# Patient Record
Sex: FEMALE | Race: WHITE | Employment: OTHER | URBAN - METROPOLITAN AREA
[De-identification: names, ages, dates, MRNs, and addresses within clinical notes are randomized per-mention and may not be internally consistent; named-entity substitution may affect disease eponyms.]

---

## 2017-11-21 ENCOUNTER — HOSPITAL ENCOUNTER (OUTPATIENT)
Dept: OTHER | Age: 62
Discharge: HOME | End: 2017-11-21
Attending: INTERNAL MEDICINE | Admitting: INTERNAL MEDICINE

## 2017-11-21 DIAGNOSIS — R07.89 OTHER CHEST PAIN: ICD-10-CM

## 2017-11-21 LAB — TSH SERPL DL<=0.05 MIU/L-ACNC: 2.21 UIU/ML (ref 0.27–4.2)

## 2018-02-22 ENCOUNTER — OFFICE VISIT (OUTPATIENT)
Dept: SURGERY | Age: 63
End: 2018-02-22

## 2018-02-22 VITALS — WEIGHT: 144 LBS | BODY MASS INDEX: 24.72 KG/M2 | SYSTOLIC BLOOD PRESSURE: 140 MMHG | DIASTOLIC BLOOD PRESSURE: 68 MMHG

## 2018-02-22 DIAGNOSIS — K27.5 PERFORATED ULCER (HCC): Primary | ICD-10-CM

## 2018-02-22 PROCEDURE — 99024 POSTOP FOLLOW-UP VISIT: CPT | Performed by: SURGERY

## 2018-02-22 RX ORDER — HYDROCODONE BITARTRATE AND ACETAMINOPHEN 5; 325 MG/1; MG/1
1 TABLET ORAL EVERY 6 HOURS PRN
Qty: 12 TABLET | Refills: 0 | Status: SHIPPED | OUTPATIENT
Start: 2018-02-22 | End: 2018-02-25

## 2018-02-27 ENCOUNTER — OFFICE VISIT (OUTPATIENT)
Dept: SURGERY | Age: 63
End: 2018-02-27

## 2018-02-27 VITALS — BODY MASS INDEX: 23.69 KG/M2 | DIASTOLIC BLOOD PRESSURE: 60 MMHG | SYSTOLIC BLOOD PRESSURE: 100 MMHG | WEIGHT: 138 LBS

## 2018-02-27 DIAGNOSIS — K27.5 PERFORATED ULCER (HCC): Primary | ICD-10-CM

## 2018-02-27 PROCEDURE — 99024 POSTOP FOLLOW-UP VISIT: CPT | Performed by: SURGERY

## 2018-02-27 NOTE — PROGRESS NOTES
[facility] GENERAL AND LAPAROSCOPIC SURGERY          PATIENT NAME: Mendy Mcdonald     TODAY'S DATE: 2/27/2018    SUBJECTIVE:    Pt making steady progress.      OBJECTIVE:  VITALS:  /60   Wt 138 lb (62.6 kg)   BMI 23.69 kg/m²     CONSTITUTIONAL:  awake and alert  LUNGS:  clear to auscultation  ABDOMEN:  normal bowel sounds, soft, non-distended, tenderness noted around incision     Data:    Radiology Review:  None      ASSESSMENT AND PLAN:  S/P repair of perfed DU  Doing well   Staples removed  Cont daily PPI  Off 6 wks post op, then RTW half days X 2 weeks  See back in 6 weeks    Jonel Lemons

## 2018-04-10 ENCOUNTER — OFFICE VISIT (OUTPATIENT)
Dept: SURGERY | Age: 63
End: 2018-04-10

## 2018-04-10 VITALS — DIASTOLIC BLOOD PRESSURE: 62 MMHG | BODY MASS INDEX: 24.55 KG/M2 | SYSTOLIC BLOOD PRESSURE: 128 MMHG | WEIGHT: 143 LBS

## 2018-04-10 DIAGNOSIS — K27.5 PERFORATED ULCER (HCC): Primary | ICD-10-CM

## 2018-04-10 PROCEDURE — 99024 POSTOP FOLLOW-UP VISIT: CPT | Performed by: SURGERY

## 2020-11-02 NOTE — PROGRESS NOTES
Yasmin Mccall MD  [facility]) Physicians - Rheumatology    [x] [facility] HOSPITAL:  [address] [facility]  [place] [] [facility] Office:  [address], [address]   Office: [phone]  Fax: [phone]     RHEUMATOLOGY CONSULT NOTE    HISTORY OF PRESENT ILLNESS:    The pt is a 75 y.o. female referred by Carol Maddox MD for evaluation of OA and RA. Current rheum meds:  Acetaminophen PRN  Voltaren gel PRN  OTC vitamin D3 unknown dose    Prior rheum meds:  NSAIDs: pt states she was told to avoid d/t a \"perforated ulcer\" 3 yrs ago    Pt reports chronic arthralgias in her hands. Joint pain has worsened over the past 6-8 months. Arthralgias are exacerbated by physical activity. L thumb joint has been hurting over the past 3 months. Pt reports constant pain and swelling in her L thumb and R 2nd DIP joints, otherwise denies any other joint swelling. She reports painful, bony growths and deformity in some of her fingers. Hands feel tight and stiff until she takes her Tylenol in the morning. Pt states Ibuprofen \"works the best, it takes me to the point where I can be functional again\" but she was told to avoid NSAIDs d/t a perforated gastric ulcer 3 yrs ago. Acetaminophen and Voltaren gel provide some pain relief. Pt reports chronic mid to low back pain which she attributes to multiple \"herniated discs and DJD\". Pt states she's had nerve ablation to her neck in the past.  She reports LBP after prolonged standing in the kitchen or walking her dogs. R hip hurts constantly. She denies any Hx of fragility fx. Mother had OA. She has a maternal cousin who was recently diagnosed w/ RA and lupus. She is a never smoker. 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       REVIEW OF SYSTEMS:    Constitutional: denies chronic fatigue, fever/chills, night sweats, unintentional weight loss  Integumentary: denies photosensitivity, rash, patchy alopecia, or Sx of Raynaud's phenomenon  Eyes: Tobacco Use    Smoking status: Never Smoker    Smokeless tobacco: Never Used   Substance and Sexual Activity    Alcohol use: Yes     Alcohol/week: 0.0 standard drinks    Drug use: No    Sexual activity: Not on file   Lifestyle    Physical activity     Days per week: Not on file     Minutes per session: Not on file    Stress: Not on file   Relationships    Social connections     Talks on phone: Not on file     Gets together: Not on file     Attends Alevism service: Not on file     Active member of club or organization: Not on file     Attends meetings of clubs or organizations: Not on file     Relationship status: Not on file    Intimate partner violence     Fear of current or ex partner: Not on file     Emotionally abused: Not on file     Physically abused: Not on file     Forced sexual activity: Not on file   Other Topics Concern    Not on file   Social History Narrative    Not on file        No family history on file. MEDICATIONS:    Current Outpatient Medications:     diclofenac sodium (VOLTAREN) 1 % GEL, APPLY 2 GRAMS QID TO BILATERAL HANDS, Disp: , Rfl:     gabapentin (NEURONTIN) 300 MG capsule, Take 1 capsule by mouth 3 times daily for 30 days. , Disp: 90 capsule, Rfl: 0    pantoprazole (PROTONIX) 40 MG tablet, Take 1 tablet by mouth daily, Disp: 30 tablet, Rfl: 3    tiZANidine (ZANAFLEX) 4 MG tablet, Take 4 mg by mouth 2 times daily, Disp: , Rfl:     lisinopril (PRINIVIL;ZESTRIL) 10 MG tablet, Take 10 mg by mouth daily, Disp: , Rfl:     ARMOUR THYROID PO, Take 60 mcg by mouth Daily , Disp: , Rfl:     ALPRAZolam (XANAX) 0.25 MG tablet, Take 0.25 mg by mouth nightly as needed for Sleep., Disp: , Rfl:     escitalopram (LEXAPRO) 10 MG tablet, Take 10 mg by mouth daily, Disp: , Rfl:     traZODone (DESYREL) 50 MG tablet, Take 50 mg by mouth nightly., Disp: , Rfl:     ALLERGIES:  Nsaids    PHYSICAL EXAM:    Vitals:    /72   Temp 96.7 °F (35.9 °C)   Ht 5' 2.5\" (1.588 m)   Wt 166 lb 05/29/2020    MCHC 34.4 05/29/2020    RDW 14.3 05/29/2020     Lab Results   Component Value Date     05/29/2020    K 4.4 05/29/2020     05/29/2020    CO2 21 05/29/2020    BUN 14 05/29/2020    CREATININE 0.6 05/29/2020    GLUCOSE 94 05/29/2020    CALCIUM 9.1 05/29/2020    PROT 7.0 05/29/2020    LABALBU 4.7 05/29/2020    BILITOT 0.8 05/29/2020    ALKPHOS 78 05/29/2020    AST 28 05/29/2020    ALT 24 05/29/2020    LABGLOM >60 05/29/2020    GFRAA >60 05/29/2020    AGRATIO 2.0 05/29/2020    GLOB 2.3 05/29/2020       Negative MARCIA, RF (5/23/17)  Vitamin D 25 OH 60.7 (5/29/20)  TSH wnl (5/29/20)    Radiology:  I personally reviewed prior imaging including:    DEXA study (4/24/15):  T-score -1.7 in L-spine, T-score of -1.5 in L femoral neck. Above results were discussed w/ the pt in detail during today's visit. 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 PMHx pertinent for HTN, hypothyroidism, anxiety, depression and insomnia. Clinical Hx and physical exam findings of her peripheral joints suggestive of erosive OA of the hands. I do not think she has RA given her joint distribution and physical exam findings. Discussed the disease course of erosive OA, pending Rheum w/u. Consider trial of HCQ and IAC injections to the [facility] joints at her 2 wk f/u visit. Clinical Hx of her chronic LBP does not sound inflammatory in nature. Suspect she likely has degenerative arthritis in her R hip and L-spine. Unfortunately she cannot tolerate NSAIDs d/t prior Hx of gastric ulcer and pt reports sub-optimal pain control from OTC Acetaminophen and Voltaren gel. As such she will try a trial of Gabapentin titrated up to 300 mg TID and compound pain cream.  Checked OARRS report. Discussed her prior DEXA study from 2015 showing osteopenia. Discussed the disease course of osteopenia and ordered repeat DEXA study today. Obtain w/u to evaluate for secondary causes of low BMD.  Vitamin D level at goal.     Orders Placed This Encounter   Procedures    XR HAND LEFT (MIN 3 VIEWS)     Standing Status:   Future     Standing Expiration Date:   5/4/2021     Order Specific Question:   Reason for exam:     Answer:   evaluate for degenerative vs inflammatory arthritis    XR HAND RIGHT (MIN 3 VIEWS)     Standing Status:   Future     Standing Expiration Date:   5/4/2021     Order Specific Question:   Reason for exam:     Answer:   evaluate for degenerative vs. inflammatory arthritis    XR HIP BILATERAL W AP PELVIS (2 VIEWS)     Standing Status:   Future     Standing Expiration Date:   5/4/2021     Order Specific Question:   Reason for exam:     Answer:   evaluate hip joints and sacroilliac joints for inflammatory vs degenerative changes    XR LUMBAR SPINE (2-3 VIEWS)     Standing Status:   Future     Standing Expiration Date:   5/4/2021     Order Specific Question:   Reason for exam:     Answer:   evaluate for inflammatory changes of spondyloarthropathies vs degenerative arthritis    CBC Auto Differential     Standing Status:   Future     Standing Expiration Date:   5/4/2021    AST     Standing Status:   Future     Standing Expiration Date:   5/4/2021    ALT     Standing Status:   Future     Standing Expiration Date:   5/4/2021    Creatinine, Serum     Standing Status:   Future     Standing Expiration Date:   5/4/2021    C-Reactive Protein     Standing Status:   Future     Standing Expiration Date:   6/3/2646    Cyclic Citrul Peptide Antibody, IgG     Standing Status:   Future     Standing Expiration Date:   5/4/2021    Rheumatoid Factor     Standing Status:   Future     Standing Expiration Date:   5/4/2021    MARCIA     Standing Status:   Future     Standing Expiration Date:   12/4/2020    Anti SSA     Standing Status:   Future     Standing Expiration Date:   12/4/2020    Anti SSB     Standing Status:   Future     Standing Expiration Date:   12/4/2020    Uric Acid     Standing Status:   Future     Standing Expiration Date:   5/4/2021    PTH, Intact     Standing Status:   Future     Standing Expiration Date:   5/4/2021    Electrophoresis Protein, Serum without Reflex to Immunofixation     Standing Status:   Future     Standing Expiration Date:   5/4/2021    Protein Electrophoresis, Urine     Standing Status:   Future     Standing Expiration Date:   5/4/2021     Outpatient Encounter Medications as of 11/4/2020   Medication Sig Dispense Refill    diclofenac sodium (VOLTAREN) 1 % GEL APPLY 2 GRAMS QID TO BILATERAL HANDS      gabapentin (NEURONTIN) 300 MG capsule Take 1 capsule by mouth 3 times daily for 30 days. 90 capsule 0    pantoprazole (PROTONIX) 40 MG tablet Take 1 tablet by mouth daily 30 tablet 3    tiZANidine (ZANAFLEX) 4 MG tablet Take 4 mg by mouth 2 times daily      lisinopril (PRINIVIL;ZESTRIL) 10 MG tablet Take 10 mg by mouth daily      ARMOUR THYROID PO Take 60 mcg by mouth Daily       ALPRAZolam (XANAX) 0.25 MG tablet Take 0.25 mg by mouth nightly as needed for Sleep.  escitalopram (LEXAPRO) 10 MG tablet Take 10 mg by mouth daily      traZODone (DESYREL) 50 MG tablet Take 50 mg by mouth nightly.  [DISCONTINUED] HYDROcodone-acetaminophen (NORCO) 5-325 MG per tablet Take 1 tablet by mouth daily.  [DISCONTINUED] cyclobenzaprine (FLEXERIL) 10 MG tablet Take 10 mg by mouth nightly as needed for Muscle spasms.  [DISCONTINUED] levothyroxine (SYNTHROID) 88 MCG tablet Take 88 mcg by mouth daily. No facility-administered encounter medications on file as of 11/4/2020. Return in about 2 weeks (around 11/18/2020) for lab result discussion and treatment plan, medication monitoring. 11/4/2020 10:13 AM      Sending consult note to referring provider Iesha Lopez MD.    Thank you very much for allowing me to participate in this pt's care.   Please do not hesitate to contact me if I can be of further assistance. NOTE: This report is transcribed by using voice recognition software dragon. Every effort is made to ensure accuracy; however, inadvertent computerized transcription errors may be present.

## 2020-11-04 ENCOUNTER — TELEPHONE (OUTPATIENT)
Dept: RHEUMATOLOGY | Age: 65
End: 2020-11-04

## 2020-11-04 ENCOUNTER — HOSPITAL ENCOUNTER (OUTPATIENT)
Dept: GENERAL RADIOLOGY | Age: 65
Discharge: HOME | End: 2020-11-04
Payer: MEDICARE

## 2020-11-04 ENCOUNTER — OFFICE VISIT (OUTPATIENT)
Dept: RHEUMATOLOGY | Age: 65
End: 2020-11-04
Payer: MEDICARE

## 2020-11-04 ENCOUNTER — HOSPITAL ENCOUNTER (OUTPATIENT)
Age: 65
Discharge: HOME | End: 2020-11-04
Payer: MEDICARE

## 2020-11-04 VITALS
WEIGHT: 166 LBS | TEMPERATURE: 96.7 F | DIASTOLIC BLOOD PRESSURE: 72 MMHG | SYSTOLIC BLOOD PRESSURE: 136 MMHG | HEIGHT: 63 IN | BODY MASS INDEX: 29.41 KG/M2

## 2020-11-04 DIAGNOSIS — M25.50 CHRONIC PAIN OF MULTIPLE JOINTS: ICD-10-CM

## 2020-11-04 DIAGNOSIS — Z51.81 ENCOUNTER FOR THERAPEUTIC DRUG MONITORING: ICD-10-CM

## 2020-11-04 DIAGNOSIS — G89.29 CHRONIC PAIN OF MULTIPLE JOINTS: ICD-10-CM

## 2020-11-04 DIAGNOSIS — Z78.0 OSTEOPENIA AFTER MENOPAUSE: ICD-10-CM

## 2020-11-04 DIAGNOSIS — M85.80 OSTEOPENIA AFTER MENOPAUSE: ICD-10-CM

## 2020-11-04 LAB
ALT SERPL-CCNC: 33 U/L (ref 10–40)
AST SERPL-CCNC: 23 U/L (ref 15–37)
BASOPHILS ABSOLUTE: 0 K/UL (ref 0–0.2)
BASOPHILS RELATIVE PERCENT: 0.7 %
C-REACTIVE PROTEIN: 4.1 MG/L (ref 0–5.1)
CREAT SERPL-MCNC: 0.8 MG/DL (ref 0.6–1.2)
EOSINOPHILS ABSOLUTE: 0.2 K/UL (ref 0–0.6)
EOSINOPHILS RELATIVE PERCENT: 3 %
GFR AFRICAN AMERICAN: >60
GFR NON-AFRICAN AMERICAN: >60
HCT VFR BLD CALC: 38.2 % (ref 36–48)
HEMOGLOBIN: 13.1 G/DL (ref 12–16)
LYMPHOCYTES ABSOLUTE: 1.2 K/UL (ref 1–5.1)
LYMPHOCYTES RELATIVE PERCENT: 22.7 %
MCH RBC QN AUTO: 34.6 PG (ref 26–34)
MCHC RBC AUTO-ENTMCNC: 34.4 G/DL (ref 31–36)
MCV RBC AUTO: 100.7 FL (ref 80–100)
MONOCYTES ABSOLUTE: 0.5 K/UL (ref 0–1.3)
MONOCYTES RELATIVE PERCENT: 9.4 %
NEUTROPHILS ABSOLUTE: 3.5 K/UL (ref 1.7–7.7)
NEUTROPHILS RELATIVE PERCENT: 64.2 %
PARATHYROID HORMONE INTACT: 35.8 PG/ML (ref 14–72)
PDW BLD-RTO: 13.3 % (ref 12.4–15.4)
PLATELET # BLD: 260 K/UL (ref 135–450)
PMV BLD AUTO: 8.8 FL (ref 5–10.5)
PROTEIN PROTEIN: <0.004 G/DL
PROTEIN PROTEIN: <4 MG/DL
RBC # BLD: 3.79 M/UL (ref 4–5.2)
RHEUMATOID FACTOR: <10 IU/ML
URIC ACID, SERUM: 5.3 MG/DL (ref 2.6–6)
WBC # BLD: 5.4 K/UL (ref 4–11)

## 2020-11-04 PROCEDURE — G8399 PT W/DXA RESULTS DOCUMENT: HCPCS | Performed by: INTERNAL MEDICINE

## 2020-11-04 PROCEDURE — 73130 X-RAY EXAM OF HAND: CPT

## 2020-11-04 PROCEDURE — G8484 FLU IMMUNIZE NO ADMIN: HCPCS | Performed by: INTERNAL MEDICINE

## 2020-11-04 PROCEDURE — 1123F ACP DISCUSS/DSCN MKR DOCD: CPT | Performed by: INTERNAL MEDICINE

## 2020-11-04 PROCEDURE — 1090F PRES/ABSN URINE INCON ASSESS: CPT | Performed by: INTERNAL MEDICINE

## 2020-11-04 PROCEDURE — 73521 X-RAY EXAM HIPS BI 2 VIEWS: CPT

## 2020-11-04 PROCEDURE — 1036F TOBACCO NON-USER: CPT | Performed by: INTERNAL MEDICINE

## 2020-11-04 PROCEDURE — G8417 CALC BMI ABV UP PARAM F/U: HCPCS | Performed by: INTERNAL MEDICINE

## 2020-11-04 PROCEDURE — 72100 X-RAY EXAM L-S SPINE 2/3 VWS: CPT

## 2020-11-04 PROCEDURE — 99204 OFFICE O/P NEW MOD 45 MIN: CPT | Performed by: INTERNAL MEDICINE

## 2020-11-04 PROCEDURE — 3017F COLORECTAL CA SCREEN DOC REV: CPT | Performed by: INTERNAL MEDICINE

## 2020-11-04 PROCEDURE — G8427 DOCREV CUR MEDS BY ELIG CLIN: HCPCS | Performed by: INTERNAL MEDICINE

## 2020-11-04 PROCEDURE — 4040F PNEUMOC VAC/ADMIN/RCVD: CPT | Performed by: INTERNAL MEDICINE

## 2020-11-04 RX ORDER — GABAPENTIN 300 MG/1
300 CAPSULE ORAL 3 TIMES DAILY
Qty: 90 CAPSULE | Refills: 0 | Status: SHIPPED | OUTPATIENT
Start: 2020-11-04 | End: 2020-11-18 | Stop reason: SDUPTHER

## 2020-11-04 NOTE — PATIENT INSTRUCTIONS
Patient Education        Learning About Low Bone Density  What is low bone density? Low bone density (sometimes called osteopenia) is a decrease in thickness, or density, in bones. That means the bones become thinner and weaker. It is much more common in women than in men. It's important to know that low bone density is not a disease. It can happen normally with aging. Having low bone density means that there is a greater risk that you may get osteoporosis. It also means that you are more likely to break a bone than someone who does not have low bone density. But not everyone with low bone density gets osteoporosis or breaks a bone. Low bone density doesn't cause any symptoms. It's usually found with a type of X-ray called a bone density test. Low bone density means that your bone density result (T-score) is between -1.0 and -2.5. What increases your risk for low bone density? Things that increase your risk include:  · Getting older. · Having a family history of osteoporosis. · Being thin. · Being white or . · Getting too little physical activity. · Smoking. · Drinking too much alcohol often. · Using certain medicines such as steroids. How can you prevent osteoporosis? There are things you can do to help prevent osteoporosis. Certain lifestyle changes will help slow the loss of bone density. · Eat food that has plenty of calcium and vitamin D. Yogurt, cheese, milk, and dark green vegetables are high in calcium. Eggs, fatty fish, cereal, and fortified milk are high in vitamin D.  · Ask your doctor if you need to take a calcium plus vitamin D supplement. You may be able to get enough calcium and vitamin D through your diet. · Get regular exercise. ? Do 30 minutes of weight-bearing exercise on most days of the week. Walking, jogging, stair climbing, and dancing are good choices. ? Do resistance exercises with weights or elastic bands 2 or 3 days a week.   · Limit alcohol to 2 drinks a day for men and 1 drink a day for women. Too much alcohol can cause health problems. · Do not smoke. Smoking can make bones thin faster. If you need help quitting, talk to your doctor about stop-smoking programs and medicines. These can increase your chances of quitting for good. Prescription medicines are available for treating low bone density. But these are more often used to treat osteoporosis. Follow-up care is a key part of your treatment and safety. Be sure to make and go to all appointments, and call your doctor if you are having problems. It's also a good idea to know your test results and keep a list of the medicines you take. Where can you learn more? Go to https://Here@ [employer] org and sign in to your [employer] account. Enter O011 in the MyWealth box to learn more about \"Learning About Low Bone Density. \"     If you do not have an account, please click on the \"Sign Up Now\" link. Current as of: April 15, 2020               Content Version: 12.6  © 2006-2020 [employer]. Care instructions adapted under license by [facility] ([facility]). If you have questions about a medical condition or this instruction, always ask your healthcare professional. [place] [address] any warranty or liability for your use of this information.

## 2020-11-04 NOTE — TELEPHONE ENCOUNTER
Pt called asking to schedule her 2 week follow up. There was nothing open in [place] for 2 weeks and they advised her to call the office to see if  office has anything open? There is nothing in the  location in 4 weeks. Wondering if you can fit her in or can she go out a couple more weeks. She is closer to the  location.

## 2020-11-05 LAB
ANTI-NUCLEAR ANTIBODY (ANA): NEGATIVE
ANTI-SS-A IGG: <0.2 AI (ref 0–0.9)
ANTI-SS-B IGG: <0.2 AI (ref 0–0.9)

## 2020-11-06 LAB
ALBUMIN SERPL-MCNC: 4.1 G/DL (ref 3.1–4.9)
ALPHA-1-GLOBULIN: 0.2 G/DL (ref 0.2–0.4)
ALPHA-2-GLOBULIN: 1 G/DL (ref 0.4–1.1)
BETA GLOBULIN: 1.2 G/DL (ref 0.9–1.6)
CYCLIC CITRULLINATED PEPTIDE ANTIBODY IGG: <0.5 U/ML (ref 0–2.9)
GAMMA GLOBULIN: 1.3 G/DL (ref 0.6–1.8)
SPE/IFE INTERPRETATION: NORMAL
TOTAL PROTEIN: 7.7 G/DL (ref 6.4–8.2)
URINE ELECTROPHORESIS INTERP: NORMAL

## 2020-11-16 NOTE — PROGRESS NOTES
Yasmin Mccall MD  [facility]) Physicians - Rheumatology    [x] [facility] HOSPITAL:  [address] [facility]  Sri PatelLoco hairston [] [facility] Office:  [address], [address]   Office: [phone]  Fax: [phone] PROGRESS NOTE    SUBJECTIVE:    Background:   Salli Brittle is a 75 y.o. female w/ erosive OA of the hands, generalized OA, severe DDD of L-spine and osteopenia. PMHx pertinent for HTN, hypothyroidism, anxiety, depression and insomnia. Current rheum meds:  Acetaminophen PRN  Gabapentin 300 mg TID   Voltaren gel PRN  Compound pain cream PRN  OTC vitamin D3 unknown dose     Prior rheum meds:  NSAIDs: pt states she was told to avoid d/t a \"perforated ulcer\" 3 yrs ago    Past medical/surgical history, medications and allergies are reviewed and updated as appropriate. Interval Hx:   Pt reports good pain relief from Gabapentin which she's been taking 2-3x daily. She denies any s/e on this. She feels this has improved her joint pain in her hands and low back. She also reports good pain relief from compound pain cream.  She reports chronic pain mainly in her L 1st CMC joint and R DIP joints (worst in R 2nd DIP joints). These joints hurt significantly when she accidentally hits them against something. Hands feel stiff for at least an hr in the morning.     Rheumatologic ROS:  Constitutional: denies chronic fatigue, fever/chills, night sweats, unintentional weight loss  Integumentary: denies photosensitivity, rash, patchy alopecia, or Sx of Raynaud's phenomenon  Eyes: denies dry eyes, redness or pain, visual disturbance, or floaters  Nose: denies nasal ulcers or recurrent sinusitis  Oral cavity: +extreme dry mouth which she attributes to her thyroid medicine, +canker sores  Cardiovascular: denies CP, palpitations, Hx of pericardial effusion or pericarditis  Respiratory: denies SOB, cough, hemoptysis, or pleurisy  Gastrointestinal: denies heart burn, dysphagia or esophageal dysmotility, denies change in bowel habits or Sx of IBD  Musculoskeletal:  refer to above HPI     Allergies   Allergen Reactions    Nsaids      Note: PERFORATED PEPTIC ULCER       Past Medical History:        Diagnosis Date    Anxiety     Depression     Hypothyroidism        Past Surgical History:        Procedure Laterality Date    ABDOMINAL EXPLORATION SURGERY  02/12/2018    LAPAROTOMY EXPLORATORY, REPAIR PERFORATED ULCER    BLADDER REPAIR      BREAST ENHANCEMENT SURGERY      HYSTERECTOMY      partial       Medications:    Current Outpatient Medications   Medication Sig Dispense Refill    hydroxychloroquine (PLAQUENIL) 200 MG tablet Take 1 tablet by mouth daily for 7 days, THEN 1.5 tablets daily. 132 tablet 0    [START ON 12/1/2020] gabapentin (NEURONTIN) 300 MG capsule Take 1 capsule by mouth 3 times daily for 90 days. 270 capsule 0    diclofenac sodium (VOLTAREN) 1 % GEL APPLY 2 GRAMS QID TO BILATERAL HANDS      pantoprazole (PROTONIX) 40 MG tablet Take 1 tablet by mouth daily 30 tablet 3    tiZANidine (ZANAFLEX) 4 MG tablet Take 4 mg by mouth 2 times daily      lisinopril (PRINIVIL;ZESTRIL) 10 MG tablet Take 10 mg by mouth daily      ARMOUR THYROID PO Take 60 mcg by mouth Daily       ALPRAZolam (XANAX) 0.25 MG tablet Take 0.25 mg by mouth nightly as needed for Sleep.  escitalopram (LEXAPRO) 10 MG tablet Take 10 mg by mouth daily      traZODone (DESYREL) 50 MG tablet Take 50 mg by mouth nightly. No current facility-administered medications for this visit.          OBJECTIVE:  Physical Exam:  Temp 96.9 °F (36.1 °C)   Ht 5' 2.5\" (1.588 m)   Wt 166 lb (75.3 kg)   BMI 29.88 kg/m²     GEN: AAOx3, in NAD, well-appearing  HEAD: normocephalic, atraumatic  EYES: no injection or icterus  CVS: RRR  LUNGS: in no acute respiratory distress, CTAB  MSK:  Spine: no kyphosis or lordosis, no point tenderness over the spine, SI joints NTTP  Upper extremities:              Hands: MCP joints w/o swelling NTTP, there is bony enlargement w/ synovial proliferation of the b/l PIP joints TTP, bogginess of the b/l DIP joints TTP, +Smiley and Heberden nodes TTP, there is squaring of the b/l 1st CMC joints, L CMC joint w/ synovitis very TTP w/ thenar atrophy, full fist formation w/ good  strength              Wrist: no synovitis in the wrist joints b/l, FROM              Elbow: no synovitis or bursitis, FROM  Lower extremities:              Knees: no warmth or effusion present, FROM              Ankles: no synovitis, FROM, Achilles tendons w/o swelling or warmth NTTP              Feet: no toe swelling or pain or warmth on palpation w/ FROM, negative MTP squeeze test  INTEGUMENTARY: no rash or psoriatic lesions, no petechiae, bruises, or palpable purpura, no patchy alopecia, no nail or periungual changes, no clubbing or digital ulcers  PSYCH: normal mood    DATA:  Labs:   I personally reviewed interval labs and discussed w/ the pt in detail which showed:    Lab Results   Component Value Date    WBC 5.4 11/04/2020    HGB 13.1 11/04/2020    HCT 38.2 11/04/2020    .7 (H) 11/04/2020     11/04/2020    LYMPHOPCT 22.7 11/04/2020    RBC 3.79 (L) 11/04/2020    MCH 34.6 (H) 11/04/2020    MCHC 34.4 11/04/2020    RDW 13.3 11/04/2020     Lab Results   Component Value Date     05/29/2020    K 4.4 05/29/2020     05/29/2020    CO2 21 05/29/2020    BUN 14 05/29/2020    CREATININE 0.8 11/04/2020    GLUCOSE 94 05/29/2020    CALCIUM 9.1 05/29/2020    PROT 7.7 11/04/2020    LABALBU 4.1 11/04/2020    BILITOT 0.8 05/29/2020    ALKPHOS 78 05/29/2020    AST 23 11/04/2020    ALT 33 11/04/2020    LABGLOM >60 11/04/2020    GFRAA >60 11/04/2020    AGRATIO 2.0 05/29/2020    GLOB 2.3 05/29/2020       CRP wnl (11/4/20)  Negative RF x 2 (5/23/17, 11/4/20)  Negative CCP (11/4/20)  Uric acid 5.3 (11/4/20)  Negative MARCIA x 2 (5/23/17, 11/4/20)  Negative SSA, SSB (11/4/20)  Vitamin D 25 OH 60.7 (5/29/20)  TSH wnl (5/29/20)  PTH intact wnl (11/4/20)  SPEP and UPEP: no monoclonal band (11/4/20)    Imaging:  I personally reviewed interval imaging and discussed w/ the pt in detail which included:    X-rays (11/4/20):  R hand: There is no acute fracture. Polyarticular osteoarthritis is present worse in the interphalangeal and trapeziometacarpal joints. L hand:  Left hand: There is no acute fracture. Polyarticular osteoarthritis is present worse in the interphalangeal and trapeziometacarpal joints. There is no destructive bone lesion seen. L-spine:  The vertebral body heights are maintained. There is grade 1 anterolisthesis of L4 on L5. Severe degenerative disc disease at L4-L5 and L5-S1. Facet osteoarthritis in the lower lumbar spine. Pelvis/hips:  No acute fractures seen. The hip joints are congruent. There is no destructive osseous lesion. I independently reviewed above x-rays, there are degenerative changes in the b/l PIP joints and significant joint space narrowing w/ gull wing deformities in the b/l DIP joints c/w erosive OA. DEXA study (4/24/15):  T-score -1.7 in L-spine, T-score of -1.5 in L femoral neck. Above results were discussed w/ the pt in detail during today's visit. ASSESSMENT/PLAN:   Discussed her rheum w/u c/w erosive OA of the hands, generalize OA and severe DDD of her L-spine. Discussed the disease course of erosive OA, she will start a 6 month trial of HCQ titrated up to 300 mg daily. Risk, benefits and side effects were fully explained to my best knowledge, including but not limited to skin rash, GI side effects, visual blurring, and the risk of retinal toxicity and need to have yearly eye/retinal exam. I also gave hand out on drug information. Pt verbalized understanding. Injected L 1st CMC joint today, refer to below procedure note for details of the injection. Discussed referral to Ortho Hand Surgery if she does not respond to steroid injection.   Cont compound pain cream PRN. Cont Gabapentin 300 mg TID for her generalized OA and severe DDD of the L-spine. Made referral to Dr. Maura Zuñiga for pain intervention for her chronic LBP. Discussed her prior DEXA study from 2015 showing osteopenia. Discussed the disease course of osteopenia and ordered repeat DEXA study today. Vitamin D level at goal.     Indication: OA of L 1st [facility] joint    Procedure details: The risks and benefits of the procedure were discussed with the pt who then gave verbal consent. The skin was first prepped w/ Chloroprep and alcohol swipe. The area of injection was anaesthetized with topical Ethylene Chloride spray. Using the sterile technique, joint was entered with a 27 G needle and Kenalog 10 mg with 0.25 mL of Lidocaine 1% was injected into the joint and the needle was withdrawn. The pt tolerated the procedure well and there were no immediate post-procedure complications. Post injection care was discussed with patient. Pt was instructed to call or return to clinic PRN if such symptoms occur or there is failure to improve as anticipated. Loan Aguilar was seen today for follow-up. Diagnoses and all orders for this visit:    Erosive osteoarthritis of both hands  -     hydroxychloroquine (PLAQUENIL) 200 MG tablet; Take 1 tablet by mouth daily for 7 days, THEN 1.5 tablets daily.  -     gabapentin (NEURONTIN) 300 MG capsule; Take 1 capsule by mouth 3 times daily for 90 days. DDD (degenerative disc disease), lumbar  -     gabapentin (NEURONTIN) 300 MG capsule; Take 1 capsule by mouth 3 times daily for 90 days.  -     External Referral To Pain Clinic    Primary osteoarthritis involving multiple joints  -     gabapentin (NEURONTIN) 300 MG capsule;  Take 1 capsule by mouth 3 times daily for 90 days.  -     [place] ARTHROCENTESIS ASPIR&/INJ SMALL JT/BURSA W/O US  -     triamcinolone acetonide (KENALOG-40) injection 40 mg  -     lidocaine 1 % injection 1 mL    Osteopenia after menopause  -     DEXA BONE DENSITY 2 SITES; Future    Abnormal findings on diagnostic imaging of limbs   -     DEXA BONE DENSITY 2 SITES; Future          Orders Placed This Encounter   Procedures    DEXA BONE DENSITY 2 SITES     Standing Status:   Future     Standing Expiration Date:   2021     Order Specific Question:   Reason for exam:     Answer:   osteopenia    External Referral To Pain Clinic     Referral Priority:   Routine     Referral Type:   Eval and Treat     Referral Reason:   Specialty Services Required     Requested Specialty:   Pain Management     Number of Visits Requested:   1    [place] ARTHROCENTESIS ASPIR&/INJ SMALL JT/BURSA W/O US     Outpatient Encounter Medications as of 2020   Medication Sig Dispense Refill    hydroxychloroquine (PLAQUENIL) 200 MG tablet Take 1 tablet by mouth daily for 7 days, THEN 1.5 tablets daily. 132 tablet 0    [START ON 2020] gabapentin (NEURONTIN) 300 MG capsule Take 1 capsule by mouth 3 times daily for 90 days. 270 capsule 0    diclofenac sodium (VOLTAREN) 1 % GEL APPLY 2 GRAMS QID TO BILATERAL HANDS      [DISCONTINUED] gabapentin (NEURONTIN) 300 MG capsule Take 1 capsule by mouth 3 times daily for 30 days. 90 capsule 0    pantoprazole (PROTONIX) 40 MG tablet Take 1 tablet by mouth daily 30 tablet 3    tiZANidine (ZANAFLEX) 4 MG tablet Take 4 mg by mouth 2 times daily      lisinopril (PRINIVIL;ZESTRIL) 10 MG tablet Take 10 mg by mouth daily      ARMOUR THYROID PO Take 60 mcg by mouth Daily       ALPRAZolam (XANAX) 0.25 MG tablet Take 0.25 mg by mouth nightly as needed for Sleep.  escitalopram (LEXAPRO) 10 MG tablet Take 10 mg by mouth daily      traZODone (DESYREL) 50 MG tablet Take 50 mg by mouth nightly.  [] triamcinolone acetonide (KENALOG-40) injection 40 mg       [] lidocaine 1 % injection 1 mL        No facility-administered encounter medications on file as of 2020.         Return in about 6 weeks (around 2020) for lab result discussion and treatment plan, medication monitoring. The risks and benefits of my recommendations, as well as other treatment options, benefits and side effects were discussed with the patient today. Questions were answered. NOTE: This report is transcribed by using voice recognition software dragon. Every effort is made to ensure accuracy; however, inadvertent computerized  transcription errors may be present.

## 2020-11-18 ENCOUNTER — OFFICE VISIT (OUTPATIENT)
Dept: RHEUMATOLOGY | Age: 65
End: 2020-11-18
Payer: MEDICARE

## 2020-11-18 VITALS — WEIGHT: 166 LBS | BODY MASS INDEX: 29.41 KG/M2 | TEMPERATURE: 96.9 F | HEIGHT: 63 IN

## 2020-11-18 PROCEDURE — 4040F PNEUMOC VAC/ADMIN/RCVD: CPT | Performed by: INTERNAL MEDICINE

## 2020-11-18 PROCEDURE — 1036F TOBACCO NON-USER: CPT | Performed by: INTERNAL MEDICINE

## 2020-11-18 PROCEDURE — G8417 CALC BMI ABV UP PARAM F/U: HCPCS | Performed by: INTERNAL MEDICINE

## 2020-11-18 PROCEDURE — 3017F COLORECTAL CA SCREEN DOC REV: CPT | Performed by: INTERNAL MEDICINE

## 2020-11-18 PROCEDURE — G8427 DOCREV CUR MEDS BY ELIG CLIN: HCPCS | Performed by: INTERNAL MEDICINE

## 2020-11-18 PROCEDURE — 1123F ACP DISCUSS/DSCN MKR DOCD: CPT | Performed by: INTERNAL MEDICINE

## 2020-11-18 PROCEDURE — G8484 FLU IMMUNIZE NO ADMIN: HCPCS | Performed by: INTERNAL MEDICINE

## 2020-11-18 PROCEDURE — G8399 PT W/DXA RESULTS DOCUMENT: HCPCS | Performed by: INTERNAL MEDICINE

## 2020-11-18 PROCEDURE — 1090F PRES/ABSN URINE INCON ASSESS: CPT | Performed by: INTERNAL MEDICINE

## 2020-11-18 PROCEDURE — 20600 DRAIN/INJ JOINT/BURSA W/O US: CPT | Performed by: INTERNAL MEDICINE

## 2020-11-18 PROCEDURE — 99214 OFFICE O/P EST MOD 30 MIN: CPT | Performed by: INTERNAL MEDICINE

## 2020-11-18 RX ORDER — HYDROXYCHLOROQUINE SULFATE 200 MG/1
TABLET, FILM COATED ORAL
Qty: 132 TABLET | Refills: 0 | Status: SHIPPED | OUTPATIENT
Start: 2020-11-18 | End: 2020-12-15 | Stop reason: SDUPTHER

## 2020-11-18 RX ORDER — LIDOCAINE HYDROCHLORIDE 10 MG/ML
1 INJECTION, SOLUTION INFILTRATION; PERINEURAL ONCE
Status: COMPLETED | OUTPATIENT
Start: 2020-11-18 | End: 2020-11-18

## 2020-11-18 RX ORDER — GABAPENTIN 300 MG/1
300 CAPSULE ORAL 3 TIMES DAILY
Qty: 270 CAPSULE | Refills: 0 | Status: SHIPPED | OUTPATIENT
Start: 2020-12-01 | End: 2021-02-22 | Stop reason: SDUPTHER

## 2020-11-18 RX ORDER — TRIAMCINOLONE ACETONIDE 40 MG/ML
40 INJECTION, SUSPENSION INTRA-ARTICULAR; INTRAMUSCULAR ONCE
Status: COMPLETED | OUTPATIENT
Start: 2020-11-18 | End: 2020-11-18

## 2020-11-18 RX ADMIN — LIDOCAINE HYDROCHLORIDE 1 ML: 10 INJECTION, SOLUTION INFILTRATION; PERINEURAL at 09:59

## 2020-11-18 RX ADMIN — TRIAMCINOLONE ACETONIDE 40 MG: 40 INJECTION, SUSPENSION INTRA-ARTICULAR; INTRAMUSCULAR at 10:00

## 2020-11-18 NOTE — PATIENT INSTRUCTIONS
Plaquenil instructions: Take one tablet daily for the first week. If well tolerated, then take one and a half tablets daily. Hydroxychloroquine     Pronunciation: iesha lyons   Brand: Plaquenil Sulfate     What is hydroxychloroquine? Hydroxychloroquine is used to treat or prevent malaria, a disease caused by parasites that enter the body through the bite of a mosquito. Malaria is common in areas such as [place], [place], and [place]. Hydroxychloroquine is also used to treat symptoms of rheumatoid arthritis and discoid or systemic lupus erythematosus. Hydroxychloroquine may also be used for purposes not listed in this medication guide. What should I discuss with my health care provider before taking hydroxychloroquine? You should not use this medication if you are allergic to hydroxychloroquine, or if you have a history of vision changes or damage to your retina caused by an anti-malaria medication. Hydroxychloroquine should not be used for long-term treatment in children. To make sure hydroxychloroquine is safe for you, tell your doctor if you have any of these conditions:    psoriasis;    porphyria;    liver disease;    alcoholism; or    glucose-6-phosphate dehydrogenase (G-6-PD) deficiency. It is not known whether hydroxychloroquine will harm an unborn baby. Tell your doctor if you are pregnant or plan to become pregnant while using this medication. Malaria is more likely to cause death in a pregnant woman. If you are pregnant, talk with your doctor about the risks of traveling to areas where malaria is common. It is not known whether hydroxychloroquine passes into breast milk or if it could harm a nursing baby. Do not use this medication without telling your doctor if you are breast-feeding a baby. How should I take hydroxychloroquine? Take exactly as prescribed by your doctor. Do not take in larger or smaller amounts or for longer than recommended. Follow the directions on your prescription label. Take hydroxychloroquine with a meal or a glass of milk, unless your doctor tells you otherwise. Hydroxychloroquine is sometimes given only once per week. Choose the same day each week to take this medication if you are on a weekly dosing schedule. To prevent malaria: Start taking the medicine 2 weeks before entering an area where malaria is common. Continue taking the medicine regularly during your stay and for at least 8 weeks after you leave the area. To treat malaria: Your doctor may recommend a single dose, or a high starting dose followed by a smaller dose during the last 2 days of treatment. Follow your doctor's instructions. Take this medicine for the full prescribed length of time for malaria. Your symptoms may improve before the infection is completely cleared. In addition to taking hydroxychloroquine, use protective clothing, insect repellents, and mosquito netting around your bed to further prevent mosquito bites that could cause malaria. Contact your doctor as soon as possible if you have been exposed to malaria, or if you have fever or other symptoms of illness during or after a stay in an area where malaria is common. When treating lupus or arthritis, hydroxychloroquine is usually given daily for several weeks or months. For best results, keep using the medication as directed. Talk with your doctor if your symptoms do not improve after 6 months of treatment. While using hydroxychloroquine, you may need frequent blood tests at your doctor's office. No medication is 100% effective in treating or preventing all types of malaria. For best results, keep using the medication as directed. Talk with your doctor if you have fever, vomiting, or diarrhea during your treatment. Store at room temperature away from moisture, heat, and light. What happens if I miss a dose? Take the missed dose as soon as you remember.  Skip the missed dose if it is almost time for your next scheduled dose. Do not take extra medicine to make up the missed dose. What happens if I overdose? Seek emergency medical attention or call the Poison Help line at 1-890.384.5622. An overdose of hydroxychloroquine can be fatal, especially in children. Treatment of a hydroxychloroquine overdose must be started quickly. You may be told to induce vomiting right away (at home, before transport to an emergency room). Ask the poison control center how to induce vomiting in the case of a hydroxychloroquine overdose. Overdose symptoms may include headache, drowsiness, vision changes, slow heart rate, chest pain or heavy feeling, pain spreading to the arm or shoulder, nausea, sweating, seizure (convulsions), shallow breathing, or breathing that stops. What should I avoid while taking hydroxychloroquine? Avoid taking an antacid or Kaopectate (kaolin-pectin) within 4 hours before or after you take hydroxychloroquine. Some antacids can make it harder for your body to absorb hydroxychloroquine. What are the possible side effects of hydroxychloroquine? Some people taking this medication over long periods of time or at high doses have developed irreversible damage to the retina of the eye. Stop taking hydroxychloroquine and call your doctor at once if you have trouble focusing, if you see light streaks or flashes in your vision, or if you notice any swelling or color changes in your eyes. Get emergency medical help if you have any of these signs of an allergic reaction: hives; difficulty breathing; swelling of your face, lips, tongue, or throat.    Call your doctor at once if you have a serious side effect such as:    muscle weakness, twitching, or uncontrolled movement;    loss of balance or coordination;    blurred vision, light sensitivity, seeing halos around lights;    pale skin, easy bruising or bleeding;    confusion, unusual thoughts or behavior; or    seizure (convulsions). Less serious side effects may include:    headache, ringing in your ears, spinning sensation;    nausea, vomiting, stomach pain;    loss of appetite, weight loss;    mood changes, feeling nervous or irritable;    skin rash or itching; or    hair loss.    This is not a complete list of side effects and others may occur. Call your doctor for medical advice about side effects. You may report side effects to FDA at 5-024-MTT-7484. What other drugs will affect hydroxychloroquine? Hydroxychloroquine can harm your liver. This effect is increased when you also use other medicines harmful to the liver. You may need dose adjustments or special tests if you have recently used:    acetaminophen (Tylenol);    an antibiotic, antifungal medicine, sulfa drug, or tuberculosis medicine;    birth control pills or hormone replacement therapy;    blood pressure medication;    cancer medications;    cholesterol-lowering medications such as Crestor, Lipitor, Pravachol, Simcor, Vytorin, Zocor;    gout or arthritis medications (including gold injections);    HIV/AIDS medications;    medicines to treat psychiatric disorders;    an NSAID such as Advil, Aleve, Arthrotec, Cataflam, Celebrex, Indocin, Motrin, Naprosyn, Treximet, Voltaren; or    seizure medications.    This list is not complete and other drugs may interact with hydroxychloroquine. Tell your doctor about all medications you use. This includes prescription, over-the-counter, vitamin, and herbal products. Do not start a new medication without telling your doctor. Where can I get more information? Your pharmacist can provide more information about hydroxychloroquine. Remember, keep this and all other medicines out of the reach of children, never share your medicines with others, and use this medication only for the indication prescribed.    Every effort has been made to ensure that the information provided by [address] [place] [address] Dr JUAREZ ('[employer]') is accurate, up-to-date, and complete, but no guarantee is made to that effect. Drug information contained herein may be time sensitive. [employer] information has been compiled for use by healthcare practitioners and consumers in the [place] and therefore [employer] does not warrant that uses outside of the [place] are appropriate, unless specifically indicated otherwise. [employer]'s drug information does not endorse drugs, diagnose patients or recommend therapy. [employer]'s drug information is an informational resource designed to assist licensed healthcare practitioners in caring for their patients and/or to serve consumers viewing this service as a supplement to, and not a substitute for, the expertise, skill, knowledge and judgment of healthcare practitioners. The absence of a warning for a given drug or drug combination in no way should be construed to indicate that the drug or drug combination is safe, effective or appropriate for any given patient. [facility] does not assume any responsibility for any aspect of healthcare administered with the aid of information [employer] provides. The information contained herein is not intended to cover all possible uses, directions, precautions, warnings, drug interactions, allergic reactions, or adverse effects. If you have questions about the drugs you are taking, check with your doctor, nurse or pharmacist.   Copyright 3435-1487 [address]: 7.01. Revision date: 11/13/2012. This information does not replace the advice of a doctor. [employer] disclaims any warranty or liability for your use of this information.    Content Version: 30.8.690102

## 2020-12-08 ENCOUNTER — HOSPITAL ENCOUNTER (OUTPATIENT)
Dept: GENERAL RADIOLOGY | Age: 65
Discharge: HOME | End: 2020-12-08
Payer: MEDICARE

## 2020-12-08 PROCEDURE — 77080 DXA BONE DENSITY AXIAL: CPT

## 2020-12-15 ENCOUNTER — HOSPITAL ENCOUNTER (INPATIENT)
Age: 65
LOS: 3 days | Discharge: HOME | DRG: 287 | End: 2020-12-18
Attending: EMERGENCY MEDICINE | Admitting: INTERNAL MEDICINE
Payer: MEDICARE

## 2020-12-15 ENCOUNTER — APPOINTMENT (OUTPATIENT)
Dept: CT IMAGING | Age: 65
DRG: 287 | End: 2020-12-15
Payer: MEDICARE

## 2020-12-15 PROBLEM — I48.92 ATRIAL FLUTTER WITH RAPID VENTRICULAR RESPONSE (HCC): Status: ACTIVE | Noted: 2020-12-15

## 2020-12-15 PROBLEM — I48.3 TYPICAL ATRIAL FLUTTER (HCC): Status: ACTIVE | Noted: 2020-12-15

## 2020-12-15 PROBLEM — R07.89 CHEST DISCOMFORT: Status: ACTIVE | Noted: 2020-12-15

## 2020-12-15 LAB
ANION GAP SERPL CALCULATED.3IONS-SCNC: 15 MMOL/L (ref 3–16)
APTT: 31.1 SEC (ref 24.2–36.2)
BASOPHILS ABSOLUTE: 0 K/UL (ref 0–0.2)
BASOPHILS RELATIVE PERCENT: 0.6 %
BUN BLDV-MCNC: 13 MG/DL (ref 7–20)
CALCIUM SERPL-MCNC: 9.3 MG/DL (ref 8.3–10.6)
CHLORIDE BLD-SCNC: 99 MMOL/L (ref 99–110)
CO2: 17 MMOL/L (ref 21–32)
CREAT SERPL-MCNC: 0.6 MG/DL (ref 0.6–1.2)
EOSINOPHILS ABSOLUTE: 0.1 K/UL (ref 0–0.6)
EOSINOPHILS RELATIVE PERCENT: 1.5 %
GFR AFRICAN AMERICAN: >60
GFR NON-AFRICAN AMERICAN: >60
GLUCOSE BLD-MCNC: 113 MG/DL (ref 70–99)
HCT VFR BLD CALC: 36.4 % (ref 36–48)
HEMOGLOBIN: 12.5 G/DL (ref 12–16)
LYMPHOCYTES ABSOLUTE: 0.9 K/UL (ref 1–5.1)
LYMPHOCYTES RELATIVE PERCENT: 11.9 %
MCH RBC QN AUTO: 34.4 PG (ref 26–34)
MCHC RBC AUTO-ENTMCNC: 34.4 G/DL (ref 31–36)
MCV RBC AUTO: 100.1 FL (ref 80–100)
MONOCYTES ABSOLUTE: 0.5 K/UL (ref 0–1.3)
MONOCYTES RELATIVE PERCENT: 7 %
NEUTROPHILS ABSOLUTE: 5.9 K/UL (ref 1.7–7.7)
NEUTROPHILS RELATIVE PERCENT: 79 %
PDW BLD-RTO: 14.1 % (ref 12.4–15.4)
PLATELET # BLD: 296 K/UL (ref 135–450)
PMV BLD AUTO: 8.6 FL (ref 5–10.5)
POTASSIUM REFLEX MAGNESIUM: 3.7 MMOL/L (ref 3.5–5.1)
PRO-BNP: 594 PG/ML (ref 0–124)
RBC # BLD: 3.63 M/UL (ref 4–5.2)
SODIUM BLD-SCNC: 131 MMOL/L (ref 136–145)
TROPONIN: <0.01 NG/ML
WBC # BLD: 7.5 K/UL (ref 4–11)

## 2020-12-15 PROCEDURE — 2500000003 HC RX 250 WO HCPCS: Performed by: EMERGENCY MEDICINE

## 2020-12-15 PROCEDURE — 85025 COMPLETE CBC W/AUTO DIFF WBC: CPT

## 2020-12-15 PROCEDURE — 99284 EMERGENCY DEPT VISIT MOD MDM: CPT

## 2020-12-15 PROCEDURE — 2500000003 HC RX 250 WO HCPCS

## 2020-12-15 PROCEDURE — 1200000000 HC SEMI PRIVATE

## 2020-12-15 PROCEDURE — 83880 ASSAY OF NATRIURETIC PEPTIDE: CPT

## 2020-12-15 PROCEDURE — 36415 COLL VENOUS BLD VENIPUNCTURE: CPT

## 2020-12-15 PROCEDURE — 84484 ASSAY OF TROPONIN QUANT: CPT

## 2020-12-15 PROCEDURE — 71260 CT THORAX DX C+: CPT

## 2020-12-15 PROCEDURE — 2580000003 HC RX 258: Performed by: EMERGENCY MEDICINE

## 2020-12-15 PROCEDURE — 80048 BASIC METABOLIC PNL TOTAL CA: CPT

## 2020-12-15 PROCEDURE — 96365 THER/PROPH/DIAG IV INF INIT: CPT

## 2020-12-15 PROCEDURE — 96375 TX/PRO/DX INJ NEW DRUG ADDON: CPT

## 2020-12-15 PROCEDURE — 85730 THROMBOPLASTIN TIME PARTIAL: CPT

## 2020-12-15 PROCEDURE — 6360000002 HC RX W HCPCS: Performed by: EMERGENCY MEDICINE

## 2020-12-15 PROCEDURE — 99222 1ST HOSP IP/OBS MODERATE 55: CPT | Performed by: INTERNAL MEDICINE

## 2020-12-15 PROCEDURE — 93005 ELECTROCARDIOGRAM TRACING: CPT | Performed by: EMERGENCY MEDICINE

## 2020-12-15 PROCEDURE — C9113 INJ PANTOPRAZOLE SODIUM, VIA: HCPCS | Performed by: EMERGENCY MEDICINE

## 2020-12-15 PROCEDURE — 6370000000 HC RX 637 (ALT 250 FOR IP): Performed by: EMERGENCY MEDICINE

## 2020-12-15 PROCEDURE — 6360000004 HC RX CONTRAST MEDICATION: Performed by: EMERGENCY MEDICINE

## 2020-12-15 RX ORDER — HEPARIN SODIUM 1000 [USP'U]/ML
4000 INJECTION, SOLUTION INTRAVENOUS; SUBCUTANEOUS ONCE
Status: COMPLETED | OUTPATIENT
Start: 2020-12-15 | End: 2020-12-15

## 2020-12-15 RX ORDER — DILTIAZEM HYDROCHLORIDE 5 MG/ML
INJECTION INTRAVENOUS
Status: COMPLETED
Start: 2020-12-15 | End: 2020-12-15

## 2020-12-15 RX ORDER — METOPROLOL TARTRATE 5 MG/5ML
INJECTION INTRAVENOUS
Status: COMPLETED
Start: 2020-12-15 | End: 2020-12-15

## 2020-12-15 RX ORDER — DULOXETIN HYDROCHLORIDE 30 MG/1
30 CAPSULE, DELAYED RELEASE ORAL DAILY
COMMUNITY
End: 2021-05-26 | Stop reason: SDUPTHER

## 2020-12-15 RX ORDER — ASPIRIN 325 MG
325 TABLET ORAL ONCE
Status: DISCONTINUED | OUTPATIENT
Start: 2020-12-15 | End: 2020-12-16 | Stop reason: HOSPADM

## 2020-12-15 RX ORDER — HYDROXYCHLOROQUINE SULFATE 200 MG/1
300 TABLET, FILM COATED ORAL DAILY
COMMUNITY
End: 2021-05-26 | Stop reason: SDUPTHER

## 2020-12-15 RX ORDER — PANTOPRAZOLE SODIUM 40 MG/10ML
40 INJECTION, POWDER, LYOPHILIZED, FOR SOLUTION INTRAVENOUS ONCE
Status: COMPLETED | OUTPATIENT
Start: 2020-12-15 | End: 2020-12-15

## 2020-12-15 RX ORDER — HEPARIN SODIUM 1000 [USP'U]/ML
4000 INJECTION, SOLUTION INTRAVENOUS; SUBCUTANEOUS PRN
Status: DISCONTINUED | OUTPATIENT
Start: 2020-12-15 | End: 2020-12-17

## 2020-12-15 RX ORDER — HEPARIN SODIUM 1000 [USP'U]/ML
2000 INJECTION, SOLUTION INTRAVENOUS; SUBCUTANEOUS PRN
Status: DISCONTINUED | OUTPATIENT
Start: 2020-12-15 | End: 2020-12-17 | Stop reason: ALTCHOICE

## 2020-12-15 RX ORDER — HEPARIN SODIUM 10000 [USP'U]/100ML
12 INJECTION, SOLUTION INTRAVENOUS CONTINUOUS
Status: DISCONTINUED | OUTPATIENT
Start: 2020-12-15 | End: 2020-12-17 | Stop reason: ALTCHOICE

## 2020-12-15 RX ORDER — ASPIRIN 325 MG
325 TABLET ORAL ONCE
Status: COMPLETED | OUTPATIENT
Start: 2020-12-15 | End: 2020-12-15

## 2020-12-15 RX ADMIN — DILTIAZEM HYDROCHLORIDE 25 MG: 5 INJECTION INTRAVENOUS at 19:03

## 2020-12-15 RX ADMIN — METOPROLOL TARTRATE 5 MG: 1 INJECTION, SOLUTION INTRAVENOUS at 19:03

## 2020-12-15 RX ADMIN — DILTIAZEM HYDROCHLORIDE 5 MG/HR: 5 INJECTION INTRAVENOUS at 19:03

## 2020-12-15 RX ADMIN — HEPARIN SODIUM 12 UNITS/KG/HR: 10000 INJECTION, SOLUTION INTRAVENOUS at 20:43

## 2020-12-15 RX ADMIN — HEPARIN SODIUM 4000 UNITS: 1000 INJECTION INTRAVENOUS; SUBCUTANEOUS at 20:43

## 2020-12-15 RX ADMIN — ASPIRIN 325 MG ORAL TABLET 325 MG: 325 PILL ORAL at 19:23

## 2020-12-15 RX ADMIN — IOPAMIDOL 75 ML: 755 INJECTION, SOLUTION INTRAVENOUS at 20:02

## 2020-12-15 RX ADMIN — PANTOPRAZOLE SODIUM 40 MG: 40 INJECTION, POWDER, FOR SOLUTION INTRAVENOUS at 20:43

## 2020-12-15 NOTE — CONSULTS
[address] [facility]  [phone]        Reason for Consultation/Chief Complaint: \" Cardiac arrhythmia, chest pain. \"    History of Present Illness:  Nory Rich is a 75 y.o. patient who presented to the hospital with complaints of palpitations, lightheadeeness and dizziness. She had sudden onset of palpitations which lasted a total of about 60 minutes. She stated she had 45 to 50 minutes of symptoms and then called EMS which took about 10 minutes to get there. In the ER she was noted to have atrial flutter at a rate of 250 bpm which was approaching one-to-one conduction. She had received adenosine which helped briefly and then received IV Lopressor and IV Cardizem which have slowed her rate. She did have a feeling of some midsternal chest tightness which was nonradiating. That is now resolved. She states that she has had flip-flopping type heart arrhythmia intermittently over the past 3 years but not frequent. She is recently recovered from COVID-19. Past history does include severe osteoarthritis, perforated peptic ulcer secondary meloxicam in 2018, secondhand smoke exposure and history of premature CAD involving her mom with MI at the age of 59. History of CAD in her dad in his [de-identified]. Past Medical History:   has a past medical history of Anxiety, Depression, and Hypothyroidism. Surgical History:   has a past surgical history that includes Hysterectomy; bladder repair; Breast enhancement surgery; and Abdominal exploration surgery (02/12/2018). Social History:   reports that she has never smoked. She has never used smokeless tobacco. She reports current alcohol use. She reports that she does not use drugs. Family History:  family history includes Heart Attack in her mother. Home Medications:  Were reviewed and are listed in nursing record. and/or listed below  Prior to Admission medications    Medication Sig Start Date End Date Taking?  Authorizing Provider DULoxetine (CYMBALTA) 30 MG extended release capsule Take 30 mg by mouth daily   Yes Historical Provider, MD   hydroxychloroquine (PLAQUENIL) 200 MG tablet Take 300 mg by mouth daily    Yes Historical Provider, MD   gabapentin (NEURONTIN) 300 MG capsule Take 1 capsule by mouth 3 times daily for 90 days. 12/1/20 3/1/21 Yes Dianah Moritz, MD   pantoprazole (PROTONIX) 40 MG tablet Take 1 tablet by mouth daily 2/18/18  Yes Denise Joy MD   tiZANidine (ZANAFLEX) 4 MG tablet Take 4 mg by mouth 2 times daily as needed    Yes Historical Provider, MD   lisinopril (PRINIVIL;ZESTRIL) 10 MG tablet Take 10 mg by mouth daily   Yes Historical Provider, MD   ARMOUR THYROID PO Take 60 mg by mouth Daily    Yes Historical Provider, MD   ALPRAZolam (XANAX) 0.25 MG tablet Take 0.25 mg by mouth nightly as needed for Sleep. Yes Historical Provider, MD   escitalopram (LEXAPRO) 10 MG tablet Take 5 mg by mouth nightly    Yes Historical Provider, MD   traZODone (DESYREL) 50 MG tablet Take 50 mg by mouth nightly. Yes Historical Provider, MD        Allergies:  Nsaids     Review of Systems:   A complete review of systems has been reviewed and updated today and is negative except as noted in the history of present illness.       Physical Examination:    Vitals:    12/15/20 1838   BP: (!) 123/55   Pulse: 85              General Appearance:  Alert, cooperative, no distress, appears stated age   Head:  Normocephalic, without obvious abnormality, atraumatic   Eyes:  EOMI, conjunctiva/corneas clear       Nose: Nares normal   Throat: Lips normal   Neck: Supple, symmetrical, trachea midline,  no carotid bruit or JVD       Lungs:   Clear to auscultation bilaterally, respirations unlabored   Chest Wall:  No tenderness or deformity   Heart:  Regular rate and rhythm, S1, S2 normal, no murmur, rub or gallop   Abdomen:   Soft, non-tender, bowel sounds active all four quadrants,  no masses, no organomegaly           Extremities: Extremities normal, atraumatic, no cyanosis or edema   Pulses: 2+ and symmetric   Skin: Skin color, texture, turgor normal, no rashes or lesions   Pysch: Normal mood and affect   Neurologic: Normal gross motor and sensory exam.         Labs  CBC:   Lab Results   Component Value Date    WBC 5.4 11/04/2020    RBC 3.79 11/04/2020    HGB 13.1 11/04/2020    HCT 38.2 11/04/2020    .7 11/04/2020    RDW 13.3 11/04/2020     11/04/2020     CMP:    Lab Results   Component Value Date     12/03/2020    K 4.3 12/03/2020    K 3.8 02/13/2018     12/03/2020    CO2 21 12/03/2020    BUN 10 12/03/2020    CREATININE 0.6 12/03/2020    GFRAA >60 12/03/2020    GFRAA >60 01/25/2013    AGRATIO 1.6 12/03/2020    LABGLOM >60 12/03/2020    GLUCOSE 107 12/03/2020    PROT 7.2 12/03/2020    PROT 6.9 01/25/2013    CALCIUM 9.3 12/03/2020    BILITOT 0.5 12/03/2020    ALKPHOS 216 12/03/2020     12/03/2020     12/03/2020     LIPIDS: No components found for: TOTAL CHOLESTEROL,  HDL,  LDL,  TRIGLYCERIDES  PT/INR:  No results found for: PTINR  Lab Results   Component Value Date    TROPONINI 0.01 02/11/2018       EKG:  I have reviewed EKG with the following interpretation:  Impression:    15-DEC-2020 18:27:29 [facility] ROUTINE RECORD  Atrial flutter  Indeterminate axis  Right bundle branch block  Abnormal ECG    Imaging/Procedures:       Assessment/Plan:  Principal Problem:    Typical atrial flutter ([address]  Plan: Possibly related to recent COVID-19 illness. Currently in atrial flutter with controlled rate. Continue Cardizem infusion. Start IV heparin. EP consultation. 2D echo Doppler and Myoview stress test.    Active Problems:    Chest discomfort  Plan: Likely related to atrial flutter. Trend cardiac enzymes. Plan 2D echo with Doppler and Myoview stress test.      DDD (degenerative disc disease), cervical  Plan: Chronic        Thank you for allowing us to participate in the care of Lisa Harvey.  Further evaluation will be based upon the patient's clinical course and testing results. All questions and concerns were addressed to the patient/family. Alternatives to my treatment were discussed. The note was completed using EMR. Every effort was made to ensure accuracy; however, inadvertent computerized transcription errors may be present.     Matheus Davis M.D.

## 2020-12-15 NOTE — ED NOTES
Bed: 01  Expected date:   Expected time:   Means of arrival:   Comments:  Critical only     Kathrynn Males  12/15/20 1817

## 2020-12-15 NOTE — ED NOTES
Pharmacy Medication History Note      List of current medications patient is taking is complete. Source of information: Self    Changes made to medication list:  Medications flagged for removal (include reason, ex. noncompliance):  none    Medications removed (include reason, ex. therapy complete or physician discontinued):  Voltaren gel- therapy completed    Medications added/doses adjusted:  Hydroxychloroquine 200 mg tablets; take 1.5 tablets (300 mg) daily  Duloxetine 30 mg daily    Other notes (ex. Recent course of antibiotics, Coumadin dosing):  Patient is currently weaning off Lexapro (down to 5 mg daily, supposed to end after tomorrow's dose); replacing with duloxetine (first doses started). Denies use of other OTC or herbal medications. Last dose times updated. Myriam Woodruff, PharmD  ED Pharmacist [id number]  12/15/2020      No current facility-administered medications on file prior to encounter. Current Outpatient Medications on File Prior to Encounter   Medication Sig Dispense Refill    DULoxetine (CYMBALTA) 30 MG extended release capsule Take 30 mg by mouth daily      hydroxychloroquine (PLAQUENIL) 200 MG tablet Take 300 mg by mouth daily       gabapentin (NEURONTIN) 300 MG capsule Take 1 capsule by mouth 3 times daily for 90 days. 270 capsule 0    pantoprazole (PROTONIX) 40 MG tablet Take 1 tablet by mouth daily 30 tablet 3    tiZANidine (ZANAFLEX) 4 MG tablet Take 4 mg by mouth 2 times daily as needed       lisinopril (PRINIVIL;ZESTRIL) 10 MG tablet Take 10 mg by mouth daily      ARMOUR THYROID PO Take 60 mg by mouth Daily       ALPRAZolam (XANAX) 0.25 MG tablet Take 0.25 mg by mouth nightly as needed for Sleep.      escitalopram (LEXAPRO) 10 MG tablet Take 5 mg by mouth nightly       traZODone (DESYREL) 50 MG tablet Take 50 mg by mouth nightly. [DISCONTINUED] hydroxychloroquine (PLAQUENIL) 200 MG tablet Take 1 tablet by mouth daily for 7 days, THEN 1.5 tablets daily.  132 tablet 0 [DISCONTINUED] diclofenac sodium (VOLTAREN) 1 % GEL APPLY 2 GRAMS QID TO BILATERAL HANDS

## 2020-12-16 LAB
ANION GAP SERPL CALCULATED.3IONS-SCNC: 13 MMOL/L (ref 3–16)
APTT: 49.2 SEC (ref 24.2–36.2)
APTT: 63.9 SEC (ref 24.2–36.2)
BUN BLDV-MCNC: 11 MG/DL (ref 7–20)
CALCIUM SERPL-MCNC: 9.3 MG/DL (ref 8.3–10.6)
CHLORIDE BLD-SCNC: 100 MMOL/L (ref 99–110)
CO2: 22 MMOL/L (ref 21–32)
CREAT SERPL-MCNC: 0.6 MG/DL (ref 0.6–1.2)
EKG ATRIAL RATE: 232 BPM
EKG ATRIAL RATE: 234 BPM
EKG ATRIAL RATE: 375 BPM
EKG DIAGNOSIS: NORMAL
EKG P AXIS: 70 DEGREES
EKG P AXIS: 89 DEGREES
EKG Q-T INTERVAL: 186 MS
EKG Q-T INTERVAL: 388 MS
EKG Q-T INTERVAL: 398 MS
EKG QRS DURATION: 190 MS
EKG QRS DURATION: 92 MS
EKG QRS DURATION: 94 MS
EKG QTC CALCULATION (BAZETT): 365 MS
EKG QTC CALCULATION (BAZETT): 453 MS
EKG QTC CALCULATION (BAZETT): 455 MS
EKG R AXIS: 46 DEGREES
EKG R AXIS: 50 DEGREES
EKG R AXIS: 56 DEGREES
EKG T AXIS: 0 DEGREES
EKG T AXIS: 261 DEGREES
EKG T AXIS: 42 DEGREES
EKG VENTRICULAR RATE: 232 BPM
EKG VENTRICULAR RATE: 78 BPM
EKG VENTRICULAR RATE: 83 BPM
GFR AFRICAN AMERICAN: >60
GFR NON-AFRICAN AMERICAN: >60
GLUCOSE BLD-MCNC: 139 MG/DL (ref 70–99)
HCT VFR BLD CALC: 38.1 % (ref 36–48)
HEMOGLOBIN: 13 G/DL (ref 12–16)
LV EF: 45 %
LV EF: 53 %
LVEF MODALITY: NORMAL
LVEF MODALITY: NORMAL
MCH RBC QN AUTO: 34.4 PG (ref 26–34)
MCHC RBC AUTO-ENTMCNC: 34.2 G/DL (ref 31–36)
MCV RBC AUTO: 100.7 FL (ref 80–100)
PDW BLD-RTO: 14.5 % (ref 12.4–15.4)
PLATELET # BLD: 274 K/UL (ref 135–450)
PMV BLD AUTO: 8.7 FL (ref 5–10.5)
POTASSIUM REFLEX MAGNESIUM: 3.9 MMOL/L (ref 3.5–5.1)
RBC # BLD: 3.78 M/UL (ref 4–5.2)
SODIUM BLD-SCNC: 135 MMOL/L (ref 136–145)
TROPONIN: 0.02 NG/ML
TROPONIN: 0.02 NG/ML
TROPONIN: 0.03 NG/ML
WBC # BLD: 5.6 K/UL (ref 4–11)

## 2020-12-16 PROCEDURE — 99152 MOD SED SAME PHYS/QHP 5/>YRS: CPT | Performed by: INTERNAL MEDICINE

## 2020-12-16 PROCEDURE — 93010 ELECTROCARDIOGRAM REPORT: CPT | Performed by: INTERNAL MEDICINE

## 2020-12-16 PROCEDURE — A9502 TC99M TETROFOSMIN: HCPCS | Performed by: INTERNAL MEDICINE

## 2020-12-16 PROCEDURE — 1200000000 HC SEMI PRIVATE

## 2020-12-16 PROCEDURE — 99223 1ST HOSP IP/OBS HIGH 75: CPT | Performed by: INTERNAL MEDICINE

## 2020-12-16 PROCEDURE — 36415 COLL VENOUS BLD VENIPUNCTURE: CPT

## 2020-12-16 PROCEDURE — 3430000000 HC RX DIAGNOSTIC RADIOPHARMACEUTICAL: Performed by: INTERNAL MEDICINE

## 2020-12-16 PROCEDURE — 93306 TTE W/DOPPLER COMPLETE: CPT

## 2020-12-16 PROCEDURE — 6370000000 HC RX 637 (ALT 250 FOR IP): Performed by: INTERNAL MEDICINE

## 2020-12-16 PROCEDURE — 84484 ASSAY OF TROPONIN QUANT: CPT

## 2020-12-16 PROCEDURE — 93005 ELECTROCARDIOGRAM TRACING: CPT | Performed by: INTERNAL MEDICINE

## 2020-12-16 PROCEDURE — 78452 HT MUSCLE IMAGE SPECT MULT: CPT

## 2020-12-16 PROCEDURE — 85730 THROMBOPLASTIN TIME PARTIAL: CPT

## 2020-12-16 PROCEDURE — 2500000003 HC RX 250 WO HCPCS: Performed by: EMERGENCY MEDICINE

## 2020-12-16 PROCEDURE — 6360000002 HC RX W HCPCS: Performed by: EMERGENCY MEDICINE

## 2020-12-16 PROCEDURE — 2580000003 HC RX 258: Performed by: EMERGENCY MEDICINE

## 2020-12-16 PROCEDURE — 93017 CV STRESS TEST TRACING ONLY: CPT | Performed by: INTERNAL MEDICINE

## 2020-12-16 PROCEDURE — 6360000002 HC RX W HCPCS: Performed by: INTERNAL MEDICINE

## 2020-12-16 PROCEDURE — 85027 COMPLETE CBC AUTOMATED: CPT

## 2020-12-16 PROCEDURE — 80048 BASIC METABOLIC PNL TOTAL CA: CPT

## 2020-12-16 RX ORDER — ATORVASTATIN CALCIUM 40 MG/1
40 TABLET, FILM COATED ORAL NIGHTLY
Status: DISCONTINUED | OUTPATIENT
Start: 2020-12-16 | End: 2020-12-18 | Stop reason: HOSPADM

## 2020-12-16 RX ORDER — PROMETHAZINE HYDROCHLORIDE 25 MG/1
12.5 TABLET ORAL EVERY 6 HOURS PRN
Status: DISCONTINUED | OUTPATIENT
Start: 2020-12-16 | End: 2020-12-18 | Stop reason: HOSPADM

## 2020-12-16 RX ORDER — ACETAMINOPHEN 325 MG/1
650 TABLET ORAL EVERY 6 HOURS PRN
Status: DISCONTINUED | OUTPATIENT
Start: 2020-12-16 | End: 2020-12-17 | Stop reason: DRUGHIGH

## 2020-12-16 RX ORDER — POLYETHYLENE GLYCOL 3350 17 G/17G
17 POWDER, FOR SOLUTION ORAL DAILY PRN
Status: DISCONTINUED | OUTPATIENT
Start: 2020-12-16 | End: 2020-12-18 | Stop reason: HOSPADM

## 2020-12-16 RX ORDER — ESCITALOPRAM OXALATE 10 MG/1
5 TABLET ORAL NIGHTLY
Status: DISCONTINUED | OUTPATIENT
Start: 2020-12-16 | End: 2020-12-16

## 2020-12-16 RX ORDER — DILTIAZEM HYDROCHLORIDE 120 MG/1
120 CAPSULE, COATED, EXTENDED RELEASE ORAL DAILY
Status: DISCONTINUED | OUTPATIENT
Start: 2020-12-16 | End: 2020-12-18 | Stop reason: HOSPADM

## 2020-12-16 RX ORDER — POTASSIUM CHLORIDE 7.45 MG/ML
10 INJECTION INTRAVENOUS PRN
Status: DISCONTINUED | OUTPATIENT
Start: 2020-12-16 | End: 2020-12-18 | Stop reason: HOSPADM

## 2020-12-16 RX ORDER — SODIUM CHLORIDE 0.9 % (FLUSH) 0.9 %
10 SYRINGE (ML) INJECTION EVERY 12 HOURS SCHEDULED
Status: DISCONTINUED | OUTPATIENT
Start: 2020-12-16 | End: 2020-12-18 | Stop reason: HOSPADM

## 2020-12-16 RX ORDER — TRAZODONE HYDROCHLORIDE 50 MG/1
50 TABLET ORAL NIGHTLY
Status: DISCONTINUED | OUTPATIENT
Start: 2020-12-16 | End: 2020-12-16

## 2020-12-16 RX ORDER — DULOXETIN HYDROCHLORIDE 30 MG/1
30 CAPSULE, DELAYED RELEASE ORAL DAILY
Status: DISCONTINUED | OUTPATIENT
Start: 2020-12-16 | End: 2020-12-18 | Stop reason: HOSPADM

## 2020-12-16 RX ORDER — ONDANSETRON 2 MG/ML
4 INJECTION INTRAMUSCULAR; INTRAVENOUS EVERY 6 HOURS PRN
Status: DISCONTINUED | OUTPATIENT
Start: 2020-12-16 | End: 2020-12-18 | Stop reason: HOSPADM

## 2020-12-16 RX ORDER — PANTOPRAZOLE SODIUM 40 MG/1
40 TABLET, DELAYED RELEASE ORAL DAILY
Status: DISCONTINUED | OUTPATIENT
Start: 2020-12-16 | End: 2020-12-18 | Stop reason: HOSPADM

## 2020-12-16 RX ORDER — SODIUM CHLORIDE 0.9 % (FLUSH) 0.9 %
10 SYRINGE (ML) INJECTION PRN
Status: DISCONTINUED | OUTPATIENT
Start: 2020-12-16 | End: 2020-12-18 | Stop reason: HOSPADM

## 2020-12-16 RX ORDER — AMINOPHYLLINE DIHYDRATE 25 MG/ML
100 INJECTION, SOLUTION INTRAVENOUS ONCE
Status: COMPLETED | OUTPATIENT
Start: 2020-12-16 | End: 2020-12-16

## 2020-12-16 RX ORDER — GABAPENTIN 300 MG/1
300 CAPSULE ORAL 3 TIMES DAILY
Status: DISCONTINUED | OUTPATIENT
Start: 2020-12-16 | End: 2020-12-16

## 2020-12-16 RX ORDER — GABAPENTIN 300 MG/1
300 CAPSULE ORAL 3 TIMES DAILY
Status: DISCONTINUED | OUTPATIENT
Start: 2020-12-16 | End: 2020-12-18 | Stop reason: HOSPADM

## 2020-12-16 RX ORDER — LEVOTHYROXINE AND LIOTHYRONINE 19; 4.5 UG/1; UG/1
60 TABLET ORAL DAILY
Status: DISCONTINUED | OUTPATIENT
Start: 2020-12-16 | End: 2020-12-18 | Stop reason: HOSPADM

## 2020-12-16 RX ORDER — TRAZODONE HYDROCHLORIDE 50 MG/1
50 TABLET ORAL NIGHTLY
Status: DISCONTINUED | OUTPATIENT
Start: 2020-12-16 | End: 2020-12-18 | Stop reason: HOSPADM

## 2020-12-16 RX ORDER — ACETAMINOPHEN 650 MG/1
650 SUPPOSITORY RECTAL EVERY 6 HOURS PRN
Status: DISCONTINUED | OUTPATIENT
Start: 2020-12-16 | End: 2020-12-18 | Stop reason: HOSPADM

## 2020-12-16 RX ORDER — TIZANIDINE 4 MG/1
4 TABLET ORAL 2 TIMES DAILY PRN
Status: DISCONTINUED | OUTPATIENT
Start: 2020-12-16 | End: 2020-12-18 | Stop reason: HOSPADM

## 2020-12-16 RX ORDER — ALPRAZOLAM 0.25 MG/1
0.25 TABLET ORAL NIGHTLY PRN
Status: DISCONTINUED | OUTPATIENT
Start: 2020-12-16 | End: 2020-12-18 | Stop reason: HOSPADM

## 2020-12-16 RX ORDER — ALPRAZOLAM 0.25 MG/1
0.25 TABLET ORAL ONCE
Status: COMPLETED | OUTPATIENT
Start: 2020-12-16 | End: 2020-12-16

## 2020-12-16 RX ORDER — HYDROXYCHLOROQUINE SULFATE 200 MG/1
300 TABLET, FILM COATED ORAL DAILY
Status: DISCONTINUED | OUTPATIENT
Start: 2020-12-16 | End: 2020-12-18 | Stop reason: HOSPADM

## 2020-12-16 RX ADMIN — AMINOPHYLLINE 100 MG: 25 INJECTION, SOLUTION INTRAVENOUS at 14:27

## 2020-12-16 RX ADMIN — LEVOTHYROXINE, LIOTHYRONINE 60 MG: 19; 4.5 TABLET ORAL at 06:50

## 2020-12-16 RX ADMIN — ATORVASTATIN CALCIUM 40 MG: 40 TABLET, FILM COATED ORAL at 21:13

## 2020-12-16 RX ADMIN — TETROFOSMIN 30 MILLICURIE: 1.38 INJECTION, POWDER, LYOPHILIZED, FOR SOLUTION INTRAVENOUS at 15:42

## 2020-12-16 RX ADMIN — TRAZODONE HYDROCHLORIDE 50 MG: 50 TABLET ORAL at 04:03

## 2020-12-16 RX ADMIN — ALPRAZOLAM 0.25 MG: 0.25 TABLET ORAL at 02:20

## 2020-12-16 RX ADMIN — GABAPENTIN 300 MG: 300 CAPSULE ORAL at 08:26

## 2020-12-16 RX ADMIN — TRAZODONE HYDROCHLORIDE 50 MG: 50 TABLET ORAL at 21:44

## 2020-12-16 RX ADMIN — REGADENOSON 0.4 MG: 0.08 INJECTION, SOLUTION INTRAVENOUS at 14:05

## 2020-12-16 RX ADMIN — HEPARIN SODIUM 12 UNITS/KG/HR: 10000 INJECTION, SOLUTION INTRAVENOUS at 23:44

## 2020-12-16 RX ADMIN — DULOXETINE HYDROCHLORIDE 30 MG: 30 CAPSULE, DELAYED RELEASE ORAL at 08:26

## 2020-12-16 RX ADMIN — TETROFOSMIN 10 MILLICURIE: 1.38 INJECTION, POWDER, LYOPHILIZED, FOR SOLUTION INTRAVENOUS at 12:46

## 2020-12-16 RX ADMIN — PANTOPRAZOLE SODIUM 40 MG: 40 TABLET, DELAYED RELEASE ORAL at 08:26

## 2020-12-16 RX ADMIN — ALPRAZOLAM 0.25 MG: 0.25 TABLET ORAL at 21:12

## 2020-12-16 RX ADMIN — HYDROXYCHLOROQUINE SULFATE 300 MG: 200 TABLET ORAL at 08:27

## 2020-12-16 RX ADMIN — DILTIAZEM HYDROCHLORIDE 10 MG/HR: 5 INJECTION INTRAVENOUS at 21:08

## 2020-12-16 RX ADMIN — ALPRAZOLAM 0.25 MG: 0.25 TABLET ORAL at 15:55

## 2020-12-16 RX ADMIN — GABAPENTIN 300 MG: 300 CAPSULE ORAL at 04:03

## 2020-12-16 RX ADMIN — DILTIAZEM HYDROCHLORIDE 120 MG: 120 CAPSULE, COATED, EXTENDED RELEASE ORAL at 09:50

## 2020-12-16 RX ADMIN — TIZANIDINE 4 MG: 4 TABLET ORAL at 02:20

## 2020-12-16 RX ADMIN — TIZANIDINE 4 MG: 4 TABLET ORAL at 21:41

## 2020-12-16 RX ADMIN — GABAPENTIN 300 MG: 300 CAPSULE ORAL at 21:12

## 2020-12-16 RX ADMIN — GABAPENTIN 300 MG: 300 CAPSULE ORAL at 15:55

## 2020-12-16 ASSESSMENT — PAIN SCALES - GENERAL
PAINLEVEL_OUTOF10: 0
PAINLEVEL_OUTOF10: 0
PAINLEVEL_OUTOF10: 4
PAINLEVEL_OUTOF10: 0

## 2020-12-16 ASSESSMENT — PAIN DESCRIPTION - FREQUENCY: FREQUENCY: INTERMITTENT

## 2020-12-16 ASSESSMENT — PAIN DESCRIPTION - ONSET: ONSET: OTHER (COMMENT)

## 2020-12-16 ASSESSMENT — PAIN - FUNCTIONAL ASSESSMENT: PAIN_FUNCTIONAL_ASSESSMENT: PREVENTS OR INTERFERES SOME ACTIVE ACTIVITIES AND ADLS

## 2020-12-16 ASSESSMENT — PAIN DESCRIPTION - PAIN TYPE: TYPE: CHRONIC PAIN

## 2020-12-16 ASSESSMENT — PAIN DESCRIPTION - DESCRIPTORS: DESCRIPTORS: ACHING

## 2020-12-16 ASSESSMENT — PAIN DESCRIPTION - ORIENTATION: ORIENTATION: RIGHT;LEFT

## 2020-12-16 ASSESSMENT — PAIN DESCRIPTION - PROGRESSION: CLINICAL_PROGRESSION: RAPIDLY IMPROVING

## 2020-12-16 NOTE — CONSULTS
negative for - bleeding, blood clots, bruising or jaundice  · Genito-Urinary:  negative for - Dysuria or incontinence  · Musculoskeletal: negative for - Joint swelling, muscle pain  · Neurological: negative for - confusion, dizziness, headaches   · Psychiatric: No anxiety, no depression. · Dermatological: negative for - rash    Physical Examination:  Vitals:    20 0800   BP: 106/84   Pulse: 85   Resp: 18   Temp: 97.1 °F (36.2 °C)   SpO2: 94%      In: 101 [I.V.:101]  Out: -    Wt Readings from Last 3 Encounters:   20 152 lb 9.6 oz (69.2 kg)   20 166 lb (75.3 kg)   20 166 lb (75.3 kg)     Temp  Av.8 °F (36.6 °C)  Min: 97.1 °F (36.2 °C)  Max: 98.6 °F (37 °C)  Pulse  Av.8  Min: 74  Max: 101  BP  Min: 92/52  Max: 137/81  SpO2  Av.2 %  Min: 93 %  Max: 98 %    Intake/Output Summary (Last 24 hours) at 2020 0908  Last data filed at 2020 0330  Gross per 24 hour   Intake 101 ml   Output --   Net 101 ml       · Telemetry: Atrial fibrillation   · Constitutional: Oriented. No distress. · Head: Normocephalic and atraumatic. · Mouth/Throat: Oropharynx is clear and moist.   · Eyes: Conjunctivae normal. EOM are normal.   · Neck: Neck supple. No rigidity. No JVD present. · Cardiovascular: Normal rate, irregular rhythm, S1&S2. · Pulmonary/Chest: Bilateral respiratory sounds. No wheezes, No rhonchi. · Abdominal: Soft. Bowel sounds present. No distension, No tenderness. · Musculoskeletal: No tenderness. No edema    · Lymphadenopathy: Has no cervical adenopathy. · Neurological: Alert and oriented. Cranial nerve appears intact, No Gross deficit   · Skin: Skin is warm and dry. No rash noted. · Psychiatric: Has a normal behavior     Labs, diagnostic and imaging results reviewed. Reviewed.    Recent Labs     12/15/20  1900 20  0717   * 135*   K 3.7 3.9   CL 99 100   CO2 17* 22   BUN 13 11   CREATININE 0.6 0.6     Recent Labs     12/15/20  1900 20  0717 disease), cervical [M50.30] 09/30/2014       Assessment:       Plan:    - New onset Atrial fibrillation and flutter     The ECG at admission suggests atrial flutter with 1:1 conduction but later it is Atrial fibrillation. Less than 24 hours. On heparin now. [id number] score 2, female and HTN   Will need anticoagulation , change to oral after result of stress test    Will do cardioversion if she does not convert today. Afib risk factors including age, HTN, obesity, inactivity and EILEEN were discussed with patient. Risk factor modification recommended. All questions were answered. - Treatment options including cardioversion, rate control strategy, anticoagulation were discussed with patient. Risks, benefits and alternative of each treatment options were explained. All questions answered. - chest discomfort, abnl ECG, FHx of CAD     Will do stress test    - HTN   cardizem   BP is well controlled. Continue current meds. - Hyponatremia   Adequate intake             Thank you for allowing me to participate in the care of [address] [place] [address]     NOTE: This report was transcribed using voice recognition software. Every effort was made to ensure accuracy, however, inadvertent computerized transcription errors may be present.

## 2020-12-16 NOTE — PROGRESS NOTES
Pt in stress lab in [facility] with rate of 120s. During lexiscan stress test HR went up to 140-160s. Dr. Ezra Theodore is aware. Aminophyliline was given for side affects.

## 2020-12-16 NOTE — ED PROVIDER NOTES
EXPLORATORY, REPAIR PERFORATED ULCER    BLADDER REPAIR      BREAST ENHANCEMENT SURGERY      HYSTERECTOMY      partial    PARTIAL HYSTERECTOMY      SHOULDER SURGERY      ligament moved up left shoulder     Family History   Problem Relation Age of Onset    Heart Attack Mother      Social History     Socioeconomic History    Marital status:      Spouse name: Not on file    Number of children: Not on file    Years of education: Not on file    Highest education level: Not on file   Occupational History    Not on file   Social Needs    Financial resource strain: Not on file    Food insecurity     Worry: Not on file     Inability: Not on file    Transportation needs     Medical: Not on file     Non-medical: Not on file   Tobacco Use    Smoking status: Never Smoker    Smokeless tobacco: Never Used   Substance and Sexual Activity    Alcohol use:  Yes     Alcohol/week: 0.0 standard drinks    Drug use: No    Sexual activity: Not on file   Lifestyle    Physical activity     Days per week: Not on file     Minutes per session: Not on file    Stress: Not on file   Relationships    Social connections     Talks on phone: Not on file     Gets together: Not on file     Attends [employer] service: Not on file     Active member of club or organization: Not on file     Attends meetings of clubs or organizations: Not on file     Relationship status: Not on file    Intimate partner violence     Fear of current or ex partner: Not on file     Emotionally abused: Not on file     Physically abused: Not on file     Forced sexual activity: Not on file   Other Topics Concern    Not on file   Social History Narrative    Not on file     Current Facility-Administered Medications   Medication Dose Route Frequency Provider Last Rate Last Admin    sodium chloride flush 0.9 % injection 10 mL  10 mL Intravenous 2 times per day Jack Silva, DO   10 mL at 12/17/20 1058    sodium chloride flush 0.9 % injection 10 mL 10 mL Intravenous PRN Comfort Sky DO        acetaminophen (TYLENOL) tablet 650 mg  650 mg Oral Q4H PRN Comfort Sky DO        rivaroxaban (XARELTO) tablet 20 mg  20 mg Oral Daily Lupe Nassar MD   20 mg at 12/17/20 1056    ALPRAZolam (XANAX) tablet 0.25 mg  0.25 mg Oral Nightly PRN Iesha Lopez MD   0.25 mg at 12/16/20 1555    thyroid (ARMOUR) tablet 60 mg  60 mg Oral Daily Iesha Lopez MD   60 mg at 12/17/20 1056    DULoxetine (CYMBALTA) extended release capsule 30 mg  30 mg Oral Daily Iesha Lopez MD   30 mg at 12/17/20 1057    hydroxychloroquine (PLAQUENIL) tablet 300 mg  300 mg Oral Daily Iesha Lopez MD   300 mg at 12/17/20 1056    pantoprazole (PROTONIX) tablet 40 mg  40 mg Oral Daily Iesha Lopez MD   40 mg at 12/17/20 1057    tiZANidine (ZANAFLEX) tablet 4 mg  4 mg Oral BID PRN Iesha Lopez MD   4 mg at 12/16/20 2141    sodium chloride flush 0.9 % injection 10 mL  10 mL Intravenous 2 times per day Iesha Lopez MD   10 mL at 12/17/20 1059    sodium chloride flush 0.9 % injection 10 mL  10 mL Intravenous PRN Iesha Lopez MD        promethazine (PHENERGAN) tablet 12.5 mg  12.5 mg Oral Q6H PRN Iesha Lopez MD        Or    [facility]ron [facility]) injection 4 mg  4 mg Intravenous Q6H PRN Iesha Lopez MD        acetaminophen (TYLENOL) suppository 650 mg  650 mg Rectal Q6H PRN Iesha Lopez MD        polyethylene glycol [facility]) packet 17 g  17 g Oral Daily PRN Iesha Lopez MD        potassium chloride 10 mEq/100 mL IVPB (Peripheral Line)  10 mEq Intravenous PRN Iesha Lopez MD        atorvastatin (LIPITOR) tablet 40 mg  40 mg Oral Nightly Iesha Lopez MD   40 mg at 12/16/20 2113    gabapentin (NEURONTIN) capsule 300 mg  300 mg Oral TID Iesha Lopez MD   300 mg at 12/17/20 1522    dilTIAZem (CARDIZEM CD) extended release capsule 120 mg  120 mg Oral Daily Alison Courtney MD   120 mg at 12/17/20 1056    regadenoson (LEXISCAN) injection 0.4 mg  0.4 mg Intravenous ONCE PRN Karime Carcamo MD        traZODone (DESYREL) tablet 50 mg  50 mg Oral Nightly Rufino Griffiths MD   50 mg at 12/16/20 2144     Allergies   Allergen Reactions    Nsaids      Note: PERFORATED PEPTIC ULCER       Nursing Notes Reviewed    Physical Exam:  Triage VS:    ED Triage Vitals   Enc Vitals Group      BP 12/15/20 1830 (!) 123/55      Pulse 12/15/20 1830 82      Resp 12/15/20 1845 16      Temp 12/15/20 1902 98.6 °F (37 °C)      Temp src --       SpO2 12/15/20 1845 97 %      Weight 12/15/20 1930 166 lb (75.3 kg)      Height --       Head Circumference --       Peak Flow --       Pain Score --       Pain Loc --       Pain Edu? --       Excl. in [address]? --        My pulse ox interpretation is - normal    General appearance:  severe acute distress. Skin:  Pale , diaphoresis   Eye:  Extraocular movements intact. Ears, nose, mouth and throat:  Oral mucosa moist   Neck:  Trachea midline. Extremity:  No swelling. Normal ROM     Heart:  Tachycardia, irregular    Perfusion:  intact  Respiratory:  Lungs clear to auscultation bilaterally. Respirations nonlabored. Abdominal:  Normal bowel sounds. Soft. Nontender. Non distended. Neurological:  Alert and oriented times 3.              Psychiatric:  Appropriate    I have reviewed and interpreted all of the currently available lab results from this visit (if applicable):  Results for orders placed or performed during the hospital encounter of 12/15/20   CBC Auto Differential   Result Value Ref Range    WBC 7.5 4.0 - 11.0 K/uL    RBC 3.63 (L) 4.00 - 5.20 M/uL    Hemoglobin 12.5 12.0 - 16.0 g/dL    Hematocrit 36.4 36.0 - 48.0 %    .1 (H) 80.0 - 100.0 fL    MCH 34.4 (H) 26.0 - 34.0 pg    MCHC 34.4 31.0 - 36.0 g/dL    RDW 14.1 12.4 - 15.4 %    Platelets 804 425 - 674 K/uL    MPV 8.6 5.0 - 10.5 fL    Neutrophils % 79.0 %    Lymphocytes % 11.9 %    Monocytes % 7.0 %    Eosinophils % 1.5 %    Basophils % 0.6 %    Neutrophils Absolute 5.9 1.7 - 7.7 K/uL    Lymphocytes Absolute 0.9 (L) 1.0 - 5.1 K/uL    Monocytes Absolute 0.5 0.0 - 1.3 K/uL    Eosinophils Absolute 0.1 0.0 - 0.6 K/uL    Basophils Absolute 0.0 0.0 - 0.2 K/uL   Basic Metabolic Panel w/ Reflex to MG   Result Value Ref Range    Sodium 131 (L) 136 - 145 mmol/L    Potassium reflex Magnesium 3.7 3.5 - 5.1 mmol/L    Chloride 99 99 - 110 mmol/L    CO2 17 (L) 21 - 32 mmol/L    Anion Gap 15 3 - 16    Glucose 113 (H) 70 - 99 mg/dL    BUN 13 7 - 20 mg/dL    CREATININE 0.6 0.6 - 1.2 mg/dL    GFR Non-African American >60 >60    GFR African American >60 >60    Calcium 9.3 8.3 - 10.6 mg/dL   Troponin   Result Value Ref Range    Troponin <0.01 <0.01 ng/mL   Brain Natriuretic Peptide   Result Value Ref Range    Pro- (H) 0 - 124 pg/mL   APTT   Result Value Ref Range    aPTT 31.1 24.2 - 36.2 sec   APTT   Result Value Ref Range    aPTT 63.9 (H) 24.2 - 36.2 sec   APTT   Result Value Ref Range    aPTT 49.2 (H) 24.2 - 36.2 sec   Troponin   Result Value Ref Range    Troponin 0.03 (H) <0.01 ng/mL   Troponin   Result Value Ref Range    Troponin 0.02 (H) <0.01 ng/mL   CBC   Result Value Ref Range    WBC 5.6 4.0 - 11.0 K/uL    RBC 3.78 (L) 4.00 - 5.20 M/uL    Hemoglobin 13.0 12.0 - 16.0 g/dL    Hematocrit 38.1 36.0 - 48.0 %    .7 (H) 80.0 - 100.0 fL    MCH 34.4 (H) 26.0 - 34.0 pg    MCHC 34.2 31.0 - 36.0 g/dL    RDW 14.5 12.4 - 15.4 %    Platelets 883 603 - 701 K/uL    MPV 8.7 5.0 - 10.5 fL   Basic Metabolic Panel w/ Reflex to MG   Result Value Ref Range    Sodium 135 (L) 136 - 145 mmol/L    Potassium reflex Magnesium 3.9 3.5 - 5.1 mmol/L    Chloride 100 99 - 110 mmol/L    CO2 22 21 - 32 mmol/L    Anion Gap 13 3 - 16    Glucose 139 (H) 70 - 99 mg/dL    BUN 11 7 - 20 mg/dL    CREATININE 0.6 0.6 - 1.2 mg/dL    GFR Non-African American >60 >60    GFR African American >60 >60    Calcium 9.3 8.3 - 10.6 mg/dL   Troponin   Result Value Ref Range    Troponin 0.02 (H) <0.01 ng/mL   CBC   Result Value Ref Range    WBC 4.3 4.0 - 11.0 K/uL    RBC 3.52 (L) 4.00 - 5.20 M/uL    Hemoglobin 12.4 12.0 - 16.0 g/dL    Hematocrit 35.6 (L) 36.0 - 48.0 %    .1 (H) 80.0 - 100.0 fL    MCH 35.3 (H) 26.0 - 34.0 pg    MCHC 34.9 31.0 - 36.0 g/dL    RDW 14.6 12.4 - 15.4 %    Platelets 779 703 - 310 K/uL    MPV 8.5 5.0 - 10.5 fL   APTT   Result Value Ref Range    aPTT 41.0 (H) 24.2 - 36.2 sec   Basic metabolic panel   Result Value Ref Range    Sodium 136 136 - 145 mmol/L    Potassium 4.0 3.5 - 5.1 mmol/L    Chloride 105 99 - 110 mmol/L    CO2 21 21 - 32 mmol/L    Anion Gap 10 3 - 16    Glucose 120 (H) 70 - 99 mg/dL    BUN 10 7 - 20 mg/dL    CREATININE 0.6 0.6 - 1.2 mg/dL    GFR Non-African American >60 >60    GFR African American >60 >60    Calcium 9.0 8.3 - 10.6 mg/dL   Sedimentation Rate   Result Value Ref Range    Sed Rate 13 0 - 30 mm/Hr   C-reactive protein   Result Value Ref Range    CRP 5.9 (H) 0.0 - 5.1 mg/L   Ejection Fraction Percentage   Result Value Ref Range    Left Ventricular Ejection Fraction 50 %    LEFT VENTRICULAR EJECTION FRACTION MODE Cardiac Cath     Left Ventricular Ejection Fraction High Value 55 %   POC ACT-Low Range   Result Value Ref Range    POC ACT  Not Establised sec   EKG 12 Lead   Result Value Ref Range    Ventricular Rate 232 BPM    Atrial Rate 232 BPM    QRS Duration 190 ms    Q-T Interval 186 ms    QTc Calculation (Bazett) 365 ms    P Axis 70 degrees    R Axis 50 degrees    T Axis 0 degrees    Diagnosis       Atrial flutterRight bundle branch blockConfirmed by Gregor Ruff ([id number]) on 12/16/2020 5:33:28 PM   EKG 12 Lead   Result Value Ref Range    Ventricular Rate 78 BPM    Atrial Rate 234 BPM    QRS Duration 94 ms    Q-T Interval 398 ms    QTc Calculation (Bazett) 453 ms    P Axis 89 degrees    R Axis 46 degrees    T Axis 42 degrees    Diagnosis       Atrial flutter with variable A-V blockAnterolateral infarct , age undeterminedConfirmed by Caroline Kwon ([id number]) on 12/16/2020 5:33:36 PM   EKG 12 lead   Result Value Ref Range    Ventricular Rate 83 BPM    Atrial Rate 375 BPM    QRS Duration 92 ms    Q-T Interval 388 ms    QTc Calculation (Bazett) 455 ms    R Axis 56 degrees    T Axis 261 degrees    Diagnosis       Atrial fibrillationMinimal voltage criteria for LVH, may be normal variantST & T wave abnormality, consider inferior ischemiaST & T wave abnormality, consider anterolateral ischemiaConfirmed by Caroline Kwon ([id number]) on 12/16/2020 5:33:47 PM   EKG 12 Lead   Result Value Ref Range    Ventricular Rate 65 BPM    Atrial Rate 65 BPM    P-R Interval 132 ms    QRS Duration 90 ms    Q-T Interval 456 ms    QTc Calculation (Bazett) 474 ms    P Axis 90 degrees    R Axis 54 degrees    T Axis 269 degrees    Diagnosis       Normal sinus rhythmT wave abnormality, consider anterolateral ischemiaT wave abnormality, consider inferolateral ischemiaConfirmed by Caroline Kwon ([id number]) on 12/17/2020 12:56:40 PM      Radiographs (if obtained):  Radiologist's Report Reviewed:  No results found. EKG (if obtained): (All EKG's are interpreted by myself in the absence of a cardiologist)  Right bundle branch block irregular tachycardia nonspecific ST segment changes    MDM:  [facility] arrived with severe distress, the patient's heart rate was greater than 230, patient received 15 of diltiazem and 5 of Lopressor and actually became rate controlled this point time she was about to be synchronized cardioverted when she had improvement in blood pressure and mental status and rate control. Dr. Kenneth Rendon from cardiology exam the patient in the ER. She will be placed on weight-based heparin, she remains on a diltiazem infusion her heart rate is currently controlled further management will be dictated per her primary care physician Dr. Racheal Manjarrez with whom I spoke as well as consulting cardiology.   His repeat EKG shows no significant ST segment elevation and there was no initial troponin leak. Total critical care time provided today was 35 minutes. This excludes seperately billable procedures and family discussion time. Critical care time provided for obtaining history, conducting a physical exam, performing and monitoring interventions, ordering, collecting and interpreting tests, and establishing medical decision-making. There was a potential for life/limb threatening pathology requiring close evaluation and intervention with concern for patient decompensation. Clinical Impression:  1. Paroxysmal supraventricular tachycardia (HCC)    2. Chest pain, precordial    3. Atrial flutter, unspecified type [facility])      Disposition referral (if applicable):  No follow-up provider specified. Disposition medications (if applicable):  Current Discharge Medication List          Comment: Please note this report has been produced using speech recognition software and may contain errors related to that system including errors in grammar, punctuation, and spelling, as well as words and phrases that may be inappropriate. Efforts were made to edit the dictations.      Melinda Tellez MD  [id number]

## 2020-12-16 NOTE — PROGRESS NOTES
Upon arrival to 3a Heart rate 91 atrial fibrillation. Patient states mild dizziness in [facility] ED when walked to bathroom. Patient has BSC with fall safety plan in place. Positive for few fine crackles in posterior bases which clear with deep breathing. Prior history covid 3 weeks ago. Cardizem drip infusing 5mg per hour. Heparin drip infusing at 12 units per kg per hour. 2nd troponin 0.03 next due 0445.  Next PTT due 0745

## 2020-12-16 NOTE — PRE SEDATION
Brief Pre-Op Note/Sedation Assessment      Ilan Ashford  1955  [id number]      [id number]  4:22 PM    Planned Procedure: Cardiac Catheterization Procedure    Post Procedure Plan: Return to same level of care    Consent: I have discussed with the patient and/or the patient representative the indication, alternatives, and the possible risks and/or complications of the planned procedure and the anesthesia methods. The patient and/or patient representative appear to understand and agree to proceed. Chief Complaint: Chest Pain/Pressure      Indications for Cath Procedure:  Suspected CAD  Anginal Classification within 2 weeks:  CCS IV - Inability to perform any activity without angina or angina at rest, i.e., severe limitation  NYHA Heart Failure Class within 2 weeks: No symptoms  Is Cath Lab Visit Valve-related?: Mitral Regurgitation; Regurgitation Severity: Moderate (2+)  Surgical Risk: Intermediate  Functional Type: >= 4 METS with symptoms    Anti- Anginal Meds within 2 weeks:   Yes: Ca Channel Blockers, Aspirin and Statin (Any)    Stress or Imaging Studies Performed:  Stress Test with SPECT Result: Indeterminate Risk/Extent of Ischemia:  Intermediate     Vital Signs:  /83   Pulse 118   Temp 97.5 °F (36.4 °C) (Oral)   Resp 18   Ht 5' 2.5\" (1.588 m)   Wt 152 lb 9.6 oz (69.2 kg)   SpO2 96%   BMI 27.47 kg/m²     Allergies:   Allergies   Allergen Reactions    Nsaids      Note: PERFORATED PEPTIC ULCER       Past Medical History:  Past Medical History:   Diagnosis Date    Anxiety     Arthritis     Depression     Hypertension     Hypothyroidism          Surgical History:  Past Surgical History:   Procedure Laterality Date    ABDOMINAL EXPLORATION SURGERY  02/12/2018    LAPAROTOMY EXPLORATORY, REPAIR PERFORATED ULCER    BLADDER REPAIR      BREAST ENHANCEMENT SURGERY      HYSTERECTOMY      partial    PARTIAL HYSTERECTOMY      SHOULDER SURGERY      ligament moved up left shoulder         Medications:  Current Facility-Administered Medications   Medication Dose Route Frequency Provider Last Rate Last Admin    ALPRAZolam Julissa Carolin) tablet 0.25 mg  0.25 mg Oral Nightly PRN Liz Aquino MD   0.25 mg at 12/16/20 1555    thyroid (ARMOUR) tablet 60 mg  60 mg Oral Daily Liz Aquino MD   60 mg at 12/16/20 0650    DULoxetine (CYMBALTA) extended release capsule 30 mg  30 mg Oral Daily Liz Aquino MD   30 mg at 12/16/20 7747    hydroxychloroquine (PLAQUENIL) tablet 300 mg  300 mg Oral Daily Liz Aquino MD   300 mg at 12/16/20 0827    pantoprazole (PROTONIX) tablet 40 mg  40 mg Oral Daily Liz Aquino MD   40 mg at 12/16/20 0826    tiZANidine (ZANAFLEX) tablet 4 mg  4 mg Oral BID PRN Liz Aquino MD   4 mg at 12/16/20 0220    sodium chloride flush 0.9 % injection 10 mL  10 mL Intravenous 2 times per day Liz Aquino MD        sodium chloride flush 0.9 % injection 10 mL  10 mL Intravenous PRN Liz Aquino MD        promethazine (PHENERGAN) tablet 12.5 mg  12.5 mg Oral Q6H PRN Liz Aquino MD        Or    ondansetron [facility]) injection 4 mg  4 mg Intravenous Q6H PRN Liz Aquino MD        acetaminophen (TYLENOL) tablet 650 mg  650 mg Oral Q6H PRN Liz Aquino MD        Or    acetaminophen (TYLENOL) suppository 650 mg  650 mg Rectal Q6H PRN Liz Aquino MD        polyethylene glycol [facility]) packet 17 g  17 g Oral Daily PRN Liz Aquino MD        potassium chloride 10 mEq/100 mL IVPB (Peripheral Line)  10 mEq Intravenous PRN Liz Aquino MD        atorvastatin (LIPITOR) tablet 40 mg  40 mg Oral Nightly Liz Aquino MD        gabapentin (NEURONTIN) capsule 300 mg  300 mg Oral TID Liz Aquino MD   300 mg at 12/16/20 1555    traZODone (DESYREL) tablet 50 mg  50 mg Oral Nightly Liz Aquino MD   50 mg at 12/16/20 0403    dilTIAZem patient. Informed consent was obtained and further recommendations will be made following the procedure. Carissa Lux DO, [facility]  Interventional Cardiology     o: [phone]  [address] [place] [address]., [address] [facility], [address] [place] [address]      NOTE:  This report was transcribed using voice recognition software. Every effort was made to ensure accuracy; however, inadvertent computerized transcription errors may be present.

## 2020-12-16 NOTE — PROGRESS NOTES
Instructed on Lexiscan Stress Test Procedure including possible side effects/ adverse reactions. Patient verbalizes  understanding and denies having any questions . See [address] [place] [address] Cardiology

## 2020-12-16 NOTE — CARE COORDINATION
Discharge Planning Assessment  RN  discharge planner met with patient to discuss reason for admission, current living situation, and potential needs at the time of discharge    Demographics/Insurance verified Yes    Current type of dwelling:  [address] 1 [address] to get in    Patient from ECF/SW confirmed with:  N/A    Living arrangements:live alone    Level of function/Support: Independent    PCP:  Dr Manuel Vergara    Last Visit to PCP:  1 week ago    DME:  None    Active with any community resources/agencies/skilled home care: No    Medication compliance issues: Denies    Financial issues that could impact healthcare: No        Tentative discharge plan: Home    Discussed and provided facilities of choice if transition to a skilled nursing facility is required at the time of discharge- No      Discussed with patient and/or family that on the day of discharge home tentative time of discharge will be between 10 AM and noon.     Transportation at the time of discharge: Family will transport

## 2020-12-16 NOTE — CONSULTS
Cardiovascular Consultation     Attending Physician: Dmitriy Elias MD    PATIENT: Jamarcus Decker  : 1955  MRN: [id number]    Reason for Consultation:   Chief Complaint   Patient presents with    Tachycardia     Pt from home via [facility], states she felt like her heart was racing, pulse 230's, upon squad arrival also 230, 6,12,12 adenosine no conversion. History of present illness:   Ms. Jamarcus Decker is a 75 y.o. female patient seen in interventional cardiology consultation for elevated troponins. Annie Noramn was admitted yesterday after presenting from home by EMS with abrupt onset tachycardia palpitations and chest pain. She was found to be in atrial flutter, presumably new. She had been recovering from COVID-19 that she had managed at home last month. She denies palpitations chest pain during her infectious course. 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 She states she had remained active and only on one medication for history of hypertension. She denies shortness of breath at rest cough fevers chills nausea vomiting. She states that she had noticed dyspnea on exertion and fatigue over the past month. She was ordered a stress test after being placed on diltiazem and heparin drip. She had been in atrial fibrillation and had rapid ventricular response in stress lab. She states that it did cause recurrent chest pain and palpitations. In seeing her back on telemetry floor she is currently asymptomatic but feeling anxious.       Medical History:      Diagnosis Date    Anxiety     Arthritis     Depression     Hypertension     Hypothyroidism        Surgical History:      Procedure Laterality Date    ABDOMINAL EXPLORATION SURGERY  2018    LAPAROTOMY EXPLORATORY, REPAIR PERFORATED ULCER    BLADDER REPAIR      BREAST ENHANCEMENT SURGERY      HYSTERECTOMY      partial    PARTIAL HYSTERECTOMY      flush 0.9 % injection 10 mL, 2 times per day      sodium chloride flush 0.9 % injection 10 mL, PRN      promethazine (PHENERGAN) tablet 12.5 mg, Q6H PRN    Or      ondansetron (ZOFRAN) injection 4 mg, Q6H PRN      acetaminophen (TYLENOL) tablet 650 mg, Q6H PRN    Or      acetaminophen (TYLENOL) suppository 650 mg, Q6H PRN      polyethylene glycol (GLYCOLAX) packet 17 g, Daily PRN      potassium chloride 10 mEq/100 mL IVPB (Peripheral Line), PRN      atorvastatin (LIPITOR) tablet 40 mg, Nightly      gabapentin (NEURONTIN) capsule 300 mg, TID      traZODone (DESYREL) tablet 50 mg, Nightly      dilTIAZem (CARDIZEM CD) extended release capsule 120 mg, Daily      regadenoson (LEXISCAN) injection 0.4 mg, ONCE PRN      dilTIAZem 125 mg in dextrose 5 % 125 mL infusion, Continuous      heparin (porcine) injection 4,000 Units, PRN      heparin (porcine) injection 2,000 Units, PRN      heparin 25,000 units in dextrose 5% 250 mL infusion, Continuous        Allergies:  Nsaids     Review of Systems:   [x]Full ROS obtained and negative except as mentioned in HPI    Physical Examination:    /83   Pulse 118   Temp 97.5 °F (36.4 °C) (Oral)   Resp 18   Ht 5' 2.5\" (1.588 m)   Wt 152 lb 9.6 oz (69.2 kg)   SpO2 96%   BMI 27.47 kg/m²   Wt Readings from Last 3 Encounters:   12/16/20 152 lb 9.6 oz (69.2 kg)   11/18/20 166 lb (75.3 kg)   11/04/20 166 lb (75.3 kg)       GENERAL: Well developed, well nourished, no acute distress  NEUROLOGICAL: Alert and oriented x3  PSYCH: Normal mood and affect   SKIN: Warm and dry, without lesions  HEENT: Normocephalic, atraumatic, Sclera non-icteric, mucous membranes moist  NECK: supple, JVP normal, thyroid not enlarged   CAROTID: Normal upstroke, no bruits  CARDIAC: Normal PMI, irregular rate and rhythm, normal S1S2, no murmur, rub  RESPIRATORY: Normal respiratory effort, clear to auscultation bilaterally  EXTREMITIES: No cyanosis, clubbing or edema, palpable pulses bilaterally MUSCULOSKELETAL: No joint swelling or tenderness, no chest wall tenderness  GASTROINTESTINAL:  soft, non-tender, no bruit    Labs:  Lab Review   Lab Results   Component Value Date     12/16/2020    K 3.9 12/16/2020     12/16/2020    CO2 22 12/16/2020    BUN 11 12/16/2020    CREATININE 0.6 12/16/2020    GLUCOSE 139 12/16/2020    CALCIUM 9.3 12/16/2020     Lab Results   Component Value Date    TROPONINI 0.02 12/16/2020    TROPONINI 0.02 12/16/2020     Lab Results   Component Value Date    WBC 5.6 12/16/2020    HGB 13.0 12/16/2020    HCT 38.1 12/16/2020    .7 12/16/2020     12/16/2020     Lab Results   Component Value Date    CHOL 161 05/29/2020    TRIG 76 05/29/2020    HDL 77 05/29/2020    HDL 79 06/13/2011       Imaging:  I have reviewed the below testing personally:    EKG:    Atrial fibrillation  Minimal voltage criteria for LVH, may be normal variant  ST & T wave abnormality, consider inferior ischemia   ST & T wave abnormality, consider anterolateral ischemia       ECHO: 12/16/20   -Overall left ventricular systolic function is low normal .   -Ejection fraction is visually estimated to be 50-55 %. E/e'= 8.1   -No definitive wall motion abnormality.   -Indeterminate diastolic function.   -The tip of the anterior leaflet appears to be slightly redundant, and is   suggestive of some myxomatous change with minimal prolapse. -Moderate to severe mitral regurgitation. Two regurgitant jets are noted. -Mild tricuspid regurgitation.   -Mild Aortic regurgitation. -IVC size is normal (<2.1cm) and collapses > 50% with respiration consistent   with normal RA pressure (3mmHg). 12/16/20 SPECT MPI  Summary    Small sized anteroapical fixed defect of intensity consistent with    infarction in the territory of the LAD .    Left ventricular ejection fraction of 45 %.    Overall findings represent a intermediate risk scan.         Impression/Recommendations    Ms. Radha Hartley is a 75 y.o. female patient    Chest pain  Elevated troponins  New onset atrial fibrillation atrial flutter  Mitral regurgitation  Hypertension  Former tobacco  History of COVID-19 November 2020  History of perforated gastric ulcer 2017  Pulmonary nodules    The 10-year ASCVD risk score (Jose Reyes., et al., 2013) is: 4.6%    Values used to calculate the score:      Age: 75 years      Sex: Female      Is Non- : No      Diabetic: No      Tobacco smoker: No      Systolic Blood Pressure: 515 mmHg      Is BP treated: No      HDL Cholesterol: 77 mg/dL      Total Cholesterol: 161 mg/dL    Atrial flutter on admission with atrial fib and rapid ventricular response during stress test today. Abnormal SPECT MPI. I am seeing her following this and she is asymptomatic with improved rates. She is agreeable to cardiac catheterization to determine whether ischemic culprit to new atrial arrhythmias and/or mitral regurg. Risks, benefits, goals, and alternatives of left heart catheterization with the potential for percutaneous coronary intervention discussed with patient; including stroke, heart attack, kidney damage, death, paralysis, disability, damage to nerves/arteries/veins. All questions answered and informed consent obtained. Further recommendations pending coronary angiography and clinical course. Thank you for allowing me to participate in the care of your patient. Please do not hesitate to call. Himanshu Marie DO, [facility]  Interventional Cardiology     o: [phone]  [address] [place] [address]., [address] [facility], [address] [facility]      NOTE:  This report was transcribed using voice recognition software. Every effort was made to ensure accuracy; however, inadvertent computerized transcription errors may be present.

## 2020-12-16 NOTE — H&P
Department of Internal Medicine  History & Physical      SUBJECTIVE:  The patient is 74 yo  female with pmh significant for htn, hypothyroidism, cervical disk degeneration,peptic ulcer disease, gerd, anxiety,depression, and insomnia. She states she started with tachycardia last evening around 17:05. She states it started when she got up to walk across the room. She had some substernal chest tightness, dizziness, and associated shortness of breath. Denies any associated nausea, vomiting, diarrhea or constipation. States she had covid 11/21. She states she was given adenosine in life squad several times with only short term relief of tachycardia. She was found to have aflutter in er. Was admitted for further evaluation and management. Pt admitted to tele. Cardiology consulted. EP Consulted. Pt will undergo serial troponins, echo and nuclear myoview. ALLERGIES:   Allergies   Allergen Reactions    Nsaids      Note: PERFORATED PEPTIC ULCER      MEDICATIONS:   Prior to Admission medications    Medication Sig Start Date End Date Taking? Authorizing Provider   DULoxetine (CYMBALTA) 30 MG extended release capsule Take 30 mg by mouth daily   Yes Historical Provider, MD   hydroxychloroquine (PLAQUENIL) 200 MG tablet Take 300 mg by mouth daily    Yes Historical Provider, MD   gabapentin (NEURONTIN) 300 MG capsule Take 1 capsule by mouth 3 times daily for 90 days. 12/1/20 3/1/21 Yes Sabrina Angel MD   pantoprazole (PROTONIX) 40 MG tablet Take 1 tablet by mouth daily 2/18/18  Yes Florinda Alatorre MD   tiZANidine (ZANAFLEX) 4 MG tablet Take 4 mg by mouth 2 times daily as needed    Yes Historical Provider, MD   lisinopril (PRINIVIL;ZESTRIL) 10 MG tablet Take 10 mg by mouth daily   Yes Historical Provider, MD LOZANO THYROID PO Take 60 mg by mouth Daily    Yes Historical Provider, MD   ALPRAZolam (XANAX) 0.25 MG tablet Take 0.25 mg by mouth nightly as needed for Sleep.    Yes Historical Provider, MD escitalopram (LEXAPRO) 10 MG tablet Take 5 mg by mouth nightly  12/15/20 12/16/20 Yes Historical Provider, MD   traZODone (DESYREL) 50 MG tablet Take 50 mg by mouth nightly. Yes Historical Provider, MD NELSON   Past Medical History:   Diagnosis Date    Anxiety     Arthritis     Depression     Hypertension     Hypothyroidism       PSH:       Procedure Laterality Date    ABDOMINAL EXPLORATION SURGERY  02/12/2018    LAPAROTOMY EXPLORATORY, REPAIR PERFORATED ULCER    BLADDER REPAIR      BREAST ENHANCEMENT SURGERY      HYSTERECTOMY      partial    PARTIAL HYSTERECTOMY      SHOULDER SURGERY      ligament moved up left shoulder      FAMILY HISTORY:       Problem Relation Age of Onset    Heart Attack Mother       SOCIAL HISTORY:   Social History     Tobacco Use    Smoking status: Never Smoker    Smokeless tobacco: Never Used   Substance Use Topics    Alcohol use:  Yes     Alcohol/week: 0.0 standard drinks        REVIEW OF SYSTEMS: -   General ROS:denies any headache or weakness  Ophthalmic ROS: denies any blurred vision, double vision or vision loss  ENT ROS: denies any epistaxis, nasal discharge  Hematological and Lymphatic: denies any fatigue, night sweats, enlarge lymph nodes or bruising ,   Respiratory ROS: denies any shortness of breath, dyspnea on exertion, wheezing, or cough   Cardiovascular ROS: denies any further chest pain,denies any further palpitations, shortness of breath, dizziness syncope or swelling in extremities  Gastrointestinal ROS: denies any abdominal pain, acid reflux, change in bowel habits or blood in stool, dark or tarry stools     Musculoskeletal ROS:denies any back pain or joint pain,  Neurological ROS: denies any mental status changes, seizures, memory loss, speech problems or muscle weakness in extremities   Dermatological ROS: denies any rash or skin lesions     OBJECTIVE:      PHYSICAL:   VITALS:  /66   Pulse 78   Temp 97.5 °F (36.4 °C) (Oral)   Resp 18   Ht 5' 2.5\" (1.588 m)   Wt 152 lb 9.6 oz (69.2 kg)   SpO2 94%   BMI 27.47 kg/m²   PULSE OXIMETRY RANGE: SpO2  Av.3 %  Min: 93 %  Max: 98 %    Intake/Output Summary (Last 24 hours) at 2020 0806  Last data filed at 2020 9884  Gross per 24 hour   Intake 101 ml   Output --   Net 101 ml      CONSTITUTIONAL:  awake, alert, cooperative, no apparent distress, and appears stated age  HEAD:  Normocephalic, atraumatic  HEENT:  ENT exam normal, no neck nodes or sinus tenderness  EYE: conjunctivae/corneas clear. PERRL, EOM's intact. Fundi benign. NECK:  Supple, symmetrical, trachea midline, no adenopathy, thyroid symmetric, not enlarged and no tenderness, skin normal  LUNGS:  No increased work of breathing, good air exchange, clear to auscultation bilaterally, no crackles or wheezing  CARDIOVASCULAR:  Irregularly regular rate and rhythm, normal S1 and S2, and no murmur noted, afib with controlled rate on tele  ABDOMEN:  Normal bowel sounds, soft, non-distended, non-tender, no masses palpated, no hepatosplenomegally  MUSCULOSKELETAL:  There is no redness, warmth, or swelling of the joints. Full range of motion noted. Motor strength is 5 out of 5 all extremities bilaterally. Tone is normal.  NEUROLOGIC:  Awake, alert, oriented to name, place and time. Cranial nerves II-XII are grossly intact. Motor is 5 out of 5 bilaterally. Cerebellar finger to nose, heel to shin intact. Sensory is intact.   Babinski down going, Romberg negative, and gait is normal.  SKIN:  normal skin color, texture, turgor  EDEMA: Normal    Data    Labs Reviewed   CBC WITH AUTO DIFFERENTIAL - Abnormal; Notable for the following components:       Result Value    RBC 3.63 (*)     .1 (*)     MCH 34.4 (*)     Lymphocytes Absolute 0.9 (*)     All other components within normal limits    Narrative:     Performed at:  [facility] [address], [facility], [address]   Phone [phone] [address] PANEL W/ REFLEX TO MG FOR LOW K - Abnormal; Notable for the following components:    Sodium 131 (*)     CO2 17 (*)     Glucose 113 (*)     All other components within normal limits    Narrative:     Performed at:  [facility] [address] [place]   Phone [phone]   BRAIN NATRIURETIC PEPTIDE - Abnormal; Notable for the following components:    Pro- (*)     All other components within normal limits    Narrative:     Performed at:  [facility] [address]   Phone [phone]   APTT - Abnormal; Notable for the following components:    aPTT 63.9 (*)     All other components within normal limits    Narrative:     Performed at:  [facility] [address]   Phone [phone]   TROPONIN - Abnormal; Notable for the following components:    Troponin 0.03 (*)     All other components within normal limits    Narrative:     Performed at:  [facility] [address] [place]   Phone [phone]   TROPONIN - Abnormal; Notable for the following components:    Troponin 0.02 (*)     All other components within normal limits    Narrative:     Performed at:  [facility] [address]   Phone [phone]   TROPONIN - Abnormal; Notable for the following components:    Troponin 0.02 (*)     All other components within normal limits    Narrative:     Performed at:  [facility] [address]   Phone [phone]   TROPONIN    Narrative:     Performed at:  [facility] [address], [place]   Phone [phone]   APTT    Narrative:     Performed at:  [facility] [address], [place]   Phone [phone]   APTT   CBC   BASIC METABOLIC PANEL W/ REFLEX TO MG FOR LOW K   APTT   APTT     CBC with Differential:    Lab Results   Component Value Date    WBC 5.6 12/16/2020    RBC 3.78 12/16/2020    HGB 13.0 12/16/2020    HCT 38.1 12/16/2020     12/16/2020    .7 12/16/2020    MCH 34.4 12/16/2020    MCHC 34.2 12/16/2020    RDW 14.5 12/16/2020    SEGSPCT 74.1 01/18/2013    LYMPHOPCT 11.9 12/15/2020    MONOPCT 7.0 12/15/2020    EOSPCT 3.1 08/15/2011    BASOPCT 0.6 12/15/2020    MONOSABS 0.5 12/15/2020    LYMPHSABS 0.9 12/15/2020    EOSABS 0.1 12/15/2020    BASOSABS 0.0 12/15/2020    DIFFTYPE Auto-K 01/18/2013     CMP:    Lab Results   Component Value Date     12/16/2020    K 3.9 12/16/2020     12/16/2020    CO2 22 12/16/2020    BUN 11 12/16/2020    CREATININE 0.6 12/16/2020    GFRAA >60 12/16/2020    GFRAA >60 01/25/2013    AGRATIO 1.6 12/03/2020    LABGLOM >60 12/16/2020    GLUCOSE 139 12/16/2020    PROT 7.2 12/03/2020    PROT 6.9 01/25/2013    LABALBU 4.4 12/03/2020    CALCIUM 9.3 12/16/2020    BILITOT 0.5 12/03/2020    ALKPHOS 216 12/03/2020     12/03/2020     12/03/2020       Imaging  Ct Chest Pulmonary Embolism W Contrast    Result Date: 12/15/2020  EXAMINATION: CTA OF THE CHEST 12/15/2020 7:58 pm TECHNIQUE: CTA of the chest was performed after the administration of intravenous contrast.  Multiplanar reformatted images are provided for review. MIP images are provided for review. Dose modulation, iterative reconstruction, and/or weight based adjustment of the mA/kV was utilized to reduce the radiation dose to as low as reasonably achievable. COMPARISON: CT abdomen and pelvis, 02/13/2018 HISTORY: ORDERING SYSTEM PROVIDED HISTORY: cp TECHNOLOGIST PROVIDED HISTORY: Reason for exam:->cp Reason for Exam: Chest pain. Acuity: Acute Type of Exam: Initial FINDINGS: Pulmonary Arteries: The central pulmonary arteries are normal in caliber.  Pulmonary arteries are well opacified to the subsegmental levels. No emboli are identified. Mediastinum: There is no mediastinal or hilar adenopathy. Lungs/pleura: There are clusters of nodules in the right upper lobe and lingula, measuring up to 5.5  mm. Dependent densities are noted in the lower lobes. No pleural effusion is appreciated. Upper Abdomen: The adrenal glands and upper abdomen are unremarkable. Soft Tissues/Bones: No acute osseous abnormality is detected. Bilateral breast implants are present. No pulmonary embolus is appreciated. Clusters nodules measuring up 5.5  Mm in the right upper lobe lingula, typical granulomatous disease. If there are no risk factors, no follow-up may be necessary. Otherwise, 12 month follow-up is suggested. Dependent densities in the lungs, characteristic of atelectasis. Pneumonia is considered less likely. --- Fleischner Society guidelines for follow-up and management of incidentally detected pulmonary nodules: Multiple Solid Nodules: Nodule size less than 6 mm In a low-risk patient, no routine follow-up. In a high-risk patient, optional CT at 12 months. - Low risk patients include individuals with minimal or absent history of smoking and other known risk factors. - High risk patients include individuals with a history or smoking or known risk factors. Radiology 2017 http://pubs. rsna.org/doi/full/10.1148/radiol. 1101843938   RISRSLT    ASSESSMENT      Patient Active Problem List   Diagnosis    DDD (degenerative disc disease), cervical    Myofascial pain syndrome    Chronic migraine without aura    Epigastric pain    Perforated ulcer ([address]    Peritonitis (HCC)    Typical atrial flutter (HCC)    Chest discomfort    Atrial flutter with rapid ventricular response ([address]         PLAN  1. Admit to telemetry for atrial fib/flutter with rvr  2. Consult cardiology. 3. Consult EP. 4. Pt is on heparin gtt. 5. Resume home medications as ordered. 6. Echo and nuclear myoview today per cardiology.    7. Plan of care discussed with patient and her nurse. 8. Case discussed with Dr. Ángela Lee who is in agreement with the plan of care.    9.

## 2020-12-17 PROBLEM — I48.91 ATRIAL FIBRILLATION AND FLUTTER (HCC): Status: ACTIVE | Noted: 2020-12-15

## 2020-12-17 LAB
ANION GAP SERPL CALCULATED.3IONS-SCNC: 10 MMOL/L (ref 3–16)
APTT: 41 SEC (ref 24.2–36.2)
BUN BLDV-MCNC: 10 MG/DL (ref 7–20)
C-REACTIVE PROTEIN: 5.9 MG/L (ref 0–5.1)
CALCIUM SERPL-MCNC: 9 MG/DL (ref 8.3–10.6)
CHLORIDE BLD-SCNC: 105 MMOL/L (ref 99–110)
CO2: 21 MMOL/L (ref 21–32)
CREAT SERPL-MCNC: 0.6 MG/DL (ref 0.6–1.2)
EKG ATRIAL RATE: 65 BPM
EKG DIAGNOSIS: NORMAL
EKG P AXIS: 90 DEGREES
EKG P-R INTERVAL: 132 MS
EKG Q-T INTERVAL: 456 MS
EKG QRS DURATION: 90 MS
EKG QTC CALCULATION (BAZETT): 474 MS
EKG R AXIS: 54 DEGREES
EKG T AXIS: 269 DEGREES
EKG VENTRICULAR RATE: 65 BPM
GFR AFRICAN AMERICAN: >60
GFR NON-AFRICAN AMERICAN: >60
GLUCOSE BLD-MCNC: 120 MG/DL (ref 70–99)
HCT VFR BLD CALC: 35.6 % (ref 36–48)
HEMOGLOBIN: 12.4 G/DL (ref 12–16)
LEFT VENTRICULAR EJECTION FRACTION HIGH VALUE: 55 %
LEFT VENTRICULAR EJECTION FRACTION MODE: NORMAL
LV EF: 50 %
MCH RBC QN AUTO: 35.3 PG (ref 26–34)
MCHC RBC AUTO-ENTMCNC: 34.9 G/DL (ref 31–36)
MCV RBC AUTO: 101.1 FL (ref 80–100)
PDW BLD-RTO: 14.6 % (ref 12.4–15.4)
PLATELET # BLD: 273 K/UL (ref 135–450)
PMV BLD AUTO: 8.5 FL (ref 5–10.5)
POC ACT LR: 145 SEC
POTASSIUM SERPL-SCNC: 4 MMOL/L (ref 3.5–5.1)
RBC # BLD: 3.52 M/UL (ref 4–5.2)
SEDIMENTATION RATE, ERYTHROCYTE: 13 MM/HR (ref 0–30)
SODIUM BLD-SCNC: 136 MMOL/L (ref 136–145)
WBC # BLD: 4.3 K/UL (ref 4–11)

## 2020-12-17 PROCEDURE — 2500000003 HC RX 250 WO HCPCS

## 2020-12-17 PROCEDURE — 93325 DOPPLER ECHO COLOR FLOW MAPG: CPT

## 2020-12-17 PROCEDURE — 85027 COMPLETE CBC AUTOMATED: CPT

## 2020-12-17 PROCEDURE — 2580000003 HC RX 258: Performed by: INTERNAL MEDICINE

## 2020-12-17 PROCEDURE — 99233 SBSQ HOSP IP/OBS HIGH 50: CPT | Performed by: INTERNAL MEDICINE

## 2020-12-17 PROCEDURE — 6370000000 HC RX 637 (ALT 250 FOR IP): Performed by: INTERNAL MEDICINE

## 2020-12-17 PROCEDURE — 93458 L HRT ARTERY/VENTRICLE ANGIO: CPT | Performed by: INTERNAL MEDICINE

## 2020-12-17 PROCEDURE — 93320 DOPPLER ECHO COMPLETE: CPT

## 2020-12-17 PROCEDURE — 1200000000 HC SEMI PRIVATE

## 2020-12-17 PROCEDURE — B2111ZZ FLUOROSCOPY OF MULTIPLE CORONARY ARTERIES USING LOW OSMOLAR CONTRAST: ICD-10-PCS | Performed by: INTERNAL MEDICINE

## 2020-12-17 PROCEDURE — C1769 GUIDE WIRE: HCPCS

## 2020-12-17 PROCEDURE — 4A023N7 MEASUREMENT OF CARDIAC SAMPLING AND PRESSURE, LEFT HEART, PERCUTANEOUS APPROACH: ICD-10-PCS | Performed by: INTERNAL MEDICINE

## 2020-12-17 PROCEDURE — 99152 MOD SED SAME PHYS/QHP 5/>YRS: CPT | Performed by: INTERNAL MEDICINE

## 2020-12-17 PROCEDURE — 94760 N-INVAS EAR/PLS OXIMETRY 1: CPT

## 2020-12-17 PROCEDURE — 92960 CARDIOVERSION ELECTRIC EXT: CPT

## 2020-12-17 PROCEDURE — 85347 COAGULATION TIME ACTIVATED: CPT

## 2020-12-17 PROCEDURE — 93005 ELECTROCARDIOGRAM TRACING: CPT | Performed by: INTERNAL MEDICINE

## 2020-12-17 PROCEDURE — B2151ZZ FLUOROSCOPY OF LEFT HEART USING LOW OSMOLAR CONTRAST: ICD-10-PCS | Performed by: INTERNAL MEDICINE

## 2020-12-17 PROCEDURE — 6360000004 HC RX CONTRAST MEDICATION: Performed by: INTERNAL MEDICINE

## 2020-12-17 PROCEDURE — 2709999900 HC NON-CHARGEABLE SUPPLY

## 2020-12-17 PROCEDURE — C1894 INTRO/SHEATH, NON-LASER: HCPCS

## 2020-12-17 PROCEDURE — 92960 CARDIOVERSION ELECTRIC EXT: CPT | Performed by: INTERNAL MEDICINE

## 2020-12-17 PROCEDURE — 86140 C-REACTIVE PROTEIN: CPT

## 2020-12-17 PROCEDURE — 2580000003 HC RX 258

## 2020-12-17 PROCEDURE — 36415 COLL VENOUS BLD VENIPUNCTURE: CPT

## 2020-12-17 PROCEDURE — 93312 ECHO TRANSESOPHAGEAL: CPT

## 2020-12-17 PROCEDURE — 5A2204Z RESTORATION OF CARDIAC RHYTHM, SINGLE: ICD-10-PCS | Performed by: INTERNAL MEDICINE

## 2020-12-17 PROCEDURE — 93458 L HRT ARTERY/VENTRICLE ANGIO: CPT

## 2020-12-17 PROCEDURE — 93010 ELECTROCARDIOGRAM REPORT: CPT | Performed by: INTERNAL MEDICINE

## 2020-12-17 PROCEDURE — 6360000002 HC RX W HCPCS

## 2020-12-17 PROCEDURE — 99152 MOD SED SAME PHYS/QHP 5/>YRS: CPT

## 2020-12-17 PROCEDURE — 85652 RBC SED RATE AUTOMATED: CPT

## 2020-12-17 PROCEDURE — 80048 BASIC METABOLIC PNL TOTAL CA: CPT

## 2020-12-17 PROCEDURE — B24BZZ4 ULTRASONOGRAPHY OF HEART WITH AORTA, TRANSESOPHAGEAL: ICD-10-PCS | Performed by: INTERNAL MEDICINE

## 2020-12-17 PROCEDURE — 85730 THROMBOPLASTIN TIME PARTIAL: CPT

## 2020-12-17 RX ORDER — HEPARIN SODIUM 1000 [USP'U]/ML
4000 INJECTION, SOLUTION INTRAVENOUS; SUBCUTANEOUS PRN
Status: DISCONTINUED | OUTPATIENT
Start: 2020-12-17 | End: 2020-12-17 | Stop reason: ALTCHOICE

## 2020-12-17 RX ORDER — SODIUM CHLORIDE 0.9 % (FLUSH) 0.9 %
10 SYRINGE (ML) INJECTION EVERY 12 HOURS SCHEDULED
Status: DISCONTINUED | OUTPATIENT
Start: 2020-12-17 | End: 2020-12-18 | Stop reason: HOSPADM

## 2020-12-17 RX ORDER — SODIUM CHLORIDE 0.9 % (FLUSH) 0.9 %
10 SYRINGE (ML) INJECTION PRN
Status: DISCONTINUED | OUTPATIENT
Start: 2020-12-17 | End: 2020-12-18 | Stop reason: HOSPADM

## 2020-12-17 RX ORDER — ACETAMINOPHEN 325 MG/1
650 TABLET ORAL EVERY 4 HOURS PRN
Status: DISCONTINUED | OUTPATIENT
Start: 2020-12-17 | End: 2020-12-18 | Stop reason: HOSPADM

## 2020-12-17 RX ADMIN — Medication 10 ML: at 21:55

## 2020-12-17 RX ADMIN — IOPAMIDOL 89 ML: 755 INJECTION, SOLUTION INTRAVENOUS at 07:48

## 2020-12-17 RX ADMIN — HYDROXYCHLOROQUINE SULFATE 300 MG: 200 TABLET ORAL at 10:56

## 2020-12-17 RX ADMIN — GABAPENTIN 300 MG: 300 CAPSULE ORAL at 15:22

## 2020-12-17 RX ADMIN — GABAPENTIN 300 MG: 300 CAPSULE ORAL at 10:57

## 2020-12-17 RX ADMIN — RIVAROXABAN 20 MG: 20 TABLET, FILM COATED ORAL at 10:56

## 2020-12-17 RX ADMIN — PANTOPRAZOLE SODIUM 40 MG: 40 TABLET, DELAYED RELEASE ORAL at 10:57

## 2020-12-17 RX ADMIN — ALPRAZOLAM 0.25 MG: 0.25 TABLET ORAL at 21:54

## 2020-12-17 RX ADMIN — LEVOTHYROXINE, LIOTHYRONINE 60 MG: 19; 4.5 TABLET ORAL at 10:56

## 2020-12-17 RX ADMIN — DULOXETINE HYDROCHLORIDE 30 MG: 30 CAPSULE, DELAYED RELEASE ORAL at 10:57

## 2020-12-17 RX ADMIN — GABAPENTIN 300 MG: 300 CAPSULE ORAL at 21:54

## 2020-12-17 RX ADMIN — TIZANIDINE 4 MG: 4 TABLET ORAL at 21:54

## 2020-12-17 RX ADMIN — DILTIAZEM HYDROCHLORIDE 120 MG: 120 CAPSULE, COATED, EXTENDED RELEASE ORAL at 10:56

## 2020-12-17 RX ADMIN — ATORVASTATIN CALCIUM 40 MG: 40 TABLET, FILM COATED ORAL at 21:54

## 2020-12-17 RX ADMIN — Medication 10 ML: at 10:58

## 2020-12-17 RX ADMIN — TRAZODONE HYDROCHLORIDE 50 MG: 50 TABLET ORAL at 21:54

## 2020-12-17 RX ADMIN — Medication 10 ML: at 10:59

## 2020-12-17 ASSESSMENT — PAIN SCALES - GENERAL
PAINLEVEL_OUTOF10: 0

## 2020-12-17 NOTE — PROGRESS NOTES
12/17/2020    .1 12/17/2020    MCH 35.3 12/17/2020    MCHC 34.9 12/17/2020    RDW 14.6 12/17/2020     12/17/2020    MPV 8.5 12/17/2020     CMP:    Lab Results   Component Value Date     12/17/2020    K 4.0 12/17/2020    K 3.9 12/16/2020     12/17/2020    CO2 21 12/17/2020    BUN 10 12/17/2020    CREATININE 0.6 12/17/2020    GFRAA >60 12/17/2020    GFRAA >60 01/25/2013    AGRATIO 1.6 12/03/2020    LABGLOM >60 12/17/2020    GLUCOSE 120 12/17/2020    PROT 7.2 12/03/2020    PROT 6.9 01/25/2013    LABALBU 4.4 12/03/2020    CALCIUM 9.0 12/17/2020    BILITOT 0.5 12/03/2020    ALKPHOS 216 12/03/2020     12/03/2020     12/03/2020       ASSESSMENT      Patient Active Problem List   Diagnosis    DDD (degenerative disc disease), cervical    Myofascial pain syndrome    Chronic migraine without aura    Epigastric pain    Perforated ulcer ([address]    Peritonitis ([address]    Typical atrial flutter (HCC)    Chest discomfort    Atrial fibrillation and flutter (HCC)    PAF (paroxysmal atrial fibrillation) (HCC)    Hyponatremia    Benign essential HTN         PLAN  1. Continue xarelto on Cardizem  2.  Discharge home tomorrow

## 2020-12-17 NOTE — PLAN OF CARE
Problem: Falls - Risk of:  Goal: Will remain free from falls  Note: Patient is a high fall risk. Patient free of falls this shift. Bed low, locked and alarmed at all times. Call light and bedside table is within reach. Yellow blanket on bed, arm band on wrist and fall sign posted in the room. Notified patient to ask for assistance when needed. Patient verbalized understanding. Problem: Pain:  Goal: Pain level will decrease  Note: Pt denies any pain upon assessment. Pt agreeable to call out to report pain between assessments.        Problem: Pain:  Goal: Control of acute pain  Outcome: Ongoing

## 2020-12-17 NOTE — PROGRESS NOTES
Pharmacy to check patient copays for Eliquis/Xarelto:    Copay for patient will be: $47/month      Pharmacy will continue to follow the decision for anticoagulation and  the patient if appropriate.        Antonietta Gallego, PharmD, BCPS  Clinical Pharmacist  [id number]

## 2020-12-17 NOTE — PROGRESS NOTES
[address]   Electrophysiology Progress Note     Admit Date: 12/15/2020     Reason for follow up: Atrial fibrillation     HPI and Interval History:   Patient seen and examined. Clinical notes reviewed. Telemetry reviewed. No new complaint today. No major events overnight. Denies having chest pain, shortness of breath, dyspnea on exertion, Orthopnea, PND at the time of this visit. Review of System:  All other systems reviewed and are negative except for that noted above. Pertinent negatives are:     · General: negative for fever, chills   · Ophthalmic ROS: negative for - eye pain or loss of vision  · ENT ROS: negative for - headaches, sore throat   · Respiratory: negative for - cough, sputum  · Cardiovascular: Reviewed in HPI  · Gastrointestinal: negative for - abdominal pain, diarrhea, N/V  · Hematology: negative for - bleeding, blood clots, bruising or jaundice  · Genito-Urinary:  negative for - Dysuria or incontinence  · Musculoskeletal: negative for - Joint swelling, muscle pain  · Neurological: negative for - confusion, dizziness, headaches   · Psychiatric: No anxiety, no depression. · Dermatological: negative for - rash      Physical Examination:  Vitals:    20 0825   BP: 104/73   Pulse:    Resp:    Temp:    SpO2:       In: 1233.1 [P.O.:1020; I.V.:213.1]  Out: 1350    Wt Readings from Last 3 Encounters:   20 152 lb 9.6 oz (69.2 kg)   20 166 lb (75.3 kg)   20 166 lb (75.3 kg)     Temp  Av.6 °F (36.4 °C)  Min: 97.3 °F (36.3 °C)  Max: 98 °F (36.7 °C)  Pulse  Av.7  Min: 77  Max: 130  BP  Min: 91/57  Max: 124/83  SpO2  Av.7 %  Min: 87 %  Max: 96 %    Intake/Output Summary (Last 24 hours) at 2020 0840  Last data filed at 2020 0830  Gross per 24 hour   Intake 1233.13 ml   Output 1950 ml   Net -716.87 ml       · Telemetry: A. fib  · Constitutional: Oriented. No distress. · Head: Normocephalic and atraumatic.    · Mouth/Throat: Oropharynx is clear and moist.   · Eyes: Conjunctivae normal. EOM are normal.   · Neck: Neck supple. No rigidity. No JVD present. · Cardiovascular: Normal rate, irregular rhythm, S1&S2. · Pulmonary/Chest: Bilateral respiratory sounds. No wheezes, No rhonchi. · Abdominal: Soft. Bowel sounds present. No distension, No tenderness. · Musculoskeletal: No tenderness. No edema    · Lymphadenopathy: Has no cervical adenopathy. · Neurological: Alert and oriented. Cranial nerve appears intact, No Gross deficit   · Skin: Skin is warm and dry. No rash noted. · Psychiatric: Has a normal behavior     Labs, diagnostic and imaging results reviewed. Reviewed. Recent Labs     12/15/20  1900 12/16/20  0717   * 135*   K 3.7 3.9   CL 99 100   CO2 17* 22   BUN 13 11   CREATININE 0.6 0.6     Recent Labs     12/15/20  1900 12/16/20  0717   WBC 7.5 5.6   HGB 12.5 13.0   HCT 36.4 38.1   .1* 100.7*    274     Lab Results   Component Value Date    TROPONINI 0.02 12/16/2020    TROPONINI 0.02 12/16/2020     Estimated Creatinine Clearance: 85 mL/min (based on SCr of 0.6 mg/dL).    No results found for: BNP  No results found for: PROTIME, INR  Lab Results   Component Value Date    CHOL 161 05/29/2020    HDL 77 05/29/2020    HDL 79 06/13/2011    TRIG 76 05/29/2020       Scheduled Meds:   thyroid  60 mg Oral Daily    DULoxetine  30 mg Oral Daily    hydroxychloroquine  300 mg Oral Daily    pantoprazole  40 mg Oral Daily    sodium chloride flush  10 mL Intravenous 2 times per day    atorvastatin  40 mg Oral Nightly    gabapentin  300 mg Oral TID    dilTIAZem  120 mg Oral Daily    traZODone  50 mg Oral Nightly     Continuous Infusions:   dilTIAZem (CARDIZEM) 125 mg in dextrose 5% 125 mL infusion 5 mg/hr (12/16/20 6468)    heparin (PORCINE) Infusion 12 Units/kg/hr (12/16/20 8716)     PRN Meds:ALPRAZolam, tiZANidine, sodium chloride flush, promethazine **OR** ondansetron, acetaminophen **OR** acetaminophen, polyethylene glycol, potassium chloride, regadenoson, heparin (porcine), heparin (porcine)     Patient Active Problem List    Diagnosis Date Noted    Typical atrial flutter ([address] 12/15/2020     Priority: High    Chest discomfort 12/15/2020     Priority: High    PAF (paroxysmal atrial fibrillation) (HCC)     Hyponatremia     Benign essential HTN     Atrial flutter with rapid ventricular response ([address] 12/15/2020    Perforated ulcer ([address]     Peritonitis ([address]     Epigastric pain 09/17/2015    Chronic migraine without aura 11/13/2014    DDD (degenerative disc disease), cervical 09/30/2014    Myofascial pain syndrome 09/30/2014      Active Hospital Problems    Diagnosis Date Noted    Typical atrial flutter ([address] [I48.3] 12/15/2020     Priority: High    Chest discomfort [R07.89] 12/15/2020     Priority: High    PAF (paroxysmal atrial fibrillation) ([address] [I48.0]     Hyponatremia [E87.1]     Benign essential HTN [I10]     Atrial flutter with rapid ventricular response ([address] [I48.92] 12/15/2020    DDD (degenerative disc disease), cervical [M50.30] 09/30/2014       Assessment:       Plan:      - New onset Atrial fibrillation and flutter                 The ECG at admission suggests atrial flutter with 1:1 conduction but later it is Atrial fibrillation.                 Less than 24 hours. On heparin now. [id number] score 2, female and HTN           Start Xarelto     Will do cardioversion if she does not convert today. Afib risk factors including age, HTN, obesity, inactivity and EILEEN were discussed with patient. Risk factor modification recommended. All questions were answered. - Treatment options including cardioversion, rate control strategy, anticoagulation were discussed with patient. Risks, benefits and alternative of each treatment options were explained.  All questions answered.      - chest discomfort, abnl ECG, FHx of CAD                 Will do stress test     - HTN              cardizem BP is well controlled. Continue current meds.     - Hyponatremia              Adequate intake       -Abnormal i stress test  Heart catheterization today showed normal coronaries and normal ejection fraction.          NOTE: This report was transcribed using voice recognition software. Every effort was made to ensure accuracy, however, inadvertent computerized transcription errors may be present.

## 2020-12-17 NOTE — PROGRESS NOTES
Pt requesting 2nd dose of xanax for sleep tonight d/t she took PRN dose for anxiety earlier today.  Perfectserve sent to admitting MD.

## 2020-12-18 VITALS
OXYGEN SATURATION: 96 % | RESPIRATION RATE: 16 BRPM | DIASTOLIC BLOOD PRESSURE: 85 MMHG | HEIGHT: 63 IN | SYSTOLIC BLOOD PRESSURE: 151 MMHG | BODY MASS INDEX: 28.86 KG/M2 | HEART RATE: 81 BPM | WEIGHT: 162.9 LBS | TEMPERATURE: 97.9 F

## 2020-12-18 LAB
ANION GAP SERPL CALCULATED.3IONS-SCNC: 9 MMOL/L (ref 3–16)
APTT: 31.7 SEC (ref 24.2–36.2)
BUN BLDV-MCNC: 12 MG/DL (ref 7–20)
CALCIUM SERPL-MCNC: 9.3 MG/DL (ref 8.3–10.6)
CHLORIDE BLD-SCNC: 106 MMOL/L (ref 99–110)
CO2: 22 MMOL/L (ref 21–32)
CREAT SERPL-MCNC: 0.7 MG/DL (ref 0.6–1.2)
GFR AFRICAN AMERICAN: >60
GFR NON-AFRICAN AMERICAN: >60
GLUCOSE BLD-MCNC: 108 MG/DL (ref 70–99)
HCT VFR BLD CALC: 34.7 % (ref 36–48)
HEMOGLOBIN: 11.9 G/DL (ref 12–16)
MCH RBC QN AUTO: 34.6 PG (ref 26–34)
MCHC RBC AUTO-ENTMCNC: 34.4 G/DL (ref 31–36)
MCV RBC AUTO: 100.5 FL (ref 80–100)
PDW BLD-RTO: 14.2 % (ref 12.4–15.4)
PLATELET # BLD: 237 K/UL (ref 135–450)
PMV BLD AUTO: 8.1 FL (ref 5–10.5)
POTASSIUM SERPL-SCNC: 4.2 MMOL/L (ref 3.5–5.1)
RBC # BLD: 3.45 M/UL (ref 4–5.2)
SODIUM BLD-SCNC: 137 MMOL/L (ref 136–145)
WBC # BLD: 5.7 K/UL (ref 4–11)

## 2020-12-18 PROCEDURE — 80048 BASIC METABOLIC PNL TOTAL CA: CPT

## 2020-12-18 PROCEDURE — 2580000003 HC RX 258: Performed by: INTERNAL MEDICINE

## 2020-12-18 PROCEDURE — 36415 COLL VENOUS BLD VENIPUNCTURE: CPT

## 2020-12-18 PROCEDURE — 85027 COMPLETE CBC AUTOMATED: CPT

## 2020-12-18 PROCEDURE — 6370000000 HC RX 637 (ALT 250 FOR IP): Performed by: INTERNAL MEDICINE

## 2020-12-18 PROCEDURE — 99233 SBSQ HOSP IP/OBS HIGH 50: CPT | Performed by: NURSE PRACTITIONER

## 2020-12-18 PROCEDURE — 85730 THROMBOPLASTIN TIME PARTIAL: CPT

## 2020-12-18 RX ORDER — DILTIAZEM HYDROCHLORIDE 120 MG/1
120 CAPSULE, COATED, EXTENDED RELEASE ORAL DAILY
Qty: 30 CAPSULE | Refills: 0 | Status: SHIPPED | OUTPATIENT
Start: 2020-12-19 | End: 2021-11-01 | Stop reason: SDUPTHER

## 2020-12-18 RX ADMIN — GABAPENTIN 300 MG: 300 CAPSULE ORAL at 08:26

## 2020-12-18 RX ADMIN — DILTIAZEM HYDROCHLORIDE 120 MG: 120 CAPSULE, COATED, EXTENDED RELEASE ORAL at 08:26

## 2020-12-18 RX ADMIN — HYDROXYCHLOROQUINE SULFATE 300 MG: 200 TABLET ORAL at 08:26

## 2020-12-18 RX ADMIN — GABAPENTIN 300 MG: 300 CAPSULE ORAL at 13:13

## 2020-12-18 RX ADMIN — Medication 10 ML: at 08:31

## 2020-12-18 RX ADMIN — DULOXETINE HYDROCHLORIDE 30 MG: 30 CAPSULE, DELAYED RELEASE ORAL at 08:26

## 2020-12-18 RX ADMIN — LEVOTHYROXINE, LIOTHYRONINE 60 MG: 19; 4.5 TABLET ORAL at 06:03

## 2020-12-18 RX ADMIN — PANTOPRAZOLE SODIUM 40 MG: 40 TABLET, DELAYED RELEASE ORAL at 08:26

## 2020-12-18 NOTE — PROGRESS NOTES
Data- discharge order received, pt verbalized agreement to discharge, disposition to previous residence, no needs for HHC/DME. Action- discharge instructions prepared and given to patient, pt verbalized understanding. Medication information packet given r/t NEW and/or CHANGED prescriptions emphasizing name/purpose/side effects, pt verbalized understanding. Discharge instruction summary: Diet- cardiac, Activity- as tolerated, Primary Care Physician as follows: Yosef King -[phone] f/u appointment within 1 week, immunizations reviewed and updated, prescription medications filled by patient. Inpatient surgical procedure precautions reviewed: left heart cath. Response- Pt belongings gathered, IV removed. Disposition is home (no HHC/DME needs), transported with son, taken to lobby via w/c w/ discharge lounge, no complications.

## 2020-12-18 NOTE — DISCHARGE SUMMARY
Physician Discharge Summary     Patient ID:  Radha Hartley  [id number]  1 y.o.  1955    Admit date: 12/15/2020    Discharge date and time: 12/18/202     Admitting Physician: Liz Aquino MD     Discharge Physician: Liz Aquino MD    Admission Diagnoses: Atrial flutter with rapid ventricular response [facility]) [I48.92]    Discharge Diagnoses: Atrial flutter/atrial fibrillation with rapid ventricular rate    Full Hospital Problem List:  [facility] Problems    Diagnosis Date Noted    Typical atrial flutter ([facility] [I48.3] 12/15/2020     Priority: High    Chest discomfort [R07.89] 12/15/2020     Priority: High    PAF (paroxysmal atrial fibrillation) ([facility] [I48.0]     Hyponatremia [E87.1]     Benign essential HTN [I10]     Atrial fibrillation and flutter ([facility] [I48.91, I48.92] 12/15/2020    DDD (degenerative disc disease), cervical [M50.30] 09/30/2014       Discharged Condition: good    Hospital Course: The patient is a 75-year-old white female with custody case of hypertension, she presented to the ER with atrial flutter/fibrillation with a heart rate in the 220, she was given adenosine and then Cardizem drip, and a slow down, she underwent an echocardiogram, angiogram and then she was cardioverted, she denies any new complaints, she will be discharged home on Cardizem and xarelto. Disposition: home    Consults made during Hospitalization:  None    Treatment team at time of Discharge: Treatment Team: Attending Provider: Liz Aquino MD; Consulting Physician: King Prabhu MD; : Nish Valadez RN; Respiratory Therapist (Day): Ramya Bowling RCP;  Registered Nurse: Janak Barraza, RN; Utilization Reviewer: Luz Marina Cherry RN    Imaging Results:  Echo Complete    Result Date: 12/16/2020  Transthoracic Echocardiography Report (TTE)  Demographics   Patient Name       Beata JONES   Date of Study      12/16/2020         Gender Female   Patient Number     [id number]         Date of Birth       1955   Visit Number       [id number]          Age                 75 year(s)   Accession Number   [id number]         Room Number         8534   Corporate ID       [id number]           Ashley Aminah, MS   Ordering Physician Prem Alcantar MD                 Physician           MD, [facility]  Procedure Type of Study   TTE procedure:ECHOCARDIOGRAM COMPLETE 2D W DOPPLER W COLOR. Procedure Date Date: 12/16/2020 Start: 01:15 PM Study Location: [facility] - Echo Lab Technical Quality: Adequate visualization Indications:Atrial flutter. Patient Status: Routine Height: 62 inches Weight: 152 pounds BSA: 1.7 m2 BMI: 27.8 kg/m2 HR: 114 bpm BP: 104/66 mmHg  Conclusions   Summary  -Overall left ventricular systolic function is low normal .  -Ejection fraction is visually estimated to be 50-55 %. E/e'= 8.1  -No definitive wall motion abnormality.  -Indeterminate diastolic function.  -The tip of the anterior leaflet appears to be slightly redundant, and is  suggestive of some myxomatous change with minimal prolapse. -Moderate to severe mitral regurgitation. Two regurgitant jets are noted. -Mild tricuspid regurgitation.  -Mild Aortic regurgitation. -IVC size is normal (<2.1cm) and collapses > 50% with respiration consistent  with normal RA pressure (3mmHg). Signature   ------------------------------------------------------------------  Electronically signed by Anne Marie Pearce MD, [facility] (Interpreting  physician) on 12/16/2020 at 03:14 PM  ------------------------------------------------------------------   Findings   Left Ventricle  Overall left ventricular systolic function is low normal .  Ejection fraction is visually estimated to be 50-55 %. E/e'= 8.1  No definitive wall motion abnormality. Indeterminate diastolic function. Mitral Valve  The tip of the anterior leaflet appears to be slightly redundant, and is  suggestive of some myxomatous change with minimal prolapse. Moderate to severe mitral regurgitation. Two regurgitant jets are noted. Left Atrium  Dilated left atrium with a volume of 118 ml. Aortic Valve  Aortic valve appears sclerotic but opens adequately. Mild aortic regurgitation. Aorta  The aortic root is normal in size. Right Ventricle  The right ventricle is normal in size and function. TAPSE= 2.24   Tricuspid Valve  Mild tricuspid regurgitation. Estimated pulmonary artery systolic pressure is mildly elevated at 30mmHg  assuming a right atrial pressure of 3 mmHg. Right Atrium  The right atrial size is normal.   Pulmonic Valve  Mild pulmonic regurgitation present. Pericardial Effusion  No pericardial effusion noted. Miscellaneous  IVC size is normal (<2.1cm) and collapses > 50% with respiration consistent  with normal RA pressure (3mmHg).   M-Mode/2D Measurements (cm)   LV Diastolic Dimension: 0.45 cm LV Systolic Dimension: 8.96 cm  LV Septum Diastolic: 5.63 cm  LV PW Diastolic: 5.94 cm        AO Root Dimension: 3.2 cm                                  LA Dimension: 5.2 cm                                  LA Area: 30 cm2  LVOT: 1.8 cm                    LA volume/Index: 118 ml /69 ml/m2  Doppler Measurements   AV Peak Velocity: 108 cm/s     MV Peak E-Wave: 94.9 cm/s  AV Peak Gradient: 4.67 mmHg    MV Peak A-Wave: 59.4 cm/s  AV Mean Gradient: 3 mmHg       MV E/A Ratio: 1.6  LVOT Peak Velocity: 71.1 cm/s  MV P1/2t: 48 msec  AV Area (Continuity):1.69 cm2  MV Max P mmHg                                 MV Vmax:491 cm/s  TR Velocity:200 cm/s  TR Gradient:16 mmHg  Estimated RAP:3 mmHg           MV Deceleration Time: 165 msec  Estimated RVSP: 30 mmHg        MV Area (PHT): 4.58 cm2  E' Septal Velocity: 9.03 cm/s  E' Lateral Velocity: 13.6 cm/s  E/E' ratio: 8.1   Aortic Valve Peak Velocity: 108 cm/s     Mean Velocity: 72.7 cm/s  Peak Gradient: 4.67 mmHg    Mean Gradient: 3 mmHg  Area (continuity): 1.69 cm2  AV VTI: 16 cm  AI P1/2t: 260 msec  Aorta   Aortic Root: 3.2 cm  Ascending Aorta: 3.5 cm  LVOT Diameter: 1.8 cm      Dexa Bone Density Axial Skeleton    Result Date: 12/8/2020  EXAMINATION: BONE DENSITOMETRY 12/8/2020 10:55 am TECHNIQUE: A bone density DEXA scan was performed of the lumbar spine and bilateral hips on a [employer] system. COMPARISON: None. HISTORY: ORDERING SYSTEM PROVIDED HISTORY: Abnormal findings on diagnostic imaging of limbs TECHNOLOGIST PROVIDED HISTORY: Reason for exam:->osteopenia FINDINGS: LUMBAR SPINE: The bone mineral density in the lumbar spine including the L1 through L4 levels is measured at 0.842 g/cm2, which corresponds to a T-score of -1.9 and a Z-score of 0.0. This is within the osteopenia range by WHO criteria. LEFT HIP: The bone mineral density in the left total hip is measured at 0.795 g/cm2 corresponding to a T-score of -1.2 and a Z-score of 0.1. This is within the osteopenia range by WHO criteria. The bone mineral density of the left femoral neck is measured at 0.667 g/cm2 corresponding to a T-score of -1.6 and a Z-score of 0.0. This is within the osteopenia range by WHO criteria. RIGHT HIP: The bone mineral density in the right total hip is measured at 0.750 g/cm2 corresponding to a T-score of -1.6 and a Z-score of -0.2. This is within the osteopenia range by WHO criteria. The bone mineral density of the right femoral neck is measured at 0.640 g/cm2 corresponding to a T-score of -1.9 and a Z-score of -0.3. This is within the osteopenia range by WHO criteria. Based upon the lowest T-score above, this represents osteopenia by WHO criteria.      Ct Chest Pulmonary Embolism W Contrast    Result Date: 12/15/2020  EXAMINATION: CTA OF THE CHEST 12/15/2020 7:58 pm TECHNIQUE: CTA of the chest was performed after the administration of intravenous contrast.  Multiplanar reformatted images are provided for review. MIP images are provided for review. Dose modulation, iterative reconstruction, and/or weight based adjustment of the mA/kV was utilized to reduce the radiation dose to as low as reasonably achievable. COMPARISON: CT abdomen and pelvis, 02/13/2018 HISTORY: ORDERING SYSTEM PROVIDED HISTORY: cp TECHNOLOGIST PROVIDED HISTORY: Reason for exam:->cp Reason for Exam: Chest pain. Acuity: Acute Type of Exam: Initial FINDINGS: Pulmonary Arteries: The central pulmonary arteries are normal in caliber. Pulmonary arteries are well opacified to the subsegmental levels. No emboli are identified. Mediastinum: There is no mediastinal or hilar adenopathy. Lungs/pleura: There are clusters of nodules in the right upper lobe and lingula, measuring up to 5.5  mm. Dependent densities are noted in the lower lobes. No pleural effusion is appreciated. Upper Abdomen: The adrenal glands and upper abdomen are unremarkable. Soft Tissues/Bones: No acute osseous abnormality is detected. Bilateral breast implants are present. No pulmonary embolus is appreciated. Clusters nodules measuring up 5.5  Mm in the right upper lobe lingula, typical granulomatous disease. If there are no risk factors, no follow-up may be necessary. Otherwise, 12 month follow-up is suggested. Dependent densities in the lungs, characteristic of atelectasis. Pneumonia is considered less likely. --- Fleischner Society guidelines for follow-up and management of incidentally detected pulmonary nodules: Multiple Solid Nodules: Nodule size less than 6 mm In a low-risk patient, no routine follow-up. In a high-risk patient, optional CT at 12 months. - Low risk patients include individuals with minimal or absent history of smoking and other known risk factors. - High risk patients include individuals with a history or smoking or known risk factors.  Radiology 2017 http://pubs. rsna.org/doi/full/10.1148/radiol. 3821059668     Nm Myocardial Spect Rest Exercise Or Rx    Result Date: 12/16/2020  Cardiac Perfusion Imaging  Demographics   Patient Name       Marycruz JONES   Date of Study      12/16/2020         Gender              Female   Patient Number     [id number]         Date of Birth       1955   Visit Number       [id number]          Age                 75 year(s)   Accession Number   [id number]         Room Number         0285   Corporate ID       [id number]           NM Technician       Carrillo Srivastava   Nurse              Joss Patino,  Interpreting        Roverto Cano RN                 Physician           MD, [facility]   Ordering Physician Teri Camargo MD   The procedure was explained in detail to the patient. Risks,  complications and alternative treatments were reviewed. Written consent  was obtained. Procedure Procedure Type:   Nuclear Stress Test:Pharmacological, NM MYOCARDIAL SPECT REST EXERCISE OR  RX   Study location: [facility] - Nuclear Medicine   Indications: Abnormal rest ECG and Atrial Fibrillation. Hospital Status: Inpatient. Height: 62 inches Weight: 152 pounds  Risk Factors   The patient risk factors include:treated and controlled hypertension and  family history of premature CAD. Conclusions   Summary  Small sized anteroapical fixed defect of intensity consistent with  infarction in the territory of the LAD . Left ventricular ejection fraction of 45 %. Overall findings represent a intermediate risk scan. Stress Protocols   Resting ECG  Atrial fibrillation. LVH and strain. Resting HR:123 bpm   Resting BP:104/76 mmHg   Pre-stress physical exam: afib rate 120s.   Stress Protocol:Pharmacologic - Lexiscan's  Peak HR:155 bpm                  HR/BP product:19530  Peak BP:126/80 mmHg  Predicted HR: 154 bpm  % of predicted HR: 101  Test duration: 4 min  Reason for termination:Completed   ECG Findings  Atrial fibrillation. Non-diagnostic due to baseline abnormalities . Arrhythmias  Occasional PVC's. Symptoms  Developed symptoms likely related to [address] [place] [address]. There was stress induced shortness of breath and nausea. Symptoms resolved with aminophylline. Chest tightness rates 4/10. Pt states having chest tightness 4/10. Complications  Procedure complication was none. Procedure Medications   - Aminophylline I.V. 100 mg.   - Lexiscan I.V. 0.4 mg.10 sec  Imaging Protocols   - One Day   Rest                          Stress   Isotope:Myoview/Tetrofosmin   Isotope: Myoview/Tetrofosmin  Isotope dose:10.8 mCi         Isotope dose:32.3 mCi  Administration Route:I.V.      Administration Route:I.V.  Date:12/16/2020 12:40         Date:12/16/2020 14:45                                 Technique:      Gated  Imaging Results    Stress ejection    Ejection fraction:45 %    EDV :100 ml    ESV :55 ml    Stroke volume :45 ml    LV mass :124 gr  Medical History  Signatures   ------------------------------------------------------------------  Electronically signed by Fortunato Tamez MD, [facility] (Interpreting  physician) on 12/16/2020 at 16:10  ------------------------------------------------------------------        Discharge Exam:  BP (!) 151/85   Pulse 81   Temp 97.2 °F (36.2 °C) (Oral)   Resp 16   Ht 5' 2.5\" (1.588 m)   Wt 162 lb 14.4 oz (73.9 kg)   SpO2 96%   BMI 29.32 kg/m²     General Appearance:    Alert, cooperative, no distress, appears stated age   Head:    Normocephalic, without obvious abnormality, atraumatic   Eyes:    PERRL, conjunctiva/corneas clear, EOM's intact, fundi     benign, both eyes   Ears:    Normal TM's and external ear canals, both ears   Nose:   Nares normal, septum midline, mucosa normal, no drainage    or sinus tenderness   Throat:   Lips, mucosa, and tongue normal; teeth and gums normal   Neck:   Supple, symmetrical, trachea midline, no adenopathy;     thyroid:  no enlargement/tenderness/nodules; no carotid    bruit or JVD   Back:     Symmetric, no curvature, ROM normal, no CVA tenderness   Lungs:     Clear to auscultation bilaterally, respirations unlabored   Chest Wall:    No tenderness or deformity    Heart:    Regular rate and rhythm, S1 and S2 normal, no murmur, rub   or gallop   Breast Exam:    No tenderness, masses, or nipple abnormality   Abdomen:     Soft, non-tender, bowel sounds active all four quadrants,     no masses, no organomegaly   Genitalia:    Normal female without lesion, discharge or tenderness   Rectal:    Normal tone ;guaiac negative stool   Extremities:   Extremities normal, atraumatic, no cyanosis or edema   Pulses:   2+ and symmetric all extremities   Skin:   Skin color, texture, turgor normal, no rashes or lesions   Lymph nodes:   Cervical, supraclavicular, and axillary nodes normal   Neurologic:   CNII-XII intact, normal strength, sensation and reflexes     throughout       Disposition: home    Condition: stable    Discharge Medications:    [facility]   Home Medication Instructions EVT:[id number]    Printed on:12/18/20 5673   Medication Information                      ALPRAZolam (XANAX) 0.25 MG tablet  Take 0.25 mg by mouth nightly as needed for Sleep. ARMOUR THYROID PO  Take 60 mg by mouth Daily              dilTIAZem (CARDIZEM CD) 120 MG extended release capsule  Take 1 capsule by mouth daily             DULoxetine (CYMBALTA) 30 MG extended release capsule  Take 30 mg by mouth daily             gabapentin (NEURONTIN) 300 MG capsule  Take 1 capsule by mouth 3 times daily for 90 days.              hydroxychloroquine (PLAQUENIL) 200 MG tablet  Take 300 mg by mouth daily              pantoprazole (PROTONIX) 40 MG tablet  Take 1 tablet by mouth daily             rivaroxaban (XARELTO) 20 MG TABS tablet  Take 1 tablet by mouth daily             tiZANidine (ZANAFLEX) 4 MG tablet  Take 4 mg by mouth 2 times daily as needed traZODone (DESYREL) 50 MG tablet  Take 50 mg by mouth nightly. Allergies: Allergies   Allergen Reactions    Nsaids      Note: PERFORATED PEPTIC ULCER         Patient Instructions: Activity: activity as tolerated  Diet: regular diet  Wound Care: none needed    Follow up Instructions: Follow-up with PCP: Cecelia Closs, MD in  2 weeks.     Total time spent on day of discharge including face-to-face visit, examination, documentation, counseling, preparation of discharge plans and followup, and discharge medicine reconciliation and presciptions is 36 minutes    Signed:  Cecelia Closs  12/18/2020  1:31 PM

## 2020-12-18 NOTE — PROGRESS NOTES
[address]   Electrophysiology Progress Note     Date: 12/18/2020  Admit Date: 12/15/2020     Reason for consultation: Atrial fibrillation/flutter    Chief Complaint:   Chief Complaint   Patient presents with    Tachycardia     Pt from home via [address] [facility], states she felt like her heart was racing, pulse 230's, upon squad arrival also 230, 6,12,12 adenosine no conversion. History of Present Illness: History obtained from patient and medical record. Earl Zimmerman is a 75 y.o. female with a past medical history of HTN, anxiety, depression, and hypothyroidism. Pt presented to hospital with malaise and tachycardia with reported HR >200. She was recently diagnosed with COVID on November 21st. She was found to be in atrial fibrillation/flutter. Interval Hx: Today, she is being seen for follow up. She feels well and would like to go home. S/p CLAUDINE/DCCV yesterday with Dr. Yaneth Jim. She remains in sinus rhythm. We discussed the need for sleep study as outpatient due to the risks of untreated EILEEN. Patient seen and examined. Clinical notes reviewed. Telemetry reviewed. No new complaints today. No major events overnight. Denies having chest pain, palpitations, shortness of breath, orthopnea/PND, cough, or dizziness at the time of this visit. Allergies: Allergies   Allergen Reactions    Nsaids      Note: PERFORATED PEPTIC ULCER     Home Meds:  Prior to Visit Medications    Medication Sig Taking? Authorizing Provider   DULoxetine (CYMBALTA) 30 MG extended release capsule Take 30 mg by mouth daily Yes Historical Provider, MD   hydroxychloroquine (PLAQUENIL) 200 MG tablet Take 300 mg by mouth daily  Yes Historical Provider, MD   gabapentin (NEURONTIN) 300 MG capsule Take 1 capsule by mouth 3 times daily for 90 days.  Yes Kenroy Graff MD   pantoprazole (PROTONIX) 40 MG tablet Take 1 tablet by mouth daily Yes Elena Arenas MD   tiZANidine (ZANAFLEX) 4 MG tablet Take 4 mg by mouth 2 times daily as needed  Yes Historical Provider, MD   lisinopril (PRINIVIL;ZESTRIL) 10 MG tablet Take 10 mg by mouth daily Yes Historical Provider, MD LOZANO THYROID PO Take 60 mg by mouth Daily  Yes Historical Provider, MD   ALPRAZolam (XANAX) 0.25 MG tablet Take 0.25 mg by mouth nightly as needed for Sleep. Yes Historical Provider, MD   escitalopram (LEXAPRO) 10 MG tablet Take 5 mg by mouth nightly  Yes Historical Provider, MD   traZODone (DESYREL) 50 MG tablet Take 50 mg by mouth nightly. Yes Historical Provider, MD      Scheduled Meds:   sodium chloride flush  10 mL Intravenous 2 times per day    rivaroxaban  20 mg Oral Daily    thyroid  60 mg Oral Daily    DULoxetine  30 mg Oral Daily    hydroxychloroquine  300 mg Oral Daily    pantoprazole  40 mg Oral Daily    sodium chloride flush  10 mL Intravenous 2 times per day    atorvastatin  40 mg Oral Nightly    gabapentin  300 mg Oral TID    dilTIAZem  120 mg Oral Daily    traZODone  50 mg Oral Nightly     Continuous Infusions:  PRN Meds:sodium chloride flush, acetaminophen, ALPRAZolam, tiZANidine, sodium chloride flush, promethazine **OR** ondansetron, [DISCONTINUED] acetaminophen **OR** acetaminophen, polyethylene glycol, potassium chloride, regadenoson     Past Medical History:  Past Medical History:   Diagnosis Date    Anxiety     Arthritis     Depression     Hypertension     Hypothyroidism       Past Surgical History:    has a past surgical history that includes Hysterectomy; bladder repair; Abdominal exploration surgery (02/12/2018); partial hysterectomy (cervix not removed); Breast enhancement surgery; and shoulder surgery. Social History:  Reviewed. reports that she has never smoked. She has never used smokeless tobacco. She reports current alcohol use. She reports that she does not use drugs. Family History:  Reviewed. family history includes Heart Attack in her mother.      Review of Systems:  · Constitutional: Negative for tenderness or masses. · Musculoskeletal: Bilateral upper and lower extremity strength 5/5 with full ROM  · Neurologic/Psych: Awake and orientated to person, place and time. Calm affect, appropriate mood    Pertinent labs, diagnostic, device, and imaging results reviewed as a part of this visit    Labs:    BMP:   Recent Labs     20  0717 20  0818 20  0555   * 136 137   K 3.9 4.0 4.2    105 106   CO2 22 21 22   BUN 11 10 12   CREATININE 0.6 0.6 0.7     Estimated Creatinine Clearance: 75 mL/min (based on SCr of 0.7 mg/dL). CBC:   Recent Labs     20  0717 20  0818 20  0555   WBC 5.6 4.3 5.7   HGB 13.0 12.4 11.9*   HCT 38.1 35.6* 34.7*   .7* 101.1* 100.5*    273 237     Thyroid:   Lab Results   Component Value Date    TSH 2.08 2020     Lipids:   Lab Results   Component Value Date    CHOL 161 2020    HDL 77 2020    HDL 79 2011    TRIG 76 2020     LFTS:   Lab Results   Component Value Date     2020     2020    ALKPHOS 216 2020    PROT 7.2 2020    PROT 6.9 2013    AGRATIO 1.6 2020    BILITOT 0.5 2020     Cardiac Enzymes:   Lab Results   Component Value Date    TROPONINI 0.02 2020    TROPONINI 0.02 2020    TROPONINI 0.03 2020     Coags: No results found for: PROTIME, INR    EC20  NSR with T wave abnormality    ECHO: 20   -Overall left ventricular systolic function is low normal .   -Ejection fraction is visually estimated to be 50-55 %. E/e'= 8.1   -No definitive wall motion abnormality.   -Indeterminate diastolic function.   -The tip of the anterior leaflet appears to be slightly redundant, and is suggestive of some myxomatous change with minimal prolapse. -Moderate to severe mitral regurgitation. Two regurgitant jets are noted. -Mild tricuspid regurgitation.   -Mild Aortic regurgitation.    -IVC size is normal (<2.1cm) and collapses > 50% with respiration consistent   with normal RA pressure (3mmHg). Cath: 12/17/20  Dominance: Right       LM: no angiographic stenosis  LAD: no angiographic stenosis  LCx: no angiographic stenosis  RCA: no angiographic stenosis     LVEDP: 2 mmHg   LVEF: 50-55%     Impression/Recommendations:  Normal coronary study. Problem List:   Patient Active Problem List    Diagnosis Date Noted    Typical atrial flutter ([address] 12/15/2020     Priority: High    Chest discomfort 12/15/2020     Priority: High    PAF (paroxysmal atrial fibrillation) (HCC)     Hyponatremia     Benign essential HTN     Atrial fibrillation and flutter ([address] 12/15/2020    Perforated ulcer ([address]     Peritonitis ([address]     Epigastric pain 09/17/2015    Chronic migraine without aura 11/13/2014    DDD (degenerative disc disease), cervical 09/30/2014    Myofascial pain syndrome 09/30/2014        Assessment and Plan:     1. Paroxysmal Atrial Fibrillation  - Currently in NSR  - Continue cardizem 120 mg QD  - [id number] score: 3 (Age, Gender, HTN) ; [id number] Vasc score and anticoagulation discussed. High risk for stroke and thromboembolism. Anticoagulation is recommended. Risk of bleeding was discussed.  ~ On Xarelto 20 mg QD (CrCl ~ 75 ml/min based on creatinine of 0.7)    - Afib risk factors including age, HTN, obesity, inactivity and EILEEN were discussed with patient. Risk factor modification recommended   ~ TSH 2.08 (12/20)       - Treatment options including cardioversion, rate control strategy, antiarrhythmics, anticoagulation and possible ablation were discussed with patient. Risks, benefits and alternative of each treatment options were explained. All questions answered    2. HTN  - Controlled: Goal <130/80  - Continue current medications  - Encouraged to monitor and log BP readings at home, then bring log to next visit  - Discussed importance of low sodium diet, weight control and exercise    3.  Chest Discomfort   - Resolved with resumption of sinus   - Cath with normal coronaries    4. Mitral Regurgitation   - Mild to moderate MR per CLAUDINE    5. At Risk For Sleep Apnea  - Multiple risk factors for EILEEN  - Discussed long term effects of untreated EILEEN  - Referral to sleep study center    6. Hypothyroidism   - Stable   - On thyroid replacement    Ok for discharge from EP standpoint. Will follow up in office in 2-3 months. EP will sign off. Please call if there are any questions. Thank you for consult. All pertinent information and plan of care discussed with the EP physician. All questions and concerns were addressed to the patient. Alternatives to my treatment were discussed. I have discussed the above stated plan with patient and the nurse. The patient verbalized understanding and agreed with the plan. Thank you for allowing to us to participate in the care of [address] [place] [address].     Eric Ramirez, EDDA-CNP  [facility]   Office: [phone]

## 2020-12-18 NOTE — PLAN OF CARE
Problem: Falls - Risk of:  Goal: Will remain free from falls  Description: Will remain free from falls  Outcome: Ongoing  Note: Medium fall risk. Patient stead on feet, no need for bed or chair alarm. Belongings within reach, non-skid footwear on patient.       Problem: Pain:  Goal: Control of acute pain  Description: Control of acute pain  Outcome: Ongoing

## 2021-01-14 ENCOUNTER — VIRTUAL VISIT (OUTPATIENT)
Dept: PULMONOLOGY | Age: 66
End: 2021-01-14
Payer: MEDICARE

## 2021-01-14 DIAGNOSIS — G47.33 OBSTRUCTIVE SLEEP APNEA: Primary | ICD-10-CM

## 2021-01-14 DIAGNOSIS — E66.3 OVERWEIGHT (BMI 25.0-29.9): ICD-10-CM

## 2021-01-14 DIAGNOSIS — I48.0 PAF (PAROXYSMAL ATRIAL FIBRILLATION) (HCC): ICD-10-CM

## 2021-01-14 PROCEDURE — 1111F DSCHRG MED/CURRENT MED MERGE: CPT | Performed by: INTERNAL MEDICINE

## 2021-01-14 PROCEDURE — 4040F PNEUMOC VAC/ADMIN/RCVD: CPT | Performed by: INTERNAL MEDICINE

## 2021-01-14 PROCEDURE — 3017F COLORECTAL CA SCREEN DOC REV: CPT | Performed by: INTERNAL MEDICINE

## 2021-01-14 PROCEDURE — 1036F TOBACCO NON-USER: CPT | Performed by: INTERNAL MEDICINE

## 2021-01-14 PROCEDURE — G8484 FLU IMMUNIZE NO ADMIN: HCPCS | Performed by: INTERNAL MEDICINE

## 2021-01-14 PROCEDURE — 99203 OFFICE O/P NEW LOW 30 MIN: CPT | Performed by: INTERNAL MEDICINE

## 2021-01-14 PROCEDURE — G8427 DOCREV CUR MEDS BY ELIG CLIN: HCPCS | Performed by: INTERNAL MEDICINE

## 2021-01-14 PROCEDURE — G8399 PT W/DXA RESULTS DOCUMENT: HCPCS | Performed by: INTERNAL MEDICINE

## 2021-01-14 PROCEDURE — G8417 CALC BMI ABV UP PARAM F/U: HCPCS | Performed by: INTERNAL MEDICINE

## 2021-01-14 PROCEDURE — 1090F PRES/ABSN URINE INCON ASSESS: CPT | Performed by: INTERNAL MEDICINE

## 2021-01-14 PROCEDURE — 1123F ACP DISCUSS/DSCN MKR DOCD: CPT | Performed by: INTERNAL MEDICINE

## 2021-01-14 ASSESSMENT — SLEEP AND FATIGUE QUESTIONNAIRES
HOW LIKELY ARE YOU TO NOD OFF OR FALL ASLEEP WHILE SITTING QUIETLY AFTER LUNCH WITHOUT ALCOHOL: 0
HOW LIKELY ARE YOU TO NOD OFF OR FALL ASLEEP WHILE SITTING INACTIVE IN A PUBLIC PLACE: 0
HOW LIKELY ARE YOU TO NOD OFF OR FALL ASLEEP WHILE WATCHING TV: 1
HOW LIKELY ARE YOU TO NOD OFF OR FALL ASLEEP WHILE LYING DOWN TO REST IN THE AFTERNOON WHEN CIRCUMSTANCES PERMIT: 0
HOW LIKELY ARE YOU TO NOD OFF OR FALL ASLEEP IN A CAR, WHILE STOPPED FOR A FEW MINUTES IN TRAFFIC: 0

## 2021-01-14 NOTE — PROGRESS NOTES
Pulmonology Video Visit    Pursuant to the emergency declaration under the [facility] waiver authority and the Coronavirus Preparedness and [facility] Appropriations Act this Video Visit was insisted, with patient's consent, to reduce the patient's risk of exposure to COVID-19 and provide continuity of care for an established patient. The patient was at home, while the provider was at the clinic. Services were provided through a synchronous discussion through a Video Visit to substitute for in-person clinic visit, and coded as such. CONSULTING PHYSICIAN:  Cristino Alford MD , No ref. provider found     REASON FOR VISIT:   Chief Complaint   Patient presents with    New Patient     ref by Dr Gerrianne Kayser: 1/14/2021    HISTORY OF PRESENT ILLNESS: 77y.o. year old female  who is being seen in the pulmonary/sleep clinic via a video visit. Patient reports that for last few years she has been having sleep issues including snoring, gasping, choking at nighttime. She is a light sleeper and has to take trazodone, anxiolytics and a muscle relaxant to help her go to sleep. Despite this she wakes up tired. Denies any daytime sleepiness. Has gained about 16 pounds of weight in last 1 year. Does have a.m. dry mouth. Denies a.m. headache.     Sleep Medicine 1/14/2021   Sitting and reading 0   Watching TV 1   Sitting, inactive in a public place (e.g. a theatre or a meeting) 0   As a passenger in a car for an hour without a break 0   Lying down to rest in the afternoon when circumstances permit 0   Sitting and talking to someone 0   Sitting quietly after a lunch without alcohol 0   In a car, while stopped for a few minutes in traffic 0   Total score 1       STOP-BANG Questionnaire    Snore Loudly Yes   Often feel Tired/Fatigued/Sleepy Yes   Observed breathing pauses No   Blood pressure problems No BMI >35 Kg/m2  29.32     Age>50 77 y.o. Neck Circumference>16 inches      Gender Male? female     Total 3         REVIEW OF SYSTEMS:   CONSTITUTIONAL SYMPTOMS: The patient denies fever, fatigue, night sweats, weight loss or weight gain. HEENT: No vision changes. No tinnitus, Denies sinus pain. No hoarseness, or dysphagia. NECK: Patient denies swelling in the neck. CARDIOVASCULAR: Denies chest pain, palpitation, syncope. RESPIRATORY: Denies shortness of breath or cough. GASTROINTESTINAL: Denies nausea, abdominal pain or change in bowel function. GENITOURINARY: Denies obstructive symptoms. No history of incontinence. BREASTS: No masses or lumps in the breasts. SKIN: No rashes or itching. MUSCULOSKELETAL: Denies weakness or bone pain. NEUROLOGICAL: No headaches or seizures. PSYCHIATRIC: Denies mood swings or depression. ENDOCRINE: Denies heat or cold intolerance or excessive thirst.  HEMATOLOGIC/LYMPHATIC: Denies easy bruising or lymph node swelling. ALLERGIC/IMMUNOLOGIC: No environmental allergies. PAST MEDICAL HISTORY:   Past Medical History:   Diagnosis Date    Anxiety     Arthritis     Depression     Hypertension     Hypothyroidism        PAST SURGICAL HISTORY:   Past Surgical History:   Procedure Laterality Date    ABDOMINAL EXPLORATION SURGERY  02/12/2018    LAPAROTOMY EXPLORATORY, REPAIR PERFORATED ULCER    BLADDER REPAIR      BREAST ENHANCEMENT SURGERY      HYSTERECTOMY      partial    PARTIAL HYSTERECTOMY      SHOULDER SURGERY      ligament moved up left shoulder       SOCIAL HISTORY:   Social History     Tobacco Use    Smoking status: Never Smoker    Smokeless tobacco: Never Used   Substance Use Topics    Alcohol use:  Yes     Alcohol/week: 0.0 standard drinks    Drug use: No       FAMILY HISTORY:   Family History   Problem Relation Age of Onset    Heart Attack Mother        MEDICATIONS:     Current Outpatient Medications on File Prior to Visit Medication Sig Dispense Refill    rivaroxaban (XARELTO) 20 MG TABS tablet Take 1 tablet by mouth daily 30 tablet 0    dilTIAZem (CARDIZEM CD) 120 MG extended release capsule Take 1 capsule by mouth daily 30 capsule 0    DULoxetine (CYMBALTA) 30 MG extended release capsule Take 30 mg by mouth daily      hydroxychloroquine (PLAQUENIL) 200 MG tablet Take 300 mg by mouth daily       gabapentin (NEURONTIN) 300 MG capsule Take 1 capsule by mouth 3 times daily for 90 days. 270 capsule 0    pantoprazole (PROTONIX) 40 MG tablet Take 1 tablet by mouth daily 30 tablet 3    tiZANidine (ZANAFLEX) 4 MG tablet Take 4 mg by mouth 2 times daily as needed       ARMOUR THYROID PO Take 60 mg by mouth Daily       ALPRAZolam (XANAX) 0.25 MG tablet Take 0.25 mg by mouth nightly as needed for Sleep.  traZODone (DESYREL) 50 MG tablet Take 50 mg by mouth nightly. No current facility-administered medications on file prior to visit. ALLERGIES:   Allergies as of 01/14/2021 - Review Complete 01/14/2021   Allergen Reaction Noted    Nsaids  02/22/2018      OBJECTIVE:   vitals were not taken for this visit. PHYSICAL EXAM:    Physical Exam    Constitutional:       General: She is not in acute distress. Appearance: Normal appearance. She is not ill-appearing. HENT:      Head: Normocephalic and atraumatic. Right Ear: External ear normal.      Left Ear: External ear normal.   Eyes:      General: No scleral icterus. Right eye: No discharge. Left eye: No discharge. Extraocular Movements: Extraocular movements intact. Conjunctiva/sclera: Conjunctivae normal.   Neck:      Musculoskeletal: Neck supple. Pulmonary:      Effort: Pulmonary effort is normal. No respiratory distress. Skin:     Coloration: Skin is not jaundiced. Findings: No lesion or rash. Neurological:      Mental Status: She is alert. Cranial Nerves: No cranial nerve deficit. Coordination: Coordination normal.   Psychiatric:         Mood and Affect: Mood normal.        LABS:    Lab Summary Latest Ref Rng & Units 12/28/2020 12/18/2020 12/17/2020   WBC 4.0 - 11.0 K/uL - 5.7 4.3   Hgb 12.0 - 16.0 g/dL - 11. 9(L) 12.4   Hct 36.0 - 48.0 % - 34. 7(L) 35. 6(L)   Platelets 899 - 689 K/uL - 237 273   Sodium 136 - 145 mmol/L 138 137 136   Potassium 3.5 - 5.1 mmol/L 4.4 4.2 4.0   BUN 7 - 20 mg/dL 13 12 10   Creatinine 0.6 - 1.2 mg/dL 0.6 0.7 0.6   Glucose 70 - 99 mg/dL 95 108(H) 120(H)   Calcium 8.3 - 10.6 mg/dL 9.2 9.3 9.0   Alk Phos 40 - 129 U/L 88 - -   Albumin 3.4 - 5.0 g/dL 4.5 - -   Bilirubin 0.0 - 1.0 mg/dL 0.5 - -   AST 15 - 37 U/L 19 - -   ALT 10 - 40 U/L 25 - -   Some recent data might be hidden           IMAGING:    No new chest imaging for me to review. Pulmonary Functions Testing Results:          IMPRESSION AND RECOMMENDATIONS:     1. Obstructive sleep apnea  -Patient has several features which are suggestive of obstructive sleep apnea. -I will order for an in lab polysomnography to rule this possibility out. Patient may also need a CPAP titration study.  - Baseline Diagnostic Sleep Study; Future    2. PAF (paroxysmal atrial fibrillation) (HCC)  -Currently patient is on Cardizem 120 mg daily, Xarelto 20 mg daily. 3.  Overweight  -I strongly advised the patient to make efforts to lose weight. I discussed various modalities including moderate intensity intermittent exercises, diet control and bariatric surgery. If the patient loses even 10 to 15% of current body weight, it will be beneficial in improving the overall health. Return in about 6 weeks (around 2/25/2021).          Rosie Tay MD  Pulmonary Critical Care and Sleep Medicine  1/14/2021, 2:24 PM This note was completed using dragon medical speech recognition software. Grammatical errors, random word insertions, pronoun errors and incomplete sentences are occasional consequences of this technology due to software limitations. If there are questions or concerns about the content of this note of information contained within the body of this dictation they should be addressed with the provider for clarification.

## 2021-01-18 ENCOUNTER — TELEPHONE (OUTPATIENT)
Dept: SLEEP CENTER | Age: 66
End: 2021-01-18

## 2021-01-19 ENCOUNTER — TELEPHONE (OUTPATIENT)
Dept: SLEEP CENTER | Age: 66
End: 2021-01-19

## 2021-01-19 NOTE — TELEPHONE ENCOUNTER
Left vm with pt that we do need to schedule covid test for her sleep study scheduled on 1/26. Its [facility] policy to have this done for any outpatient procedure and asked for a phone call back to get this scheduled.

## 2021-01-21 ENCOUNTER — OFFICE VISIT (OUTPATIENT)
Dept: PRIMARY CARE CLINIC | Age: 66
End: 2021-01-21
Payer: MEDICARE

## 2021-01-21 DIAGNOSIS — Z20.828 EXPOSURE TO SARS-ASSOCIATED CORONAVIRUS: Primary | ICD-10-CM

## 2021-01-21 PROCEDURE — G8428 CUR MEDS NOT DOCUMENT: HCPCS | Performed by: NURSE PRACTITIONER

## 2021-01-21 PROCEDURE — G8417 CALC BMI ABV UP PARAM F/U: HCPCS | Performed by: NURSE PRACTITIONER

## 2021-01-21 PROCEDURE — 99211 OFF/OP EST MAY X REQ PHY/QHP: CPT | Performed by: NURSE PRACTITIONER

## 2021-01-21 NOTE — PATIENT INSTRUCTIONS
You have received a viral test for COVID-19. Below is education on quarantine per the CDC guidelines. For any symptoms, seek care from your PCP, call [phone] to establish care with a doctor, or go directly to an urgent care or the emergency room. Test results will take 2-7 days and will be sent to you in your [employer] account. If you test positive, you will be contacted via phone. If you test negative, the ONLY communication will be through [address] GO TO [employer] AND SIGN UP FOR [employer]  (LOWER LEFT OF THE HOME PAGE)  No test is 100%. If you have symptoms, you should follow the guidance of quarantine as previously stated. You can still be contagious if you have symptoms. Your [facility] will reach out to you if you have a positive result. They will provide you with a return to work date and note. If you were tested for a pre-op, then you should remain in quarantine until your procedure. How do I know if I need to be in quarantine? If you live in a community where COVID-19 is or might be spreading (currently, that is virtually everywhere in the [place])  Be alert for symptoms. Watch for fever, cough, shortness of breath, or other symptoms of COVID-19.  ? Take your temperature if symptoms develop. ? Practice social distancing. Maintain 6 feet of distance from others and stay out of crowded places. ? Follow CDC guidance if symptoms develop. If you feel healthy but:  ? Recently had close contact with a person with COVID-19 you need to Quarantine:  ? Stay home until 14 days after your last exposure. ? Check your temperature twice a day and watch for symptoms of COVID-19.  ? If possible, stay away from people who are at higher-risk for getting very sick from COVID-19. Stay Home and Monitor Your Health if you:  ? Have been diagnosed with COVID-19, or  ? Are waiting for test results, or  ?  Have cough, fever, or shortness of breath, or symptoms of COVID-19 When You Can be Around Others After You Had or Likely Had COVID-19     If you have or think you might have COVID-19, it is important to stay home and away from other people. Staying away from others helps stop the spread of COVID-19. If you have an emergency warning sign (including trouble breathing), get emergency medical care immediately. When you can be around others (end home isolation) depends on different factors for different situations. Find CDC's recommendations for your situation below. I think or know I had COVID-19, and I had symptoms  You can be with others after  ? 3 days with no fever and  ? Respiratory symptoms have improved (e.g. cough, shortness of breath) and  ? 10 days since symptoms first appeared  Depending on your healthcare provider's advice and availability of testing, you might get tested to see if you still have COVID-19. If you will be tested, you can be around others when you have no fever, respiratory symptoms have improved, and you receive two negative test results in a row, at least 24 hours apart. I tested positive for COVID-19 but had no symptoms  If you continue to have no symptoms, you can be with others after:  ? 10 days have passed since test or 14 days since your exposure test   Depending on your healthcare provider's advice and availability of testing, you might get tested to see if you still have COVID-19. If you will be tested, you can be around others after you receive two negative test results in a row, at least 24 hours apart. If you develop symptoms after testing positive, follow the guidance above for I think or know I had COVID, and I had symptoms.   For Anyone Who Has Been Around a Person with COVID-19  It is important to remember that anyone who has close contact with someone with COVID-19 should stay home for 14 days after exposure based on the time it takes to develop illness. Testing is not necessary.     www.cdc.gov/coronavirus/2019-ncov/index.html

## 2021-01-21 NOTE — PROGRESS NOTES
Rohith Flores received a viral test for COVID-19. They were educated on isolation and quarantine as appropriate. For any symptoms, they were directed to seek care from their PCP, given contact information to establish with a doctor, directed to an urgent care or the emergency room.

## 2021-01-22 LAB — SARS-COV-2: NOT DETECTED

## 2021-01-26 ENCOUNTER — HOSPITAL ENCOUNTER (OUTPATIENT)
Dept: SLEEP CENTER | Age: 66
Discharge: HOME | End: 2021-01-26
Payer: MEDICARE

## 2021-01-26 DIAGNOSIS — G47.33 OBSTRUCTIVE SLEEP APNEA: ICD-10-CM

## 2021-01-26 PROCEDURE — 95810 POLYSOM 6/> YRS 4/> PARAM: CPT | Performed by: INTERNAL MEDICINE

## 2021-01-26 PROCEDURE — 95810 POLYSOM 6/> YRS 4/> PARAM: CPT

## 2021-01-27 DIAGNOSIS — G47.33 OBSTRUCTIVE SLEEP APNEA SYNDROME: Primary | ICD-10-CM

## 2021-02-01 ENCOUNTER — TELEPHONE (OUTPATIENT)
Dept: PULMONOLOGY | Age: 66
End: 2021-02-01

## 2021-02-11 ENCOUNTER — OFFICE VISIT (OUTPATIENT)
Dept: PRIMARY CARE CLINIC | Age: 66
End: 2021-02-11
Payer: MEDICARE

## 2021-02-11 DIAGNOSIS — Z20.828 EXPOSURE TO SARS-ASSOCIATED CORONAVIRUS: Primary | ICD-10-CM

## 2021-02-11 LAB — SARS-COV-2, NAA: NOT DETECTED

## 2021-02-11 PROCEDURE — 99211 OFF/OP EST MAY X REQ PHY/QHP: CPT | Performed by: NURSE PRACTITIONER

## 2021-02-11 PROCEDURE — G8428 CUR MEDS NOT DOCUMENT: HCPCS | Performed by: NURSE PRACTITIONER

## 2021-02-11 PROCEDURE — G8417 CALC BMI ABV UP PARAM F/U: HCPCS | Performed by: NURSE PRACTITIONER

## 2021-02-11 NOTE — PROGRESS NOTES
Patrici Alpers received a viral test for COVID-19. They were educated on isolation and quarantine as appropriate. For any symptoms, they were directed to seek care from their PCP, given contact information to establish with a doctor, directed to an urgent care or the emergency room.

## 2021-02-11 NOTE — PATIENT INSTRUCTIONS
You have received a viral test for COVID-19. Below is education on quarantine per the CDC guidelines. For any symptoms, seek care from your PCP, call [phone] to establish care with a doctor, or go directly to an urgent care or the emergency room. Test results will take 2-7 days and will be sent to you in your [employer] account. If you test positive, you will be contacted via phone. If you test negative, the ONLY communication will be through [address] GO TO [employer] AND SIGN UP FOR [employer]  (LOWER LEFT OF THE HOME PAGE)  No test is 100%. If you have symptoms, you should follow the guidance of quarantine as previously stated. You can still be contagious if you have symptoms. Your [facility] will reach out to you if you have a positive result. They will provide you with a return to work date and note. If you were tested for a pre-op, then you should remain in quarantine until your procedure. How do I know if I need to be in quarantine? If you live in a community where COVID-19 is or might be spreading (currently, that is virtually everywhere in the [place])  Be alert for symptoms. Watch for fever, cough, shortness of breath, or other symptoms of COVID-19.  ? Take your temperature if symptoms develop. ? Practice social distancing. Maintain 6 feet of distance from others and stay out of crowded places. ? Follow CDC guidance if symptoms develop. If you feel healthy but:  ? Recently had close contact with a person with COVID-19 you need to Quarantine:  ? Stay home until 14 days after your last exposure. ? Check your temperature twice a day and watch for symptoms of COVID-19.  ? If possible, stay away from people who are at higher-risk for getting very sick from COVID-19. Stay Home and Monitor Your Health if you:  ? Have been diagnosed with COVID-19, or  ? Are waiting for test results, or  ?  Have cough, fever, or shortness of breath, or symptoms of COVID-19 When You Can be Around Others After You Had or Likely Had COVID-19     If you have or think you might have COVID-19, it is important to stay home and away from other people. Staying away from others helps stop the spread of COVID-19. If you have an emergency warning sign (including trouble breathing), get emergency medical care immediately. When you can be around others (end home isolation) depends on different factors for different situations. Find CDC's recommendations for your situation below. I think or know I had COVID-19, and I had symptoms  You can be with others after  ? 3 days with no fever and  ? Respiratory symptoms have improved (e.g. cough, shortness of breath) and  ? 10 days since symptoms first appeared  Depending on your healthcare provider's advice and availability of testing, you might get tested to see if you still have COVID-19. If you will be tested, you can be around others when you have no fever, respiratory symptoms have improved, and you receive two negative test results in a row, at least 24 hours apart. I tested positive for COVID-19 but had no symptoms  If you continue to have no symptoms, you can be with others after:  ? 10 days have passed since test or 14 days since your exposure test   Depending on your healthcare provider's advice and availability of testing, you might get tested to see if you still have COVID-19. If you will be tested, you can be around others after you receive two negative test results in a row, at least 24 hours apart. If you develop symptoms after testing positive, follow the guidance above for I think or know I had COVID, and I had symptoms.   For Anyone Who Has Been Around a Person with COVID-19  It is important to remember that anyone who has close contact with someone with COVID-19 should stay home for 14 days after exposure based on the time it takes to develop illness. Testing is not necessary.     www.cdc.gov/coronavirus/2019-ncov/index.html

## 2021-02-22 DIAGNOSIS — M15.9 PRIMARY OSTEOARTHRITIS INVOLVING MULTIPLE JOINTS: ICD-10-CM

## 2021-02-22 DIAGNOSIS — M15.4 EROSIVE OSTEOARTHRITIS OF BOTH HANDS: ICD-10-CM

## 2021-02-22 DIAGNOSIS — M51.36 DDD (DEGENERATIVE DISC DISEASE), LUMBAR: ICD-10-CM

## 2021-02-22 RX ORDER — GABAPENTIN 300 MG/1
300 CAPSULE ORAL 3 TIMES DAILY
Qty: 270 CAPSULE | Refills: 0 | Status: SHIPPED | OUTPATIENT
Start: 2021-02-22 | End: 2021-05-26 | Stop reason: SDUPTHER

## 2021-02-25 ENCOUNTER — OFFICE VISIT (OUTPATIENT)
Dept: CARDIOLOGY CLINIC | Age: 66
End: 2021-02-25
Payer: MEDICARE

## 2021-02-25 VITALS
WEIGHT: 163.8 LBS | HEART RATE: 74 BPM | SYSTOLIC BLOOD PRESSURE: 137 MMHG | BODY MASS INDEX: 29.02 KG/M2 | OXYGEN SATURATION: 97 % | DIASTOLIC BLOOD PRESSURE: 88 MMHG | HEIGHT: 63 IN

## 2021-02-25 DIAGNOSIS — R94.31 EKG ABNORMALITIES: ICD-10-CM

## 2021-02-25 DIAGNOSIS — I48.92 ATRIAL FIBRILLATION AND FLUTTER (HCC): Primary | ICD-10-CM

## 2021-02-25 DIAGNOSIS — I10 BENIGN ESSENTIAL HTN: ICD-10-CM

## 2021-02-25 DIAGNOSIS — I49.8 JUNCTIONAL RHYTHM: ICD-10-CM

## 2021-02-25 DIAGNOSIS — I48.91 ATRIAL FIBRILLATION AND FLUTTER (HCC): Primary | ICD-10-CM

## 2021-02-25 DIAGNOSIS — R29.818 SUSPECTED SLEEP APNEA: ICD-10-CM

## 2021-02-25 PROCEDURE — 4040F PNEUMOC VAC/ADMIN/RCVD: CPT | Performed by: INTERNAL MEDICINE

## 2021-02-25 PROCEDURE — 3017F COLORECTAL CA SCREEN DOC REV: CPT | Performed by: INTERNAL MEDICINE

## 2021-02-25 PROCEDURE — 99214 OFFICE O/P EST MOD 30 MIN: CPT | Performed by: INTERNAL MEDICINE

## 2021-02-25 PROCEDURE — G8417 CALC BMI ABV UP PARAM F/U: HCPCS | Performed by: INTERNAL MEDICINE

## 2021-02-25 PROCEDURE — G8484 FLU IMMUNIZE NO ADMIN: HCPCS | Performed by: INTERNAL MEDICINE

## 2021-02-25 PROCEDURE — G8399 PT W/DXA RESULTS DOCUMENT: HCPCS | Performed by: INTERNAL MEDICINE

## 2021-02-25 PROCEDURE — 1036F TOBACCO NON-USER: CPT | Performed by: INTERNAL MEDICINE

## 2021-02-25 PROCEDURE — 1090F PRES/ABSN URINE INCON ASSESS: CPT | Performed by: INTERNAL MEDICINE

## 2021-02-25 PROCEDURE — 93228 REMOTE 30 DAY ECG REV/REPORT: CPT | Performed by: INTERNAL MEDICINE

## 2021-02-25 PROCEDURE — 93000 ELECTROCARDIOGRAM COMPLETE: CPT | Performed by: INTERNAL MEDICINE

## 2021-02-25 PROCEDURE — G8427 DOCREV CUR MEDS BY ELIG CLIN: HCPCS | Performed by: INTERNAL MEDICINE

## 2021-02-25 PROCEDURE — 1123F ACP DISCUSS/DSCN MKR DOCD: CPT | Performed by: INTERNAL MEDICINE

## 2021-02-25 NOTE — PROCEDURES
[address]     Electrophysiology Procedure Note       Date of Procedure: 2/25/2021  Patient's Name: Ashley Fraga  YOB: 1955   Medical Record Number: [id number]  Procedure Performed by: Mandy Gilbert MD    Procedures performed:  IV sedation. Trans-esophageal echocardiography  External Electrical cardioversion   Mallampati3  ASA 3    Indication of the procedure: Persistent atrial flutter   Details of procedure: The patient was brought to the cath lab area in a fasting and non-sedated state. The risks, benefits and alternatives of the procedure were discussed with the patient. The patient opted to proceed with the procedure. Written informed consent was signed and placed in the chart. A timeout protocol was completed to identify the patient and the procedure being performed. IV sedation was provided with IV Versed, Fentanyl initially and CLAUDINE was performed which did not show any MARION/LA clot/thrombus. Full CLAUDINE reports will be dictated. I pushed 40 mg Brevital.An independent trained observer pushed medications  at my direction. We monitored the patient's level of consciousness and vital signs/physiologic status throughout the procedure duration (see start and stop times below). Sedation:  2 mg Versed, 50 mcg Fentanyl   Sedation start: 0919  Sedation stop: 0940     Patient is on chronic anticoagulation therapy. Then we used Brevital for sedation and electrical DC cardioversion was perfomred using 200J, synchronized shock. Patient was converted to sinus rhythm at 61 bpm. The patient tolerated the procedure well and there were no complications. Conclusion:   Successful external DC cardioversion of atrial fib flutter . Plan: Will continue with medical therapy.

## 2021-03-03 ENCOUNTER — OFFICE VISIT (OUTPATIENT)
Dept: PULMONOLOGY | Age: 66
End: 2021-03-03
Payer: MEDICARE

## 2021-03-03 VITALS — OXYGEN SATURATION: 98 % | HEART RATE: 74 BPM

## 2021-03-03 DIAGNOSIS — I48.0 PAF (PAROXYSMAL ATRIAL FIBRILLATION) (HCC): ICD-10-CM

## 2021-03-03 DIAGNOSIS — G47.33 OBSTRUCTIVE SLEEP APNEA SYNDROME: Primary | ICD-10-CM

## 2021-03-03 DIAGNOSIS — E66.3 OVERWEIGHT (BMI 25.0-29.9): ICD-10-CM

## 2021-03-03 PROCEDURE — G8417 CALC BMI ABV UP PARAM F/U: HCPCS | Performed by: INTERNAL MEDICINE

## 2021-03-03 PROCEDURE — G8399 PT W/DXA RESULTS DOCUMENT: HCPCS | Performed by: INTERNAL MEDICINE

## 2021-03-03 PROCEDURE — 1090F PRES/ABSN URINE INCON ASSESS: CPT | Performed by: INTERNAL MEDICINE

## 2021-03-03 PROCEDURE — 4040F PNEUMOC VAC/ADMIN/RCVD: CPT | Performed by: INTERNAL MEDICINE

## 2021-03-03 PROCEDURE — 1036F TOBACCO NON-USER: CPT | Performed by: INTERNAL MEDICINE

## 2021-03-03 PROCEDURE — 1123F ACP DISCUSS/DSCN MKR DOCD: CPT | Performed by: INTERNAL MEDICINE

## 2021-03-03 PROCEDURE — 99214 OFFICE O/P EST MOD 30 MIN: CPT | Performed by: INTERNAL MEDICINE

## 2021-03-03 PROCEDURE — G8427 DOCREV CUR MEDS BY ELIG CLIN: HCPCS | Performed by: INTERNAL MEDICINE

## 2021-03-03 PROCEDURE — 3017F COLORECTAL CA SCREEN DOC REV: CPT | Performed by: INTERNAL MEDICINE

## 2021-03-03 PROCEDURE — G8484 FLU IMMUNIZE NO ADMIN: HCPCS | Performed by: INTERNAL MEDICINE

## 2021-03-03 NOTE — PROGRESS NOTES
Pulmonology Follow Up Visit    CONSULTING PHYSICIAN:  Feroz Vilchis MD , No ref. provider found     REASON FOR VISIT:   Chief Complaint   Patient presents with    Sleep Apnea       DATE OF VISIT: 3/3/2021    HISTORY OF PRESENT ILLNESS: 77y.o. year old female  who is being seen in the pulmonary/sleep for a follow-up. Patient underwent a diagnostic polysomnography which revealed severe obstructive sleep apnea. Patient continues to have sleep-related symptoms. Is willing to give a trial of CPAP therapy. No further episodes of atrial fibrillation or a flutter. Now does have a loop recorder. Previously:   Patient reports that for last few years she has been having sleep issues including snoring, gasping, choking at nighttime. She is a light sleeper and has to take trazodone, anxiolytics and a muscle relaxant to help her go to sleep. Despite this she wakes up tired. Denies any daytime sleepiness. Has gained about 16 pounds of weight in last 1 year. Does have a.m. dry mouth. Denies a.m. headache. Sleep Medicine 1/14/2021   Sitting and reading 0   Watching TV 1   Sitting, inactive in a public place (e.g. a theatre or a meeting) 0   As a passenger in a car for an hour without a break 0   Lying down to rest in the afternoon when circumstances permit 0   Sitting and talking to someone 0   Sitting quietly after a lunch without alcohol 0   In a car, while stopped for a few minutes in traffic 0   Total score 1       STOP-BANG Questionnaire    Snore Loudly Yes   Often feel Tired/Fatigued/Sleepy Yes   Observed breathing pauses No   Blood pressure problems No   BMI >35 Kg/m2  29.32     Age>50 77 y.o. Neck Circumference>16 inches      Gender Male? female     Total 3         REVIEW OF SYSTEMS:   CONSTITUTIONAL SYMPTOMS: The patient denies fever, fatigue, night sweats, weight loss or weight gain. HEENT: No vision changes. No tinnitus, Denies sinus pain. No hoarseness, or dysphagia.    NECK: Patient denies swelling in the neck. CARDIOVASCULAR: Denies chest pain, palpitation, syncope. RESPIRATORY: Denies shortness of breath or cough. GASTROINTESTINAL: Denies nausea, abdominal pain or change in bowel function. GENITOURINARY: Denies obstructive symptoms. No history of incontinence. BREASTS: No masses or lumps in the breasts. SKIN: No rashes or itching. MUSCULOSKELETAL: Denies weakness or bone pain. NEUROLOGICAL: No headaches or seizures. PSYCHIATRIC: Denies mood swings or depression. ENDOCRINE: Denies heat or cold intolerance or excessive thirst.  HEMATOLOGIC/LYMPHATIC: Denies easy bruising or lymph node swelling. ALLERGIC/IMMUNOLOGIC: No environmental allergies. PAST MEDICAL HISTORY:   Past Medical History:   Diagnosis Date    Anxiety     Arthritis     Depression     Hypertension     Hypothyroidism        PAST SURGICAL HISTORY:   Past Surgical History:   Procedure Laterality Date    ABDOMINAL EXPLORATION SURGERY  02/12/2018    LAPAROTOMY EXPLORATORY, REPAIR PERFORATED ULCER    BLADDER REPAIR      BREAST ENHANCEMENT SURGERY      HYSTERECTOMY      partial    PARTIAL HYSTERECTOMY      SHOULDER SURGERY      ligament moved up left shoulder       SOCIAL HISTORY:   Social History     Tobacco Use    Smoking status: Never Smoker    Smokeless tobacco: Never Used   Substance Use Topics    Alcohol use: Yes     Alcohol/week: 0.0 standard drinks    Drug use: No       FAMILY HISTORY:   Family History   Problem Relation Age of Onset    Heart Attack Mother        MEDICATIONS:     Current Outpatient Medications on File Prior to Visit   Medication Sig Dispense Refill    gabapentin (NEURONTIN) 300 MG capsule Take 1 capsule by mouth 3 times daily for 90 days.  (Patient taking differently: Take 400 mg by mouth 3 times daily. ) 270 capsule 0    rivaroxaban (XARELTO) 20 MG TABS tablet Take 1 tablet by mouth daily 30 tablet 0    dilTIAZem (CARDIZEM CD) 120 MG extended release capsule Take 1 capsule by mouth daily 30 capsule 0    DULoxetine (CYMBALTA) 30 MG extended release capsule Take 30 mg by mouth daily      hydroxychloroquine (PLAQUENIL) 200 MG tablet Take 300 mg by mouth daily       pantoprazole (PROTONIX) 40 MG tablet Take 1 tablet by mouth daily 30 tablet 3    tiZANidine (ZANAFLEX) 4 MG tablet Take 4 mg by mouth 2 times daily as needed       ARMOUR THYROID PO Take 60 mg by mouth Daily       ALPRAZolam (XANAX) 0.25 MG tablet Take 0.25 mg by mouth nightly as needed for Sleep.  traZODone (DESYREL) 50 MG tablet Take 50 mg by mouth nightly. No current facility-administered medications on file prior to visit. ALLERGIES:   Allergies as of 03/03/2021 - Review Complete 03/03/2021   Allergen Reaction Noted    Nsaids  02/22/2018      OBJECTIVE:   pulse is 74. Her oxygen saturation is 98%. PHYSICAL EXAM:    Physical Exam    CONSTITUTIONAL: She is a 77y.o.-year-old who appears her stated age. She is alert and oriented x 3 and in no acute distress. HEENT: PERRLA, EOMI. No scleral icterus. No thrush, atraumatic, normocephalic. Mallampati class II airway. NECK: Supple, without cervical or supraclavicular lymphadenopathy:  CARDIOVASCULAR: S1 S2 RRR. Without murmer  RESPIRATORY & CHEST: Lungs are clear to auscultation and percussion. No wheezing, no crackles. Good air movement  GASTROINTESTINAL & ABDOMEN: Soft, nontender, positive bowel sounds in all quadrants, non-distended, without hepatosplenomegaly. GENITOURINARY: Deferred. MUSCULOSKELETAL: No tenderness to palpation of the axial skeleton. There is no clubbing. No cyanosis. No edema of the lower extremities. SKIN OF BODY: No rash or jaundice. PSYCHIATRIC EVALUATION: Normal affect. Patient answers questions appropriately. HEMATOLOGIC/LYMPHATIC/ IMMUNOLOGIC: No palpable lymphadenopathy. NEUROLOGIC: Alert and oriented x 3. Groslly non-focal. Motor strength is 5+/5 in all muscle groups.  The patient has a normal sensorium globally. LABS:    Lab Summary Latest Ref Rng & Units 12/28/2020 12/18/2020 12/17/2020   WBC 4.0 - 11.0 K/uL - 5.7 4.3   Hgb 12.0 - 16.0 g/dL - 11. 9(L) 12.4   Hct 36.0 - 48.0 % - 34. 7(L) 35. 6(L)   Platelets 033 - 484 K/uL - 237 273   Sodium 136 - 145 mmol/L 138 137 136   Potassium 3.5 - 5.1 mmol/L 4.4 4.2 4.0   BUN 7 - 20 mg/dL 13 12 10   Creatinine 0.6 - 1.2 mg/dL 0.6 0.7 0.6   Glucose 70 - 99 mg/dL 95 108(H) 120(H)   Calcium 8.3 - 10.6 mg/dL 9.2 9.3 9.0   Alk Phos 40 - 129 U/L 88 - -   Albumin 3.4 - 5.0 g/dL 4.5 - -   Bilirubin 0.0 - 1.0 mg/dL 0.5 - -   AST 15 - 37 U/L 19 - -   ALT 10 - 40 U/L 25 - -   Some recent data might be hidden           IMAGING:    No new chest imaging for me to review. Pulmonary Functions Testing Results:    Baseline polysomnography done on 1/26/2021  Sleep efficiency: 88.8%  Overall AHI: 36  REM AHI: 29.1  Lowest oxygen saturation was 76%  Time spent below an oxygen saturation of 88%: 131.4 minutes        IMPRESSION AND RECOMMENDATIONS:     1. Obstructive sleep apnea  -Patient has severe obstructive sleep apnea and severe oxygen desaturation.  -She will need an in lab CPAP titration study in order to find out the adequate pressures, adequate hold and need for oxygen supplementation at nighttime. 2. PAF (paroxysmal atrial fibrillation) (HCC)  -Currently patient is on Cardizem 120 mg daily, Xarelto 20 mg daily. 3.  Overweight  -I strongly advised the patient to make efforts to lose weight. I discussed various modalities including moderate intensity intermittent exercises, diet control and bariatric surgery. If the patient loses even 10 to 15% of current body weight, it will be beneficial in improving the overall health. Return in about 3 months (around 6/3/2021). Toby Milian MD  Pulmonary Critical Care and Sleep Medicine  3/3/2021, 11:45 AM    This note was completed using dragon medical speech recognition software.  Grammatical errors, random word insertions, pronoun errors and incomplete sentences are occasional consequences of this technology due to software limitations. If there are questions or concerns about the content of this note of information contained within the body of this dictation they should be addressed with the provider for clarification.

## 2021-03-04 ENCOUNTER — OFFICE VISIT (OUTPATIENT)
Dept: PRIMARY CARE CLINIC | Age: 66
End: 2021-03-04
Payer: MEDICARE

## 2021-03-04 DIAGNOSIS — Z20.828 EXPOSURE TO SARS-ASSOCIATED CORONAVIRUS: Primary | ICD-10-CM

## 2021-03-04 LAB — SARS-COV-2: NOT DETECTED

## 2021-03-04 PROCEDURE — 99211 OFF/OP EST MAY X REQ PHY/QHP: CPT | Performed by: NURSE PRACTITIONER

## 2021-03-04 PROCEDURE — G8417 CALC BMI ABV UP PARAM F/U: HCPCS | Performed by: NURSE PRACTITIONER

## 2021-03-04 PROCEDURE — G8428 CUR MEDS NOT DOCUMENT: HCPCS | Performed by: NURSE PRACTITIONER

## 2021-03-04 NOTE — PATIENT INSTRUCTIONS
You have received a viral test for COVID-19. Below is education on quarantine per the CDC guidelines. For any symptoms, seek care from your PCP, call [phone] to establish care with a doctor, or go directly to an urgent care or the emergency room. Test results will take 2-7 days and will be sent to you in your [employer] account. If you test positive, you will be contacted via phone. If you test negative, the ONLY communication will be through [address] GO TO [employer] AND SIGN UP FOR [employer]  (LOWER LEFT OF THE HOME PAGE)  No test is 100%. If you have symptoms, you should follow the guidance of quarantine as previously stated. You can still be contagious if you have symptoms. Your [facility] Department will reach out to you if you have a positive result. They will provide you with a return to work date and note. If you were tested for a pre-op, then you should remain in quarantine until your procedure. How do I know if I need to be in quarantine? If you live in a community where COVID-19 is or might be spreading (currently, that is virtually everywhere in the [place])  Be alert for symptoms. Watch for fever, cough, shortness of breath, or other symptoms of COVID-19.  ? Take your temperature if symptoms develop. ? Practice social distancing. Maintain 6 feet of distance from others and stay out of crowded places. ? Follow CDC guidance if symptoms develop. If you feel healthy but:  ? Recently had close contact with a person with COVID-19 you need to Quarantine:  ? Stay home until 14 days after your last exposure. ? Check your temperature twice a day and watch for symptoms of COVID-19.  ? If possible, stay away from people who are at higher-risk for getting very sick from COVID-19. Stay Home and Monitor Your Health if you:  ? Have been diagnosed with COVID-19, or  ? Are waiting for test results, or  ?  Have cough, fever, or shortness of breath, or symptoms of COVID-19 When You Can be Around Others After You Had or Likely Had COVID-19     If you have or think you might have COVID-19, it is important to stay home and away from other people. Staying away from others helps stop the spread of COVID-19. If you have an emergency warning sign (including trouble breathing), get emergency medical care immediately. When you can be around others (end home isolation) depends on different factors for different situations. Find CDC's recommendations for your situation below. I think or know I had COVID-19, and I had symptoms  You can be with others after  ? 3 days with no fever and  ? Respiratory symptoms have improved (e.g. cough, shortness of breath) and  ? 10 days since symptoms first appeared  Depending on your healthcare provider's advice and availability of testing, you might get tested to see if you still have COVID-19. If you will be tested, you can be around others when you have no fever, respiratory symptoms have improved, and you receive two negative test results in a row, at least 24 hours apart. I tested positive for COVID-19 but had no symptoms  If you continue to have no symptoms, you can be with others after:  ? 10 days have passed since test or 14 days since your exposure test   Depending on your healthcare provider's advice and availability of testing, you might get tested to see if you still have COVID-19. If you will be tested, you can be around others after you receive two negative test results in a row, at least 24 hours apart. If you develop symptoms after testing positive, follow the guidance above for I think or know I had COVID, and I had symptoms.   For Anyone Who Has Been Around a Person with COVID-19  It is important to remember that anyone who has close contact with someone with COVID-19 should stay home for 14 days after exposure based on the time it takes to develop illness. Testing is not necessary.     www.cdc.gov/coronavirus/2019-ncov/index.html

## 2021-03-04 NOTE — PROGRESS NOTES
Damion Thomas received a viral test for COVID-19. They were educated on isolation and quarantine as appropriate. For any symptoms, they were directed to seek care from their PCP, given contact information to establish with a doctor, directed to an urgent care or the emergency room.

## 2021-03-08 ENCOUNTER — HOSPITAL ENCOUNTER (OUTPATIENT)
Dept: SLEEP CENTER | Age: 66
Discharge: HOME | End: 2021-03-08
Payer: MEDICARE

## 2021-03-08 DIAGNOSIS — I48.92 ATRIAL FIBRILLATION AND FLUTTER (HCC): ICD-10-CM

## 2021-03-08 DIAGNOSIS — G47.33 OBSTRUCTIVE SLEEP APNEA SYNDROME: ICD-10-CM

## 2021-03-08 DIAGNOSIS — I48.91 ATRIAL FIBRILLATION AND FLUTTER (HCC): ICD-10-CM

## 2021-03-08 PROCEDURE — 95811 POLYSOM 6/>YRS CPAP 4/> PARM: CPT

## 2021-03-08 PROCEDURE — 95811 POLYSOM 6/>YRS CPAP 4/> PARM: CPT | Performed by: INTERNAL MEDICINE

## 2021-03-11 ENCOUNTER — TELEPHONE (OUTPATIENT)
Dept: PULMONOLOGY | Age: 66
End: 2021-03-11

## 2021-03-11 ENCOUNTER — TELEPHONE (OUTPATIENT)
Dept: INTERNAL MEDICINE CLINIC | Age: 66
End: 2021-03-11

## 2021-03-11 DIAGNOSIS — M15.4 EROSIVE OSTEOARTHRITIS OF BOTH HANDS: ICD-10-CM

## 2021-03-11 RX ORDER — HYDROXYCHLOROQUINE SULFATE 200 MG/1
TABLET, FILM COATED ORAL
Qty: 30 TABLET | Refills: 2 | Status: CANCELLED | OUTPATIENT
Start: 2021-03-11

## 2021-03-11 NOTE — TELEPHONE ENCOUNTER
Patient is requesting a refill of hydroxychloroquine (PLAQUENIL) 200 MG tablet, 1 tab qd sent to [facility] in [place].

## 2021-03-15 DIAGNOSIS — M15.4 EROSIVE OSTEOARTHRITIS OF BOTH HANDS: ICD-10-CM

## 2021-03-15 RX ORDER — HYDROXYCHLOROQUINE SULFATE 200 MG/1
TABLET, FILM COATED ORAL
Qty: 132 TABLET | Refills: 0 | Status: SHIPPED | OUTPATIENT
Start: 2021-03-15 | End: 2021-03-17

## 2021-03-15 NOTE — TELEPHONE ENCOUNTER
Pt calling to see where her medicine is---says she takes 1 and 1/2 of the 200 mg tables a day---hurting terribly ---can this be called in for her? Thanks.

## 2021-03-17 RX ORDER — HYDROXYCHLOROQUINE SULFATE 200 MG/1
TABLET, FILM COATED ORAL
Qty: 135 TABLET | Refills: 1 | Status: SHIPPED | OUTPATIENT
Start: 2021-03-17 | End: 2021-05-26

## 2021-04-01 ENCOUNTER — TELEPHONE (OUTPATIENT)
Dept: CARDIOLOGY CLINIC | Age: 66
End: 2021-04-01

## 2021-04-01 NOTE — LETTER
[facility] CARDIOLOGY[address]  Dept: [phone]  Dept Fax: [phone]    Johnny Altamirano  1955 4/1/21    To Whom It May Concern:    Johnny Altamirano has been seen in the electrophysiology office for Atrial fibrillation. Worse a 30 day monitor 2/25/2021 which showed no recurrence of atrial fibrillation. Discontinuation of any anticoagulant carries significant risks of morbidity, sometimes with a fatal outcome (e.g. stroke), from thromboembolic complications. Guidelines do not recommend discontinuation of anticoagulation for low risk surgical procedure, such as cataract surgery, dental procedures, and dermatologic surgeries. The risks of hemorrhage or thromboembolism versus the benefit from the operation need to be addressed by attending surgeon. Xarelto: \"If anticoagulation must be discontinued to reduce the risk of bleeding with surgical or other procedures, Leana Stout should be stopped at least 24 hours before the procedure to reduce the risk of bleeding. In deciding whether a procedure should be delayed until 24 hours after the last dose of XARELTO, the increased risk of bleeding should be weighed against the urgency of intervention. Leana Stout should be restarted after the surgical or other procedures as soon as adequate hemostasis has been established, noting that the time to onset of  therapeutic effect is short. If oral medication cannot be taken during or after surgical intervention, consider administering a parenteral anticoagulant. \"    Johnny Altamirano may hold Xarelto 3 days prior to her scheduled procedure. If more days are desired, it is up to the requesting physician to appropriately bridge the patient with an alternative medication. Please limit time off anticoagulation and resume medication as soon as possible. Please contact my office with any further questions or concerns.      Sincerely,        Severo Primer, MD

## 2021-04-01 NOTE — TELEPHONE ENCOUNTER
No atril fibrillation on her monitor it is acceptable to hold her xarelto for 3 days or per her physicians recommendation.  Letter created will fax

## 2021-04-01 NOTE — TELEPHONE ENCOUNTER
Pt calling she is having an epidural injections with Dr Forbes Goodpasture and needs to stop taking her xarelto for 3 days prior ph. [phone] fax [phone] pls call to advise thank you

## 2021-04-06 ENCOUNTER — TELEPHONE (OUTPATIENT)
Dept: PULMONOLOGY | Age: 66
End: 2021-04-06

## 2021-04-06 NOTE — TELEPHONE ENCOUNTER
Pt called and said she wants to return her cpap machine, said it made her face swell up and she couldn't sleep.     Call her to discuss    [phone]

## 2021-04-09 ENCOUNTER — VIRTUAL VISIT (OUTPATIENT)
Dept: PULMONOLOGY | Age: 66
End: 2021-04-09
Payer: MEDICARE

## 2021-04-09 DIAGNOSIS — G47.33 OBSTRUCTIVE SLEEP APNEA SYNDROME: Primary | ICD-10-CM

## 2021-04-09 DIAGNOSIS — E66.3 OVERWEIGHT (BMI 25.0-29.9): ICD-10-CM

## 2021-04-09 DIAGNOSIS — I48.0 PAF (PAROXYSMAL ATRIAL FIBRILLATION) (HCC): ICD-10-CM

## 2021-04-09 PROCEDURE — G8399 PT W/DXA RESULTS DOCUMENT: HCPCS | Performed by: INTERNAL MEDICINE

## 2021-04-09 PROCEDURE — 4040F PNEUMOC VAC/ADMIN/RCVD: CPT | Performed by: INTERNAL MEDICINE

## 2021-04-09 PROCEDURE — 3017F COLORECTAL CA SCREEN DOC REV: CPT | Performed by: INTERNAL MEDICINE

## 2021-04-09 PROCEDURE — G8417 CALC BMI ABV UP PARAM F/U: HCPCS | Performed by: INTERNAL MEDICINE

## 2021-04-09 PROCEDURE — G8427 DOCREV CUR MEDS BY ELIG CLIN: HCPCS | Performed by: INTERNAL MEDICINE

## 2021-04-09 PROCEDURE — 99214 OFFICE O/P EST MOD 30 MIN: CPT | Performed by: INTERNAL MEDICINE

## 2021-04-09 PROCEDURE — 1036F TOBACCO NON-USER: CPT | Performed by: INTERNAL MEDICINE

## 2021-04-09 PROCEDURE — 1090F PRES/ABSN URINE INCON ASSESS: CPT | Performed by: INTERNAL MEDICINE

## 2021-04-09 PROCEDURE — 1123F ACP DISCUSS/DSCN MKR DOCD: CPT | Performed by: INTERNAL MEDICINE

## 2021-04-09 NOTE — PROGRESS NOTES
a car for an hour without a break 0   Lying down to rest in the afternoon when circumstances permit 0   Sitting and talking to someone 0   Sitting quietly after a lunch without alcohol 0   In a car, while stopped for a few minutes in traffic 0   Total score 1       STOP-BANG Questionnaire    Snore Loudly Yes   Often feel Tired/Fatigued/Sleepy Yes   Observed breathing pauses No   Blood pressure problems No   BMI >35 Kg/m2  29.32     Age>50 77 y.o. Neck Circumference>16 inches      Gender Male? female     Total 3         REVIEW OF SYSTEMS:   CONSTITUTIONAL SYMPTOMS: The patient denies fever, fatigue, night sweats, weight loss or weight gain. HEENT: No vision changes. No tinnitus, Denies sinus pain. No hoarseness, or dysphagia. NECK: Patient denies swelling in the neck. CARDIOVASCULAR: Denies chest pain, palpitation, syncope. RESPIRATORY: Denies shortness of breath or cough. GASTROINTESTINAL: Denies nausea, abdominal pain or change in bowel function. GENITOURINARY: Denies obstructive symptoms. No history of incontinence. BREASTS: No masses or lumps in the breasts. SKIN: No rashes or itching. MUSCULOSKELETAL: Denies weakness or bone pain. NEUROLOGICAL: No headaches or seizures. PSYCHIATRIC: Denies mood swings or depression. ENDOCRINE: Denies heat or cold intolerance or excessive thirst.  HEMATOLOGIC/LYMPHATIC: Denies easy bruising or lymph node swelling. ALLERGIC/IMMUNOLOGIC: No environmental allergies.     PAST MEDICAL HISTORY:   Past Medical History:   Diagnosis Date    Anxiety     Arthritis     Depression     Hypertension     Hypothyroidism        PAST SURGICAL HISTORY:   Past Surgical History:   Procedure Laterality Date    ABDOMINAL EXPLORATION SURGERY  02/12/2018    LAPAROTOMY EXPLORATORY, REPAIR PERFORATED ULCER    BLADDER REPAIR      BREAST ENHANCEMENT SURGERY      HYSTERECTOMY      partial    PARTIAL HYSTERECTOMY      SHOULDER SURGERY      ligament moved up left shoulder SOCIAL HISTORY:   Social History     Tobacco Use    Smoking status: Never Smoker    Smokeless tobacco: Never Used   Substance Use Topics    Alcohol use: Yes     Alcohol/week: 0.0 standard drinks    Drug use: No       FAMILY HISTORY:   Family History   Problem Relation Age of Onset    Heart Attack Mother        MEDICATIONS:     Current Outpatient Medications on File Prior to Visit   Medication Sig Dispense Refill    hydroxychloroquine (PLAQUENIL) 200 MG tablet TAKE 1 AND 1/2 TABLETS BY MOUTH EVERY  tablet 1    gabapentin (NEURONTIN) 300 MG capsule Take 1 capsule by mouth 3 times daily for 90 days. (Patient taking differently: Take 400 mg by mouth 3 times daily. ) 270 capsule 0    rivaroxaban (XARELTO) 20 MG TABS tablet Take 1 tablet by mouth daily 30 tablet 0    dilTIAZem (CARDIZEM CD) 120 MG extended release capsule Take 1 capsule by mouth daily 30 capsule 0    DULoxetine (CYMBALTA) 30 MG extended release capsule Take 30 mg by mouth daily      hydroxychloroquine (PLAQUENIL) 200 MG tablet Take 300 mg by mouth daily       pantoprazole (PROTONIX) 40 MG tablet Take 1 tablet by mouth daily 30 tablet 3    tiZANidine (ZANAFLEX) 4 MG tablet Take 4 mg by mouth 2 times daily as needed       ARMOUR THYROID PO Take 60 mg by mouth Daily       ALPRAZolam (XANAX) 0.25 MG tablet Take 0.25 mg by mouth nightly as needed for Sleep.  traZODone (DESYREL) 50 MG tablet Take 50 mg by mouth nightly. No current facility-administered medications on file prior to visit. ALLERGIES:   Allergies as of 04/09/2021 - Review Complete 04/09/2021   Allergen Reaction Noted    Nsaids  02/22/2018      OBJECTIVE:   vitals were not taken for this visit. PHYSICAL EXAM:    Physical Exam    Physical Exam    Constitutional:       General: She is not in acute distress. Appearance: Normal appearance. She is not ill-appearing. HENT:      Head: Normocephalic and atraumatic.       Right Ear: External ear normal.      Left Ear: External ear normal.   Eyes:      General: No scleral icterus. Right eye: No discharge. Left eye: No discharge. Extraocular Movements: Extraocular movements intact. Conjunctiva/sclera: Conjunctivae normal.   Neck:      Musculoskeletal: Neck supple. Pulmonary:      Effort: Pulmonary effort is normal. No respiratory distress. Skin:     Coloration: Skin is not jaundiced. Findings: No lesion or rash. Neurological:      Mental Status: She is alert. Cranial Nerves: No cranial nerve deficit. Coordination: Coordination normal.   Psychiatric:         Mood and Affect: Mood normal.        LABS:    Lab Summary Latest Ref Rng & Units 12/28/2020 12/18/2020 12/17/2020   WBC 4.0 - 11.0 K/uL - 5.7 4.3   Hgb 12.0 - 16.0 g/dL - 11. 9(L) 12.4   Hct 36.0 - 48.0 % - 34. 7(L) 35. 6(L)   Platelets 193 - 753 K/uL - 237 273   Sodium 136 - 145 mmol/L 138 137 136   Potassium 3.5 - 5.1 mmol/L 4.4 4.2 4.0   BUN 7 - 20 mg/dL 13 12 10   Creatinine 0.6 - 1.2 mg/dL 0.6 0.7 0.6   Glucose 70 - 99 mg/dL 95 108(H) 120(H)   Calcium 8.3 - 10.6 mg/dL 9.2 9.3 9.0   Alk Phos 40 - 129 U/L 88 - -   Albumin 3.4 - 5.0 g/dL 4.5 - -   Bilirubin 0.0 - 1.0 mg/dL 0.5 - -   AST 15 - 37 U/L 19 - -   ALT 10 - 40 U/L 25 - -   Some recent data might be hidden           IMAGING:    No new chest imaging for me to review. Pulmonary Functions Testing Results:    Baseline polysomnography done on 1/26/2021  Sleep efficiency: 88.8%  Overall AHI: 36  REM AHI: 29.1  Lowest oxygen saturation was 76%  Time spent below an oxygen saturation of 88%: 131.4 minutes        IMPRESSION AND RECOMMENDATIONS:     1. Obstructive sleep apnea  -Patient has severe obstructive sleep apnea and severe oxygen desaturation.   -CPAP titration study showed that she would benefit from a BiPAP therapy.  -Since she is having pressure intolerance, I will reduce the pressures to EPAP: 10 cmH2O, pressure support range of 4-8 cmH2O, IPAP

## 2021-05-21 NOTE — PROGRESS NOTES
Narinder Wheeler MD  [facility]) Physicians - Rheumatology    [x] [facility] HOSPITAL:  [address] [facility], [facility] [place] [] [place] Office:  [address], [email or web address]   Office: [phone]  Fax: [phone] PROGRESS NOTE    SUBJECTIVE:    Background:   Alma Pritchett is a 77 y.o. female w/ erosive OA of the hands, generalized OA, severe DDD of L-spine (followed by Pain Management) and osteopenia. PMHx pertinent for HTN, hypothyroidism, anxiety, depression and insomnia. Current rheum meds:   mg daily: started in 11/2020  Gabapentin 600 mg TID: prescribed by Pain Management   Duloxetine 30 mg daily  Voltaren gel PRN  Compound pain cream PRN  OTC vitamin D3 daily     Prior rheum meds:  NSAIDs: pt states she was told to avoid d/t a \"perforated ulcer\" 3 yrs ago    Past medical/surgical history, medications and allergies are reviewed and updated as appropriate. Interval Hx:   Pt reports significant improvement in her arthralgias since starting HCQ in November. Specifically, she reports improvement in her joint pain, swelling and stiffness in her hand joints. She is able to use her hands much better now. She also reports good pain relief from her L 1st CMC joint injection, swelling has completely resolved. She tells me she's been following w/ Pain Management (Dr. Radha Merlos) for her severe DDD of L-spine. She tells me her Gabapentin dose has been increased to 600 mg TID by Dr. Radha Merlos and she has been started on low dose Duloxetine 30 mg daily. She reports good pain relief from this.     Rheumatologic ROS:  Constitutional: denies chronic fatigue, fever/chills, night sweats, unintentional weight loss  Integumentary: denies photosensitivity, rash, patchy alopecia, or Sx of Raynaud's phenomenon  Eyes: denies dry eyes, redness or pain, visual disturbance, or floaters  Oral cavity: +extreme dry mouth which she attributes to her thyroid medicine, +canker sores  Cardiovascular: denies CP, palpitations, Hx of pericardial effusion or pericarditis  Respiratory: denies SOB, cough, hemoptysis, or pleurisy  Musculoskeletal:  refer to above HPI     Allergies   Allergen Reactions    Nsaids      Note: PERFORATED PEPTIC ULCER       Past Medical History:        Diagnosis Date    Anxiety     Arthritis     Depression     Hypertension     Hypothyroidism        Past Surgical History:        Procedure Laterality Date    ABDOMINAL EXPLORATION SURGERY  02/12/2018    LAPAROTOMY EXPLORATORY, REPAIR PERFORATED ULCER    BLADDER REPAIR      BREAST ENHANCEMENT SURGERY      HYSTERECTOMY      partial    PARTIAL HYSTERECTOMY      SHOULDER SURGERY      ligament moved up left shoulder       Medications:    Current Outpatient Medications   Medication Sig Dispense Refill    [START ON 6/1/2021] gabapentin (NEURONTIN) 300 MG capsule Take 2 capsules by mouth 3 times daily for 90 days. 540 capsule 0    hydroxychloroquine (PLAQUENIL) 200 MG tablet Take 1.5 tablets by mouth daily 135 tablet 0    DULoxetine (CYMBALTA) 30 MG extended release capsule Take 1 capsule by mouth daily 90 capsule 0    rivaroxaban (XARELTO) 20 MG TABS tablet Take 1 tablet by mouth daily 30 tablet 0    dilTIAZem (CARDIZEM CD) 120 MG extended release capsule Take 1 capsule by mouth daily 30 capsule 0    pantoprazole (PROTONIX) 40 MG tablet Take 1 tablet by mouth daily 30 tablet 3    tiZANidine (ZANAFLEX) 4 MG tablet Take 4 mg by mouth 2 times daily as needed       ARMOUR THYROID PO Take 60 mg by mouth Daily       ALPRAZolam (XANAX) 0.25 MG tablet Take 0.25 mg by mouth nightly as needed for Sleep.  traZODone (DESYREL) 50 MG tablet Take 50 mg by mouth nightly. No current facility-administered medications for this visit.         OBJECTIVE:  Physical Exam:  /88   Ht 5' 2.75\" (1.594 m)   Wt 168 lb (76.2 kg)   BMI 30.00 kg/m²     GEN: AAOx3, in NAD, well-appearing  HEAD: normocephalic, atraumatic  EYES: no injection or icterus  CVS: RRR  LUNGS: in no acute respiratory distress, CTAB  MSK:  Spine: no kyphosis or lordosis, no point tenderness over the spine, SI joints NTTP  Upper extremities:              Hands: MCP joints w/o swelling NTTP, there is bony enlargement w/ synovial proliferation of the b/l PIP joints NTTP, DIP joints w/o synovitis slightly boggy NTTP, +Smiley and Heberden nodes TTP, there is squaring of the b/l 1st CMC joints, L CMC joint w/o synovitis NTTP w/ thenar atrophy, full fist formation w/ good  strength              Wrist: no synovitis in the wrist joints b/l, FROM              Elbow: no synovitis or bursitis, FROM  Lower extremities:              Knees: no warmth or effusion present, FROM              Ankles: no synovitis, FROM, Achilles tendons w/o swelling or warmth NTTP              Feet: no toe swelling or pain or warmth on palpation w/ FROM, negative MTP squeeze test  INTEGUMENTARY: no rash or psoriatic lesions, no petechiae, bruises, or palpable purpura, no patchy alopecia, no nail or periungual changes, no clubbing or digital ulcers  PSYCH: normal mood    DATA:  Labs:   I personally reviewed interval labs and discussed w/ the pt in detail which showed:    Lab Results   Component Value Date    WBC 5.9 05/25/2021    HGB 11.9 (L) 05/25/2021    HCT 34.3 (L) 05/25/2021    .6 (H) 05/25/2021     05/25/2021    LYMPHOPCT 18.7 05/25/2021    RBC 3.31 (L) 05/25/2021    MCH 36.0 (H) 05/25/2021    MCHC 34.8 05/25/2021    RDW 13.0 05/25/2021     Lab Results   Component Value Date     (L) 05/25/2021    K 4.2 05/25/2021    CL 99 05/25/2021    CO2 23 05/25/2021    BUN 11 05/25/2021    CREATININE 0.9 05/25/2021    GLUCOSE 100 (H) 05/25/2021    CALCIUM 9.0 05/25/2021    PROT 6.8 05/25/2021    LABALBU 4.4 05/25/2021    BILITOT 0.4 05/25/2021    ALKPHOS 83 05/25/2021    AST 27 05/25/2021    ALT 27 05/25/2021    LABGLOM >60 05/25/2021    GFRAA >60 05/25/2021 AGRATIO 1.8 05/25/2021    GLOB 2.4 05/25/2021     Lab Results   Component Value Date    VITD25 58.9 05/25/2021     CRP wnl (11/4/20)  Negative RF x 2 (5/23/17, 11/4/20)  Negative CCP (11/4/20)  Uric acid 5.3 (11/4/20)  Negative MARCIA x 2 (5/23/17, 11/4/20)  Negative SSA, SSB (11/4/20)  TSH wnl (5/29/20)  PTH intact wnl (11/4/20)  SPEP and UPEP: no monoclonal band (11/4/20)    Imaging:  I personally reviewed interval imaging and discussed w/ the pt in detail which included:    X-rays (11/4/20):  R hand: There is no acute fracture. Polyarticular osteoarthritis is present worse in the interphalangeal and trapeziometacarpal joints. L hand:  Left hand: There is no acute fracture. Polyarticular osteoarthritis is present worse in the interphalangeal and trapeziometacarpal joints. There is no destructive bone lesion seen. L-spine:  The vertebral body heights are maintained. There is grade 1 anterolisthesis of L4 on L5. Severe degenerative disc disease at L4-L5 and L5-S1. Facet osteoarthritis in the lower lumbar spine. Pelvis/hips:  No acute fractures seen. The hip joints are congruent. There is no destructive osseous lesion. I independently reviewed above x-rays, there are degenerative changes in the b/l PIP joints and significant joint space narrowing w/ gull wing deformities in the b/l DIP joints c/w erosive OA. DEXA study (4/24/15):  T-score -1.7 in L-spine  T-score of -1.5 in L femoral neck. DEXA study (12/8/20):  T-score -1.9 in L1-L4  T-score -1.6 in the L femoral neck  T-score -1.9 in the R femoral neck  I personally calculated her FRAX scores:  Major osteoporosis fx risk 10%  Hip fx risk 1.4%    Above results were discussed w/ the pt in detail during today's visit. ASSESSMENT/PLAN:   Erosive OA well controlled on HCQ.  Made referral to ophthalmology for annual eye exam.    Per pt request, we will take over her Gabapentin and Duloxetine prescriptions as she tells me her pain specialist

## 2021-05-26 ENCOUNTER — OFFICE VISIT (OUTPATIENT)
Dept: RHEUMATOLOGY | Age: 66
End: 2021-05-26
Payer: MEDICARE

## 2021-05-26 VITALS
SYSTOLIC BLOOD PRESSURE: 138 MMHG | DIASTOLIC BLOOD PRESSURE: 88 MMHG | HEIGHT: 63 IN | BODY MASS INDEX: 29.77 KG/M2 | WEIGHT: 168 LBS

## 2021-05-26 DIAGNOSIS — Z78.0 OSTEOPENIA AFTER MENOPAUSE: ICD-10-CM

## 2021-05-26 DIAGNOSIS — M15.4 EROSIVE OSTEOARTHRITIS OF BOTH HANDS: Primary | ICD-10-CM

## 2021-05-26 DIAGNOSIS — Z79.899 HIGH RISK MEDICATION USE: ICD-10-CM

## 2021-05-26 DIAGNOSIS — M85.80 OSTEOPENIA AFTER MENOPAUSE: ICD-10-CM

## 2021-05-26 DIAGNOSIS — M51.36 DDD (DEGENERATIVE DISC DISEASE), LUMBAR: ICD-10-CM

## 2021-05-26 DIAGNOSIS — M15.9 PRIMARY OSTEOARTHRITIS INVOLVING MULTIPLE JOINTS: ICD-10-CM

## 2021-05-26 PROCEDURE — G8427 DOCREV CUR MEDS BY ELIG CLIN: HCPCS | Performed by: INTERNAL MEDICINE

## 2021-05-26 PROCEDURE — G8417 CALC BMI ABV UP PARAM F/U: HCPCS | Performed by: INTERNAL MEDICINE

## 2021-05-26 PROCEDURE — 1123F ACP DISCUSS/DSCN MKR DOCD: CPT | Performed by: INTERNAL MEDICINE

## 2021-05-26 PROCEDURE — 4040F PNEUMOC VAC/ADMIN/RCVD: CPT | Performed by: INTERNAL MEDICINE

## 2021-05-26 PROCEDURE — 1036F TOBACCO NON-USER: CPT | Performed by: INTERNAL MEDICINE

## 2021-05-26 PROCEDURE — 99214 OFFICE O/P EST MOD 30 MIN: CPT | Performed by: INTERNAL MEDICINE

## 2021-05-26 PROCEDURE — G8399 PT W/DXA RESULTS DOCUMENT: HCPCS | Performed by: INTERNAL MEDICINE

## 2021-05-26 PROCEDURE — 3017F COLORECTAL CA SCREEN DOC REV: CPT | Performed by: INTERNAL MEDICINE

## 2021-05-26 PROCEDURE — 1090F PRES/ABSN URINE INCON ASSESS: CPT | Performed by: INTERNAL MEDICINE

## 2021-05-26 RX ORDER — DULOXETIN HYDROCHLORIDE 30 MG/1
30 CAPSULE, DELAYED RELEASE ORAL DAILY
Qty: 90 CAPSULE | Refills: 0 | Status: SHIPPED | OUTPATIENT
Start: 2021-05-26 | End: 2021-08-25 | Stop reason: ALTCHOICE

## 2021-05-26 RX ORDER — HYDROXYCHLOROQUINE SULFATE 200 MG/1
300 TABLET, FILM COATED ORAL DAILY
Qty: 135 TABLET | Refills: 0 | Status: SHIPPED | OUTPATIENT
Start: 2021-05-26 | End: 2021-08-25 | Stop reason: SDUPTHER

## 2021-05-26 RX ORDER — GABAPENTIN 300 MG/1
600 CAPSULE ORAL 3 TIMES DAILY
Qty: 540 CAPSULE | Refills: 0 | Status: SHIPPED | OUTPATIENT
Start: 2021-06-01 | End: 2021-08-25 | Stop reason: SDUPTHER

## 2021-06-01 ENCOUNTER — TELEPHONE (OUTPATIENT)
Dept: CARDIOLOGY CLINIC | Age: 66
End: 2021-06-01

## 2021-06-01 NOTE — TELEPHONE ENCOUNTER
Patient made appt for check up in November . She never got a call about the results of the heart monitor she wore a few months ago .  Please call patient with results

## 2021-06-12 ENCOUNTER — HOSPITAL ENCOUNTER (OUTPATIENT)
Dept: ULTRASOUND IMAGING | Age: 66
Discharge: HOME | End: 2021-06-12
Payer: MEDICARE

## 2021-06-12 DIAGNOSIS — D53.1 MEGALOBLASTIC ANEMIA DUE TO ALCOHOLISM: ICD-10-CM

## 2021-06-12 DIAGNOSIS — F10.10 ALCOHOL ABUSE: ICD-10-CM

## 2021-06-12 PROCEDURE — 76700 US EXAM ABDOM COMPLETE: CPT

## 2021-08-23 NOTE — PROGRESS NOTES
sores  Cardiovascular: denies CP, palpitations, Hx of pericardial effusion or pericarditis  Respiratory: denies SOB, cough, hemoptysis, or pleurisy  Musculoskeletal:  refer to above HPI     Allergies   Allergen Reactions    Nsaids      Note: PERFORATED PEPTIC ULCER       Past Medical History:        Diagnosis Date    Anxiety     Arthritis     Depression     Hypertension     Hypothyroidism        Past Surgical History:        Procedure Laterality Date    ABDOMINAL EXPLORATION SURGERY  02/12/2018    LAPAROTOMY EXPLORATORY, REPAIR PERFORATED ULCER    BLADDER REPAIR      BREAST ENHANCEMENT SURGERY      HYSTERECTOMY      partial    PARTIAL HYSTERECTOMY      SHOULDER SURGERY      ligament moved up left shoulder       Medications:    Current Outpatient Medications   Medication Sig Dispense Refill    predniSONE (DELTASONE) 10 MG tablet Take 2 tablets by mouth daily for 10 days, THEN 1 tablet daily for 10 days, THEN 0.5 tablets daily for 10 days. 35 tablet 0    gabapentin (NEURONTIN) 300 MG capsule Take 2 capsules by mouth 3 times daily for 90 days. 540 capsule 0    hydroxychloroquine (PLAQUENIL) 200 MG tablet Take 1.5 tablets by mouth daily 135 tablet 0    rivaroxaban (XARELTO) 20 MG TABS tablet Take 1 tablet by mouth daily 30 tablet 0    dilTIAZem (CARDIZEM CD) 120 MG extended release capsule Take 1 capsule by mouth daily 30 capsule 0    pantoprazole (PROTONIX) 40 MG tablet Take 1 tablet by mouth daily 30 tablet 3    tiZANidine (ZANAFLEX) 4 MG tablet Take 4 mg by mouth 2 times daily as needed       ARMOUR THYROID PO Take 60 mg by mouth Daily       ALPRAZolam (XANAX) 0.25 MG tablet Take 0.25 mg by mouth nightly as needed for Sleep.  traZODone (DESYREL) 50 MG tablet Take 50 mg by mouth nightly. No current facility-administered medications for this visit.         OBJECTIVE:  Physical Exam:  /78   Ht 5' 3\" (1.6 m)   Wt 168 lb (76.2 kg)   BMI 29.76 kg/m²     GEN: AAOx3, in NAD, well-appearing  HEAD: normocephalic, atraumatic  EYES: no injection or icterus  CVS: RRR  LUNGS: in no acute respiratory distress, CTAB  MSK:  Upper extremities:              Hands: MCP joints w/o swelling NTTP, there is bony enlargement w/ synovial proliferation and synovitis of the b/l PIP and DIP joints TTP, +Smiley and Heberden nodes TTP, there is squaring of the b/l 1st CMC joints, L CMC joint w/ synovitis TTP w/ thenar atrophy, full fist formation w/ good  strength              Wrist: no synovitis in the wrist joints b/l, FROM              Elbow: no synovitis or bursitis, FROM  Lower extremities:              Knees: no warmth or effusion present, FROM              Ankles: no synovitis, FROM, Achilles tendons w/o swelling or warmth NTTP              Feet: no toe swelling or pain or warmth on palpation w/ FROM, negative MTP squeeze test  INTEGUMENT: no rash or psoriatic lesions, no petechiae, bruises, or palpable purpura, no patchy alopecia, no nail or periungual changes, no clubbing or digital ulcers    DATA:  Labs:   I personally reviewed interval labs and discussed w/ the pt in detail which showed:    Lab Results   Component Value Date    WBC 5.9 05/25/2021    HGB 11.9 (L) 05/25/2021    HCT 34.3 (L) 05/25/2021    .6 (H) 05/25/2021     05/25/2021    LYMPHOPCT 18.7 05/25/2021    RBC 3.31 (L) 05/25/2021    MCH 36.0 (H) 05/25/2021    MCHC 34.8 05/25/2021    RDW 13.0 05/25/2021       Chemistry        Component Value Date/Time     (L) 05/25/2021 1024    K 4.2 05/25/2021 1024    K 3.9 12/16/2020 0717    CL 99 05/25/2021 1024    CO2 23 05/25/2021 1024    BUN 11 05/25/2021 1024    CREATININE 0.9 05/25/2021 1024        Component Value Date/Time    CALCIUM 9.0 05/25/2021 1024    ALKPHOS 83 05/25/2021 1024    AST 27 05/25/2021 1024    ALT 27 05/25/2021 1024    BILITOT 0.4 05/25/2021 1024        Lab Results   Component Value Date    VITD25 58.9 05/25/2021     Lab Results   Component Value Date CRP 5.9 (H) 12/17/2020    CRP 4.1 11/04/2020     Lab Results   Component Value Date    SEDRATE 13 12/17/2020    SEDRATE 10 05/23/2017     Lab Results   Component Value Date    LABURIC 5.3 11/04/2020    LABURIC 5.1 05/23/2017     Lab Results   Component Value Date    VITD25 58.9 05/25/2021     Negative RF x 2 (5/23/17, 11/4/20)  Negative CCP (11/4/20)  Negative MARCIA x 2 (5/23/17, 11/4/20)  Negative SSA, SSB (11/4/20)  TSH wnl (5/29/20)  PTH intact wnl (11/4/20)  SPEP and UPEP: no monoclonal band (11/4/20)    Imaging:  I personally reviewed interval imaging and discussed w/ the pt in detail which included:    X-rays (11/4/20):  R hand: There is no acute fracture. Polyarticular osteoarthritis is present worse in the interphalangeal and trapeziometacarpal joints. L hand:  Left hand: There is no acute fracture. Polyarticular osteoarthritis is present worse in the interphalangeal and trapeziometacarpal joints. There is no destructive bone lesion seen. L-spine:  The vertebral body heights are maintained. There is grade 1 anterolisthesis of L4 on L5. Severe degenerative disc disease at L4-L5 and L5-S1. Facet osteoarthritis in the lower lumbar spine. Pelvis/hips:  No acute fractures seen. The hip joints are congruent. There is no destructive osseous lesion. I independently reviewed above x-rays, there are degenerative changes in the b/l PIP joints and significant joint space narrowing w/ gull wing deformities in the b/l DIP joints c/w erosive OA. DEXA study (4/24/15):  T-score -1.7 in L-spine  T-score of -1.5 in L femoral neck. DEXA study (12/8/20):  T-score -1.9 in L1-L4  T-score -1.6 in the L femoral neck  T-score -1.9 in the R femoral neck  I personally calculated her FRAX scores:  Major osteoporosis fx risk 10%  Hip fx risk 1.4%    Above results were discussed w/ the pt in detail during today's visit. ASSESSMENT/PLAN:   1.  Erosive osteoarthritis of both hands  - active currently, she will start a low dos Prednisone taper now, cont HCQ  - predniSONE (DELTASONE) 10 MG tablet; Take 2 tablets by mouth daily for 10 days, THEN 1 tablet daily for 10 days, THEN 0.5 tablets daily for 10 days. Dispense: 35 tablet; Refill: 0  - gabapentin (NEURONTIN) 300 MG capsule; Take 2 capsules by mouth 3 times daily for 90 days. Dispense: 540 capsule; Refill: 0  - hydroxychloroquine (PLAQUENIL) 200 MG tablet; Take 1.5 tablets by mouth daily  Dispense: 135 tablet; Refill: 0    2. Primary osteoarthritis involving multiple joints  - injected L 1st CMC joint today, refer to below procedure note for details of the injection  - gabapentin (NEURONTIN) 300 MG capsule; Take 2 capsules by mouth 3 times daily for 90 days. Dispense: 540 capsule; Refill: 0  - VA ARTHROCENTESIS ASPIR&/INJ SMALL JT/BURSA W/O US  - triamcinolone acetonide (KENALOG-40) injection 10 mg  - lidocaine 1 % injection 0.3 mL    3. High risk medication use  - annual eye exam for HCQ toxicity monitoring, previously made referral to Opthalmology    4. DDD (degenerative disc disease), lumbar  - good response to radiofrequency ablation in the past, pt does not want to return to Dr. Mel Wilder, Dr. Oj Frias closed down his practice, provided general referral to Pain Management - instructed pt to call her insurance for a new pain clinic close to her  - predniSONE (DELTASONE) 10 MG tablet; Take 2 tablets by mouth daily for 10 days, THEN 1 tablet daily for 10 days, THEN 0.5 tablets daily for 10 days. Dispense: 35 tablet; Refill: 0  - gabapentin (NEURONTIN) 300 MG capsule; Take 2 capsules by mouth 3 times daily for 90 days. Dispense: 540 capsule; Refill: 0  - External Referral To Pain Clinic    5. Osteopenia after menopause  - discussed her repeat DEXA study in December. I personally calculated her FRAX scores which are still below the threshold for indication to initiate bisphosphonate therapy. She denies any Hx of fragility fractures.    - vitamin D level at goal, cont daily vitamin D w/ calcium    Indication: OA of L 1st [facility] joint     Procedure details: The risks and benefits of the procedure were discussed with the pt who then gave verbal consent. The skin was first prepped w/ Chloroprep and alcohol swipe. The area of injection was anaesthetized with topical Ethylene Chloride spray. Using the sterile technique, joint was entered with a 27 G needle and Kenalog 10 mg with 0.25 mL of Lidocaine 1% was injected into the joint and the needle was withdrawn. The pt tolerated the procedure well and there were no immediate post-procedure complications. Post injection care was discussed with patient. Pt was instructed to call or return to clinic PRN if such symptoms occur or there is failure to improve as anticipated. Return in about 3 months (around 11/25/2021) for lab result discussion and treatment plan, medication monitoring. The risks and benefits of my recommendations, as well as other treatment options, benefits and side effects were discussed with the patient today. Questions were answered. NOTE: This report is transcribed by using voice recognition software dragon. Every effort is made to ensure accuracy; however, inadvertent computerized  transcription errors may be present.

## 2021-08-25 ENCOUNTER — OFFICE VISIT (OUTPATIENT)
Dept: RHEUMATOLOGY | Age: 66
End: 2021-08-25
Payer: MEDICARE

## 2021-08-25 VITALS
BODY MASS INDEX: 29.77 KG/M2 | DIASTOLIC BLOOD PRESSURE: 78 MMHG | SYSTOLIC BLOOD PRESSURE: 135 MMHG | HEIGHT: 63 IN | WEIGHT: 168 LBS

## 2021-08-25 DIAGNOSIS — M51.36 DDD (DEGENERATIVE DISC DISEASE), LUMBAR: ICD-10-CM

## 2021-08-25 DIAGNOSIS — M15.9 PRIMARY OSTEOARTHRITIS INVOLVING MULTIPLE JOINTS: ICD-10-CM

## 2021-08-25 DIAGNOSIS — M85.80 OSTEOPENIA AFTER MENOPAUSE: ICD-10-CM

## 2021-08-25 DIAGNOSIS — Z79.899 HIGH RISK MEDICATION USE: ICD-10-CM

## 2021-08-25 DIAGNOSIS — Z78.0 OSTEOPENIA AFTER MENOPAUSE: ICD-10-CM

## 2021-08-25 DIAGNOSIS — M15.4 EROSIVE OSTEOARTHRITIS OF BOTH HANDS: Primary | ICD-10-CM

## 2021-08-25 PROBLEM — M05.9 RHEUMATOID ARTHRITIS WITH RHEUMATOID FACTOR, UNSPECIFIED (HCC): Status: ACTIVE | Noted: 2021-08-25

## 2021-08-25 PROBLEM — M06.9 RHEUMATOID ARTHRITIS, UNSPECIFIED (HCC): Status: ACTIVE | Noted: 2021-08-25

## 2021-08-25 PROCEDURE — 99214 OFFICE O/P EST MOD 30 MIN: CPT | Performed by: INTERNAL MEDICINE

## 2021-08-25 PROCEDURE — G8417 CALC BMI ABV UP PARAM F/U: HCPCS | Performed by: INTERNAL MEDICINE

## 2021-08-25 PROCEDURE — G8399 PT W/DXA RESULTS DOCUMENT: HCPCS | Performed by: INTERNAL MEDICINE

## 2021-08-25 PROCEDURE — 1036F TOBACCO NON-USER: CPT | Performed by: INTERNAL MEDICINE

## 2021-08-25 PROCEDURE — 1123F ACP DISCUSS/DSCN MKR DOCD: CPT | Performed by: INTERNAL MEDICINE

## 2021-08-25 PROCEDURE — 1090F PRES/ABSN URINE INCON ASSESS: CPT | Performed by: INTERNAL MEDICINE

## 2021-08-25 PROCEDURE — G8427 DOCREV CUR MEDS BY ELIG CLIN: HCPCS | Performed by: INTERNAL MEDICINE

## 2021-08-25 PROCEDURE — 3017F COLORECTAL CA SCREEN DOC REV: CPT | Performed by: INTERNAL MEDICINE

## 2021-08-25 PROCEDURE — 20600 DRAIN/INJ JOINT/BURSA W/O US: CPT | Performed by: INTERNAL MEDICINE

## 2021-08-25 PROCEDURE — 4040F PNEUMOC VAC/ADMIN/RCVD: CPT | Performed by: INTERNAL MEDICINE

## 2021-08-25 RX ORDER — HYDROXYCHLOROQUINE SULFATE 200 MG/1
300 TABLET, FILM COATED ORAL DAILY
Qty: 135 TABLET | Refills: 0 | Status: SHIPPED | OUTPATIENT
Start: 2021-08-25 | End: 2021-10-12

## 2021-08-25 RX ORDER — TRIAMCINOLONE ACETONIDE 40 MG/ML
10 INJECTION, SUSPENSION INTRA-ARTICULAR; INTRAMUSCULAR ONCE
Status: COMPLETED | OUTPATIENT
Start: 2021-08-25 | End: 2021-08-25

## 2021-08-25 RX ORDER — GABAPENTIN 300 MG/1
600 CAPSULE ORAL 3 TIMES DAILY
Qty: 540 CAPSULE | Refills: 0 | Status: SHIPPED | OUTPATIENT
Start: 2021-08-25 | End: 2021-11-17 | Stop reason: SDUPTHER

## 2021-08-25 RX ORDER — LIDOCAINE HYDROCHLORIDE 10 MG/ML
0.25 INJECTION, SOLUTION INFILTRATION; PERINEURAL ONCE
Status: COMPLETED | OUTPATIENT
Start: 2021-08-25 | End: 2021-08-25

## 2021-08-25 RX ORDER — PREDNISONE 10 MG/1
TABLET ORAL
Qty: 35 TABLET | Refills: 0 | Status: SHIPPED | OUTPATIENT
Start: 2021-08-25 | End: 2021-09-24

## 2021-08-25 RX ADMIN — TRIAMCINOLONE ACETONIDE 10 MG: 40 INJECTION, SUSPENSION INTRA-ARTICULAR; INTRAMUSCULAR at 12:00

## 2021-08-25 RX ADMIN — LIDOCAINE HYDROCHLORIDE 0.3 ML: 10 INJECTION, SOLUTION INFILTRATION; PERINEURAL at 11:59

## 2021-10-12 DIAGNOSIS — M15.4 EROSIVE OSTEOARTHRITIS OF BOTH HANDS: ICD-10-CM

## 2021-10-12 RX ORDER — HYDROXYCHLOROQUINE SULFATE 200 MG/1
TABLET, FILM COATED ORAL
Qty: 135 TABLET | Refills: 0 | Status: SHIPPED | OUTPATIENT
Start: 2021-10-12 | End: 2021-11-17 | Stop reason: SDUPTHER

## 2021-10-29 ENCOUNTER — HOSPITAL ENCOUNTER (INPATIENT)
Age: 66
LOS: 2 days | Discharge: HOME | DRG: 310 | End: 2021-10-31
Attending: EMERGENCY MEDICINE | Admitting: INTERNAL MEDICINE
Payer: MEDICARE

## 2021-10-29 ENCOUNTER — APPOINTMENT (OUTPATIENT)
Dept: CT IMAGING | Age: 66
DRG: 310 | End: 2021-10-29
Payer: MEDICARE

## 2021-10-29 ENCOUNTER — APPOINTMENT (OUTPATIENT)
Dept: GENERAL RADIOLOGY | Age: 66
DRG: 310 | End: 2021-10-29
Payer: MEDICARE

## 2021-10-29 ENCOUNTER — TELEPHONE (OUTPATIENT)
Dept: CARDIOLOGY CLINIC | Age: 66
End: 2021-10-29

## 2021-10-29 DIAGNOSIS — I48.92 ATRIAL FLUTTER WITH RAPID VENTRICULAR RESPONSE (HCC): Primary | ICD-10-CM

## 2021-10-29 PROBLEM — R79.89 ELEVATED TROPONIN: Status: ACTIVE | Noted: 2021-10-29

## 2021-10-29 PROBLEM — I48.91 ATRIAL FIBRILLATION WITH RAPID VENTRICULAR RESPONSE (HCC): Status: ACTIVE | Noted: 2021-10-29

## 2021-10-29 PROBLEM — R77.8 ELEVATED TROPONIN: Status: ACTIVE | Noted: 2021-10-29

## 2021-10-29 LAB
A/G RATIO: 1.5 (ref 1.1–2.2)
ALBUMIN SERPL-MCNC: 4.4 G/DL (ref 3.4–5)
ALP BLD-CCNC: 96 U/L (ref 40–129)
ALT SERPL-CCNC: 35 U/L (ref 10–40)
ANION GAP SERPL CALCULATED.3IONS-SCNC: 12 MMOL/L (ref 3–16)
AST SERPL-CCNC: 30 U/L (ref 15–37)
BASOPHILS ABSOLUTE: 0.1 K/UL (ref 0–0.2)
BASOPHILS RELATIVE PERCENT: 1.2 %
BILIRUB SERPL-MCNC: 0.6 MG/DL (ref 0–1)
BUN BLDV-MCNC: 15 MG/DL (ref 7–20)
CALCIUM SERPL-MCNC: 9.8 MG/DL (ref 8.3–10.6)
CHLORIDE BLD-SCNC: 102 MMOL/L (ref 99–110)
CO2: 22 MMOL/L (ref 21–32)
CREAT SERPL-MCNC: 0.8 MG/DL (ref 0.6–1.2)
EOSINOPHILS ABSOLUTE: 0.1 K/UL (ref 0–0.6)
EOSINOPHILS RELATIVE PERCENT: 2.6 %
GFR AFRICAN AMERICAN: >60
GFR NON-AFRICAN AMERICAN: >60
GLUCOSE BLD-MCNC: 102 MG/DL (ref 70–99)
HCT VFR BLD CALC: 38.3 % (ref 36–48)
HEMOGLOBIN: 13 G/DL (ref 12–16)
LYMPHOCYTES ABSOLUTE: 1.4 K/UL (ref 1–5.1)
LYMPHOCYTES RELATIVE PERCENT: 23.7 %
MCH RBC QN AUTO: 34.3 PG (ref 26–34)
MCHC RBC AUTO-ENTMCNC: 34 G/DL (ref 31–36)
MCV RBC AUTO: 100.8 FL (ref 80–100)
MONOCYTES ABSOLUTE: 0.6 K/UL (ref 0–1.3)
MONOCYTES RELATIVE PERCENT: 10.3 %
NEUTROPHILS ABSOLUTE: 3.6 K/UL (ref 1.7–7.7)
NEUTROPHILS RELATIVE PERCENT: 62.2 %
PDW BLD-RTO: 13.6 % (ref 12.4–15.4)
PLATELET # BLD: 281 K/UL (ref 135–450)
PMV BLD AUTO: 7.9 FL (ref 5–10.5)
POTASSIUM REFLEX MAGNESIUM: 4.6 MMOL/L (ref 3.5–5.1)
PRO-BNP: 3129 PG/ML (ref 0–124)
RBC # BLD: 3.8 M/UL (ref 4–5.2)
SODIUM BLD-SCNC: 136 MMOL/L (ref 136–145)
TOTAL PROTEIN: 7.4 G/DL (ref 6.4–8.2)
TROPONIN: 0.02 NG/ML
TROPONIN: <0.01 NG/ML
WBC # BLD: 5.7 K/UL (ref 4–11)

## 2021-10-29 PROCEDURE — 84484 ASSAY OF TROPONIN QUANT: CPT

## 2021-10-29 PROCEDURE — 36415 COLL VENOUS BLD VENIPUNCTURE: CPT

## 2021-10-29 PROCEDURE — 71260 CT THORAX DX C+: CPT

## 2021-10-29 PROCEDURE — 96374 THER/PROPH/DIAG INJ IV PUSH: CPT

## 2021-10-29 PROCEDURE — 80053 COMPREHEN METABOLIC PANEL: CPT

## 2021-10-29 PROCEDURE — 2060000000 HC ICU INTERMEDIATE R&B

## 2021-10-29 PROCEDURE — 99283 EMERGENCY DEPT VISIT LOW MDM: CPT

## 2021-10-29 PROCEDURE — 71045 X-RAY EXAM CHEST 1 VIEW: CPT

## 2021-10-29 PROCEDURE — 93005 ELECTROCARDIOGRAM TRACING: CPT | Performed by: EMERGENCY MEDICINE

## 2021-10-29 PROCEDURE — 83880 ASSAY OF NATRIURETIC PEPTIDE: CPT

## 2021-10-29 PROCEDURE — 85025 COMPLETE CBC W/AUTO DIFF WBC: CPT

## 2021-10-29 PROCEDURE — 2500000003 HC RX 250 WO HCPCS: Performed by: EMERGENCY MEDICINE

## 2021-10-29 PROCEDURE — 84443 ASSAY THYROID STIM HORMONE: CPT

## 2021-10-29 PROCEDURE — 6360000004 HC RX CONTRAST MEDICATION: Performed by: EMERGENCY MEDICINE

## 2021-10-29 PROCEDURE — 6370000000 HC RX 637 (ALT 250 FOR IP): Performed by: INTERNAL MEDICINE

## 2021-10-29 RX ORDER — PANTOPRAZOLE SODIUM 40 MG/1
40 TABLET, DELAYED RELEASE ORAL DAILY
Status: DISCONTINUED | OUTPATIENT
Start: 2021-10-30 | End: 2021-10-31 | Stop reason: HOSPADM

## 2021-10-29 RX ORDER — TIZANIDINE 4 MG/1
4 TABLET ORAL 2 TIMES DAILY PRN
Status: DISCONTINUED | OUTPATIENT
Start: 2021-10-29 | End: 2021-10-31 | Stop reason: HOSPADM

## 2021-10-29 RX ORDER — LISINOPRIL 10 MG/1
10 TABLET ORAL DAILY
Status: DISCONTINUED | OUTPATIENT
Start: 2021-10-30 | End: 2021-10-31 | Stop reason: HOSPADM

## 2021-10-29 RX ORDER — DILTIAZEM HYDROCHLORIDE 120 MG/1
120 CAPSULE, COATED, EXTENDED RELEASE ORAL DAILY
Status: DISCONTINUED | OUTPATIENT
Start: 2021-10-30 | End: 2021-10-31 | Stop reason: HOSPADM

## 2021-10-29 RX ORDER — ESCITALOPRAM OXALATE 10 MG/1
20 TABLET ORAL DAILY
COMMUNITY

## 2021-10-29 RX ORDER — ONDANSETRON 2 MG/ML
4 INJECTION INTRAMUSCULAR; INTRAVENOUS EVERY 6 HOURS PRN
Status: DISCONTINUED | OUTPATIENT
Start: 2021-10-29 | End: 2021-10-31 | Stop reason: HOSPADM

## 2021-10-29 RX ORDER — GABAPENTIN 300 MG/1
600 CAPSULE ORAL 3 TIMES DAILY
Status: DISCONTINUED | OUTPATIENT
Start: 2021-10-29 | End: 2021-10-31 | Stop reason: HOSPADM

## 2021-10-29 RX ORDER — POLYETHYLENE GLYCOL 3350 17 G/17G
17 POWDER, FOR SOLUTION ORAL DAILY PRN
Status: DISCONTINUED | OUTPATIENT
Start: 2021-10-29 | End: 2021-10-31 | Stop reason: HOSPADM

## 2021-10-29 RX ORDER — HYDROXYCHLOROQUINE SULFATE 200 MG/1
300 TABLET, FILM COATED ORAL DAILY
Status: DISCONTINUED | OUTPATIENT
Start: 2021-10-30 | End: 2021-10-31 | Stop reason: HOSPADM

## 2021-10-29 RX ORDER — POTASSIUM CHLORIDE 7.45 MG/ML
10 INJECTION INTRAVENOUS PRN
Status: DISCONTINUED | OUTPATIENT
Start: 2021-10-29 | End: 2021-10-31 | Stop reason: HOSPADM

## 2021-10-29 RX ORDER — TRAZODONE HYDROCHLORIDE 50 MG/1
50 TABLET ORAL NIGHTLY
Status: DISCONTINUED | OUTPATIENT
Start: 2021-10-29 | End: 2021-10-31 | Stop reason: HOSPADM

## 2021-10-29 RX ORDER — LISINOPRIL 10 MG/1
10 TABLET ORAL DAILY
COMMUNITY

## 2021-10-29 RX ORDER — ACETAMINOPHEN 325 MG/1
650 TABLET ORAL EVERY 6 HOURS PRN
Status: DISCONTINUED | OUTPATIENT
Start: 2021-10-29 | End: 2021-10-31 | Stop reason: HOSPADM

## 2021-10-29 RX ORDER — ESCITALOPRAM OXALATE 10 MG/1
10 TABLET ORAL DAILY
Status: DISCONTINUED | OUTPATIENT
Start: 2021-10-30 | End: 2021-10-31 | Stop reason: HOSPADM

## 2021-10-29 RX ORDER — ALPRAZOLAM 0.25 MG/1
0.25 TABLET ORAL NIGHTLY PRN
Status: DISCONTINUED | OUTPATIENT
Start: 2021-10-29 | End: 2021-10-31 | Stop reason: HOSPADM

## 2021-10-29 RX ORDER — SODIUM CHLORIDE 9 MG/ML
25 INJECTION, SOLUTION INTRAVENOUS PRN
Status: DISCONTINUED | OUTPATIENT
Start: 2021-10-29 | End: 2021-10-31 | Stop reason: HOSPADM

## 2021-10-29 RX ORDER — ONDANSETRON 4 MG/1
4 TABLET, ORALLY DISINTEGRATING ORAL EVERY 8 HOURS PRN
Status: DISCONTINUED | OUTPATIENT
Start: 2021-10-29 | End: 2021-10-31 | Stop reason: HOSPADM

## 2021-10-29 RX ORDER — DILTIAZEM HYDROCHLORIDE 5 MG/ML
10 INJECTION INTRAVENOUS ONCE
Status: COMPLETED | OUTPATIENT
Start: 2021-10-29 | End: 2021-10-29

## 2021-10-29 RX ORDER — NITROGLYCERIN 0.4 MG/1
0.4 TABLET SUBLINGUAL EVERY 5 MIN PRN
Status: DISCONTINUED | OUTPATIENT
Start: 2021-10-29 | End: 2021-10-31 | Stop reason: HOSPADM

## 2021-10-29 RX ORDER — DILTIAZEM HYDROCHLORIDE 5 MG/ML
20 INJECTION INTRAVENOUS ONCE
Status: DISCONTINUED | OUTPATIENT
Start: 2021-10-29 | End: 2021-10-29

## 2021-10-29 RX ORDER — LANOLIN ALCOHOL/MO/W.PET/CERES
1000 CREAM (GRAM) TOPICAL DAILY
COMMUNITY

## 2021-10-29 RX ORDER — LANOLIN ALCOHOL/MO/W.PET/CERES
1000 CREAM (GRAM) TOPICAL DAILY
Status: DISCONTINUED | OUTPATIENT
Start: 2021-10-30 | End: 2021-10-31 | Stop reason: HOSPADM

## 2021-10-29 RX ORDER — DILTIAZEM HCL/D5W 125 MG/125
5-15 PLASTIC BAG, INJECTION (ML) INTRAVENOUS CONTINUOUS
Status: DISCONTINUED | OUTPATIENT
Start: 2021-10-29 | End: 2021-10-29 | Stop reason: ALTCHOICE

## 2021-10-29 RX ORDER — M-VIT,TX,IRON,MINS/CALC/FOLIC 27MG-0.4MG
1 TABLET ORAL DAILY
COMMUNITY

## 2021-10-29 RX ORDER — ACETAMINOPHEN 650 MG/1
650 SUPPOSITORY RECTAL EVERY 6 HOURS PRN
Status: DISCONTINUED | OUTPATIENT
Start: 2021-10-29 | End: 2021-10-31 | Stop reason: HOSPADM

## 2021-10-29 RX ORDER — PSYLLIUM HUSK 0.4 G
1 CAPSULE ORAL DAILY
COMMUNITY

## 2021-10-29 RX ORDER — LEVOTHYROXINE AND LIOTHYRONINE 19; 4.5 UG/1; UG/1
60 TABLET ORAL DAILY
Status: DISCONTINUED | OUTPATIENT
Start: 2021-10-30 | End: 2021-10-31 | Stop reason: HOSPADM

## 2021-10-29 RX ORDER — SODIUM CHLORIDE 0.9 % (FLUSH) 0.9 %
5-40 SYRINGE (ML) INJECTION EVERY 12 HOURS SCHEDULED
Status: DISCONTINUED | OUTPATIENT
Start: 2021-10-29 | End: 2021-10-31 | Stop reason: HOSPADM

## 2021-10-29 RX ORDER — SODIUM CHLORIDE 0.9 % (FLUSH) 0.9 %
5-40 SYRINGE (ML) INJECTION PRN
Status: DISCONTINUED | OUTPATIENT
Start: 2021-10-29 | End: 2021-10-31 | Stop reason: HOSPADM

## 2021-10-29 RX ADMIN — Medication 5 MG/HR: at 15:14

## 2021-10-29 RX ADMIN — IOPAMIDOL 75 ML: 755 INJECTION, SOLUTION INTRAVENOUS at 17:44

## 2021-10-29 RX ADMIN — DILTIAZEM HYDROCHLORIDE 10 MG: 5 INJECTION INTRAVENOUS at 15:03

## 2021-10-29 RX ADMIN — GABAPENTIN 600 MG: 300 CAPSULE ORAL at 21:46

## 2021-10-29 RX ADMIN — TIZANIDINE 4 MG: 4 TABLET ORAL at 21:46

## 2021-10-29 RX ADMIN — TRAZODONE HYDROCHLORIDE 50 MG: 50 TABLET ORAL at 21:46

## 2021-10-29 ASSESSMENT — PAIN DESCRIPTION - PAIN TYPE: TYPE: ACUTE PAIN

## 2021-10-29 ASSESSMENT — PAIN DESCRIPTION - LOCATION: LOCATION: CHEST

## 2021-10-29 ASSESSMENT — PAIN SCALES - GENERAL: PAINLEVEL_OUTOF10: 2

## 2021-10-29 NOTE — ED PROVIDER NOTES
(CALCIUM 1000 + D) 1000-800 MG-UNIT TABS    Take 1 tablet by mouth daily    DILTIAZEM (CARDIZEM CD) 120 MG EXTENDED RELEASE CAPSULE    Take 1 capsule by mouth daily    ESCITALOPRAM (LEXAPRO) 10 MG TABLET    Take 10 mg by mouth daily    GABAPENTIN (NEURONTIN) 300 MG CAPSULE    Take 2 capsules by mouth 3 times daily for 90 days. HYDROXYCHLOROQUINE (PLAQUENIL) 200 MG TABLET    TAKE 1 AND 1/2 TABLETS BY MOUTH DAILY    LISINOPRIL (PRINIVIL;ZESTRIL) 10 MG TABLET    Take 10 mg by mouth daily    MULTIPLE VITAMINS-MINERALS (THERAPEUTIC MULTIVITAMIN-MINERALS) TABLET    Take 1 tablet by mouth daily    PANTOPRAZOLE (PROTONIX) 40 MG TABLET    Take 1 tablet by mouth daily    RIVAROXABAN (XARELTO) 20 MG TABS TABLET    Take 1 tablet by mouth daily    TIZANIDINE (ZANAFLEX) 4 MG TABLET    Take 4 mg by mouth 2 times daily as needed     TRAZODONE (DESYREL) 50 MG TABLET    Take 50 mg by mouth nightly. VITAMIN B-12 (CYANOCOBALAMIN) 1000 MCG TABLET    Take 1,000 mcg by mouth daily       Social history:  reports that she has never smoked. She has never used smokeless tobacco. She reports current alcohol use. She reports that she does not use drugs. Family history:    Family History   Problem Relation Age of Onset    Heart Attack Mother        Exam  ED Triage Vitals [10/29/21 1318]   BP Temp Temp Source Pulse Resp SpO2 Height Weight   136/63 97.5 °F (36.4 °C) Oral 126 20 97 % 5' 3\" (1.6 m) 167 lb (75.8 kg)     Nursing note and vitals reviewed. Constitutional: Appears uncomfortable and in moderate distress. HENT:      Head: Normocephalic and atraumatic. Ears: External ears normal.      Nose: Nose normal.     Mouth: Membrane mucosa moist and pink. Eyes: Anicteric sclera. No discharge. Neck: Supple. Trachea midline. Cardiovascular: Heart is tachycardic with irregularly irregular rhythm. No murmurs  Pulmonary/Chest: Tachypneic with short phrase dyspnea. Lungs are CTAB. No wheezes, rales, or rhonchi. Abdominal: Soft.  No distension. Musculoskeletal: Moves all extremities. No gross deformity. No lower extremity edema. Neurological: Alert and oriented. Face symmetric. Speech is clear. Skin: Warm and dry. No rash. Psychiatric: Normal mood and affect. Behavior is normal.    EKG  The Ekg interpreted by me in the absence of a cardiologist shows. atrial flutter with variable block with a rate of 124  Axis is   Normal  QTc is  prolonged  Intervals and Durations are unremarkable. No specific ST-T wave changes appreciated. No evidence of acute ischemia. Atrial flutter is new in comparison to previous EKG from 2/25/2021      Radiology  CT CHEST PULMONARY EMBOLISM W CONTRAST   Final Result   No evidence of a pulmonary embolus. Mildly dilated and atherosclerotic thoracic aorta with no aneurysm or   dissection. Subsegmental atelectasis vs early infiltrates or scarring along the lung   bases posteriorly which is less prominent. XR CHEST PORTABLE   Final Result   No acute process.              Labs  Results for orders placed or performed during the hospital encounter of 10/29/21   CBC Auto Differential   Result Value Ref Range    WBC 5.7 4.0 - 11.0 K/uL    RBC 3.80 (L) 4.00 - 5.20 M/uL    Hemoglobin 13.0 12.0 - 16.0 g/dL    Hematocrit 38.3 36.0 - 48.0 %    .8 (H) 80.0 - 100.0 fL    MCH 34.3 (H) 26.0 - 34.0 pg    MCHC 34.0 31.0 - 36.0 g/dL    RDW 13.6 12.4 - 15.4 %    Platelets 018 251 - 994 K/uL    MPV 7.9 5.0 - 10.5 fL    Neutrophils % 62.2 %    Lymphocytes % 23.7 %    Monocytes % 10.3 %    Eosinophils % 2.6 %    Basophils % 1.2 %    Neutrophils Absolute 3.6 1.7 - 7.7 K/uL    Lymphocytes Absolute 1.4 1.0 - 5.1 K/uL    Monocytes Absolute 0.6 0.0 - 1.3 K/uL    Eosinophils Absolute 0.1 0.0 - 0.6 K/uL    Basophils Absolute 0.1 0.0 - 0.2 K/uL   Comprehensive Metabolic Panel w/ Reflex to MG   Result Value Ref Range    Sodium 136 136 - 145 mmol/L    Potassium reflex Magnesium 4.6 3.5 - 5.1 mmol/L    Chloride 102 99 - 110 mmol/L    CO2 22 21 - 32 mmol/L    Anion Gap 12 3 - 16    Glucose 102 (H) 70 - 99 mg/dL    BUN 15 7 - 20 mg/dL    CREATININE 0.8 0.6 - 1.2 mg/dL    GFR Non-African American >60 >60    GFR African American >60 >60    Calcium 9.8 8.3 - 10.6 mg/dL    Total Protein 7.4 6.4 - 8.2 g/dL    Albumin 4.4 3.4 - 5.0 g/dL    Albumin/Globulin Ratio 1.5 1.1 - 2.2    Total Bilirubin 0.6 0.0 - 1.0 mg/dL    Alkaline Phosphatase 96 40 - 129 U/L    ALT 35 10 - 40 U/L    AST 30 15 - 37 U/L   Troponin   Result Value Ref Range    Troponin 0.02 (H) <0.01 ng/mL   Brain Natriuretic Peptide   Result Value Ref Range    Pro-BNP 3,129 (H) 0 - 124 pg/mL       MDM and ED Course  Patient was found to be in atrial fibrillation with RVR on arrival.  Her initial heart rate on my evaluation was 126 bpm.  During her visit, it climbed as high as ~140 beats per minute while I was in the room at her bedside. Initially treated with Cardizem bolus at 20 mg IV. Patient's heart rate did not respond to this, so I initiated a Cardizem drip. Since then, her heart rate has improved significantly. Troponin and BNP were both elevated. The troponin was only slightly elevated, likely reflective of heart strain from her dysrhythmia. BNP was significantly elevated just over 3000, but also likely due to strain of the heart. Chest x-ray is normal.  I followed this with a CT of the chest for PE given her sudden onset of symptoms, description of symptoms, and elevated BNP and troponin. I wanted to ensure there was no PE nor occult edema not seen on chest x-ray. Fortunately, CT was negative for pulmonary embolism. She also had no edema. I spoke with Dr. Ana Sanches, her PCP. We thoroughly discussed the history, physical exam, laboratory and imaging studies, as well as, emergency department course.  Based upon that discussion, we've decided to admit Sarah Womack for further observation and evaluation of Azucena Cantor's atrial fibrillation requiring

## 2021-10-29 NOTE — ED TRIAGE NOTES
Pt arrived per family d/t a run of afib.  Currently placed on monitor and HR started at 76 up to 125 bpm.

## 2021-10-29 NOTE — H&P
Department of Internal Medicine  History & Physical      SUBJECTIVE:  The patient is a 77year old white female with PMH significant for A fib,HTN and rheumatoid arthritis who presented to the ER with rapid heart rate, dizziness, chest pain and shortness of breath was found in rapid A fib , the patient was diagnosed with atrial fibrillation last year and has a normal angiogram , she states that she has been taking her medicine regularly, she is currnetly on 10 mg of cardizem drip. ALLERGIES:   Allergies   Allergen Reactions    Nsaids      Note: PERFORATED PEPTIC ULCER      MEDICATIONS:   Prior to Admission medications    Medication Sig Start Date End Date Taking? Authorizing Provider   lisinopril (PRINIVIL;ZESTRIL) 10 MG tablet Take 10 mg by mouth daily   Yes Historical Provider, MD   escitalopram (LEXAPRO) 10 MG tablet Take 10 mg by mouth daily   Yes Historical Provider, MD   Calcium Carb-Cholecalciferol (CALCIUM 1000 + D) 1000-800 MG-UNIT TABS Take 1 tablet by mouth daily   Yes Historical Provider, MD   Multiple Vitamins-Minerals (THERAPEUTIC MULTIVITAMIN-MINERALS) tablet Take 1 tablet by mouth daily   Yes Historical Provider, MD   vitamin B-12 (CYANOCOBALAMIN) 1000 MCG tablet Take 1,000 mcg by mouth daily   Yes Historical Provider, MD   hydroxychloroquine (PLAQUENIL) 200 MG tablet TAKE 1 AND 1/2 TABLETS BY MOUTH DAILY 10/12/21  Yes Danette Mijares MD   gabapentin (NEURONTIN) 300 MG capsule Take 2 capsules by mouth 3 times daily for 90 days.  8/25/21 11/23/21 Yes Danette Mijares MD   rivaroxaban [place]) 20 MG TABS tablet Take 1 tablet by mouth daily 12/18/20  Yes Marilee Santos MD   dilTIAZem [facility] CD) 120 MG extended release capsule Take 1 capsule by mouth daily 12/19/20  Yes Marilee Santos MD   pantoprazole (PROTONIX) 40 MG tablet Take 1 tablet by mouth daily 2/18/18  Yes Branden Keith MD   tiZANidine (ZANAFLEX) 4 MG tablet Take 4 mg by mouth 2 times daily as needed    Yes Historical Provider, MD LOZANO THYROID PO Take 60 mg by mouth Daily    Yes Historical Provider, MD   ALPRAZolam (XANAX) 0.25 MG tablet Take 0.25 mg by mouth nightly as needed for Sleep. Yes Historical Provider, MD   traZODone (DESYREL) 50 MG tablet Take 50 mg by mouth nightly. Yes Historical Provider, MD NELSON   Past Medical History:   Diagnosis Date    Anxiety     Arthritis     Depression     Hypertension     Hypothyroidism       PSH:       Procedure Laterality Date    ABDOMINAL EXPLORATION SURGERY  02/12/2018    LAPAROTOMY EXPLORATORY, REPAIR PERFORATED ULCER    BLADDER REPAIR      BREAST ENHANCEMENT SURGERY      HYSTERECTOMY      partial    PARTIAL HYSTERECTOMY      SHOULDER SURGERY      ligament moved up left shoulder      FAMILY HISTORY:       Problem Relation Age of Onset    Heart Attack Mother       SOCIAL HISTORY:   Social History     Tobacco Use    Smoking status: Never Smoker    Smokeless tobacco: Never Used   Substance Use Topics    Alcohol use:  Yes     Alcohol/week: 0.0 standard drinks        REVIEW OF SYSTEMS: -   General ROS:denies any headache or weakness  Ophthalmic ROS: denies any blurred vision, double vision or vision loss  ENT ROS: denies any epistaxis, nasal discharge  Hematological and Lymphatic: denies any fatigue, night sweats, enlarge lymph nodes or bruising ,   Respiratory ROS: has shortness of breath, dyspnea on exertion, wheezing, or cough   Cardiovascular ROS:had  chest pain, palpitations, shortness of breath, dizziness but denies syncope or swelling in extremities  Gastrointestinal ROS: denies any abdominal pain, acid reflux, change in bowel habits or blood in stool, dark or tarry stools     Musculoskeletal ROS:denies any back pain or joint pain,  Neurological ROS: denies any mental status changes, seizures, memory loss, speech problems or muscle weakness in extremities   Dermatological ROS: denies any rash or skin lesions     OBJECTIVE:      PHYSICAL:   VITALS: /63   Pulse 126   Temp 97.5 °F (36.4 °C) (Oral)   Resp 20   Ht 5' 3\" (1.6 m)   Wt 167 lb (75.8 kg)   SpO2 97%   BMI 29.58 kg/m²   PULSE OXIMETRY RANGE: SpO2  Av %  Min: 97 %  Max: 97 %  No intake or output data in the 24 hours ending 10/29/21 1924   CONSTITUTIONAL:  awake, alert, cooperative, no apparent distress, and appears stated age  HEAD:  Normocephalic, atraumatic  HEENT:  ENT exam normal, no neck nodes or sinus tenderness  EYE: conjunctivae/corneas clear. PERRL, EOM's intact. Fundi benign. NECK:  Supple, symmetrical, trachea midline, no adenopathy, thyroid symmetric, not enlarged and no tenderness, skin normal  LUNGS:  No increased work of breathing, good air exchange, clear to auscultation bilaterally, no crackles or wheezing  CARDIOVASCULAR:  irregularly irregular rhythm, tachycardic  ABDOMEN:  No scars, normal bowel sounds, soft, non-distended, non-tender, no masses palpated, no hepatosplenomegally  MUSCULOSKELETAL:  There is no redness, warmth, or swelling of the joints. Full range of motion noted. Motor strength is 5 out of 5 all extremities bilaterally. Tone is normal.  NEUROLOGIC:  Awake, alert, oriented to name, place and time. Cranial nerves II-XII are grossly intact. Motor is 5 out of 5 bilaterally. Cerebellar finger to nose, heel to shin intact. Sensory is intact.   Babinski down going, Romberg negative, and gait is normal.  SKIN:  no bruising or bleeding  EDEMA: Normal    Data    Labs Reviewed   CBC WITH AUTO DIFFERENTIAL - Abnormal; Notable for the following components:       Result Value    RBC 3.80 (*)     .8 (*)     MCH 34.3 (*)     All other components within normal limits    Narrative:     Performed at:  [facility] [address], [place], [address]   Phone [phone]   COMPREHENSIVE METABOLIC PANEL W/ REFLEX TO MG FOR LOW K - Abnormal; Notable for the following components:    Glucose 102 (*)     All other components within normal limits    Narrative:     Performed at:  [facility]  [address] [facility]  [place], [address]   Phone [phone]   TROPONIN - Abnormal; Notable for the following components:    Troponin 0.02 (*)     All other components within normal limits    Narrative:     Performed at:  [facility]  [address] [facility],  [place], [address]   Phone 21  - Abnormal; Notable for the following components:    Pro-BNP 3,129 (*)     All other components within normal limits    Narrative:     Performed at:  [facility]  [address] [facility],  [place], [address]   Phone [phone]   TROPONIN     CBC:   Lab Results   Component Value Date    WBC 5.7 10/29/2021    RBC 3.80 10/29/2021    HGB 13.0 10/29/2021    HCT 38.3 10/29/2021    .8 10/29/2021    MCH 34.3 10/29/2021    MCHC 34.0 10/29/2021    RDW 13.6 10/29/2021     10/29/2021    MPV 7.9 10/29/2021     Lab Results   Component Value Date    TROPONINI 0.02 (H) 10/29/2021       Imaging  CT CHEST PULMONARY EMBOLISM W CONTRAST [[id number]] Collected: 10/29/21 1848      Order Status: Completed Updated: 10/29/21 1903     Narrative:       EXAMINATION:   CTA OF THE CHEST 10/29/2021 4:12 pm     TECHNIQUE:   CTA of the chest was performed after the administration of intravenous   contrast.  Multiplanar reformatted images are provided for review.  MIP   images are provided for review. Dose modulation, iterative reconstruction,   and/or weight based adjustment of the mA/kV was utilized to reduce the   radiation dose to as low as reasonably achievable.      COMPARISON:   12/15/2020     HISTORY:   ORDERING SYSTEM PROVIDED HISTORY: short of breath, tachycardia, sudden onset   symptoms   TECHNOLOGIST PROVIDED HISTORY:   Reason for exam:->short of breath, tachycardia, sudden onset symptoms   Decision Support Exception - unselect if not a suspected or confirmed   emergency medical condition->Emergency Medical Condition (MA)   Reason for Exam: Atrial Flutter (arrived per family hx of afib; placed pulse   ox on and P ranged from  bpm (in car was holding 140s); SOB)   Acuity: Acute   Type of Exam: Initial     FINDINGS:   Pulmonary Arteries: Pulmonary arteries are adequately opacified for   evaluation.  No evidence of intraluminal filling defect to suggest pulmonary   embolism.  Main pulmonary artery is normal in caliber. Mediastinum: No evidence of mediastinal lymphadenopathy.  The heart and   pericardium demonstrate no acute abnormality.  There is mild calcified plaque   throughout the aorta which is mildly dilated throughout minimally opacified   with no obvious aneurysm or filling defect. Lungs/pleura: There are hazy subsegmental linear and ground-glass opacities          ASSESSMENT      Patient Active Problem List   Diagnosis    DDD (degenerative disc disease), cervical    Myofascial pain syndrome    Chronic migraine without aura    Epigastric pain    [facility]forated ulcer ([facility] [address]    Peritonitis (HCC)    Typical atrial flutter (HCC)    Chest discomfort    Atrial flutter (HCC)    PAF (paroxysmal atrial fibrillation) (HCC)    Hyponatremia    Hypertension    Obstructive sleep apnea    Suspected sleep apnea    Paroxysmal supraventricular tachycardia (HCC)    Atrial fibrillation and flutter (HCC)    Rheumatoid arthritis with rheumatoid factor, unspecified    Rheumatoid arthritis, unspecified    Atrial fibrillation with rapid ventricular response (HCC)    Elevated troponin         PLAN  1. The patient was admitted to telemtry  2. Started on cardizem drip   3. troponin elevated most probably secondary to rate, check troponin  4. Cardiology consult  5. Resume home medicine   6. Check TSH  7.  Recheck labs in AM

## 2021-10-29 NOTE — TELEPHONE ENCOUNTER
Patient states she is atrial fibrillation. Rates 112/88. She is taking her cardizem and xarelto. States she is dizzy and SOB with any activity. She is en route  to the ER. She requested that we call and let them know she is coming. ER notified.  She well be triaged as usual.

## 2021-10-30 LAB
ANION GAP SERPL CALCULATED.3IONS-SCNC: 14 MMOL/L (ref 3–16)
BUN BLDV-MCNC: 22 MG/DL (ref 7–20)
CALCIUM SERPL-MCNC: 9.3 MG/DL (ref 8.3–10.6)
CHLORIDE BLD-SCNC: 102 MMOL/L (ref 99–110)
CO2: 20 MMOL/L (ref 21–32)
CREAT SERPL-MCNC: 0.7 MG/DL (ref 0.6–1.2)
EKG ATRIAL RATE: 300 BPM
EKG ATRIAL RATE: 97 BPM
EKG DIAGNOSIS: NORMAL
EKG DIAGNOSIS: NORMAL
EKG Q-T INTERVAL: 350 MS
EKG Q-T INTERVAL: 408 MS
EKG QRS DURATION: 86 MS
EKG QRS DURATION: 88 MS
EKG QTC CALCULATION (BAZETT): 502 MS
EKG QTC CALCULATION (BAZETT): 523 MS
EKG R AXIS: 60 DEGREES
EKG R AXIS: 69 DEGREES
EKG T AXIS: -54 DEGREES
EKG T AXIS: 259 DEGREES
EKG VENTRICULAR RATE: 124 BPM
EKG VENTRICULAR RATE: 99 BPM
GFR AFRICAN AMERICAN: >60
GFR NON-AFRICAN AMERICAN: >60
GLUCOSE BLD-MCNC: 101 MG/DL (ref 70–99)
HCT VFR BLD CALC: 36 % (ref 36–48)
HEMOGLOBIN: 12.4 G/DL (ref 12–16)
MCH RBC QN AUTO: 34.4 PG (ref 26–34)
MCHC RBC AUTO-ENTMCNC: 34.4 G/DL (ref 31–36)
MCV RBC AUTO: 100 FL (ref 80–100)
PDW BLD-RTO: 13.4 % (ref 12.4–15.4)
PLATELET # BLD: 251 K/UL (ref 135–450)
PMV BLD AUTO: 8.6 FL (ref 5–10.5)
POTASSIUM REFLEX MAGNESIUM: 3.9 MMOL/L (ref 3.5–5.1)
RBC # BLD: 3.6 M/UL (ref 4–5.2)
SODIUM BLD-SCNC: 136 MMOL/L (ref 136–145)
TROPONIN: <0.01 NG/ML
TSH SERPL DL<=0.05 MIU/L-ACNC: 1.99 UIU/ML (ref 0.27–4.2)
WBC # BLD: 5.6 K/UL (ref 4–11)

## 2021-10-30 PROCEDURE — 99223 1ST HOSP IP/OBS HIGH 75: CPT | Performed by: INTERNAL MEDICINE

## 2021-10-30 PROCEDURE — 93010 ELECTROCARDIOGRAM REPORT: CPT | Performed by: INTERNAL MEDICINE

## 2021-10-30 PROCEDURE — 2580000003 HC RX 258: Performed by: INTERNAL MEDICINE

## 2021-10-30 PROCEDURE — 80048 BASIC METABOLIC PNL TOTAL CA: CPT

## 2021-10-30 PROCEDURE — 84484 ASSAY OF TROPONIN QUANT: CPT

## 2021-10-30 PROCEDURE — 2500000003 HC RX 250 WO HCPCS: Performed by: INTERNAL MEDICINE

## 2021-10-30 PROCEDURE — 6370000000 HC RX 637 (ALT 250 FOR IP): Performed by: INTERNAL MEDICINE

## 2021-10-30 PROCEDURE — 36415 COLL VENOUS BLD VENIPUNCTURE: CPT

## 2021-10-30 PROCEDURE — 6360000002 HC RX W HCPCS: Performed by: INTERNAL MEDICINE

## 2021-10-30 PROCEDURE — 93005 ELECTROCARDIOGRAM TRACING: CPT | Performed by: INTERNAL MEDICINE

## 2021-10-30 PROCEDURE — 2060000000 HC ICU INTERMEDIATE R&B

## 2021-10-30 PROCEDURE — 85027 COMPLETE CBC AUTOMATED: CPT

## 2021-10-30 RX ADMIN — ESCITALOPRAM OXALATE 10 MG: 10 TABLET ORAL at 09:10

## 2021-10-30 RX ADMIN — PANTOPRAZOLE SODIUM 40 MG: 40 TABLET, DELAYED RELEASE ORAL at 06:19

## 2021-10-30 RX ADMIN — AMIODARONE HYDROCHLORIDE 300 MG: 50 INJECTION, SOLUTION INTRAVENOUS at 13:48

## 2021-10-30 RX ADMIN — Medication 10 ML: at 21:10

## 2021-10-30 RX ADMIN — LEVOTHYROXINE, LIOTHYRONINE 60 MG: 19; 4.5 TABLET ORAL at 06:19

## 2021-10-30 RX ADMIN — ACETAMINOPHEN 650 MG: 325 TABLET ORAL at 09:18

## 2021-10-30 RX ADMIN — TRAZODONE HYDROCHLORIDE 50 MG: 50 TABLET ORAL at 21:10

## 2021-10-30 RX ADMIN — GABAPENTIN 600 MG: 300 CAPSULE ORAL at 21:10

## 2021-10-30 RX ADMIN — RIVAROXABAN 20 MG: 20 TABLET, FILM COATED ORAL at 09:19

## 2021-10-30 RX ADMIN — GABAPENTIN 600 MG: 300 CAPSULE ORAL at 16:54

## 2021-10-30 RX ADMIN — TIZANIDINE 4 MG: 4 TABLET ORAL at 21:09

## 2021-10-30 RX ADMIN — ACETAMINOPHEN 650 MG: 325 TABLET ORAL at 16:57

## 2021-10-30 RX ADMIN — Medication 10 ML: at 02:14

## 2021-10-30 RX ADMIN — ALPRAZOLAM 0.25 MG: 0.25 TABLET ORAL at 01:27

## 2021-10-30 RX ADMIN — DILTIAZEM HYDROCHLORIDE 5 MG/HR: 5 INJECTION INTRAVENOUS at 07:56

## 2021-10-30 RX ADMIN — GABAPENTIN 600 MG: 300 CAPSULE ORAL at 09:10

## 2021-10-30 RX ADMIN — DILTIAZEM HYDROCHLORIDE 120 MG: 120 CAPSULE, COATED, EXTENDED RELEASE ORAL at 09:10

## 2021-10-30 RX ADMIN — Medication 10 ML: at 09:11

## 2021-10-30 RX ADMIN — LISINOPRIL 10 MG: 10 TABLET ORAL at 09:10

## 2021-10-30 RX ADMIN — Medication 1000 MCG: at 09:10

## 2021-10-30 RX ADMIN — ALPRAZOLAM 0.25 MG: 0.25 TABLET ORAL at 21:14

## 2021-10-30 RX ADMIN — HYDROXYCHLOROQUINE SULFATE 300 MG: 200 TABLET ORAL at 09:10

## 2021-10-30 RX ADMIN — DILTIAZEM HYDROCHLORIDE 10 MG/HR: 5 INJECTION INTRAVENOUS at 02:13

## 2021-10-30 ASSESSMENT — PAIN SCALES - GENERAL
PAINLEVEL_OUTOF10: 5
PAINLEVEL_OUTOF10: 4
PAINLEVEL_OUTOF10: 5

## 2021-10-30 NOTE — PROGRESS NOTES
Department of Internal Medicine  Progress Note      SUBJECTIVE:  Had an episode of chest pain overnight , HR down to 70 with cardizem 5 mg/h ,HR up to 130 going to bathroom    Review of Systems - General ROS:denies any headache or weakness    Respiratory ROS: denies any shortness of breath, dyspnea on exertion, wheezing, or cough   Cardiovascular ROS: denies any chest pain, palpitations, shortness of breath, dizziness syncope or swelling in extremities  Gastrointestinal ROS: denies any abdominal pain, acid reflux, change in bowel habits or blood in stool, dark or tarry stools       OBJECTIVE:      PHYSICAL:   VITALS:  /82   Pulse 82   Temp 97.5 °F (36.4 °C) (Oral)   Resp 18   Ht 5' 3\" (1.6 m)   Wt 166 lb 6.4 oz (75.5 kg)   SpO2 96%   BMI 29.48 kg/m²   PULSE OXIMETRY RANGE: SpO2  Av.8 %  Min: 95 %  Max: 97 %  No intake or output data in the 24 hours ending 10/30/21 0834   CONSTITUTIONAL:  awake, alert, cooperative, no apparent distress, and appears stated age  NECK:  Supple, symmetrical, trachea midline, no adenopathy, thyroid symmetric, not enlarged and no tenderness, skin normal  LUNGS:  No increased work of breathing, good air exchange, clear to auscultation bilaterally, no crackles or wheezing  CARDIOVASCULAR:  irregularly irregular rhythm  ABDOMEN:  No scars, normal bowel sounds, soft, non-distended, non-tender, no masses palpated, no hepatosplenomegally  NEUROLOGIC:  Awake, alert, oriented to name, place and time. Cranial nerves II-XII are grossly intact. Motor is 5 out of 5 bilaterally. Cerebellar finger to nose, heel to shin intact. Sensory is intact.   Babinski down going, Romberg negative, and gait is normal.  EDEMA: Normal    Data      CBC:   Lab Results   Component Value Date    WBC 5.6 10/30/2021    RBC 3.60 10/30/2021    HGB 12.4 10/30/2021    HCT 36.0 10/30/2021    .0 10/30/2021    MCH 34.4 10/30/2021    MCHC 34.4 10/30/2021    RDW 13.4 10/30/2021     10/30/2021 MPV 8.6 10/30/2021     CMP:    Lab Results   Component Value Date     10/30/2021    K 3.9 10/30/2021     10/30/2021    CO2 20 10/30/2021    BUN 22 10/30/2021    CREATININE 0.7 10/30/2021    GFRAA >60 10/30/2021    GFRAA >60 01/25/2013    AGRATIO 1.5 10/29/2021    LABGLOM >60 10/30/2021    GLUCOSE 101 10/30/2021    PROT 7.4 10/29/2021    PROT 6.9 01/25/2013    LABALBU 4.4 10/29/2021    CALCIUM 9.3 10/30/2021    BILITOT 0.6 10/29/2021    ALKPHOS 96 10/29/2021    AST 30 10/29/2021    ALT 35 10/29/2021     Lab Results   Component Value Date    TROPONINI <0.01 10/30/2021       ASSESSMENT      Patient Active Problem List   Diagnosis    DDD (degenerative disc disease), cervical    Myofascial pain syndrome    Chronic migraine without aura    Epigastric pain    Perforated ulcer ([address]    Peritonitis ([address]    Typical atrial flutter (HCC)    Chest discomfort    Atrial flutter (HCC)    PAF (paroxysmal atrial fibrillation) (HCC)    Hyponatremia    Hypertension    Obstructive sleep apnea    Suspected sleep apnea    Paroxysmal supraventricular tachycardia (HCC)    Atrial fibrillation and flutter (HCC)    Rheumatoid arthritis with rheumatoid factor, unspecified    Rheumatoid arthritis, unspecified    Atrial fibrillation with rapid ventricular response (HCC)    Elevated troponin         PLAN  1. Continue cardizem drip   2. Cardiology consulted  3.  On Xarelto

## 2021-10-30 NOTE — CONSULTS
Diana Bernstein MD   300 mg at 10/30/21 0910    lisinopril (PRINIVIL;ZESTRIL) tablet 10 mg  10 mg Oral Daily Haley Reyes MD   10 mg at 10/30/21 0910    pantoprazole (PROTONIX) tablet 40 mg  40 mg Oral Daily Haley Reyes MD   40 mg at 10/30/21 3912    rivaroxaban (XARELTO) tablet 20 mg  20 mg Oral Daily Haley Reyes MD   20 mg at 10/30/21 0919    tiZANidine (ZANAFLEX) tablet 4 mg  4 mg Oral BID PRN Haley Reyes MD   4 mg at 10/29/21 2146    traZODone (DESYREL) tablet 50 mg  50 mg Oral Nightly Haley Reyes MD   50 mg at 10/29/21 2146    vitamin B-12 (CYANOCOBALAMIN) tablet 1,000 mcg  1,000 mcg Oral Daily Haley Reyes MD   1,000 mcg at 10/30/21 0910    sodium chloride flush 0.9 % injection 5-40 mL  5-40 mL IntraVENous 2 times per day Haley Reyes MD   10 mL at 10/30/21 0911    sodium chloride flush 0.9 % injection 5-40 mL  5-40 mL IntraVENous PRN Haley Reyes MD        0.9 % sodium chloride infusion  25 mL IntraVENous PRN Haley Reyes MD        ondansetron (ZOFRAN-ODT) disintegrating tablet 4 mg  4 mg Oral Q8H PRN Haley Reyes MD        Or    ondansetron [facility]) injection 4 mg  4 mg IntraVENous Q6H PRN Haley Reyes MD        acetaminophen (TYLENOL) tablet 650 mg  650 mg Oral Q6H PRN Haley Reyes MD   650 mg at 10/30/21 4667    Or    acetaminophen (TYLENOL) suppository 650 mg  650 mg Rectal Q6H PRN Haley Reyes MD        polyethylene glycol [facility]) packet 17 g  17 g Oral Daily PRN Haley Reyes MD        potassium chloride 10 mEq/100 mL IVPB (Peripheral Line)  10 mEq IntraVENous PRN Haley Reyes MD        nitroGLYCERIN (NITROSTAT) SL tablet 0.4 mg  0.4 mg SubLINGual Q5 Min PRN Haley Reyes MD           Allergies:  Nsaids     Social History:  Social History     Socioeconomic History    Marital status:      Spouse name: Not on file    Number of children: Not on file    Years of education: Not on file    Highest education level: Not on file   Occupational History    Not on file   Tobacco Use    Smoking status: Never Smoker    Smokeless tobacco: Never Used   Vaping Use    Vaping Use: Never assessed   Substance and Sexual Activity    Alcohol use: Yes     Alcohol/week: 0.0 standard drinks    Drug use: No    Sexual activity: Not on file   Other Topics Concern    Not on file   Social History Narrative    Not on file     Social Determinants of Health     Financial Resource Strain:     Difficulty of Paying Living Expenses:    Food Insecurity:     Worried About Running Out of Food in the Last Year:     [address] in the Last Year:    Transportation Needs:     Lack of Transportation (Medical):  Lack of Transportation (Non-Medical):    Physical Activity:     Days of Exercise per Week:     Minutes of Exercise per Session:    Stress:     Feeling of Stress :    Social Connections:     Frequency of Communication with Friends and Family:     Frequency of Social Gatherings with Friends and Family:     Attends [employer] Services:     Active Member of Clubs or Organizations:     Attends Club or Organization Meetings:     Marital Status:    Intimate Partner Violence:     Fear of Current or Ex-Partner:     Emotionally Abused:     Physically Abused:     Sexually Abused:        Family History:   Family History   Problem Relation Age of Onset    Heart Attack Mother      Family history has been reviewed and not pertinent except as noted above. Review of Systems:   · Constitutional: there has been no unanticipated weight loss. No change in energy or activity level   · Eyes: No visual changes   · ENT: No Headaches, hearing loss or vertigo. No mouth sores or sore throat. · Cardiovascular: Reviewed in HPI  · Respiratory: No cough or wheezing, no sputum production. · Gastrointestinal: No abdominal pain, appetite loss, blood in stools.  No change in bowel or bladder habits. · Genitourinary: No nocturia, dysuria, trouble voiding  · Musculoskeletal:  No gait disturbance, weakness or joint complaints. · Integumentary: No rash or pruritis. · Neurological: No headache, change in muscle strength, numbness or tingling. No change in gait, balance, coordination, mood, affect, memory, mentation, behavior. · Psychiatric: No anxiety or depression  · Endocrine: No malaise or fever  · Hematologic/Lymphatic: No abnormal bruising or bleeding, blood clots or swollen lymph nodes. · Allergic/Immunologic: No nasal congestion or hives. Physical Examination:    Vitals:    10/29/21 2030 10/30/21 0126 10/30/21 0604 10/30/21 0715   BP: 131/83 90/61 105/65 124/82   Pulse: 94 87 85 82   Resp: 16 16 18 18   Temp: 98.2 °F (36.8 °C) 97.5 °F (36.4 °C) 97.3 °F (36.3 °C) 97.5 °F (36.4 °C)   TempSrc: Oral Axillary Oral Oral   SpO2: 96% 96% 95% 96%   Weight: 166 lb 6.4 oz (75.5 kg)      Height:         Body mass index is 29.48 kg/m². Wt Readings from Last 3 Encounters:   10/29/21 166 lb 6.4 oz (75.5 kg)   08/25/21 168 lb (76.2 kg)   05/26/21 168 lb (76.2 kg)      BP Readings from Last 3 Encounters:   10/30/21 124/82   08/25/21 135/78   05/26/21 138/88        Physical Examination:    · CONSTITUTIONAL: Well developed, well nourished  · EYES: PERRLA. No xanthelasma, sclera non icteric  · EARS,NOSE,MOUTH,THROAT:  Mucous membranes moist, normal hearing  · NECK: Supple, JVP normal, thyroid not enlarged. Carotids 2+ without bruits  · RESPIRATORY: Normal effort, no rales or rhonchi  · CARDIOVASCULAR: Normal PMI, irregular rate and rhythm, no murmurs, rub or gallop. No edema. Radial pulses present and equal  · CHEST: No scar or masses  · ABDOMEN: Normal bowel sounds. No masses or tenderness. No bruit  · MUSCULOSKELETAL: No clubbing or cyanosis. Moves all extremities well. Normal gait  · SKIN:  Warm and dry. No rashes  · NEUROLOGIC: Cranial nerves intact.  Alert and oriented  · PSYCHIATRIC: Calm affect. Appears to have normal judgement and insight        Assessment/Plan  Recurrent AF - has been refractory to IV cardizem. Hx of junctional rhythm. I have spoken with Dr Christal Preston who suggests IV amio bolus x 1-2 to try to help convert her chemically. If not will need CVon MOnday. Hx of junctional rhythm. Sleep apnea - treated by Dr Peg Lawrence. Thank you for allowing to me to participate in the care of [address] [place] [address].

## 2021-10-30 NOTE — PLAN OF CARE
Patient free from harm. ID bands on, bed in lowest position, call light in reach. Patient instructed to call for help if needed. Patient educated on ambulation and safety. 11-Oct-2017 23:59

## 2021-10-30 NOTE — ED NOTES
Report given to 3T RN, tele confirmed on monitor.      Doris Ervin RN  10/29/21 2021
traZODone (DESYREL) 50 MG tablet Take 50 mg by mouth nightly.

## 2021-10-31 VITALS
OXYGEN SATURATION: 95 % | TEMPERATURE: 98.5 F | HEART RATE: 76 BPM | RESPIRATION RATE: 16 BRPM | BODY MASS INDEX: 29.48 KG/M2 | HEIGHT: 63 IN | SYSTOLIC BLOOD PRESSURE: 119 MMHG | DIASTOLIC BLOOD PRESSURE: 70 MMHG | WEIGHT: 166.4 LBS

## 2021-10-31 LAB
EKG ATRIAL RATE: 81 BPM
EKG DIAGNOSIS: NORMAL
EKG P AXIS: 42 DEGREES
EKG P-R INTERVAL: 294 MS
EKG Q-T INTERVAL: 556 MS
EKG QRS DURATION: 110 MS
EKG QTC CALCULATION (BAZETT): 645 MS
EKG R AXIS: 58 DEGREES
EKG T AXIS: 64 DEGREES
EKG VENTRICULAR RATE: 81 BPM

## 2021-10-31 PROCEDURE — 93005 ELECTROCARDIOGRAM TRACING: CPT | Performed by: INTERNAL MEDICINE

## 2021-10-31 PROCEDURE — 93010 ELECTROCARDIOGRAM REPORT: CPT | Performed by: INTERNAL MEDICINE

## 2021-10-31 PROCEDURE — 99233 SBSQ HOSP IP/OBS HIGH 50: CPT | Performed by: INTERNAL MEDICINE

## 2021-10-31 PROCEDURE — 6370000000 HC RX 637 (ALT 250 FOR IP): Performed by: INTERNAL MEDICINE

## 2021-10-31 PROCEDURE — 2580000003 HC RX 258: Performed by: INTERNAL MEDICINE

## 2021-10-31 RX ADMIN — LISINOPRIL 10 MG: 10 TABLET ORAL at 10:22

## 2021-10-31 RX ADMIN — GABAPENTIN 600 MG: 300 CAPSULE ORAL at 15:51

## 2021-10-31 RX ADMIN — ESCITALOPRAM OXALATE 10 MG: 10 TABLET ORAL at 10:23

## 2021-10-31 RX ADMIN — RIVAROXABAN 20 MG: 20 TABLET, FILM COATED ORAL at 10:21

## 2021-10-31 RX ADMIN — Medication 1000 MCG: at 10:21

## 2021-10-31 RX ADMIN — GABAPENTIN 600 MG: 300 CAPSULE ORAL at 10:22

## 2021-10-31 RX ADMIN — LEVOTHYROXINE, LIOTHYRONINE 60 MG: 19; 4.5 TABLET ORAL at 05:31

## 2021-10-31 RX ADMIN — DILTIAZEM HYDROCHLORIDE 120 MG: 120 CAPSULE, COATED, EXTENDED RELEASE ORAL at 10:21

## 2021-10-31 RX ADMIN — HYDROXYCHLOROQUINE SULFATE 300 MG: 200 TABLET ORAL at 10:21

## 2021-10-31 RX ADMIN — Medication 10 ML: at 10:23

## 2021-10-31 RX ADMIN — ACETAMINOPHEN 650 MG: 325 TABLET ORAL at 07:37

## 2021-10-31 RX ADMIN — PANTOPRAZOLE SODIUM 40 MG: 40 TABLET, DELAYED RELEASE ORAL at 05:31

## 2021-10-31 ASSESSMENT — PAIN SCALES - GENERAL
PAINLEVEL_OUTOF10: 2
PAINLEVEL_OUTOF10: 4
PAINLEVEL_OUTOF10: 4

## 2021-10-31 ASSESSMENT — PAIN DESCRIPTION - PAIN TYPE: TYPE: CHRONIC PAIN

## 2021-10-31 ASSESSMENT — PAIN DESCRIPTION - LOCATION: LOCATION: BACK;NECK

## 2021-10-31 NOTE — DISCHARGE SUMMARY
Physician Discharge Summary     Patient ID:  Pernell Cee  [id number]  49 y.o.  1955    Admit date: 10/29/2021    Discharge date and time: 10/31/2021     Admitting Physician: Dayanara Carmen MD     Discharge Physician: Dayanara Carmen MD    Admission Diagnoses: Atrial fibrillation with rapid ventricular response ([facility] [address] [I48.91]  Atrial flutter with rapid ventricular response [facility]) [I48.92]    Discharge Diagnoses: Paroxysmal Atrial fibrillation    Full Hospital Problem List:  Active Hospital Problems    Diagnosis Date Noted    Atrial fibrillation with rapid ventricular response ([address] [I48.91] 10/29/2021    Elevated troponin [R77.8] 10/29/2021    Hypertension [I10]        Discharged Condition: good    Hospital Course: The patient was admitted with rapid atrial fibrillation was started on cardizem drip , weaned off , was given a bolus of amiodarone , converted before amiodarone but had a run of a fib when went to bathroom , cardiology ok to discharge home and follow up with EP. Disposition: home    Consults made during Hospitalization:  [address] TO CARDIOLOGY    Treatment team at time of Discharge: Treatment Team: Attending Provider: Dayanara Carmen MD; Consulting Physician: Keanu Bautista MD; Respiratory Therapist (Day): Kellie Crowder RCP    Imaging Results:  XR CHEST PORTABLE    Result Date: 10/29/2021  EXAMINATION: ONE XRAY VIEW OF THE CHEST 10/29/2021 1:38 pm COMPARISON: 02/11/2018 HISTORY: ORDERING SYSTEM PROVIDED HISTORY: chest pain, short of breath TECHNOLOGIST PROVIDED HISTORY: Reason for exam:->chest pain, short of breath Reason for Exam: Atrial Flutter (arrived per family hx of afib; placed pulse ox on and P ranged from  bpm (in car was holding 140s); SOB) Acuity: Acute Type of Exam: Initial FINDINGS: The lungs are without acute focal process. There is no effusion or pneumothorax. The cardiomediastinal silhouette is stable.  The osseous structures are stable. No acute process. CT CHEST PULMONARY EMBOLISM W CONTRAST    Result Date: 10/29/2021  EXAMINATION: CTA OF THE CHEST 10/29/2021 4:12 pm TECHNIQUE: CTA of the chest was performed after the administration of intravenous contrast.  Multiplanar reformatted images are provided for review. MIP images are provided for review. Dose modulation, iterative reconstruction, and/or weight based adjustment of the mA/kV was utilized to reduce the radiation dose to as low as reasonably achievable. COMPARISON: 12/15/2020 HISTORY: ORDERING SYSTEM PROVIDED HISTORY: short of breath, tachycardia, sudden onset symptoms TECHNOLOGIST PROVIDED HISTORY: Reason for exam:->short of breath, tachycardia, sudden onset symptoms Decision Support Exception - unselect if not a suspected or confirmed emergency medical condition->Emergency Medical Condition (MA) Reason for Exam: Atrial Flutter (arrived per family hx of afib; placed pulse ox on and P ranged from  bpm (in car was holding 140s); SOB) Acuity: Acute Type of Exam: Initial FINDINGS: Pulmonary Arteries: Pulmonary arteries are adequately opacified for evaluation. No evidence of intraluminal filling defect to suggest pulmonary embolism. Main pulmonary artery is normal in caliber. Mediastinum: No evidence of mediastinal lymphadenopathy. The heart and pericardium demonstrate no acute abnormality. There is mild calcified plaque throughout the aorta which is mildly dilated throughout minimally opacified with no obvious aneurysm or filling defect. Lungs/pleura: There are hazy subsegmental linear and ground-glass opacities along the lung bases posteriorly which are less prominent. No effusion is seen and there is no pulmonary nodule or mass. Upper Abdomen: Limited images of the upper abdomen are unremarkable. Soft Tissues/Bones: No acute bone or soft tissue abnormality.   There are bilateral breast implants in place which are intact and unchanged in there are hazy subsegmental linear and ground-glass opacities posteriorly which are less prominent no effusion is seen and there is no pulmonary nodule or mass. No evidence of a pulmonary embolus. Mildly dilated and atherosclerotic thoracic aorta with no aneurysm or dissection. Subsegmental atelectasis vs early infiltrates or scarring along the lung bases posteriorly which is less prominent.        Discharge Exam:  /74   Pulse 66   Temp 97.6 °F (36.4 °C) (Oral)   Resp 16   Ht 5' 3\" (1.6 m)   Wt 166 lb 6.4 oz (75.5 kg)   SpO2 93%   BMI 29.48 kg/m²     General Appearance:    Alert, cooperative, no distress, appears stated age   Head:    Normocephalic, without obvious abnormality, atraumatic   Eyes:    PERRL, conjunctiva/corneas clear, EOM's intact, fundi     benign, both eyes   Ears:    Normal TM's and external ear canals, both ears   Nose:   Nares normal, septum midline, mucosa normal, no drainage    or sinus tenderness   Throat:   Lips, mucosa, and tongue normal; teeth and gums normal   Neck:   Supple, symmetrical, trachea midline, no adenopathy;     thyroid:  no enlargement/tenderness/nodules; no carotid    bruit or JVD   Back:     Symmetric, no curvature, ROM normal, no CVA tenderness   Lungs:     Clear to auscultation bilaterally, respirations unlabored   Chest Wall:    No tenderness or deformity    Heart:    Regular rate and rhythm, S1 and S2 normal, no murmur, rub   or gallop   Breast Exam:    No tenderness, masses, or nipple abnormality   Abdomen:     Soft, non-tender, bowel sounds active all four quadrants,     no masses, no organomegaly   Genitalia:    Normal female without lesion, discharge or tenderness   Rectal:    Normal tone ;guaiac negative stool   Extremities:   Extremities normal, atraumatic, no cyanosis or edema   Pulses:   2+ and symmetric all extremities   Skin:   Skin color, texture, turgor normal, no rashes or lesions   Lymph nodes:   Cervical, supraclavicular, and axillary nodes normal Neurologic:   CNII-XII intact, normal strength, sensation and reflexes     throughout       Disposition: home    Condition: stable    Discharge Medications:   Magdalena Blount   Home Medication Instructions YMO:[id number]    Printed on:10/31/21 6182   Medication Information                      ALPRAZolam (XANAX) 0.25 MG tablet  Take 0.25 mg by mouth nightly as needed for Sleep. ARMOUR THYROID PO  Take 60 mg by mouth Daily              Calcium Carb-Cholecalciferol (CALCIUM 1000 + D) 1000-800 MG-UNIT TABS  Take 1 tablet by mouth daily             dilTIAZem (CARDIZEM CD) 120 MG extended release capsule  Take 1 capsule by mouth daily             escitalopram (LEXAPRO) 10 MG tablet  Take 10 mg by mouth daily             gabapentin (NEURONTIN) 300 MG capsule  Take 2 capsules by mouth 3 times daily for 90 days. hydroxychloroquine (PLAQUENIL) 200 MG tablet  TAKE 1 AND 1/2 TABLETS BY MOUTH DAILY             lisinopril (PRINIVIL;ZESTRIL) 10 MG tablet  Take 10 mg by mouth daily             Multiple Vitamins-Minerals (THERAPEUTIC MULTIVITAMIN-MINERALS) tablet  Take 1 tablet by mouth daily             pantoprazole (PROTONIX) 40 MG tablet  Take 1 tablet by mouth daily             rivaroxaban (XARELTO) 20 MG TABS tablet  Take 1 tablet by mouth daily             tiZANidine (ZANAFLEX) 4 MG tablet  Take 4 mg by mouth 2 times daily as needed              traZODone (DESYREL) 50 MG tablet  Take 50 mg by mouth nightly. vitamin B-12 (CYANOCOBALAMIN) 1000 MCG tablet  Take 1,000 mcg by mouth daily                 Allergies: Allergies   Allergen Reactions    Nsaids      Note: PERFORATED PEPTIC ULCER         Patient Instructions: Activity: activity as tolerated  Diet: regular diet  Wound Care: none needed    Follow up Instructions: Follow-up with PCP: Zulema Sanches MD in  1 week.     Total time spent on day of discharge including face-to-face visit, examination, documentation, counseling, preparation of discharge plans and followup, and discharge medicine reconciliation and presciptions is 36 minutes    Signed:  Gorge Stallworth MD  10/31/2021  3:02 PM

## 2021-10-31 NOTE — PROGRESS NOTES
[address]   Progress Note  CHF/Pulmonary Hypertension Cardiology    Chief complaint: We are following this patient for atrial fibrillation with RVR  HPI:   76 yo WF with hx of PAF which is usually controlled on po cardizem came to the ER with recurrent Af and fast rates from which she was symptomatic. Placed on a cardizem drip and rates have improved but still in AF and symptomatic on getting up to the BR. Hx of some junctional rhythm. She has been told she may need an ablation in the future.         ROS:  She converted to sinus rhythm yesterday. She did receive the amiodarone bolus. Continue on cardizem. She is frustrated that this has happened, especially over a weekend. She would like to proceed with an ablation. Denies shortness of breath.     Medications/Labs all Reviewed    Lab Results   Component Value Date    WBC 5.6 10/30/2021    HGB 12.4 10/30/2021    HCT 36.0 10/30/2021    .0 10/30/2021     10/30/2021     Lab Results   Component Value Date    CREATININE 0.7 10/30/2021    BUN 22 (H) 10/30/2021     10/30/2021    K 3.9 10/30/2021     10/30/2021    CO2 20 (L) 10/30/2021     No results found for: INR, PROTIME     Physical Examination:    /74   Pulse 66   Temp 97.6 °F (36.4 °C) (Oral)   Resp 16   Ht 5' 3\" (1.6 m)   Wt 166 lb 6.4 oz (75.5 kg)   SpO2 93%   BMI 29.48 kg/m²      WD/WN  HEENT:  NC/AT  Respiratory:  · Resp Assessment: Normal respiratory effort  · Resp Auscultation: Clear to auscultation bilaterally   Cardiovascular:  · Auscultation: regular rate and rhythm, normal S1S2, no murmur, rub or gallop  · Palpation:  Nl PMI  · JVP:  normal  · Extremities: No Edema  Abdomen:  · Soft, non-tender  · Normal bowel sounds  Extremities:  ·  No Cyanosis or Clubbing  Neurological/Psychiatric:  · Oriented to time, place, and person  · Non-anxious  Skin Warm and dry    Lab Results   Component Value Date     10/30/2021     10/29/2021     05/25/2021    K 3.9 10/30/2021    K 4.6 10/29/2021    K 4.2 05/25/2021    K 4.4 12/28/2020    K 4.2 12/18/2020    K 3.9 12/16/2020    BUN 22 10/30/2021    BUN 15 10/29/2021    BUN 11 05/25/2021    CREATININE 0.7 10/30/2021    CREATININE 0.8 10/29/2021    CREATININE 0.9 05/25/2021    GLUCOSE 101 10/30/2021    GLUCOSE 102 10/29/2021     Lab Results   Component Value Date    PROBNP 3,129 (H) 10/29/2021    PROBNP 594 (H) 12/15/2020     Lab Results   Component Value Date    ALT 35 10/29/2021    ALT 27 05/25/2021    AST 30 10/29/2021    AST 27 05/25/2021     Lab Results   Component Value Date    HGB 12.4 10/30/2021    HGB 13.0 10/29/2021    HCT 36.0 10/30/2021    HCT 38.3 10/29/2021     10/30/2021     10/29/2021     Lab Results   Component Value Date    TRIG 82 05/25/2021    TRIG 76 05/29/2020    HDL 75 05/25/2021    HDL 77 05/29/2020    HDL 79 06/13/2011    HDL 68 05/04/2010    LDLCALC 64 05/25/2021    [address] 69 05/29/2020        76 yo WF with hx of PAF which is usually controlled on po cardizem came to the ER with recurrent Af and fast rates from which she was symptomatic. She is now in NSR  She has been told she may need an ablation in the future. she would like to discuss this with EP. I told her that scheduling an ablation for tomorrow would be extremely unlikely.   Okay for her to be discharged home and follow up with Dr. Andrzej Lamar as an outpatient for an ablation.     NYHA Class: 2    Emmanuel Desir MD, 10/31/2021 2:32 PM

## 2021-10-31 NOTE — PROGRESS NOTES
Department of Internal Medicine  Progress Note      SUBJECTIVE:  Converted yesterday before amiodarone bolus but had a run of A fib at 10 pm when went to the bathroom, denies any chest pain    Review of Systems - General ROS:denies any headache or weakness    Respiratory ROS: denies any shortness of breath, dyspnea on exertion, wheezing, or cough   Cardiovascular ROS: denies any chest pain, palpitations, shortness of breath, dizziness syncope or swelling in extremities  Gastrointestinal ROS: denies any abdominal pain, acid reflux, change in bowel habits or blood in stool, dark or tarry stools       OBJECTIVE:      PHYSICAL:   VITALS:  /74   Pulse 66   Temp 97.6 °F (36.4 °C) (Oral)   Resp 16   Ht 5' 3\" (1.6 m)   Wt 166 lb 6.4 oz (75.5 kg)   SpO2 93%   BMI 29.48 kg/m²   PULSE OXIMETRY RANGE: SpO2  Av.5 %  Min: 93 %  Max: 97 %  No intake or output data in the 24 hours ending 10/31/21 0846   CONSTITUTIONAL:  awake, alert, cooperative, no apparent distress, and appears stated age  NECK:  Supple, symmetrical, trachea midline, no adenopathy, thyroid symmetric, not enlarged and no tenderness, skin normal  LUNGS:  No increased work of breathing, good air exchange, clear to auscultation bilaterally, no crackles or wheezing  CARDIOVASCULAR:  regular rate and rhythm  ABDOMEN:  No scars, normal bowel sounds, soft, non-distended, non-tender, no masses palpated, no hepatosplenomegally  NEUROLOGIC:  Awake, alert, oriented to name, place and time. Cranial nerves II-XII are grossly intact. Motor is 5 out of 5 bilaterally. Cerebellar finger to nose, heel to shin intact. Sensory is intact.   Babinski down going, Romberg negative, and gait is normal.  EDEMA: Normal    Data      CBC:   Lab Results   Component Value Date    WBC 5.6 10/30/2021    RBC 3.60 10/30/2021    HGB 12.4 10/30/2021    HCT 36.0 10/30/2021    .0 10/30/2021    MCH 34.4 10/30/2021    MCHC 34.4 10/30/2021    RDW 13.4 10/30/2021     10/30/2021    MPV 8.6 10/30/2021     CMP:    Lab Results   Component Value Date     10/30/2021    K 3.9 10/30/2021     10/30/2021    CO2 20 10/30/2021    BUN 22 10/30/2021    CREATININE 0.7 10/30/2021    GFRAA >60 10/30/2021    GFRAA >60 01/25/2013    AGRATIO 1.5 10/29/2021    LABGLOM >60 10/30/2021    GLUCOSE 101 10/30/2021    PROT 7.4 10/29/2021    PROT 6.9 01/25/2013    LABALBU 4.4 10/29/2021    CALCIUM 9.3 10/30/2021    BILITOT 0.6 10/29/2021    ALKPHOS 96 10/29/2021    AST 30 10/29/2021    ALT 35 10/29/2021       ASSESSMENT      Patient Active Problem List   Diagnosis    DDD (degenerative disc disease), cervical    Myofascial pain syndrome    Chronic migraine without aura    Epigastric pain    Perforated ulcer ([address]    Peritonitis (HCC)    Typical atrial flutter (HCC)    Chest discomfort    Atrial flutter (HCC)    PAF (paroxysmal atrial fibrillation) (HCC)    Hyponatremia    Hypertension    Obstructive sleep apnea    Suspected sleep apnea    Paroxysmal supraventricular tachycardia (HCC)    Atrial fibrillation and flutter (HCC)    Rheumatoid arthritis with rheumatoid factor, unspecified    Rheumatoid arthritis, unspecified    Atrial fibrillation with rapid ventricular response (HCC)    Elevated troponin         PLAN  1.  The patient with paroxysmal A fib , further plan by cardiology , anticipate will spent one more night to monitor rhythm and see EP tomorrow

## 2021-10-31 NOTE — CARE COORDINATION
Discharge Planning:  I have reviewed the patient's medical history in detail and updated the computerized patient record. Patient independent prior to admission. There are no needs identified at this time. If something specific is identified, please notify Discharge Planner.     Electronically signed by Filipe Coon RN on 10/31/2021 at 3:30 PM

## 2021-10-31 NOTE — PROGRESS NOTES
Data- discharge order received, pt verbalized agreement to discharge, disposition to previous residence, no needs for HHC/DME. Action- discharge instructions prepared and given to pt, pt verbalized understanding. Medication information packet given r/t NEW and/or CHANGED prescriptions emphasizing name/purpose/side effects, pt verbalized understanding. Discharge instruction summary: Diet- General, Activity- as tolerated, Primary Care Physician as follows: Dayanara Carmen [phone] f/u appointment as directed, she is to call Cardiology tomorrow morning to follow up for possible ablation    1. WEIGHT: Admit Weight: 167 lb (75.8 kg) (10/29/21 1318)        Today  Weight: 166 lb 6.4 oz (75.5 kg) (10/29/21 2030)       2. O2 SAT.: SpO2: 95 % (10/31/21 1345)    Response- Pt belongings gathered, IV removed. Disposition is home (no HHC/DME needs), transported with papers and belongings, taken to [facility] ambulatory per request, no complications.

## 2021-11-01 ENCOUNTER — TELEPHONE (OUTPATIENT)
Dept: CARDIOLOGY CLINIC | Age: 66
End: 2021-11-01

## 2021-11-01 RX ORDER — DILTIAZEM HYDROCHLORIDE 180 MG/1
180 CAPSULE, COATED, EXTENDED RELEASE ORAL DAILY
Qty: 30 CAPSULE | Refills: 5 | Status: ON HOLD | OUTPATIENT
Start: 2021-11-01 | End: 2022-03-03 | Stop reason: HOSPADM

## 2021-11-01 RX ORDER — DILTIAZEM HYDROCHLORIDE 180 MG/1
180 CAPSULE, COATED, EXTENDED RELEASE ORAL DAILY
Qty: 90 CAPSULE | OUTPATIENT
Start: 2021-11-01

## 2021-11-01 NOTE — TELEPHONE ENCOUNTER
Received refill request for Diltiazem 180 mg from [facility]. Rx was filled today.  Duplicate request.

## 2021-11-01 NOTE — TELEPHONE ENCOUNTER
She was at [facility] ER all weekend for AF . RAJI said she could go home but needed to call [facility] first thing today . Patient thinks she needs ablation .  Please call to advise

## 2021-11-03 PROBLEM — R77.8 ELEVATED TROPONIN: Status: RESOLVED | Noted: 2021-10-29 | Resolved: 2021-11-03

## 2021-11-03 PROBLEM — R79.89 ELEVATED TROPONIN: Status: RESOLVED | Noted: 2021-10-29 | Resolved: 2021-11-03

## 2021-11-03 PROBLEM — R29.818 SUSPECTED SLEEP APNEA: Status: RESOLVED | Noted: 2021-02-25 | Resolved: 2021-11-03

## 2021-11-03 NOTE — PROGRESS NOTES
[address]   Electrophysiology  EDDA Watts-CNP  Attending EP: Dr. Juanita Adam    Date: 11/24/2021  I had the privilege of visiting Evon Vargas in the office. Chief Complaint:   Chief Complaint   Patient presents with    Follow-up     9 month, discuss ablation    Atrial Fibrillation     History of Present Illness: History obtained from patient and medical record. Evon Vargas is 77 y.o. female with a past medical history of HTN, anxiety, depression, and hypothyroidism.     In December of 2020, pt presented to hospital with malaise and tachycardia with reported HR >200. She was recently diagnosed with COVID on November 21st. She was found to be in atrial fibrillation/flutter. S/p DCCV (12/20)    -Interval history: Today, Evon Vargas is being seen for hospital follow up. She was recently admitted for AF with RVR and she converted spontaneously. She wants to discuss ablation. We had a long discussion about ablation, including risks, benefits, and alternatives. Her BP is elevated today, 144/90. She has not taken her cardizem today. She states it runs \"120/70s\" at home. Pt admits to having EILEEN, but she was unable to tolerate the mask and no longer wears it. She works as a therapy assistant. Denies having chest pain, palpitations, shortness of breath, orthopnea/PND, cough, or dizziness at the time of this visit. With regard to medication therapy the patient has been compliant with prescribed regimen. They have tolerated therapy to date. Allergies: Allergies   Allergen Reactions    Nsaids      Note: PERFORATED PEPTIC ULCER     Home Medications:  Prior to Visit Medications    Medication Sig Taking?  Authorizing Provider   methylPREDNISolone (MEDROL DOSEPACK) 4 MG tablet  Yes Historical Provider, MD   traMADol (ULTRAM) 50 MG tablet TAKE 1 TABLET BY MOUTH EVERY 12 HOURS AS NEEDED Yes Historical Provider, MD   gabapentin (NEURONTIN) 300 MG capsule Take 2 capsules by mouth (02/12/2018); partial hysterectomy (cervix not removed); Breast enhancement surgery; and shoulder surgery. Social History:  Reviewed. reports that she has never smoked. She has never used smokeless tobacco. She reports current alcohol use. She reports that she does not use drugs. Family History:  Reviewed. family history includes Heart Attack in her mother. Review of Systems:  · Constitutional: Negative for fever, night sweats, chills, weight changes, or weakness  · Skin: Negative for rash, dry skin, pruritus, bruising, bleeding, blood clots, or changes in skin pigment  · HEENT: Negative for vision changes, ringing in the ears, sore throat, dysphagia, or swollen lymph nodes  · Respiratory: Reviewed in HPI  · Cardiovascular: Reviewed in HPI  · Gastrointestinal: Negative for abdominal pain, N/V/D, constipation, or black/tarry stools  · Genito-Urinary: Negative for dysuria, incontinence, urgency, or hematuria  · Musculoskeletal: Negative for joint swelling, muscle pain, or injuries  · Neurological/Psych: Negative for confusion, seizures, dizziness, headaches, balance issues or TIA-like symptoms. No anxiety, depression, or insomnia    Physical Examination:  Vitals:    11/24/21 1144   BP: (!) 144/91   Pulse: 74   SpO2: 96%      Wt Readings from Last 3 Encounters:   11/24/21 174 lb (78.9 kg)   11/17/21 177 lb (80.3 kg)   10/29/21 166 lb 6.4 oz (75.5 kg)     Constitutional: Cooperative and in no apparent distress, and appears well nourished  Skin: Warm and pink; no pallor, cyanosis, bruising, or clubbing  HEENT: Symmetric and normocephalic. PERRL, EOM intact. Conjunctiva pink with clear sclera. Mucus membranes pink and moist. Teeth intact. Thyroid smooth without nodules or goiter  Respiratory: Respirations symmetric and unlabored. Lungs clear to auscultation bilaterally, no wheezing, rhonchi, or crackles  Cardiovascular:  Regular rate and rhythm. S1/S2 present without murmurs, rubs, or gallops.  Peripheral pulses 2+, capillary refill < 3 seconds. No elevation of JVP. No peripheral edema  Gastrointestinal: Abdomen soft and round. Bowel sounds normoactive in all quadrants without tenderness or masses. Musculoskeletal: Bilateral upper and lower extremity strength 5/5 with full ROM. Neurological/Psych: Awake and orientated to person, place and time. Calm affect, appropriate mood. Pertinent labs, diagnostic, device, and imaging results reviewed as a part of this visit    LABS    CBC:   Lab Results   Component Value Date    WBC 5.6 10/30/2021    HGB 12.4 10/30/2021    HCT 36.0 10/30/2021    .0 10/30/2021     10/30/2021     BMP:   Lab Results   Component Value Date    CREATININE 0.7 10/30/2021    BUN 22 (H) 10/30/2021     10/30/2021    K 3.9 10/30/2021     10/30/2021    CO2 20 (L) 10/30/2021     Estimated Creatinine Clearance: 78 mL/min (based on SCr of 0.7 mg/dL). Thyroid:   Lab Results   Component Value Date    TSH 1.99 10/29/2021     Lipid Panel:   Lab Results   Component Value Date    CHOL 155 2021    HDL 75 2021    HDL 79 2011    TRIG 82 2021     LFTs:  Lab Results   Component Value Date    ALT 35 10/29/2021    AST 30 10/29/2021    ALKPHOS 96 10/29/2021    BILITOT 0.6 10/29/2021     Coags:   Lab Results   Component Value Date    APTT 31.7 2020       EC2021  - NSR with T wave changes. Rate 65, QRS 94, QTc 434    Echo:    Overall left ventricular function is normal.   There is no evidence of mass or thrombus in the left atrium or appendage. The left atrium is dilated. Tricuspid aortic valve. Trace aortic regurgitation is present. Plaque is noted in the thoracic aorta. Cath:   Dominance: Right    LM: no angiographic stenosis  LAD: no angiographic stenosis  LCx: no angiographic stenosis  RCA: no angiographic stenosis  LVEDP: 2 mmHg   LVEF: 50-55%      Impression/Recommendations:  Normal coronary study. Event Monitor:   NSR. Average HR 75, low 50, high 149  5 short atrial runs    Assessment:    1. Paroxysmal Atrial Fibrillation/Flutter   - S/p DCC (12/20)     - Currently in NSR   - Continue cardizem 180 mg QD   - [id number] score: 3 (Age, Gender, HTN) ; [id number] Vasc score and anticoagulation discussed. High risk for stroke and thromboembolism. Anticoagulation is recommended. Risk of bleeding was discussed.    ~ On Xarelto 20 mg QD (CrCl ~ 75 ml/min based on creatinine of 0.7)      - Afib risk factors including age, HTN, obesity, inactivity and EILEEN were discussed with patient. Risk factor modification recommended               ~ TSH 2.08 (12/20)                  - Treatment options including cardioversion, rate control strategy, antiarrhythmics, anticoagulation and possible ablation were discussed with patient. Risks, benefits and alternative of each treatment options were explained. All questions answered     ~ Given the symptomatic nature of her atrial fibrillation, she would be ideal candidate for AF ablation. Atrial fibrillation ablation procedure has been discussed in detail with patient. The risks, benefits and alternatives of the ablation procedure were discussed with the patient. The risks including, but not limited to, the risks of bleeding, infection, radiation exposure, injury to vascular, cardiac and surrounding structures (including pneumothorax), stroke, cardiac perforation, tamponade, need for emergent open heart surgery, need for pacemaker implantation, Injury to the phrenic nerve, injury to the esophagus, myocardial infarction and death were discussed in detail. I explained the success rate considering possible need for a second procedure is about 60-70% and with addition of anti arrhythmics is about 80%. We discussed the need for repeat procedure. On average patients need more than one ablation procedure    -------- > The patient opted to proceed with the ablation. Hold Xarelto one day prior to procedure    2. Junctional Rhythm   - Noted in past; likely secondary to higher vagal tone   - Asymptomatic     3. HTN   - Mildly uncontrolled (hasn't taken meds yet today)    ~ Goal <130/80   - Continue current medications   - Encouraged to monitor and log BP readings at home, then bring log to next visit   - Discussed importance of low sodium diet, weight control and exercise     4. Mitral Regurgitation              - Mild to moderate MR per CLAUDINE     5. EILEEN  - Unable to tolerate CPAP  - Encourage to use CPAP to prevent long term effects of untreated EILEEN     6. Hypothyroidism              - Stable              - On thyroid replacement    Plan:  1. No changes  2. Our  will be contacting you from [phone] to schedule your procedure. Hold Xarelto the day prior to the procedure  3. Call if blood pressure remains elevated at home    F/U: Follow-up with EP following ablation  -Call [address] at [phone] with any questions    Diet & Exercise:   The patient is counseled to follow a low salt diet to assure blood pressure remains controlled for cardiovascular risk factor modification   The patient is counseled to avoid excess caffeine, and energy drinks as this may exacerbated ectopy and arrhythmia   The patient is counseled to lose weight to control cardiovascular risk factors   Exercise program discussed: To improve overall cardiovascular health, the patient is instructed to increase cardiovascular related activities with a goal of 150 min/week of moderate level activity or 10,000 steps per day. Encouraged to perform as much activity as tolerated    I have addressed the patient's cardiac risk factors and adjusted pharmacologic treatment as needed. In addition, I have reinforced the need for patient directed risk factor modification. I independently reviewed the ECG    All questions and concerns were addressed with the patient. Alternatives to treatment were discussed.      Thank you for allowing to us to participate in the care of [address] [place] [address] Road    Time  33 minutes spent preparing to see patient including reviewing patient history/prior tests/prior consults, performing a medical exam, counseling and educating patient/family/caregiver, ordering medications/tests/procedures, referring and communicating with PCPs and other pertinent consultants, documenting information in the EMR, independently interpreting results and communicating to family and coordination of patient care.     Craige Holstein, APRN-CNP  [address]   Office: [phone]

## 2021-11-05 NOTE — PROGRESS NOTES
Physician Progress Note      Saskia Prabhakar  [facility] #:                  [id number]  :                       1955  ADMIT DATE:       10/29/2021 1:15 PM  Maricruz Mendoza [place] DATE:        10/31/2021 4:30 PM  RESPONDING  PROVIDER #:        Jearl Rubinstein MD          QUERY TEXT:    Patient admitted with PAF , noted to have paroxysmal atrial fibrillation and   is maintained on Xarelto. If possible, please document in progress notes and   discharge summary if you are evaluating and/or treating any of the following: The medical record reflects the following:  Risk Factors: Age, PAF/Aflutter on Xarelto, HTN  Clinical Indicators: Pt. was admitted for PAF/A-Flutter in R, converted on   her own prior to Amiodarone gtt started. Pt. has been on Xarelto. [id number] score >1 calculated to be 3. Treatment: Cardiology consult, po Xarelto    [id number] score >1 as this predicts an increased risk of thrombotic and   embolic stroke  Options provided:  -- Secondary hypercoagulable state in a patient with atrial fibrillation  -- Other - I will add my own diagnosis  -- Disagree - Not applicable / Not valid  -- Disagree - Clinically unable to determine / Unknown  -- Refer to Clinical Documentation Reviewer    PROVIDER RESPONSE TEXT:    Provider is clinically unable to determine a response to this query.     Query created by: Noah Shannon on 2021 9:28 AM      Electronically signed by:  Jearl Rubinstein MD 2021 10:51 AM

## 2021-11-12 NOTE — PROGRESS NOTES
Jeses Du MD  [facility]) Physicians - Rheumatology    [x] [facility]:  [address] [facility] [address], [address] [facility]  Mihaela Pickett [place] [] [place] Office:  [address], [address]   Office: [phone]  Fax: [phone] PROGRESS NOTE    SUBJECTIVE:    Background:   Pernell Cee is a 77 y.o. female w/ erosive OA of the hands, generalized OA, severe DDD of L-spine (followed by Pain Management) and osteopenia. PMHx pertinent for HTN, hypothyroidism, anxiety, depression and insomnia. Current rheum meds:   mg daily: started in 11/2020  Gabapentin 600 mg TID  Lexapro: per PCP  Voltaren gel PRN  Compound pain cream PRN  OTC vitamin D3 daily + calcium 500 mg BID     Prior rheum meds:  Prednisone taper starting w/ 20 mg daily: provided good pain relief  Duloxetine 30 mg daily: caused \"my head to swim\"  NSAIDs: pt states she was told to avoid d/t a \"perforated ulcer\" 3 yrs ago    Past medical/surgical history, medications and allergies are reviewed and updated as appropriate. Interval Hx:   Pt states her arthralgias in her hands remain well controlled on HCQ. She denies any joint swelling or prolonged morning stiffness. She reports good pain relief from low dose Prednisone taper prescribed at her last visit. She also reports good pain relief from IAC injection to her L CMC joint at her last visit. She reports worsening mechanical LBP which she attributes to her remodeling her floors and ripping up her carpet. She was referred to Pain Management in the past but she had not made any appt as her back pain had not flared up. She previously saw Dr. Heather Escobar but was unable to schedule w/ Dr. Heather Escobar d/t conflicting work schedule. She remains on Gabapentin. She is requesting for a temporary Tramadol Rx. She recently fractured her pinky when her pit bull jumped on her hand. Of note, pt was hospitalized last month for rapid Afib.  She tells me she is currently on Cardizem and Xarelto. She plans to undergo an ablation in the near future. Rheumatologic ROS:  Constitutional: denies chronic fatigue, fever/chills, night sweats, unintentional weight loss  Integumentary: denies photosensitivity, rash, patchy alopecia, or Sx of Raynaud's phenomenon  Eyes: denies dry eyes, redness or pain, visual disturbance, or floaters  Oral cavity: +extreme dry mouth which she attributes to her thyroid medicine, +canker sores  Cardiovascular: denies CP, palpitations, Hx of pericardial effusion or pericarditis  Respiratory: denies SOB, cough, hemoptysis, or pleurisy  Musculoskeletal:  refer to above HPI     Allergies   Allergen Reactions    Nsaids      Note: PERFORATED PEPTIC ULCER       Past Medical History:        Diagnosis Date    Anxiety     Arthritis     Depression     Hypertension     Hypothyroidism        Past Surgical History:        Procedure Laterality Date    ABDOMINAL EXPLORATION SURGERY  02/12/2018    LAPAROTOMY EXPLORATORY, REPAIR PERFORATED ULCER    BLADDER REPAIR      BREAST ENHANCEMENT SURGERY      HYSTERECTOMY      partial    PARTIAL HYSTERECTOMY      SHOULDER SURGERY      ligament moved up left shoulder       Medications:    Current Outpatient Medications   Medication Sig Dispense Refill    traMADol (ULTRAM) 50 MG tablet TAKE 1 TABLET BY MOUTH EVERY 12 HOURS AS NEEDED      [START ON 11/22/2021] gabapentin (NEURONTIN) 300 MG capsule Take 2 capsules by mouth 3 times daily for 90 days. 540 capsule 0    traMADol (ULTRAM) 50 MG tablet Take 1 tablet by mouth every 8 hours as needed for Pain for up to 30 days.  Take lowest dose possible to manage pain 90 tablet 0    hydroxychloroquine (PLAQUENIL) 200 MG tablet Take 1 tablet by mouth 2 times daily 180 tablet 0    dilTIAZem (CARDIZEM CD) 180 MG extended release capsule Take 1 capsule by mouth daily 30 capsule 5    lisinopril (PRINIVIL;ZESTRIL) 10 MG tablet Take 10 mg by mouth daily      escitalopram (LEXAPRO) 10 MG tablet Take 10 mg by mouth daily      Calcium Carb-Cholecalciferol (CALCIUM 1000 + D) 1000-800 MG-UNIT TABS Take 1 tablet by mouth daily      Multiple Vitamins-Minerals (THERAPEUTIC MULTIVITAMIN-MINERALS) tablet Take 1 tablet by mouth daily      vitamin B-12 (CYANOCOBALAMIN) 1000 MCG tablet Take 1,000 mcg by mouth daily      rivaroxaban (XARELTO) 20 MG TABS tablet Take 1 tablet by mouth daily 30 tablet 0    pantoprazole (PROTONIX) 40 MG tablet Take 1 tablet by mouth daily 30 tablet 3    tiZANidine (ZANAFLEX) 4 MG tablet Take 4 mg by mouth 2 times daily as needed       ARMOUR THYROID PO Take 60 mg by mouth Daily       ALPRAZolam (XANAX) 0.25 MG tablet Take 0.25 mg by mouth nightly as needed for Sleep.  traZODone (DESYREL) 50 MG tablet Take 50 mg by mouth nightly. No current facility-administered medications for this visit.         OBJECTIVE:  Physical Exam:  /60 (Site: Right Upper Arm)   Pulse 71   Temp 96.3 °F (35.7 °C) (Infrared)   Wt 177 lb (80.3 kg)   SpO2 97%   BMI 31.35 kg/m²     GEN: AAOx3, in NAD, well-appearing  HEAD: normocephalic, atraumatic  EYES: no injection or icterus  CVS: RRR  LUNGS: in no acute respiratory distress, CTAB  MSK:  Upper extremities:              Hands: MCP joints w/o swelling NTTP, there is bony enlargement of the b/l PIP and DIP joints no active synovitis NTTP, +Smiley and Heberden nodes TTP, there is squaring of the b/l 1st CMC joints w/ thenar atrophy no active synovitis, full fist formation w/ good  strength              Wrist: no synovitis in the wrist joints b/l, FROM              Elbow: no synovitis or bursitis, FROM  Lower extremities:              Knees: no warmth or effusion present, FROM              Ankles: no synovitis, FROM, Achilles tendons w/o swelling or warmth NTTP              Feet: no toe swelling or pain or warmth on palpation w/ FROM, negative MTP squeeze test  INTEGUMENT: no rash or psoriatic lesions, no petechiae, bruises, or palpable purpura, no patchy alopecia, no nail or periungual changes, no clubbing or digital ulcers    DATA:  Labs: I personally reviewed interval labs and discussed w/ the pt in detail which showed:    Lab Results   Component Value Date    WBC 5.6 10/30/2021    HGB 12.4 10/30/2021    HCT 36.0 10/30/2021    .0 10/30/2021     10/30/2021    LYMPHOPCT 23.7 10/29/2021    RBC 3.60 (L) 10/30/2021    MCH 34.4 (H) 10/30/2021    MCHC 34.4 10/30/2021    RDW 13.4 10/30/2021     Lab Results   Component Value Date     10/30/2021    K 3.9 10/30/2021     10/30/2021    CO2 20 (L) 10/30/2021    BUN 22 (H) 10/30/2021    CREATININE 0.7 10/30/2021    GLUCOSE 101 (H) 10/30/2021    CALCIUM 9.3 10/30/2021    PROT 7.4 10/29/2021    LABALBU 4.4 10/29/2021    BILITOT 0.6 10/29/2021    ALKPHOS 96 10/29/2021    AST 30 10/29/2021    ALT 35 10/29/2021    LABGLOM >60 10/30/2021    GFRAA >60 10/30/2021    AGRATIO 1.5 10/29/2021    GLOB 2.4 05/25/2021     Lab Results   Component Value Date    VITD25 58.9 05/25/2021     Lab Results   Component Value Date    CRP 5.9 (H) 12/17/2020    CRP 4.1 11/04/2020     Lab Results   Component Value Date    SEDRATE 13 12/17/2020    SEDRATE 10 05/23/2017     Lab Results   Component Value Date    LABURIC 5.3 11/04/2020    LABURIC 5.1 05/23/2017     Negative RF x 2 (5/23/17, 11/4/20)  Negative CCP (11/4/20)  Negative MARCIA x 2 (5/23/17, 11/4/20)  Negative SSA, SSB (11/4/20)  TSH wnl (5/29/20)  PTH intact wnl (11/4/20)  SPEP and UPEP: no monoclonal band (11/4/20)    Imaging:  I personally reviewed interval imaging and discussed w/ the pt in detail which included:    X-rays (11/4/20):  R hand: There is no acute fracture. Polyarticular osteoarthritis is present worse in the interphalangeal and trapeziometacarpal joints. L hand:  Left hand: There is no acute fracture. Polyarticular osteoarthritis is present worse in the interphalangeal and trapeziometacarpal joints.  There is no destructive bone lesion seen. L-spine:  The vertebral body heights are maintained. There is grade 1 anterolisthesis of L4 on L5. Severe degenerative disc disease at L4-L5 and L5-S1. Facet osteoarthritis in the lower lumbar spine. Pelvis/hips:  No acute fractures seen. The hip joints are congruent. There is no destructive osseous lesion. I independently reviewed above x-rays, there are degenerative changes in the b/l PIP joints and significant joint space narrowing w/ gull wing deformities in the b/l DIP joints c/w erosive OA. DEXA study (4/24/15):  T-score -1.7 in L-spine  T-score of -1.5 in L femoral neck. DEXA study (12/8/20):  T-score -1.9 in L1-L4  T-score -1.6 in the L femoral neck  T-score -1.9 in the R femoral neck  I personally calculated her FRAX scores:  Major osteoporosis fx risk 10%  Hip fx risk 1.4%    Above results were discussed w/ the pt in detail during today's visit. ASSESSMENT/PLAN:   1. Erosive osteoarthritis of both hands  Assessment & Plan:  - she had good response to low dose Prednisone taper after her last visit. Inflammatory arthritis remains well controlled on HCQ. No active synovitis appreciated on exam today. She will increase her HCQ dose from 300 mg daily to 200 mg BID based on her current weight. Orders:  -     gabapentin (NEURONTIN) 300 MG capsule; Take 2 capsules by mouth 3 times daily for 90 days. , Disp-540 capsule, R-0Normal  -     hydroxychloroquine (PLAQUENIL) 200 MG tablet; Take 1 tablet by mouth 2 times daily, Disp-180 tablet, R-0Normal  2. Primary osteoarthritis involving multiple joints  Assessment & Plan:  - good pain relief from IAC injection to the L CMC joint on 8/25/21.  - cont OTC APAP, avoid NSAIDs d/t pt being on anticoagulation for Afib. Cont Gabapentin and topicals. Orders:  -     gabapentin (NEURONTIN) 300 MG capsule; Take 2 capsules by mouth 3 times daily for 90 days. , Disp-540 capsule, R-0Normal  3.  DDD (degenerative disc disease), lumbar  Assessment & Plan:  - recent flare from remodeling her floors. Pt was previously followed by Dr. Michi Benoit, she stated her schedule did not allow her to cont seeing Dr. Michi Benoit. She has been referred to Pain Management but never made an appt. She requested for a temporary Tramadol Rx today for her acute LBP. Provided 30 day supply of Tramadol, checked OARRS report. D/w pt that we cannot provide long term Tramadol Rx. I strongly encouraged her to establish care w/ Pain Management. Cont same dose Gabapentin. Avoid NSAIDs d/t pt being on anticoagulation. Cont OTC APAP PRN. Orders:  -     gabapentin (NEURONTIN) 300 MG capsule; Take 2 capsules by mouth 3 times daily for 90 days. , Disp-540 capsule, R-0Normal  -     traMADol (ULTRAM) 50 MG tablet; Take 1 tablet by mouth every 8 hours as needed for Pain for up to 30 days. Take lowest dose possible to manage pain, Disp-90 tablet, R-0Print  4. High risk medication use  Assessment & Plan:  - pt stated she had normal baseline eye exam for HCQ this yr.  5. Osteopenia after menopause  Assessment & Plan:  - I personally calculated her FRAX scores based on her most recent DEXA study from 12/2020 which are still below the threshold for indication to initiate bisphosphonate therapy. She denies any Hx of fragility fractures. Recent traumatic fx of L 5th digit from pit bull. Will plan on repeating DEXA study in 12/2022.  - vitamin D level at goal, cont daily vitamin D w/ calcium. Return in about 3 months (around 2/17/2022) for lab result discussion and treatment plan, medication monitoring. The risks and benefits of my recommendations, as well as other treatment options, benefits and side effects were discussed with the patient today. Questions were answered. NOTE: This report is transcribed by using voice recognition software dragon. Every effort is made to ensure accuracy; however, inadvertent computerized  transcription errors may be present.

## 2021-11-15 DIAGNOSIS — M15.9 PRIMARY OSTEOARTHRITIS INVOLVING MULTIPLE JOINTS: ICD-10-CM

## 2021-11-15 DIAGNOSIS — M15.4 EROSIVE OSTEOARTHRITIS OF BOTH HANDS: ICD-10-CM

## 2021-11-15 DIAGNOSIS — M51.36 DDD (DEGENERATIVE DISC DISEASE), LUMBAR: ICD-10-CM

## 2021-11-15 RX ORDER — GABAPENTIN 300 MG/1
CAPSULE ORAL
Qty: 540 CAPSULE | Refills: 0 | OUTPATIENT
Start: 2021-11-15 | End: 2022-02-13

## 2021-11-17 ENCOUNTER — OFFICE VISIT (OUTPATIENT)
Dept: RHEUMATOLOGY | Age: 66
End: 2021-11-17
Payer: MEDICARE

## 2021-11-17 VITALS
HEART RATE: 71 BPM | BODY MASS INDEX: 31.35 KG/M2 | TEMPERATURE: 96.3 F | DIASTOLIC BLOOD PRESSURE: 60 MMHG | WEIGHT: 177 LBS | OXYGEN SATURATION: 97 % | SYSTOLIC BLOOD PRESSURE: 110 MMHG

## 2021-11-17 DIAGNOSIS — M15.4 EROSIVE OSTEOARTHRITIS OF BOTH HANDS: Primary | ICD-10-CM

## 2021-11-17 DIAGNOSIS — M15.9 PRIMARY OSTEOARTHRITIS INVOLVING MULTIPLE JOINTS: ICD-10-CM

## 2021-11-17 DIAGNOSIS — M51.36 DDD (DEGENERATIVE DISC DISEASE), LUMBAR: ICD-10-CM

## 2021-11-17 DIAGNOSIS — Z79.899 HIGH RISK MEDICATION USE: ICD-10-CM

## 2021-11-17 DIAGNOSIS — M85.80 OSTEOPENIA AFTER MENOPAUSE: ICD-10-CM

## 2021-11-17 DIAGNOSIS — Z78.0 OSTEOPENIA AFTER MENOPAUSE: ICD-10-CM

## 2021-11-17 PROBLEM — M51.369 DDD (DEGENERATIVE DISC DISEASE), LUMBAR: Status: ACTIVE | Noted: 2021-11-17

## 2021-11-17 PROBLEM — M15.0 PRIMARY OSTEOARTHRITIS INVOLVING MULTIPLE JOINTS: Status: ACTIVE | Noted: 2021-11-17

## 2021-11-17 PROCEDURE — 1111F DSCHRG MED/CURRENT MED MERGE: CPT | Performed by: INTERNAL MEDICINE

## 2021-11-17 PROCEDURE — 99214 OFFICE O/P EST MOD 30 MIN: CPT | Performed by: INTERNAL MEDICINE

## 2021-11-17 PROCEDURE — G8399 PT W/DXA RESULTS DOCUMENT: HCPCS | Performed by: INTERNAL MEDICINE

## 2021-11-17 PROCEDURE — G8484 FLU IMMUNIZE NO ADMIN: HCPCS | Performed by: INTERNAL MEDICINE

## 2021-11-17 PROCEDURE — 1123F ACP DISCUSS/DSCN MKR DOCD: CPT | Performed by: INTERNAL MEDICINE

## 2021-11-17 PROCEDURE — 1090F PRES/ABSN URINE INCON ASSESS: CPT | Performed by: INTERNAL MEDICINE

## 2021-11-17 PROCEDURE — 3017F COLORECTAL CA SCREEN DOC REV: CPT | Performed by: INTERNAL MEDICINE

## 2021-11-17 PROCEDURE — G8427 DOCREV CUR MEDS BY ELIG CLIN: HCPCS | Performed by: INTERNAL MEDICINE

## 2021-11-17 PROCEDURE — 1036F TOBACCO NON-USER: CPT | Performed by: INTERNAL MEDICINE

## 2021-11-17 PROCEDURE — G8417 CALC BMI ABV UP PARAM F/U: HCPCS | Performed by: INTERNAL MEDICINE

## 2021-11-17 PROCEDURE — 4040F PNEUMOC VAC/ADMIN/RCVD: CPT | Performed by: INTERNAL MEDICINE

## 2021-11-17 RX ORDER — TRAMADOL HYDROCHLORIDE 50 MG/1
TABLET ORAL
COMMUNITY
Start: 2021-09-11

## 2021-11-17 RX ORDER — GABAPENTIN 300 MG/1
600 CAPSULE ORAL 3 TIMES DAILY
Qty: 540 CAPSULE | Refills: 0 | Status: SHIPPED | OUTPATIENT
Start: 2021-11-22 | End: 2022-02-23 | Stop reason: SDUPTHER

## 2021-11-17 RX ORDER — HYDROXYCHLOROQUINE SULFATE 200 MG/1
200 TABLET, FILM COATED ORAL 2 TIMES DAILY
Qty: 180 TABLET | Refills: 0 | Status: SHIPPED | OUTPATIENT
Start: 2021-11-17 | End: 2022-02-23 | Stop reason: SDUPTHER

## 2021-11-17 RX ORDER — TRAMADOL HYDROCHLORIDE 50 MG/1
50 TABLET ORAL EVERY 8 HOURS PRN
Qty: 90 TABLET | Refills: 0 | Status: SHIPPED | OUTPATIENT
Start: 2021-11-17 | End: 2021-12-17

## 2021-11-17 RX ORDER — GABAPENTIN 400 MG/1
CAPSULE ORAL
COMMUNITY
Start: 2021-08-22 | End: 2021-11-17

## 2021-11-17 RX ORDER — HYDROXYCHLOROQUINE SULFATE 200 MG/1
TABLET, FILM COATED ORAL
Qty: 135 TABLET | Refills: 0 | Status: CANCELLED | OUTPATIENT
Start: 2021-11-17

## 2021-11-17 NOTE — ASSESSMENT & PLAN NOTE
- recent flare from remodeling her floors. Pt was previously followed by Dr. Kamran Samson, she stated her schedule did not allow her to cont seeing Dr. Kamran Samson. She has been referred to Pain Management but never made an appt. She requested for a temporary Tramadol Rx today for her acute LBP. Provided 30 day supply of Tramadol, checked OARRS report. D/w pt that we cannot provide long term Tramadol Rx. I strongly encouraged her to establish care w/ Pain Management. Cont same dose Gabapentin. Avoid NSAIDs d/t pt being on anticoagulation. Cont OTC APAP PRN.

## 2021-11-17 NOTE — ASSESSMENT & PLAN NOTE
- I personally calculated her FRAX scores based on her most recent DEXA study from 12/2020 which are still below the threshold for indication to initiate bisphosphonate therapy. She denies any Hx of fragility fractures. Recent traumatic fx of L 5th digit from pit bull. Will plan on repeating DEXA study in 12/2022.  - vitamin D level at goal, cont daily vitamin D w/ calcium.

## 2021-11-17 NOTE — ASSESSMENT & PLAN NOTE
- she had good response to low dose Prednisone taper after her last visit. Inflammatory arthritis remains well controlled on HCQ. No active synovitis appreciated on exam today. She will increase her HCQ dose from 300 mg daily to 200 mg BID based on her current weight.

## 2021-11-17 NOTE — ASSESSMENT & PLAN NOTE
- good pain relief from IAC injection to the L CMC joint on 8/25/21.  - cont OTC APAP, avoid NSAIDs d/t pt being on anticoagulation for Afib. Cont Gabapentin and topicals.

## 2021-11-24 ENCOUNTER — OFFICE VISIT (OUTPATIENT)
Dept: CARDIOLOGY CLINIC | Age: 66
End: 2021-11-24
Payer: MEDICARE

## 2021-11-24 VITALS
BODY MASS INDEX: 30.83 KG/M2 | HEART RATE: 74 BPM | WEIGHT: 174 LBS | SYSTOLIC BLOOD PRESSURE: 144 MMHG | HEIGHT: 63 IN | OXYGEN SATURATION: 96 % | DIASTOLIC BLOOD PRESSURE: 91 MMHG

## 2021-11-24 DIAGNOSIS — I48.92 ATRIAL FIBRILLATION AND FLUTTER (HCC): Primary | ICD-10-CM

## 2021-11-24 DIAGNOSIS — I48.91 ATRIAL FIBRILLATION AND FLUTTER (HCC): Primary | ICD-10-CM

## 2021-11-24 DIAGNOSIS — G47.33 OBSTRUCTIVE SLEEP APNEA: ICD-10-CM

## 2021-11-24 DIAGNOSIS — I10 HYPERTENSION, UNSPECIFIED TYPE: ICD-10-CM

## 2021-11-24 DIAGNOSIS — I47.1 PAROXYSMAL SUPRAVENTRICULAR TACHYCARDIA (HCC): ICD-10-CM

## 2021-11-24 PROBLEM — R07.89 CHEST DISCOMFORT: Status: RESOLVED | Noted: 2020-12-15 | Resolved: 2021-11-24

## 2021-11-24 PROCEDURE — 4040F PNEUMOC VAC/ADMIN/RCVD: CPT | Performed by: NURSE PRACTITIONER

## 2021-11-24 PROCEDURE — 1090F PRES/ABSN URINE INCON ASSESS: CPT | Performed by: NURSE PRACTITIONER

## 2021-11-24 PROCEDURE — 1123F ACP DISCUSS/DSCN MKR DOCD: CPT | Performed by: NURSE PRACTITIONER

## 2021-11-24 PROCEDURE — G8427 DOCREV CUR MEDS BY ELIG CLIN: HCPCS | Performed by: NURSE PRACTITIONER

## 2021-11-24 PROCEDURE — G8417 CALC BMI ABV UP PARAM F/U: HCPCS | Performed by: NURSE PRACTITIONER

## 2021-11-24 PROCEDURE — 1036F TOBACCO NON-USER: CPT | Performed by: NURSE PRACTITIONER

## 2021-11-24 PROCEDURE — G8399 PT W/DXA RESULTS DOCUMENT: HCPCS | Performed by: NURSE PRACTITIONER

## 2021-11-24 PROCEDURE — 3017F COLORECTAL CA SCREEN DOC REV: CPT | Performed by: NURSE PRACTITIONER

## 2021-11-24 PROCEDURE — 93000 ELECTROCARDIOGRAM COMPLETE: CPT | Performed by: NURSE PRACTITIONER

## 2021-11-24 PROCEDURE — 1111F DSCHRG MED/CURRENT MED MERGE: CPT | Performed by: NURSE PRACTITIONER

## 2021-11-24 PROCEDURE — G8484 FLU IMMUNIZE NO ADMIN: HCPCS | Performed by: NURSE PRACTITIONER

## 2021-11-24 PROCEDURE — 99214 OFFICE O/P EST MOD 30 MIN: CPT | Performed by: NURSE PRACTITIONER

## 2021-11-24 RX ORDER — METHYLPREDNISOLONE 4 MG/1
TABLET ORAL
Status: ON HOLD | COMMUNITY
Start: 2021-11-23 | End: 2022-03-02 | Stop reason: ALTCHOICE

## 2021-11-24 NOTE — LETTER
[facility]    11/24/21  London Emms  1955  EP Procedures     Pacemaker implant (single/dual) XXX EP Study    ICD implant (single/dual) XXX Atrial flutter ablation (CLAUDINE Y/N)    Biv implant ICD  Tilt Table    Biv implant PPM XXX Atrial fibrillation ablation (CLAUDINE Yes)    Generator Change (PPM/ICD/BiV)  SVT ablation    Lead revision (RV/LA/RA) (<1 month)  VT ablation      Lead extraction +/- upgrade (BiV/PPM/ICD)  VT Ischemic/ non-ischemic    Loop implant/ removal  VT RVOT    Cardioversion  VT Left sided    CLAUDINE  AVN ablation     Equipment     [employer]   PEDRITO Mapping System    [employer]  Carto Mapping System    [employer]  CryoAblation    [employer]ron[employer]  Laser Lead Extraction     EP Procedures Scheduling Request    # hours Requested   Scheduled  Date:   Specific Day  Completed    Anesthesia Yes F/u Date:   CT surgery backup  COVID     Overnight stay      Location [facility]       Pre-Procedure Labs / Imaging     PT/INR  Type & cross    CBC  Units PRBC    BMP/Mg  Units FFP    Venogram  Cardiac CTA for Pulmonary vein mapping     RN INITIALS:     Patient Instructions  Do not eat or drink after midnight the night prior to procedure  Dx:PAF  Hold Xarelto for 1 day prior

## 2021-11-24 NOTE — PATIENT INSTRUCTIONS
1. No changes  2. Our  will be contacting you from [phone] to schedule your procedure. Hold Xarelto the day prior to the procedure  3.  Call if blood pressure remains elevated at home

## 2021-12-08 ENCOUNTER — HOSPITAL ENCOUNTER (OUTPATIENT)
Dept: MRI IMAGING | Age: 66
Discharge: HOME | End: 2021-12-08
Payer: MEDICARE

## 2021-12-08 DIAGNOSIS — M47.816 LUMBAR SPONDYLOSIS: ICD-10-CM

## 2021-12-08 PROCEDURE — 72148 MRI LUMBAR SPINE W/O DYE: CPT

## 2022-02-07 ENCOUNTER — TELEPHONE (OUTPATIENT)
Dept: CARDIOLOGY CLINIC | Age: 67
End: 2022-02-07

## 2022-02-07 NOTE — TELEPHONE ENCOUNTER
Pt is scheduled for ablation on 3/15 with Dr. Marah Sosa    If she likes, we can increase her cardizem to 240 mg daily to help reduce her episodes of AF until the ablation, otherwise, would not recommend any other changes    JERMAN Melendez      .

## 2022-02-07 NOTE — TELEPHONE ENCOUNTER
Pt called stating the past few weeks pt has been intermittent in AFIB where she gets extremely dizzy, and AFIB is 10-15 points. Pt still feels fluttering when she is resting not doing anything cony she is extremely tired. As soon as she get up and moves around even going to the bathroom her HR jumps  and she is very SOB. Pt stated she is having rough time.     Pls advise thank you

## 2022-02-18 NOTE — PROGRESS NOTES
Mau العراقي MD  [facility]) Physicians - Rheumatology    [x] [facility]:  [facility]  [address] [facility] [] [place]:  [address], [address]   Office: [phone]  Fax: [phone]     RHEUMATOLOGY PROGRESS NOTE    ASSESSMENT/PLAN:  Ish Colunga is a 74 y.o. female w/ erosive OA of the hands, generalized OA, severe DDD of L-spine (followed by Pain Management) and osteopenia.     PMHx pertinent for HTN, hypothyroidism, anxiety, depression and insomnia.     Current rheum meds:   mg daily: started in 11/2020  Gabapentin 600 mg TID  Lexapro: per PCP  Voltaren gel PRN  Compound pain cream PRN  OTC vitamin D3 daily + calcium 500 mg BID     Prior rheum meds:  Prednisone taper starting w/ 20 mg daily: provided good pain relief  Duloxetine 30 mg daily: caused \"my head to swim\"  NSAIDs: pt states she was told to avoid d/t a \"perforated ulcer\" 3 yrs ago    1. Erosive osteoarthritis of both hands  Assessment & Plan:  - well controlled on HCQ, cont 300 mg daily based on her current weight. Orders:  -     hydroxychloroquine (PLAQUENIL) 200 MG tablet; Take 1.5 tablets by mouth daily, Disp-135 tablet, R-1Normal  -     gabapentin (NEURONTIN) 300 MG capsule; Take 2 capsules by mouth 3 times daily for 90 days. , Disp-540 capsule, R-1Normal  2. Primary osteoarthritis involving multiple joints  -     gabapentin (NEURONTIN) 300 MG capsule; Take 2 capsules by mouth 3 times daily for 90 days. , Disp-540 capsule, R-1Normal  3. Multilevel degenerative disc disease  Assessment & Plan:  - following w/ Dr. Marcela Martinez for DDD of the cervical and lumbar spine. - cont Gabapentin. D/w pt that we cannot prescribe long term Tramadol, will defer to Pain Management. Orders:  -     gabapentin (NEURONTIN) 300 MG capsule; Take 2 capsules by mouth 3 times daily for 90 days. , Disp-540 capsule, R-1Normal  4.  Age-related osteoporosis with current pathological fracture, initial encounter  Assessment & Plan:  - discussed her new Dx of osteoporosis based on recent fragility fracture - pt sustained a R sacral insufficiency fracture from a ground level fall in 10/2021. Prior DEXA studies showed osteopenic T-scores. - discussed the disease course of osteoporosis and provided pt handout. - start Fosamax. Discussed s/e and risks of Fosamax including flu like Sx, arthralgias, myalgias and jaw osteonecrosis. - cont daily vitamin D and calcium supplements. Vitamin D level at goal.  Orders:  -     Creatinine; Future  -     PTH, Intact; Future  -     TSH with Reflex to FT4; Future  -     alendronate (FOSAMAX) 70 MG tablet; Take 1 tablet by mouth every 7 days, Disp-12 tablet, R-1Normal  5. High risk medication use  Assessment & Plan:  - annual eye exam for HCQ toxicity monitoring. 6. Encounter for therapeutic drug monitoring  -     Creatinine; Future  7. Other osteoporosis without current pathological fracture   -     TSH with Reflex to FT4; Future     Return in about 4 months (around 6/23/2022) for lab result discussion and treatment plan, medication monitoring. The risks and benefits of my recommendations, as well as other treatment options, benefits and side effects were discussed with the patient today. Questions were answered. NOTE: This report is transcribed by using voice recognition software dragon. Every effort is made to ensure accuracy; however, inadvertent computerized  transcription errors may be present. SUBJECTIVE:  Past medical/surgical history, medications and allergies are reviewed and updated as appropriate. Interval Hx:    Pt reports good pain relief from HCQ. She denies any significant arthralgias or joint swelling in her hand joints today. Pt states she is following w/ Dr. Viktoria Mata. She tells me she recently had a \"nerve block\" done to her cervical spine and she plans to to get \"nerve ablation\" in the future. She reports good pain relief from Gabapentin.  She is wondering if she can get a refill on Tramadol. Pt states she sustained a R sacral insufficiency fracture from a ground level fall at a wedding in 10/2021. She tells me her primary care provider recently started her on calcium supplement. Rheumatologic ROS:  Constitutional: denies chronic fatigue, fever/chills, night sweats, unintentional weight loss  Integumentary: denies photosensitivity, rash, patchy alopecia, or Sx of Raynaud's phenomenon  Eyes: denies dry eyes, redness or pain, visual disturbance, or floaters  Oral cavity: +extreme dry mouth which she attributes to her thyroid medicine, +canker sores  Cardiovascular: denies CP, palpitations, Hx of pericardial effusion or pericarditis  Respiratory: denies SOB, cough, hemoptysis, or pleurisy  Musculoskeletal:  refer to above HPI     Allergies   Allergen Reactions    Nsaids      Note: PERFORATED PEPTIC ULCER       Past Medical History:        Diagnosis Date    Anxiety     Arthritis     Depression     Hypertension     Hypothyroidism        Past Surgical History:        Procedure Laterality Date    ABDOMINAL EXPLORATION SURGERY  02/12/2018    LAPAROTOMY EXPLORATORY, REPAIR PERFORATED ULCER    BLADDER REPAIR      BREAST ENHANCEMENT SURGERY      HYSTERECTOMY      partial    PARTIAL HYSTERECTOMY      SHOULDER SURGERY      ligament moved up left shoulder       Medications:    Current Outpatient Medications   Medication Sig Dispense Refill    hydroxychloroquine (PLAQUENIL) 200 MG tablet Take 1.5 tablets by mouth daily 135 tablet 1    gabapentin (NEURONTIN) 300 MG capsule Take 2 capsules by mouth 3 times daily for 90 days.  540 capsule 1    alendronate (FOSAMAX) 70 MG tablet Take 1 tablet by mouth every 7 days 12 tablet 1    traMADol (ULTRAM) 50 MG tablet TAKE 1 TABLET BY MOUTH EVERY 12 HOURS AS NEEDED      dilTIAZem (CARDIZEM CD) 180 MG extended release capsule Take 1 capsule by mouth daily (Patient taking differently: Take 240 mg by mouth daily ) warmth NTTP              Feet: no toe swelling or pain or warmth on palpation w/ FROM, negative MTP squeeze test  INTEGUMENT: no rash or psoriatic lesions, no petechiae, bruises, or palpable purpura, no patchy alopecia, no nail or periungual changes, no clubbing or digital ulcers    DATA:  Labs: I personally reviewed interval labs and discussed w/ the pt in detail which showed:    Lab Results   Component Value Date    WBC 5.6 10/30/2021    HGB 12.4 10/30/2021    HCT 36.0 10/30/2021    .0 10/30/2021     10/30/2021    LYMPHOPCT 23.7 10/29/2021    RBC 3.60 (L) 10/30/2021    MCH 34.4 (H) 10/30/2021    MCHC 34.4 10/30/2021    RDW 13.4 10/30/2021     Lab Results   Component Value Date     10/30/2021    K 3.9 10/30/2021     10/30/2021    CO2 20 (L) 10/30/2021    BUN 22 (H) 10/30/2021    CREATININE 0.7 10/30/2021    GLUCOSE 101 (H) 10/30/2021    CALCIUM 9.3 10/30/2021    PROT 7.4 10/29/2021    LABALBU 4.4 10/29/2021    BILITOT 0.6 10/29/2021    ALKPHOS 96 10/29/2021    AST 30 10/29/2021    ALT 35 10/29/2021    LABGLOM >60 10/30/2021    GFRAA >60 10/30/2021    AGRATIO 1.5 10/29/2021    GLOB 2.4 05/25/2021     Lab Results   Component Value Date    VITD25 58.9 05/25/2021     Lab Results   Component Value Date    LABURIC 5.3 11/04/2020    LABURIC 5.1 05/23/2017     Lab Results   Component Value Date    CRP 5.9 (H) 12/17/2020    CRP 4.1 11/04/2020     Lab Results   Component Value Date    SEDRATE 13 12/17/2020    SEDRATE 10 05/23/2017     No results found for: CKTOTAL    Negative RF x 2 (5/23/17, 11/4/20)  Negative CCP (11/4/20)  Negative MARCIA x 2 (5/23/17, 11/4/20)  Negative SSA, SSB (11/4/20)  TSH wnl (5/29/20)  PTH intact wnl (11/4/20)  SPEP and UPEP: no monoclonal band (11/4/20)    Imaging:  I personally reviewed interval imaging and discussed w/ the pt in detail which included:    X-rays (11/4/20):  R hand:     There is no acute fracture.  Polyarticular osteoarthritis is present worse in the interphalangeal and trapeziometacarpal joints.      L hand:  Left hand:   There is no acute fracture. Polyarticular osteoarthritis is present worse in the interphalangeal and trapeziometacarpal joints. There is no destructive bone lesion seen.      L-spine:  The vertebral body heights are maintained. There is grade 1 anterolisthesis of L4 on L5. Severe degenerative disc disease at L4-L5 and L5-S1. Facet osteoarthritis in the lower lumbar spine.      Pelvis/hips:  No acute fractures seen. The hip joints are congruent. There is no destructive osseous lesion.      I independently reviewed above x-rays, there are degenerative changes in the b/l PIP joints and significant joint space narrowing w/ gull wing deformities in the b/l DIP joints c/w erosive OA.     MRI L-spine (12/8/21): IMPRESSION:  1. Acute/subacute right sacral insufficiency fractures. 2. Severe right and moderate left neural foraminal narrowing at L4-5 secondary to grade 1 anterolisthesis, disc bulge and facet arthropathy. 3. Left lateral recess disc protrusion at L3-4 superimposed upon a disc bulge and facet arthropathy effacing the left lateral recess and mildly narrowing the left neural foramen. 4. Mild bilateral neural foraminal narrowing at L5-S1. The findings were sent to the Radiology Results [id number] at 8:08 am on 12/9/2021to be communicated to a licensed caregiver. MRI C-spine (2/1/22): IMPRESSION:  Moderate multilevel cervical disc degeneration, uncovertebral joint and facet joint osteoarthritis with degenerative anterior listhesis C4-5 and C7-T1. Mild central stenosis C6-7 with moderately severe bilateral foraminal narrowing. Moderate left foraminal narrowing C 2-3, moderately severe bilateral foraminal narrowing C3-4, mild bilateral foraminal narrowing C4-5 and mild left foraminal narrowing C5-6.     DEXA study (4/24/15):  T-score -1.7 in L-spine  T-score of -1.5 in L femoral neck.     DEXA study (12/8/20):  T-score -1.9 in L1-L4  T-score -1.6 in the L femoral neck  T-score -1.9 in the R femoral neck  I personally calculated her FRAX scores:  Major osteoporosis fx risk 10%  Hip fx risk 1.4%    Above results were discussed w/ the pt in detail during today's visit.

## 2022-02-23 ENCOUNTER — OFFICE VISIT (OUTPATIENT)
Dept: RHEUMATOLOGY | Age: 67
End: 2022-02-23
Payer: MEDICARE

## 2022-02-23 VITALS
HEART RATE: 72 BPM | DIASTOLIC BLOOD PRESSURE: 64 MMHG | SYSTOLIC BLOOD PRESSURE: 124 MMHG | BODY MASS INDEX: 30.48 KG/M2 | OXYGEN SATURATION: 96 % | HEIGHT: 63 IN | WEIGHT: 172 LBS

## 2022-02-23 DIAGNOSIS — M81.8 OTHER OSTEOPOROSIS WITHOUT CURRENT PATHOLOGICAL FRACTURE: ICD-10-CM

## 2022-02-23 DIAGNOSIS — M15.4 EROSIVE OSTEOARTHRITIS OF BOTH HANDS: Primary | ICD-10-CM

## 2022-02-23 DIAGNOSIS — M15.9 PRIMARY OSTEOARTHRITIS INVOLVING MULTIPLE JOINTS: ICD-10-CM

## 2022-02-23 DIAGNOSIS — M53.9 MULTILEVEL DEGENERATIVE DISC DISEASE: ICD-10-CM

## 2022-02-23 DIAGNOSIS — M80.00XA AGE-RELATED OSTEOPOROSIS WITH CURRENT PATHOLOGICAL FRACTURE, INITIAL ENCOUNTER: ICD-10-CM

## 2022-02-23 DIAGNOSIS — Z79.899 HIGH RISK MEDICATION USE: ICD-10-CM

## 2022-02-23 DIAGNOSIS — Z51.81 ENCOUNTER FOR THERAPEUTIC DRUG MONITORING: ICD-10-CM

## 2022-02-23 LAB
CREAT SERPL-MCNC: 0.6 MG/DL (ref 0.6–1.2)
GFR AFRICAN AMERICAN: >60
GFR NON-AFRICAN AMERICAN: >60
PARATHYROID HORMONE INTACT: 36.6 PG/ML (ref 14–72)
TSH REFLEX FT4: 1.5 UIU/ML (ref 0.27–4.2)

## 2022-02-23 PROCEDURE — G8427 DOCREV CUR MEDS BY ELIG CLIN: HCPCS | Performed by: INTERNAL MEDICINE

## 2022-02-23 PROCEDURE — 1036F TOBACCO NON-USER: CPT | Performed by: INTERNAL MEDICINE

## 2022-02-23 PROCEDURE — G8399 PT W/DXA RESULTS DOCUMENT: HCPCS | Performed by: INTERNAL MEDICINE

## 2022-02-23 PROCEDURE — 1090F PRES/ABSN URINE INCON ASSESS: CPT | Performed by: INTERNAL MEDICINE

## 2022-02-23 PROCEDURE — 99214 OFFICE O/P EST MOD 30 MIN: CPT | Performed by: INTERNAL MEDICINE

## 2022-02-23 PROCEDURE — 1123F ACP DISCUSS/DSCN MKR DOCD: CPT | Performed by: INTERNAL MEDICINE

## 2022-02-23 PROCEDURE — 4040F PNEUMOC VAC/ADMIN/RCVD: CPT | Performed by: INTERNAL MEDICINE

## 2022-02-23 PROCEDURE — 3017F COLORECTAL CA SCREEN DOC REV: CPT | Performed by: INTERNAL MEDICINE

## 2022-02-23 PROCEDURE — G8417 CALC BMI ABV UP PARAM F/U: HCPCS | Performed by: INTERNAL MEDICINE

## 2022-02-23 PROCEDURE — G8484 FLU IMMUNIZE NO ADMIN: HCPCS | Performed by: INTERNAL MEDICINE

## 2022-02-23 RX ORDER — GABAPENTIN 300 MG/1
600 CAPSULE ORAL 3 TIMES DAILY
Qty: 540 CAPSULE | Refills: 1 | Status: SHIPPED | OUTPATIENT
Start: 2022-02-23 | End: 2022-05-18 | Stop reason: SDUPTHER

## 2022-02-23 RX ORDER — HYDROXYCHLOROQUINE SULFATE 200 MG/1
200 TABLET, FILM COATED ORAL DAILY
Qty: 90 TABLET | Refills: 0 | Status: SHIPPED | OUTPATIENT
Start: 2022-02-23 | End: 2022-02-23

## 2022-02-23 RX ORDER — HYDROXYCHLOROQUINE SULFATE 200 MG/1
300 TABLET, FILM COATED ORAL DAILY
Qty: 135 TABLET | Refills: 1 | Status: SHIPPED | OUTPATIENT
Start: 2022-02-23 | End: 2022-05-18 | Stop reason: SDUPTHER

## 2022-02-23 RX ORDER — GABAPENTIN 300 MG/1
600 CAPSULE ORAL 3 TIMES DAILY
Qty: 540 CAPSULE | Refills: 0 | Status: SHIPPED | OUTPATIENT
Start: 2022-02-23 | End: 2022-02-23

## 2022-02-23 RX ORDER — ALENDRONATE SODIUM 70 MG/1
70 TABLET ORAL
Qty: 12 TABLET | Refills: 0 | Status: SHIPPED | OUTPATIENT
Start: 2022-02-23 | End: 2022-02-23

## 2022-02-23 RX ORDER — ALENDRONATE SODIUM 70 MG/1
70 TABLET ORAL
Qty: 12 TABLET | Refills: 1 | Status: SHIPPED | OUTPATIENT
Start: 2022-02-23 | End: 2022-05-18 | Stop reason: SINTOL

## 2022-02-23 NOTE — ASSESSMENT & PLAN NOTE
- discussed her new Dx of osteoporosis based on recent fragility fracture - pt sustained a R sacral insufficiency fracture from a ground level fall in 10/2021. Prior DEXA studies showed osteopenic T-scores. - discussed the disease course of osteoporosis and provided pt handout. - start Fosamax. Discussed s/e and risks of Fosamax including flu like Sx, arthralgias, myalgias and jaw osteonecrosis. - cont daily vitamin D and calcium supplements.  Vitamin D level at goal.

## 2022-02-23 NOTE — ASSESSMENT & PLAN NOTE
- following w/ Dr. Milton Augustine for DDD of the cervical and lumbar spine. - cont Gabapentin. D/w pt that we cannot prescribe long term Tramadol, will defer to Pain Management.

## 2022-02-23 NOTE — PATIENT INSTRUCTIONS
Osteoporosis              Osteoporosis is a common condition where bones become weak, affecting both men and women, mainly as they grow older. Fortunately, you can take steps to reduce your risk of osteoporosis. By doing so, you can avoid the often-disabling broken bones (fractures) that can result from this condition. If you already have osteoporosis, new medications are available to slow or even stop the bones from getting weaker. These medicines also can decrease the chance of having a fracture. Fast facts  Age is not the only risk factor for osteoporosis. Lifestyle choices, certain diseases and even medications can lead to this condition. A simple test known as a bone density scan can give important information about your bone health. Newer medications can slow and even stop the progression of bones getting weaker, and can help decrease fracture risk. Osteoporosis is a condition of weak bones, which results from a loss of bone mass and a change in bone structure. The picture at left is normal bone, and the one at right shows osteoporotic bone. What is Osteoporosis? Osteoporosis is a \"silent\" condition where the bones are weak and prone to fracture. Bone is living tissue that is in a constant state of regeneration. That is, the body removes old bone (called bone resorption) and replaces it with new bone (bone formation). By their mid-30s, most people begin to slowly lose more bone than can be replaced. As a result, bones become thinner and weaker in structure. Osteoporosis is silent because there are no symptoms (what you feel). It may come to your attention only after you break a bone. When you have this condition, a fracture can occur even after a minor injury, such as a fall. The most common fractures occur at the spine, wrist and hip. Spine and hip fractures, in particular, may lead to chronic (long-term) pain and disability, and even death.  The main goal of treating osteoporosis is to prevent such fractures in the first place. What causes osteoporosis? Osteoporosis results from a loss of bone mass (measured as bone density) and from a change in bone structure. Many factors will raise your risk of developing osteoporosis and breaking a bone. You can change some of these risk factors, but not others. Recognizing your risk factors is important so you can take steps to prevent this condition or treat it before it becomes worse.   Major risk factors that you cannot change include:  Older age (starting in the [address] but more likely with advancing age)   Non- white or  ethnic background   Small bone structure   Family history of osteoporosis or an osteoporosis-related fracture in a parent or sibling   Prior fracture due to a low-level injury, particularly after age 48  Risk factors that you may be able to change include:  Low levels of sex hormone, mainly estrogen in women (e.g., menopause)   The eating disorders anorexia nervosa and bulimia   Cigarette smoking   Alcohol abuse   Low calcium and vitamin D, from low intake in your diet or inadequate absorption in your gut   Sedentary (inactive) lifestyle or immobility   Certain medications, including the following:   glucocorticoid medications (also called corticosteroids), such as prednisone (brand names: Fatmata [place], etc.) or prednisolone (Prelone); see fact sheet on glucocorticoid-induced osteoporosis   excess thyroid hormone replacement in those taking medications for low thyroid or hypothyroidism   heparin, a commonly-used blood thinner   some treatments that deplete sex hormones, such as anastrozole (Arimidex) and letrozole (Femara) to treat breast cancer or leuprorelin (Lupron) to treat prostate cancer and other health problems  Diseases that can affect bones   endocrine (hormone) diseases (hyperthyroidism, hyperparathyroidism, Cushing's disease, etc.)   inflammatory arthritis (rheumatoid arthritis, ankylosing spondylitis, etc.)  Who gets osteoporosis? Osteoporosis is more common in older women, mainly non- white and  women. Yet it can occur at any age, in men as well as women, and in all ethnic groups. People over age 48 are at greatest risk of developing osteoporosis and having related fractures. Over age 48, one in two women and one in six men will suffer an osteoporosis-related fracture at some point in their lives. In the [place] about 4.5 million women and 0.8 million men over the age of 48 have osteoporosis, according to 200506 data. These figures are lower than older estimates, suggesting that osteoporosis is decreasing in the population. This is consistent with recent trends seen in decreasing rates of hip fracture. However, another 22.7 million women and 11.8 million men over age 48 have low bone mass (known as osteopenia). People with low bone mass are also at higher risk of fractures, but it is not as high as for people with osteoporosis. If bone loss continues, people with osteopenia can become osteoporotic. As the bones of the spine (vertebrae) weaken in osteoporosis, fractures can occur, causing the bones to collapse and get shorter. This can lead to a loss of height and a forward curving of the spine (left picture). How is osteoporosis diagnosed? You can learn if you have osteoporosis by having a simple test that measures bone mineral densitysometimes called BMD. BMD the amount of bone you have in a given areais measured at different parts of your body. Often the measurements are at your spine and your hip, including a part of the hip called the femoral neck, at the top of the thighbone (femur). Dual energy X-ray absorptiometry (referred to as DXA or DEXA and pronounced \"dex-uh\") is the best current test to measure BMD.  The test is quick and painless. It is similar to an X-ray, but uses much less radiation.  Even so, pregnant women should not have this test, to avoid any risk of harming the fetus.  DXA test results are scored compared with the BMD of young, healthy people. This results in a measure called a T-score. The scoring is as follows:  DXA T-score Bone mineral density (BMD)   Not lower than 1.0 Normal   Between 1.0 and 2.5 Osteopenia (mild BMD loss)   2.5 or lower Osteoporosis   The risk of fracture most often is lower in people with osteopenia than those with osteoporosis. But, if bone loss continues, the risk of fracture increases. How is osteoporosis treated? If you have osteoporosis, your health care provider will advise the following:  Calcium. Make sure you are getting enough calcium in your diet or you might need to consider taking supplements. The National Osteoporosis Foundation recommends 1,000 milligrams (shortened as mg) per day for most adults and 1,200 mg per day for women over age 48 or men over age 79. Vitamin D. Get adequate amounts of vitamin D, which is important to help your body absorb calcium from foods you eat. The recommended daily dose is 400800 International Units (called IU) for adults younger than age 48, and 803,12 IU for those age 48 and older. (These are the current guidelines from the [address] [place] [address] Melvina [place] may need a different dose depending on your blood level of vitamin D. Physical activity. Get exercise most days, especially weight-bearing exercise, such as walking. Some people also will need medication. A number of medications are available for the prevention and/or treatment (\"management\") of osteoporosis. Some people also will need medication. A number of medications are available for the prevention and/or treatment (\"management\") of osteoporosis. Bisphosphonates. The [employer] and Drug Administration (better known as the FDA) has approved certain drugs called bisphosphonates to prevent and treat osteoporosis.  This class of drugs (often called \"antiresorptive\" drugs) helps slow bone loss, and studies show they can decrease the risk of fractures. The Table shows the drug names and dosing (how often you receive the drug) of bisphosphonates approved in the [address] [place] [address] for management of osteoporosis. Bisphosphonate Medications for Osteoporosis (OP)   Generic drug name Brand name FDA approved uses for OP Dosing and form   alendronate Fosamax Prevention and treatment of postmenopausal OP in women    Treatment of OP in men    Treatment of OP due to use of glucocorticoid medicines in women and men Once-daily or once-weekly pills   risedronate Actonel Prevention and treatment of postmenopausal OP in women    Treatment of OP in men    Prevention and treatment of OP due to use of glucocorticoid medicines in women and men Once-daily, once-weekly or once-monthly pills   ibandronate Boniva Prevention and treatment of postmenopausal OP in women Once-monthly pills, or every three months by intravenous infusion (often called IV) given through a vein   zoledronic acid Reclast Same as for risedronate Once a year by IV   With all of these medications, you should make sure you are taking enough calcium and vitamin D, and that the vitamin D levels in your body are not low. (Your doctor can measure your vitamin D level with a blood test.) Alendronate, risedronate and ibandronate are pills that you must take on an empty stomach with water only, or else you will not properly absorb the medicine. These drugs sometimes can irritate the esophagus (the tube that goes from the throat to the stomach). Therefore, you should remain upright for at least an hour after taking these medications. Other bisphosphonates include clodronate (Bonefos), etidronate (Didronel), pamidronate (Aredia) and tiludronate (Skelid). They are used to treat other bone diseases but are not FDA approved for osteoporosis treatment. In some other countries, clodronate is approved for osteoporosis treatment. The bisphosphonates are also used to treat cancer that has spread to the bones.  The dose used is most often higher than for osteoporosis. Zoledronic acid used in cancer treatment is marketed under another name (Zometa). There have been reports of rare side effects that may be linked to use of bisphosphonates. These include osteonecrosis of the jaw (also called jaw osteonecrosis or ONJ) and atypical femoral fractures:  Osteonecrosis of the jaw. There have been reports of ONJ (permanent damage of the bones of the jaw) resulting after use of bisphosphonates, mostly in people who recently had a dental procedure or had dental disease. Most cases were in people who received high-dose IV bisphosphonates for cancer treatment. The risk of this problem in people taking these medications at doses recommended for osteoporosis management seems to be very low. Still, doctors recommend that anyone taking a bisphosphonate have good oral hygiene and regular dental care. Atypical femoral fractures. Uncommon types of thighbone fractures have occurred in a small percent of people using bisphosphonates long term for their osteoporosis. Again, this risk appears to be very low, especially compared with the number of fractures that bisphosphonates prevent. Calcitonin (Calcimar, Miacalcin). This medication, a hormone made from the thyroid gland, is given most often as a nasal spray or as an injection (shot) under the skin. It is FDA- approved for the management of postmenopausal osteoporosis and helps prevent vertebral (spine) fractures. It also is helpful in controlling pain after an osteoporotic vertebral fracture. Estrogen or hormone replacement therapy. Estrogen treatment alone or combined with another hormone, progestin, has been shown to decrease the risk of osteoporosis and osteoporotic fractures in women. However, combination estrogen and progestin can increase the risk of breast cancer, strokes, heart attacks and blood clots. Estrogens alone may raise the risk of strokes.  Consult with your doctor about whether hormone replacement therapy is right for you. Selective estrogen receptor modulators. These medications, often referred to as SERMs, mimic estrogen's good effects on bones without some of the serious side effects such as breast cancer. However, there is still a risk of blood clots and stroke with use of SERMs. The SERM raloxifene (Evista) decreases the risk of spine fractures in women. It is approved for use only in postmenopausal women. Teriparatide (Forteo). Teriparatide is a form of parathyroid hormone that helps stimulate bone formation. It is approved for use in postmenopausal women and men at high risk of osteoporotic fracture. It also is approved for treatment of glucocorticoid-induced osteoporosis. It is given as a daily injection under the skin and can be used for up to two years. If you have ever had radiation treatment or your parathyroid hormone levels are already too high, you may not be able to take this drug. Strontium ranelate. This medicine is approved for managing postmenopausal osteoporosis in several countries around the world, but not the [place] (Brand names include Protelos, Protos, Osseor, Bivalos, Protaxos and Ossum.) Studies show it lowers the fracture risk in postmenopausal women. The drug comes as a powder, which women dissolve in water and take daily. Because of an increased risk of blood clots, it should be used with caution in women who have a history of or risk of blood clots such as deep venous thrombosis or pulmonary embolism. Denosumab (Prolia). This new class of \"antiresorptive\" drug is a fully human monoclonal antibody, a type of immune therapy. It works against a protein that interferes with the survival of bone-resorbing cells. This treatment is approved for use in postmenopausal women who have osteoporosis and are at high risk of fracture.  Another approved use is for women and men at high risk of bone loss and fractures from hormone-depleting medications used to treat breast and prostate cancer. Patients receive this medicine as an injection under the skin every six months. This medication can make your calcium levels go very low, so your calcium and vitamin D levels should not be low when you start to take this medicine. There may be an increased risk of infections when using this drug. There have also been rare reports of ONJ linked to use of denosumab. This drug is also approved for the treatment of cancer involving the bones, and is marketed under another name (Vadim Michel). [facility] women and pregnancy  Young women who have risk factors for osteoporosis and fractures need to carefully consider their medication options if they are planning a pregnancy. None of the drugs for managing osteoporosis has enough safety data available to recommend using them in women who are pregnant or breastfeeding. Bisphosphonates, even after you stop taking them, can stay in your body a long time. Animal studies show that bisphosphonates cross the mother's placenta and enter the fetus. The risk of harm to the fetus in humans is not known. Thus, women who want to become pregnant later should weigh the expected benefits of bisphosphonates against the possible risks. If a woman who has taken a bisphosphonate becomes pregnant, she should have her blood calcium levels checked, because they could become low. Prevention  Lifestyle changes may be the best way of preventing osteoporosis. Here are some tips:  Make sure you get enough calcium in your diet or through supplements (roughly 1,0001,200 mg/day, but will depend on your age). Get enough vitamin D (4001,000 IU/day, depending on your age and your blood level of vitamin D measured by your doctor). Stop smoking. Avoid excess alcohol intake: no more than two or three drinks a day. Engage in weight-bearing exercise. Aim for at least 2½ hours a week (30 minutes a day five times a week or 50 minutes a day three times a week), or as much as you can.  Exercises that can improve balance, such as Justin Chi or yoga, may help prevent falls. You also should get treatment of any underlying medical problem that can cause osteoporosis. If you are on a medication that can cause osteoporosis, ask your doctor if you can lower the dose or take another type of medicine. Never change the dose or stop taking any medicine without speaking to your doctor first.  If you have low bone density and a high risk of breaking a bone, your doctor may suggest medicine to prevent your bones from getting weaker. (See the section \"How is glucocorticoid-osteoporosis treated? \") Health care providers now have a tool for estimating the risk of a patient's having an osteoporotic fracture in the next 10 years. This fracture risk assessment tool, from the [employer], is called FRAX. The score can help guide treatment decisions. What is the broader health impact of osteoporosis? The most serious health consequence of osteoporosis is a fracture. Spine and hip fractures especially may lead to chronic pain, long-term disability and even death. The main goal of treating osteoporosis is to prevent fractures. Living with osteoporosis  If you have osteoporosis, it is important to help prevent not just further bone loss but also a fracture. Here are some ways to decrease your chance of falls:  Use a walking aid. If you are unsteady, use a cane or walker. Remove hazards in the home. Remove throw rugs. Also, remove or secure loose wires or cables that may make you trip. Add nightlights in the hallways leading to the bathroom. Install grab bars in the bathroom and nonskid mats near sinks and the tub. Get help carrying or lifting heavy items. If you are not careful, you could fall, or even suffer a spine fracture without falling. Wear sturdy shoes. This is above all true in winter or when it rains. Points to remember  Make sure there is enough calcium and vitamin D in your diet.    Be physically active and do weight-bearing exercises, like walking, most days each week. Change lifestyle choices that raise your risk of osteoporosis. Implement strategies to help decrease your risk of falling. Appropriate exercise is key in the treatment of osteoporosis. The rheumatologist's role in the treatment of osteoporosis  As doctors who are experts in diagnosing and treating diseases of the joints, muscles and bones, rheumatologists can help find the cause of osteoporosis. They can provide and monitor the best treatments for this condition. To find a rheumatologist  For more information about rheumatologists, click here. Learn more about rheumatologists and rheumatology health professionals. For more information   The [employer] of Rheumatology has compiled this list to give you a starting point for your own additional research. The ACR does not endorse or maintain these Web sites, and is not responsible for any information or claims provided on them. It is always best to talk with your rheumatologist for more information and before making any decisions about your care. [facility]  www. osteo. org  FRAX: World Health Organization Fracture Risk Assessment Tool  www. shef.ac.uk/FRAX  Rheumatology [address] advances research and training to improve the health of people with rheumatic diseases. www. rheumatology. org/REF      Calcium and Vitamin D     Why do I need calcium and vitamin D? Calcium and vitamin D are important for bone health. Nerves, muscles, and blood vessels need calcium to work. Vitamin D helps the body absorb calcium, and is needed for immune system function. There is some evidence that vitamin D helps prevent cancer and cardiovascular disease. What are sources of calcium and vitamin D?   Calcium is found in foods. Dairy products are good sources. Eight ounces of yogurt (228 gram) or milk (1 cup [236 mL]), or a 1.5-oz. (43 gram) serving of cheese, can provide around 300 mg. Fortified orange juice can provide 300 mg per 8-oz. (236 mL) serving. Vitamin D is made by sun-exposed skin and is found in some foods. One of the best sources is salmon. A 3-oz. (86 gram) serving of sockeye salmon provides almost 800 IU. A 3-oz. serving of tuna canned in water provides about 150 IU. Dairy products fortified with vitamin D are good sources. Examples include a cup of fortified milk (115 to 124 IU), a cup of fortified orange juice (80 IU), or 6-ozs. (171 grams) of fortified yogurt (80 IU). Calcium and vitamin D are also available as supplements. Do I need a supplement? Are they safe? Many people are low on vitamin D. It is hard to get enough vitamin D from food, and most people don't get much sun exposure because they use sunscreens, spend long hours indoors, or live at a [facility]. Most people need a vitamin D supplement. Ask if you should have your vitamin D level checked. People typically get 300 mg calcium from their diet daily, not including dairy. If you include two servings of high-calcium foods (e.g., dairy), you can get around 900 mg per day. Supplementation with just 300 mg of calcium daily, or adding a third high-calcium serving, will provide 1200 mg daily. You may have heard calcium supplements are unsafe. While there has been negative press about heart attacks and prostate cancer, calcium supplements have not been proven to be unsafe. But don't go overboard with calcium supplements; get your calcium from diet when possible. However, avoid calcium supplements from coral or dolomite (a kind of limestone); they can contain heavy metals like lead. How do I choose a calcium or vitamin D supplement? Most calcium products contain calcium carbonate (e.g., Tums, Caltrate) or calcium citrate (e.g., Citracal). Both work. Calcium carbonate is cheap and provides the most calcium per dose. (Read the product label to check the calcium content \"per serving,\" as this can vary depending on the type of calcium you select.) Calcium citrate may be better for patients who don't absorb calcium as well, like older people or those on heartburn medications (e.g., omeprazole [Prilosec; Losec (Shay)], ranitidine [Zantac], others). Calcium is best absorbed if no more than 500 mg is taken at a time. Some supplements contain other ingredients (e.g., magnesium, vitamin K), but these don't work any better than those with just calcium. Vitamin D is available over-the-counter in combination with calcium or by itself. There are also high-dose vitamin D products that are prescribed if you have low vitamin D levels. It is okay to take a multivitamin or eat vitamin D-containing foods while taking prescription-strength vitamin D. Vitamin D is available as either vitamin D2 or vitamin D3. Either can be used. Look for a vitamin D supplement that is USP Verified (in [place] ([facility]), a product with a Natural Product Number [NPN]). These products meet certain quality standards. How much calcium and vitamin D do I need? Women up to 48years old and men up to age 79 need 1000 mg of calcium daily. Women over 48years old and men over 70 need 1200 mg of calcium daily. The vitamin D RDA (recommended dietary allowance) has recently been increased to 600 IU daily for adults under age 79 and 800 IU daily for people over age 79 to keep bones strong. But most experts recommend that adults get 800 IU to 2000 IU of vitamin D daily for optimal health benefits.

## 2022-02-24 ENCOUNTER — TELEPHONE (OUTPATIENT)
Dept: CARDIOLOGY CLINIC | Age: 67
End: 2022-02-24

## 2022-02-24 ENCOUNTER — HOSPITAL ENCOUNTER (OUTPATIENT)
Age: 67
Discharge: HOME | End: 2022-02-24
Payer: MEDICARE

## 2022-02-24 DIAGNOSIS — Z01.818 PRE-OP TESTING: Primary | ICD-10-CM

## 2022-02-24 PROCEDURE — U0003 INFECTIOUS AGENT DETECTION BY NUCLEIC ACID (DNA OR RNA); SEVERE ACUTE RESPIRATORY SYNDROME CORONAVIRUS 2 (SARS-COV-2) (CORONAVIRUS DISEASE [COVID-19]), AMPLIFIED PROBE TECHNIQUE, MAKING USE OF HIGH THROUGHPUT TECHNOLOGIES AS DESCRIBED BY CMS-2020-01-R: HCPCS

## 2022-02-24 PROCEDURE — U0005 INFEC AGEN DETEC AMPLI PROBE: HCPCS

## 2022-02-24 NOTE — TELEPHONE ENCOUNTER
Spoke with patient regarding needing to r/s procedure.  Moved from 3/15 to 3/2 @ 7:30am, 6:45am arrival.

## 2022-02-25 LAB — SARS-COV-2: NOT DETECTED

## 2022-03-01 ENCOUNTER — TELEPHONE (OUTPATIENT)
Dept: CARDIOLOGY CLINIC | Age: 67
End: 2022-03-01

## 2022-03-01 NOTE — TELEPHONE ENCOUNTER
Patient cannot have her procedure that close to ablation of atrial fibrillation.  She needs to wait till 3 months following her ablation to be able to hold her anticoagulation for a procedure

## 2022-03-01 NOTE — TELEPHONE ENCOUNTER
Called the office to find out why they are requesting xarelto to be held.  They sent a message back and will call us back

## 2022-03-01 NOTE — TELEPHONE ENCOUNTER
Martin Bravo from [facility] Dr[facility] Beulah Gonzalez office called, wants to hold rivaroxaban (xarelto) 20 mg tablets for three days. Please call to advise. Thank you.

## 2022-03-02 ENCOUNTER — ANESTHESIA EVENT (OUTPATIENT)
Dept: CARDIAC CATH/INVASIVE PROCEDURES | Age: 67
End: 2022-03-02
Payer: MEDICARE

## 2022-03-02 ENCOUNTER — ANESTHESIA (OUTPATIENT)
Dept: CARDIAC CATH/INVASIVE PROCEDURES | Age: 67
End: 2022-03-02
Payer: MEDICARE

## 2022-03-02 ENCOUNTER — HOSPITAL ENCOUNTER (OUTPATIENT)
Dept: CARDIAC CATH/INVASIVE PROCEDURES | Age: 67
Setting detail: OBSERVATION
Discharge: HOME | End: 2022-03-03
Attending: INTERNAL MEDICINE | Admitting: INTERNAL MEDICINE
Payer: MEDICARE

## 2022-03-02 VITALS
DIASTOLIC BLOOD PRESSURE: 58 MMHG | SYSTOLIC BLOOD PRESSURE: 99 MMHG | RESPIRATION RATE: 5 BRPM | OXYGEN SATURATION: 97 % | TEMPERATURE: 97.5 F

## 2022-03-02 LAB
ABO/RH: NORMAL
ANION GAP SERPL CALCULATED.3IONS-SCNC: 11 MMOL/L (ref 3–16)
ANTIBODY SCREEN: NORMAL
BUN BLDV-MCNC: 21 MG/DL (ref 7–20)
CALCIUM SERPL-MCNC: 9.2 MG/DL (ref 8.3–10.6)
CHLORIDE BLD-SCNC: 102 MMOL/L (ref 99–110)
CO2: 21 MMOL/L (ref 21–32)
CREAT SERPL-MCNC: 0.6 MG/DL (ref 0.6–1.2)
GFR AFRICAN AMERICAN: >60
GFR NON-AFRICAN AMERICAN: >60
GLUCOSE BLD-MCNC: 101 MG/DL (ref 70–99)
HCT VFR BLD CALC: 35 % (ref 36–48)
HEMOGLOBIN: 12.2 G/DL (ref 12–16)
LV EF: 48 %
LVEF MODALITY: NORMAL
MCH RBC QN AUTO: 35.5 PG (ref 26–34)
MCHC RBC AUTO-ENTMCNC: 34.9 G/DL (ref 31–36)
MCV RBC AUTO: 101.7 FL (ref 80–100)
PDW BLD-RTO: 14.3 % (ref 12.4–15.4)
PLATELET # BLD: 241 K/UL (ref 135–450)
PMV BLD AUTO: 8.2 FL (ref 5–10.5)
POC ACT LR: 276 SEC
POC ACT LR: 342 SEC
POC ACT LR: 389 SEC
POC ACT LR: >400 SEC
POC ACT LR: >400 SEC
POTASSIUM SERPL-SCNC: 5.6 MMOL/L (ref 3.5–5.1)
RBC # BLD: 3.44 M/UL (ref 4–5.2)
SODIUM BLD-SCNC: 134 MMOL/L (ref 136–145)
WBC # BLD: 4.9 K/UL (ref 4–11)

## 2022-03-02 PROCEDURE — 2500000003 HC RX 250 WO HCPCS

## 2022-03-02 PROCEDURE — G0378 HOSPITAL OBSERVATION PER HR: HCPCS

## 2022-03-02 PROCEDURE — 2500000003 HC RX 250 WO HCPCS: Performed by: NURSE ANESTHETIST, CERTIFIED REGISTERED

## 2022-03-02 PROCEDURE — 2580000003 HC RX 258: Performed by: NURSE ANESTHETIST, CERTIFIED REGISTERED

## 2022-03-02 PROCEDURE — 93656 COMPRE EP EVAL ABLTJ ATR FIB: CPT

## 2022-03-02 PROCEDURE — 93325 DOPPLER ECHO COLOR FLOW MAPG: CPT

## 2022-03-02 PROCEDURE — 2709999900 HC NON-CHARGEABLE SUPPLY

## 2022-03-02 PROCEDURE — 93312 ECHO TRANSESOPHAGEAL: CPT

## 2022-03-02 PROCEDURE — 6360000002 HC RX W HCPCS

## 2022-03-02 PROCEDURE — 2580000003 HC RX 258

## 2022-03-02 PROCEDURE — 94760 N-INVAS EAR/PLS OXIMETRY 1: CPT

## 2022-03-02 PROCEDURE — 85347 COAGULATION TIME ACTIVATED: CPT

## 2022-03-02 PROCEDURE — 2700000000 HC OXYGEN THERAPY PER DAY

## 2022-03-02 PROCEDURE — 93622 COMP EP EVAL L VENTR PAC&REC: CPT

## 2022-03-02 PROCEDURE — C1732 CATH, EP, DIAG/ABL, 3D/VECT: HCPCS

## 2022-03-02 PROCEDURE — 7100000001 HC PACU RECOVERY - ADDTL 15 MIN

## 2022-03-02 PROCEDURE — C1894 INTRO/SHEATH, NON-LASER: HCPCS

## 2022-03-02 PROCEDURE — 86900 BLOOD TYPING SEROLOGIC ABO: CPT

## 2022-03-02 PROCEDURE — 93320 DOPPLER ECHO COMPLETE: CPT

## 2022-03-02 PROCEDURE — C1769 GUIDE WIRE: HCPCS

## 2022-03-02 PROCEDURE — 6360000002 HC RX W HCPCS: Performed by: NURSE ANESTHETIST, CERTIFIED REGISTERED

## 2022-03-02 PROCEDURE — 36415 COLL VENOUS BLD VENIPUNCTURE: CPT

## 2022-03-02 PROCEDURE — C1760 CLOSURE DEV, VASC: HCPCS

## 2022-03-02 PROCEDURE — 93005 ELECTROCARDIOGRAM TRACING: CPT | Performed by: INTERNAL MEDICINE

## 2022-03-02 PROCEDURE — 93623 PRGRMD STIMJ&PACG IV RX NFS: CPT | Performed by: INTERNAL MEDICINE

## 2022-03-02 PROCEDURE — 93623 PRGRMD STIMJ&PACG IV RX NFS: CPT

## 2022-03-02 PROCEDURE — 3700000000 HC ANESTHESIA ATTENDED CARE

## 2022-03-02 PROCEDURE — 86901 BLOOD TYPING SEROLOGIC RH(D): CPT

## 2022-03-02 PROCEDURE — 3700000001 HC ADD 15 MINUTES (ANESTHESIA)

## 2022-03-02 PROCEDURE — 6370000000 HC RX 637 (ALT 250 FOR IP): Performed by: INTERNAL MEDICINE

## 2022-03-02 PROCEDURE — C1759 CATH, INTRA ECHOCARDIOGRAPHY: HCPCS

## 2022-03-02 PROCEDURE — 7100000000 HC PACU RECOVERY - FIRST 15 MIN

## 2022-03-02 PROCEDURE — 80048 BASIC METABOLIC PNL TOTAL CA: CPT

## 2022-03-02 PROCEDURE — 93622 COMP EP EVAL L VENTR PAC&REC: CPT | Performed by: INTERNAL MEDICINE

## 2022-03-02 PROCEDURE — 93656 COMPRE EP EVAL ABLTJ ATR FIB: CPT | Performed by: INTERNAL MEDICINE

## 2022-03-02 PROCEDURE — 86850 RBC ANTIBODY SCREEN: CPT

## 2022-03-02 PROCEDURE — C1730 CATH, EP, 19 OR FEW ELECT: HCPCS

## 2022-03-02 PROCEDURE — 2580000003 HC RX 258: Performed by: INTERNAL MEDICINE

## 2022-03-02 PROCEDURE — 6360000002 HC RX W HCPCS: Performed by: NURSE PRACTITIONER

## 2022-03-02 PROCEDURE — 6370000000 HC RX 637 (ALT 250 FOR IP): Performed by: NURSE ANESTHETIST, CERTIFIED REGISTERED

## 2022-03-02 PROCEDURE — 85027 COMPLETE CBC AUTOMATED: CPT

## 2022-03-02 RX ORDER — SODIUM CHLORIDE 9 MG/ML
25 INJECTION, SOLUTION INTRAVENOUS PRN
Status: DISCONTINUED | OUTPATIENT
Start: 2022-03-02 | End: 2022-03-02

## 2022-03-02 RX ORDER — M-VIT,TX,IRON,MINS/CALC/FOLIC 27MG-0.4MG
1 TABLET ORAL DAILY
Status: DISCONTINUED | OUTPATIENT
Start: 2022-03-03 | End: 2022-03-03 | Stop reason: HOSPADM

## 2022-03-02 RX ORDER — EPHEDRINE SULFATE/0.9% NACL/PF 50 MG/5 ML
SYRINGE (ML) INTRAVENOUS PRN
Status: DISCONTINUED | OUTPATIENT
Start: 2022-03-02 | End: 2022-03-02 | Stop reason: SDUPTHER

## 2022-03-02 RX ORDER — METOPROLOL SUCCINATE 50 MG/1
50 TABLET, EXTENDED RELEASE ORAL DAILY
Status: DISCONTINUED | OUTPATIENT
Start: 2022-03-02 | End: 2022-03-03 | Stop reason: HOSPADM

## 2022-03-02 RX ORDER — SODIUM CHLORIDE 0.9 % (FLUSH) 0.9 %
5-40 SYRINGE (ML) INJECTION PRN
Status: DISCONTINUED | OUTPATIENT
Start: 2022-03-02 | End: 2022-03-03 | Stop reason: HOSPADM

## 2022-03-02 RX ORDER — LIDOCAINE HYDROCHLORIDE 20 MG/ML
INJECTION, SOLUTION EPIDURAL; INFILTRATION; INTRACAUDAL; PERINEURAL PRN
Status: DISCONTINUED | OUTPATIENT
Start: 2022-03-02 | End: 2022-03-02 | Stop reason: SDUPTHER

## 2022-03-02 RX ORDER — PANTOPRAZOLE SODIUM 40 MG/1
40 TABLET, DELAYED RELEASE ORAL DAILY
Status: DISCONTINUED | OUTPATIENT
Start: 2022-03-03 | End: 2022-03-03 | Stop reason: HOSPADM

## 2022-03-02 RX ORDER — SODIUM CHLORIDE 0.9 % (FLUSH) 0.9 %
5-40 SYRINGE (ML) INJECTION EVERY 12 HOURS SCHEDULED
Status: DISCONTINUED | OUTPATIENT
Start: 2022-03-02 | End: 2022-03-03 | Stop reason: HOSPADM

## 2022-03-02 RX ORDER — GABAPENTIN 300 MG/1
600 CAPSULE ORAL 3 TIMES DAILY
Status: DISCONTINUED | OUTPATIENT
Start: 2022-03-02 | End: 2022-03-03 | Stop reason: HOSPADM

## 2022-03-02 RX ORDER — TRAMADOL HYDROCHLORIDE 50 MG/1
50 TABLET ORAL EVERY 6 HOURS PRN
Status: DISCONTINUED | OUTPATIENT
Start: 2022-03-02 | End: 2022-03-03 | Stop reason: HOSPADM

## 2022-03-02 RX ORDER — LANOLIN ALCOHOL/MO/W.PET/CERES
1000 CREAM (GRAM) TOPICAL DAILY
Status: DISCONTINUED | OUTPATIENT
Start: 2022-03-03 | End: 2022-03-03 | Stop reason: HOSPADM

## 2022-03-02 RX ORDER — ALENDRONATE SODIUM 70 MG/1
70 TABLET ORAL
Status: DISCONTINUED | OUTPATIENT
Start: 2022-03-02 | End: 2022-03-02 | Stop reason: RX

## 2022-03-02 RX ORDER — SODIUM CHLORIDE 9 MG/ML
INJECTION, SOLUTION INTRAVENOUS CONTINUOUS PRN
Status: DISCONTINUED | OUTPATIENT
Start: 2022-03-02 | End: 2022-03-02 | Stop reason: SDUPTHER

## 2022-03-02 RX ORDER — ESCITALOPRAM OXALATE 10 MG/1
10 TABLET ORAL DAILY
Status: DISCONTINUED | OUTPATIENT
Start: 2022-03-03 | End: 2022-03-03 | Stop reason: HOSPADM

## 2022-03-02 RX ORDER — TIZANIDINE 4 MG/1
4 TABLET ORAL EVERY 8 HOURS PRN
Status: DISCONTINUED | OUTPATIENT
Start: 2022-03-02 | End: 2022-03-03 | Stop reason: HOSPADM

## 2022-03-02 RX ORDER — MIDAZOLAM HYDROCHLORIDE 1 MG/ML
INJECTION INTRAMUSCULAR; INTRAVENOUS PRN
Status: DISCONTINUED | OUTPATIENT
Start: 2022-03-02 | End: 2022-03-02 | Stop reason: SDUPTHER

## 2022-03-02 RX ORDER — FENTANYL CITRATE 50 UG/ML
INJECTION, SOLUTION INTRAMUSCULAR; INTRAVENOUS PRN
Status: DISCONTINUED | OUTPATIENT
Start: 2022-03-02 | End: 2022-03-02 | Stop reason: SDUPTHER

## 2022-03-02 RX ORDER — HYDROMORPHONE HCL 110MG/55ML
0.25 PATIENT CONTROLLED ANALGESIA SYRINGE INTRAVENOUS EVERY 5 MIN PRN
Status: DISCONTINUED | OUTPATIENT
Start: 2022-03-02 | End: 2022-03-02

## 2022-03-02 RX ORDER — SODIUM CHLORIDE 0.9 % (FLUSH) 0.9 %
5-40 SYRINGE (ML) INJECTION EVERY 12 HOURS SCHEDULED
Status: DISCONTINUED | OUTPATIENT
Start: 2022-03-02 | End: 2022-03-02

## 2022-03-02 RX ORDER — HYDROXYCHLOROQUINE SULFATE 200 MG/1
300 TABLET, FILM COATED ORAL DAILY
Status: DISCONTINUED | OUTPATIENT
Start: 2022-03-03 | End: 2022-03-03 | Stop reason: HOSPADM

## 2022-03-02 RX ORDER — HEPARIN SODIUM 1000 [USP'U]/ML
INJECTION, SOLUTION INTRAVENOUS; SUBCUTANEOUS PRN
Status: DISCONTINUED | OUTPATIENT
Start: 2022-03-02 | End: 2022-03-02 | Stop reason: SDUPTHER

## 2022-03-02 RX ORDER — ACETAMINOPHEN 325 MG/1
650 TABLET ORAL EVERY 4 HOURS PRN
Status: DISCONTINUED | OUTPATIENT
Start: 2022-03-02 | End: 2022-03-03 | Stop reason: HOSPADM

## 2022-03-02 RX ORDER — HYDROMORPHONE HCL 110MG/55ML
0.5 PATIENT CONTROLLED ANALGESIA SYRINGE INTRAVENOUS EVERY 5 MIN PRN
Status: DISCONTINUED | OUTPATIENT
Start: 2022-03-02 | End: 2022-03-02

## 2022-03-02 RX ORDER — SODIUM CHLORIDE 9 MG/ML
25 INJECTION, SOLUTION INTRAVENOUS PRN
Status: DISCONTINUED | OUTPATIENT
Start: 2022-03-02 | End: 2022-03-03 | Stop reason: HOSPADM

## 2022-03-02 RX ORDER — TRAZODONE HYDROCHLORIDE 50 MG/1
50 TABLET ORAL NIGHTLY
Status: DISCONTINUED | OUTPATIENT
Start: 2022-03-02 | End: 2022-03-03 | Stop reason: HOSPADM

## 2022-03-02 RX ORDER — ONDANSETRON 2 MG/ML
4 INJECTION INTRAMUSCULAR; INTRAVENOUS EVERY 6 HOURS PRN
Status: DISCONTINUED | OUTPATIENT
Start: 2022-03-02 | End: 2022-03-03 | Stop reason: HOSPADM

## 2022-03-02 RX ORDER — LEVOTHYROXINE AND LIOTHYRONINE 19; 4.5 UG/1; UG/1
60 TABLET ORAL DAILY
Status: DISCONTINUED | OUTPATIENT
Start: 2022-03-03 | End: 2022-03-03 | Stop reason: HOSPADM

## 2022-03-02 RX ORDER — LISINOPRIL 10 MG/1
10 TABLET ORAL DAILY
Status: DISCONTINUED | OUTPATIENT
Start: 2022-03-03 | End: 2022-03-03 | Stop reason: HOSPADM

## 2022-03-02 RX ORDER — ONDANSETRON 2 MG/ML
4 INJECTION INTRAMUSCULAR; INTRAVENOUS
Status: DISCONTINUED | OUTPATIENT
Start: 2022-03-02 | End: 2022-03-02

## 2022-03-02 RX ORDER — DIPHENHYDRAMINE HYDROCHLORIDE 50 MG/ML
12.5 INJECTION INTRAMUSCULAR; INTRAVENOUS
Status: DISCONTINUED | OUTPATIENT
Start: 2022-03-02 | End: 2022-03-02

## 2022-03-02 RX ORDER — SUCCINYLCHOLINE/SOD CL,ISO/PF 200MG/10ML
SYRINGE (ML) INTRAVENOUS PRN
Status: DISCONTINUED | OUTPATIENT
Start: 2022-03-02 | End: 2022-03-02 | Stop reason: SDUPTHER

## 2022-03-02 RX ORDER — ONDANSETRON 2 MG/ML
INJECTION INTRAMUSCULAR; INTRAVENOUS PRN
Status: DISCONTINUED | OUTPATIENT
Start: 2022-03-02 | End: 2022-03-02 | Stop reason: SDUPTHER

## 2022-03-02 RX ORDER — LIDOCAINE HYDROCHLORIDE 40 MG/ML
SOLUTION TOPICAL PRN
Status: DISCONTINUED | OUTPATIENT
Start: 2022-03-02 | End: 2022-03-02 | Stop reason: SDUPTHER

## 2022-03-02 RX ORDER — LIDOCAINE HYDROCHLORIDE 40 MG/ML
SOLUTION TOPICAL PRN
Status: DISCONTINUED | OUTPATIENT
Start: 2022-03-02 | End: 2022-03-02

## 2022-03-02 RX ORDER — ALPRAZOLAM 0.25 MG/1
0.25 TABLET ORAL NIGHTLY PRN
Status: DISCONTINUED | OUTPATIENT
Start: 2022-03-02 | End: 2022-03-03 | Stop reason: HOSPADM

## 2022-03-02 RX ORDER — FUROSEMIDE 10 MG/ML
INJECTION INTRAMUSCULAR; INTRAVENOUS PRN
Status: DISCONTINUED | OUTPATIENT
Start: 2022-03-02 | End: 2022-03-02 | Stop reason: SDUPTHER

## 2022-03-02 RX ORDER — MEPERIDINE HYDROCHLORIDE 25 MG/ML
12.5 INJECTION INTRAMUSCULAR; INTRAVENOUS; SUBCUTANEOUS EVERY 5 MIN PRN
Status: DISCONTINUED | OUTPATIENT
Start: 2022-03-02 | End: 2022-03-02

## 2022-03-02 RX ORDER — DEXAMETHASONE SODIUM PHOSPHATE 4 MG/ML
INJECTION, SOLUTION INTRA-ARTICULAR; INTRALESIONAL; INTRAMUSCULAR; INTRAVENOUS; SOFT TISSUE PRN
Status: DISCONTINUED | OUTPATIENT
Start: 2022-03-02 | End: 2022-03-02 | Stop reason: SDUPTHER

## 2022-03-02 RX ORDER — PROPOFOL 10 MG/ML
INJECTION, EMULSION INTRAVENOUS PRN
Status: DISCONTINUED | OUTPATIENT
Start: 2022-03-02 | End: 2022-03-02 | Stop reason: SDUPTHER

## 2022-03-02 RX ORDER — SODIUM CHLORIDE 0.9 % (FLUSH) 0.9 %
5-40 SYRINGE (ML) INJECTION PRN
Status: DISCONTINUED | OUTPATIENT
Start: 2022-03-02 | End: 2022-03-02

## 2022-03-02 RX ORDER — GLYCOPYRROLATE 0.2 MG/ML
INJECTION INTRAMUSCULAR; INTRAVENOUS PRN
Status: DISCONTINUED | OUTPATIENT
Start: 2022-03-02 | End: 2022-03-02 | Stop reason: SDUPTHER

## 2022-03-02 RX ADMIN — FUROSEMIDE 20 MG: 10 INJECTION, SOLUTION INTRAMUSCULAR; INTRAVENOUS at 10:17

## 2022-03-02 RX ADMIN — SODIUM CHLORIDE: 9 INJECTION, SOLUTION INTRAVENOUS at 08:25

## 2022-03-02 RX ADMIN — Medication 10 ML: at 20:34

## 2022-03-02 RX ADMIN — GLYCOPYRROLATE 0.4 MG: 0.2 INJECTION INTRAMUSCULAR; INTRAVENOUS at 07:49

## 2022-03-02 RX ADMIN — LIDOCAINE HYDROCHLORIDE 100 MG: 20 INJECTION, SOLUTION EPIDURAL; INFILTRATION; INTRACAUDAL; PERINEURAL at 07:44

## 2022-03-02 RX ADMIN — RIVAROXABAN 20 MG: 20 TABLET, FILM COATED ORAL at 13:00

## 2022-03-02 RX ADMIN — SODIUM CHLORIDE: 9 INJECTION, SOLUTION INTRAVENOUS at 07:36

## 2022-03-02 RX ADMIN — FENTANYL CITRATE 25 MCG: 50 INJECTION, SOLUTION INTRAMUSCULAR; INTRAVENOUS at 08:59

## 2022-03-02 RX ADMIN — GABAPENTIN 600 MG: 300 CAPSULE ORAL at 13:49

## 2022-03-02 RX ADMIN — FENTANYL CITRATE 50 MCG: 50 INJECTION, SOLUTION INTRAMUSCULAR; INTRAVENOUS at 07:44

## 2022-03-02 RX ADMIN — FENTANYL CITRATE 25 MCG: 50 INJECTION, SOLUTION INTRAMUSCULAR; INTRAVENOUS at 09:01

## 2022-03-02 RX ADMIN — HEPARIN SODIUM 2000 UNITS: 1000 INJECTION INTRAVENOUS; SUBCUTANEOUS at 08:44

## 2022-03-02 RX ADMIN — TRAMADOL HYDROCHLORIDE 50 MG: 50 TABLET, COATED ORAL at 20:34

## 2022-03-02 RX ADMIN — ALPRAZOLAM 0.25 MG: 0.25 TABLET ORAL at 21:52

## 2022-03-02 RX ADMIN — TIZANIDINE 4 MG: 4 TABLET ORAL at 21:52

## 2022-03-02 RX ADMIN — HEPARIN SODIUM 4000 UNITS: 1000 INJECTION INTRAVENOUS; SUBCUTANEOUS at 09:04

## 2022-03-02 RX ADMIN — PHENYLEPHRINE HYDROCHLORIDE 100 MCG: 10 INJECTION INTRAVENOUS at 09:56

## 2022-03-02 RX ADMIN — Medication 100 MG: at 07:45

## 2022-03-02 RX ADMIN — DEXAMETHASONE SODIUM PHOSPHATE 8 MG: 4 INJECTION, SOLUTION INTRAMUSCULAR; INTRAVENOUS at 07:53

## 2022-03-02 RX ADMIN — PHENYLEPHRINE HYDROCHLORIDE 50 MCG/MIN: 10 INJECTION INTRAVENOUS at 07:59

## 2022-03-02 RX ADMIN — Medication 10 MG: at 07:52

## 2022-03-02 RX ADMIN — MIDAZOLAM 2 MG: 1 INJECTION INTRAMUSCULAR; INTRAVENOUS at 07:38

## 2022-03-02 RX ADMIN — GABAPENTIN 600 MG: 300 CAPSULE ORAL at 20:34

## 2022-03-02 RX ADMIN — ONDANSETRON 4 MG: 2 INJECTION INTRAMUSCULAR; INTRAVENOUS at 14:43

## 2022-03-02 RX ADMIN — PHENYLEPHRINE HYDROCHLORIDE 200 MCG: 10 INJECTION INTRAVENOUS at 07:50

## 2022-03-02 RX ADMIN — PHENYLEPHRINE HYDROCHLORIDE 100 MCG: 10 INJECTION INTRAVENOUS at 10:00

## 2022-03-02 RX ADMIN — HEPARIN SODIUM 12000 UNITS: 1000 INJECTION INTRAVENOUS; SUBCUTANEOUS at 08:25

## 2022-03-02 RX ADMIN — ONDANSETRON 4 MG: 2 INJECTION INTRAMUSCULAR; INTRAVENOUS at 10:15

## 2022-03-02 RX ADMIN — LIDOCAINE HYDROCHLORIDE 4 ML: 40 SOLUTION TOPICAL at 07:47

## 2022-03-02 RX ADMIN — ISOPROTERENOL HYDROCHLORIDE 10 MCG/MIN: 0.2 INJECTION, SOLUTION INTRAMUSCULAR; INTRAVENOUS at 09:43

## 2022-03-02 RX ADMIN — PROPOFOL 160 MG: 10 INJECTION, EMULSION INTRAVENOUS at 07:44

## 2022-03-02 RX ADMIN — TRAZODONE HYDROCHLORIDE 50 MG: 50 TABLET ORAL at 21:52

## 2022-03-02 RX ADMIN — PHENYLEPHRINE HYDROCHLORIDE 100 MCG: 10 INJECTION INTRAVENOUS at 09:51

## 2022-03-02 RX ADMIN — PHENYLEPHRINE HYDROCHLORIDE 100 MCG: 10 INJECTION INTRAVENOUS at 08:00

## 2022-03-02 RX ADMIN — PHENYLEPHRINE HYDROCHLORIDE 100 MCG: 10 INJECTION INTRAVENOUS at 09:53

## 2022-03-02 RX ADMIN — Medication 10 MG: at 10:18

## 2022-03-02 ASSESSMENT — PULMONARY FUNCTION TESTS
PIF_VALUE: 15
PIF_VALUE: 4
PIF_VALUE: 15
PIF_VALUE: 4
PIF_VALUE: 15
PIF_VALUE: 1
PIF_VALUE: 0
PIF_VALUE: 15
PIF_VALUE: 1
PIF_VALUE: 14
PIF_VALUE: 15
PIF_VALUE: 14
PIF_VALUE: 15
PIF_VALUE: 14
PIF_VALUE: 16
PIF_VALUE: 15
PIF_VALUE: 17
PIF_VALUE: 15
PIF_VALUE: 15
PIF_VALUE: 14
PIF_VALUE: 15
PIF_VALUE: 17
PIF_VALUE: 13
PIF_VALUE: 15
PIF_VALUE: 16
PIF_VALUE: 15
PIF_VALUE: 14
PIF_VALUE: 15
PIF_VALUE: 15
PIF_VALUE: 16
PIF_VALUE: 15
PIF_VALUE: 4
PIF_VALUE: 4
PIF_VALUE: 15
PIF_VALUE: 15
PIF_VALUE: 3
PIF_VALUE: 15
PIF_VALUE: 1
PIF_VALUE: 15
PIF_VALUE: 15
PIF_VALUE: 14
PIF_VALUE: 15
PIF_VALUE: 15
PIF_VALUE: 17
PIF_VALUE: 15
PIF_VALUE: 14
PIF_VALUE: 18
PIF_VALUE: 15
PIF_VALUE: 0
PIF_VALUE: 14
PIF_VALUE: 15
PIF_VALUE: 14
PIF_VALUE: 15
PIF_VALUE: 1
PIF_VALUE: 15
PIF_VALUE: 4
PIF_VALUE: 1
PIF_VALUE: 14
PIF_VALUE: 15
PIF_VALUE: 14
PIF_VALUE: 15
PIF_VALUE: 13
PIF_VALUE: 14
PIF_VALUE: 18
PIF_VALUE: 15
PIF_VALUE: 17
PIF_VALUE: 15
PIF_VALUE: 15
PIF_VALUE: 16
PIF_VALUE: 15
PIF_VALUE: 14
PIF_VALUE: 15
PIF_VALUE: 2
PIF_VALUE: 1
PIF_VALUE: 1
PIF_VALUE: 13
PIF_VALUE: 0
PIF_VALUE: 15
PIF_VALUE: 1
PIF_VALUE: 15
PIF_VALUE: 16
PIF_VALUE: 15
PIF_VALUE: 15
PIF_VALUE: 17
PIF_VALUE: 15
PIF_VALUE: 3
PIF_VALUE: 15
PIF_VALUE: 4
PIF_VALUE: 13
PIF_VALUE: 14
PIF_VALUE: 15
PIF_VALUE: 14
PIF_VALUE: 15
PIF_VALUE: 14
PIF_VALUE: 15
PIF_VALUE: 0
PIF_VALUE: 1
PIF_VALUE: 15
PIF_VALUE: 0
PIF_VALUE: 15
PIF_VALUE: 3
PIF_VALUE: 15
PIF_VALUE: 15
PIF_VALUE: 1
PIF_VALUE: 4
PIF_VALUE: 15
PIF_VALUE: 15
PIF_VALUE: 0
PIF_VALUE: 15
PIF_VALUE: 1
PIF_VALUE: 1
PIF_VALUE: 15
PIF_VALUE: 1

## 2022-03-02 ASSESSMENT — PAIN DESCRIPTION - PAIN TYPE
TYPE: ACUTE PAIN

## 2022-03-02 ASSESSMENT — PAIN SCALES - GENERAL
PAINLEVEL_OUTOF10: 2
PAINLEVEL_OUTOF10: 3
PAINLEVEL_OUTOF10: 6
PAINLEVEL_OUTOF10: 6

## 2022-03-02 ASSESSMENT — PAIN DESCRIPTION - ONSET
ONSET: GRADUAL
ONSET: GRADUAL

## 2022-03-02 ASSESSMENT — PAIN DESCRIPTION - DESCRIPTORS
DESCRIPTORS: PRESSURE
DESCRIPTORS: DISCOMFORT;SORE
DESCRIPTORS: SORE

## 2022-03-02 ASSESSMENT — PAIN DESCRIPTION - PROGRESSION
CLINICAL_PROGRESSION: NOT CHANGED

## 2022-03-02 ASSESSMENT — PAIN DESCRIPTION - LOCATION
LOCATION: CHEST;GROIN
LOCATION: GROIN
LOCATION: CHEST

## 2022-03-02 ASSESSMENT — PAIN DESCRIPTION - ORIENTATION: ORIENTATION: RIGHT

## 2022-03-02 ASSESSMENT — PAIN DESCRIPTION - FREQUENCY
FREQUENCY: INTERMITTENT
FREQUENCY: INTERMITTENT

## 2022-03-02 NOTE — ANESTHESIA PRE PROCEDURE
Department of Anesthesiology  Preprocedure Note       Name:  Lino Perdomo   Age:  74 y.o.  :  1955                                          MRN:  [id number]         Date:  3/2/2022      Surgeon: * No surgeons listed *    Procedure: * No procedures listed *    Medications prior to admission:   Prior to Admission medications    Medication Sig Start Date End Date Taking? Authorizing Provider   hydroxychloroquine (PLAQUENIL) 200 MG tablet Take 1.5 tablets by mouth daily 22  Rhiannon Adam MD   gabapentin (NEURONTIN) 300 MG capsule Take 2 capsules by mouth 3 times daily for 90 days.  22  Rhiannon Adam MD   alendronate (FOSAMAX) 70 MG tablet Take 1 tablet by mouth every 7 days 22   Rhiannon Adam MD   methylPREDNISolone (MEDROL DOSEPACK) 4 MG tablet  21   Historical Provider, MD   traMADol (ULTRAM) 50 MG tablet TAKE 1 TABLET BY MOUTH EVERY 12 HOURS AS NEEDED 21   Historical Provider, MD   dilTIAZem (CARDIZEM CD) 180 MG extended release capsule Take 1 capsule by mouth daily  Patient taking differently: Take 240 mg by mouth daily  21   EDDA Melendez - CNP   lisinopril (PRINIVIL;ZESTRIL) 10 MG tablet Take 10 mg by mouth daily    Historical Provider, MD   escitalopram (LEXAPRO) 10 MG tablet Take 10 mg by mouth daily    Historical Provider, MD   Calcium Carb-Cholecalciferol (CALCIUM 1000 + D) 1000-800 MG-UNIT TABS Take 1 tablet by mouth daily    Historical Provider, MD   Multiple Vitamins-Minerals (THERAPEUTIC MULTIVITAMIN-MINERALS) tablet Take 1 tablet by mouth daily    Historical Provider, MD   vitamin B-12 (CYANOCOBALAMIN) 1000 MCG tablet Take 1,000 mcg by mouth daily    Historical Provider, MD   rivaroxaban (XARELTO) 20 MG TABS tablet Take 1 tablet by mouth daily 20   Chinedu Falcon MD   pantoprazole (PROTONIX) 40 MG tablet Take 1 tablet by mouth daily 18   Jesika Dodd MD   tiZANidine (ZANAFLEX) 4 MG tablet Take 4 mg by mouth 2 times daily as needed     Historical Provider, MD LOZANO THYROID PO Take 60 mg by mouth Daily     Historical Provider, MD   ALPRAZolam (XANAX) 0.25 MG tablet Take 0.25 mg by mouth nightly as needed for Sleep. Historical Provider, MD   traZODone (DESYREL) 50 MG tablet Take 50 mg by mouth nightly. Historical Provider, MD       Current medications:    Current Facility-Administered Medications   Medication Dose Route Frequency Provider Last Rate Last Admin    sodium chloride flush 0.9 % injection 5-40 mL  5-40 mL IntraVENous PRN Starr Bruce MD           Allergies:     Allergies   Allergen Reactions    Nsaids      Note: PERFORATED PEPTIC ULCER       Problem List:    Patient Active Problem List   Diagnosis Code    DDD (degenerative disc disease), cervical M50.30    Myofascial pain syndrome M79.18    Chronic migraine without aura G43.709    Perforated ulcer ([facility] [address] K27.5    Peritonitis ([facility] [address] K65.9    Typical atrial flutter (HCC) I48.3    PAF (paroxysmal atrial fibrillation) (HCC) I48.0    Hyponatremia E87.1    Hypertension I10    Obstructive sleep apnea G47.33    Paroxysmal supraventricular tachycardia (HCC) I47.1    Atrial fibrillation and flutter (HCC) I48.91, I48.92    Rheumatoid arthritis with rheumatoid factor, unspecified M05.9    Rheumatoid arthritis, unspecified M06.9    Erosive osteoarthritis of both hands M15.4    Primary osteoarthritis involving multiple joints M89.49    DDD (degenerative disc disease), lumbar M51.36    High risk medication use Z79.899    Age-related osteoporosis with current pathological fracture M80.00XA    Encounter for therapeutic drug monitoring Z51.81       Past Medical History:        Diagnosis Date    Anxiety     Arthritis     Depression     Hypertension     Hypothyroidism        Past Surgical History:        Procedure Laterality Date    ABDOMINAL EXPLORATION SURGERY  02/12/2018    LAPAROTOMY EXPLORATORY, REPAIR PERFORATED ULCER    BLADDER REPAIR      BREAST ENHANCEMENT SURGERY      HYSTERECTOMY      partial    PARTIAL HYSTERECTOMY      SHOULDER SURGERY      ligament moved up left shoulder       Social History:    Social History     Tobacco Use    Smoking status: Never Smoker    Smokeless tobacco: Never Used   Substance Use Topics    Alcohol use: Yes     Alcohol/week: 0.0 standard drinks                                Counseling given: Not Answered      Vital Signs (Current): There were no vitals filed for this visit. BP Readings from Last 3 Encounters:   02/23/22 124/64   11/24/21 (!) 144/91   11/17/21 110/60       NPO Status:                                                                                 BMI:   Wt Readings from Last 3 Encounters:   02/23/22 172 lb (78 kg)   11/24/21 174 lb (78.9 kg)   11/17/21 177 lb (80.3 kg)     There is no height or weight on file to calculate BMI.    CBC:   Lab Results   Component Value Date    WBC 5.6 10/30/2021    RBC 3.60 10/30/2021    HGB 12.4 10/30/2021    HCT 36.0 10/30/2021    .0 10/30/2021    RDW 13.4 10/30/2021     10/30/2021       CMP:   Lab Results   Component Value Date     10/30/2021    K 3.9 10/30/2021     10/30/2021    CO2 20 10/30/2021    BUN 22 10/30/2021    CREATININE 0.6 02/23/2022    GFRAA >60 02/23/2022    GFRAA >60 01/25/2013    AGRATIO 1.5 10/29/2021    LABGLOM >60 02/23/2022    GLUCOSE 101 10/30/2021    PROT 7.4 10/29/2021    PROT 6.9 01/25/2013    CALCIUM 9.3 10/30/2021    BILITOT 0.6 10/29/2021    ALKPHOS 96 10/29/2021    AST 30 10/29/2021    ALT 35 10/29/2021       POC Tests: No results for input(s): POCGLU, POCNA, POCK, POCCL, POCBUN, POCHEMO, POCHCT in the last 72 hours.     Coags:   Lab Results   Component Value Date    APTT 31.7 12/18/2020       HCG (If Applicable): No results found for: PREGTESTUR, PREGSERUM, HCG, HCGQUANT     ABGs: No results found for: PHART, PO2ART, SPS4XAJ, CVD2XOI, BEART, M2XDYVWB Type & Screen (If Applicable):  No results found for: LABABO, LABRH    Drug/Infectious Status (If Applicable):  No results found for: HIV, HEPCAB    COVID-19 Screening (If Applicable):   Lab Results   Component Value Date    COVID19 Not Detected 02/24/2022    COVID19 NOT DETECTED 02/11/2021           Anesthesia Evaluation  Patient summary reviewed and Nursing notes reviewed  Airway: Mallampati: II  TM distance: >3 FB   Neck ROM: full  Mouth opening: > = 3 FB Dental:          Pulmonary:   (+) sleep apnea: on CPAP,                             Cardiovascular:  Exercise tolerance: poor (<4 METS),   (+) hypertension: moderate, dysrhythmias: atrial flutter,                   Neuro/Psych:   (+) neuromuscular disease:, headaches: migraine headaches, psychiatric history: stable with treatment            GI/Hepatic/Renal:   (+) PUD,           Endo/Other:    (+) hypothyroidism: arthritis: rheumatoid. , .                 Abdominal:             Vascular: Other Findings:             Anesthesia Plan      general     ASA 3       Induction: intravenous and rapid sequence. MIPS: Postoperative opioids intended, Prophylactic antiemetics administered and Postoperative trial extubation. Anesthetic plan and risks discussed with patient. Plan discussed with CRNA.                   Milagros Henry MD   3/2/2022

## 2022-03-02 NOTE — PROGRESS NOTES
Call received from Dr. Doug Iverson to speak to Dr. Raine Castro. Observed Dr. Raine Castro talk to Dr. Doug Iverson.      Thony Lyles RN  09/06/20 [id number] Pt's significant other called RN and stated she was needing an emesis bag. Pt states \"I can't keep any of this meatloaf up, it just keeps coming up and feels like it's not making it to my stomach\". No PRN orders in for nausea/vomiting. Dr. Caryl Nicholson notified, awaiting new orders.

## 2022-03-02 NOTE — H&P
[address]   Electrophysiology         CC: Atrial fibrillation  HPI: Ej Camarillo is a 77 y.o. female with Hx of HTN,  hypothyroid recovering from COVID 23 Dx on 11/21, was feeling bad 12/15/2020 and noticed her HR was >200. She was found to be in atrial flutter and today she is in Atrial fibrillation. Did not convert with adenosine  She has Hx of palpitations but only a few seconds at a time. LHC 12/17/2020showed normal Cors. S/P DCCV 12/17/2020. Interval History:  Ilana Henriquez presents today in follow up. She states she has hadrecurrent Atrial fibrillation       Past Medical History:   Diagnosis Date    Anxiety     Arthritis     Depression     Hypertension     Hypothyroidism         Past Surgical History:   Procedure Laterality Date    ABDOMINAL EXPLORATION SURGERY  02/12/2018    LAPAROTOMY EXPLORATORY, REPAIR PERFORATED ULCER    BLADDER REPAIR      BREAST ENHANCEMENT SURGERY      HYSTERECTOMY      partial    PARTIAL HYSTERECTOMY      SHOULDER SURGERY      ligament moved up left shoulder       Allergies: Allergies   Allergen Reactions    Nsaids      Note: PERFORATED PEPTIC ULCER       Social History:  Reviewed. reports that she has never smoked. She has never used smokeless tobacco. She reports current alcohol use. She reports that she does not use drugs. Family History:  Reviewed. family history includes Heart Attack in her mother. Review of System:  All other systems reviewed and are negative except for that noted above.  Pertinent negatives are:     · General: negative for fever, chills   · Ophthalmic ROS: negative for - eye pain or loss of vision  · ENT ROS: negative for - headaches, sore throat   · Respiratory: negative for - cough, sputum  · Cardiovascular: Reviewed in HPI  · Gastrointestinal: negative for - abdominal pain, diarrhea, N/V  · Hematology: negative for - bleeding, blood clots, bruising or jaundice  · Genito-Urinary:  negative for - Dysuria or incontinence  · Musculoskeletal: negative for - Joint swelling, muscle pain  · Neurological: negative for - confusion, dizziness, headaches   · Psychiatric: No anxiety, no depression. · Dermatological: negative for - rash    Physical Examination:  Vitals:    02/25/21 1357   BP: 137/88   Pulse: 74   SpO2: 97%        · Constitutional: Oriented. No distress. · Head: Normocephalic and atraumatic. · Mouth/Throat: Oropharynx is clear and moist.   · Eyes: Conjunctivae normal. EOM are normal.   · Neck: Neck supple. No rigidity. No JVD present. · Cardiovascular: Normal rate, regular rhythm, S1&S2. · Pulmonary/Chest: Bilateral respiratory sounds. No wheezes, No rhonchi. · Abdominal: Soft. Bowel sounds present. No distension, No tenderness. · Musculoskeletal: No tenderness. No edema    · Lymphadenopathy: Has no cervical adenopathy. · Neurological: Alert and oriented. Cranial nerve appears intact, No Gross deficit   · Skin: Skin is warm and dry. No rash noted. · Psychiatric: Has a normal behavior     Labs, diagnostic and imaging results reviewed. ECG: today Junctional rhythm with PVC    Cath: 12/17/2021  Left Heart Cath  Dominance: Right       LM: no angiographic stenosis  LAD: no angiographic stenosis  LCx: no angiographic stenosis  RCA: no angiographic stenosis     LVEDP: 2 mmHg   LVEF: 50-55%   Impression/Recommendations:  Normal coronary study. Further treatment plan per Electrophysiology     ECHO: 12/16/20   -Overall left ventricular systolic function is low normal .   -Ejection fraction is visually estimated to be 50-55 %. E/e'= 8.1   -No definitive wall motion abnormality.   -Indeterminate diastolic function.   -The tip of the anterior leaflet appears to be slightly redundant, and is suggestive of some myxomatous change with minimal prolapse.   -Moderate to severe mitral regurgitation.  Two regurgitant jets are noted.   -Mild tricuspid regurgitation.   -Mild Aortic regurgitation.   -IVC size is normal (<2.1cm) and collapses > 50% with respiration consistent   with normal RA pressure (3mmHg).    12/16/2020 NM stress      Summary    Small sized anteroapical fixed defect of intensity consistent with    infarction in the territory of the LAD .    Left ventricular ejection fraction of 45 %.    Overall findings represent a intermediate risk scan.             Medication:  Current Outpatient Medications   Medication Sig Dispense Refill    gabapentin (NEURONTIN) 300 MG capsule Take 1 capsule by mouth 3 times daily for 90 days. (Patient taking differently: Take 400 mg by mouth 3 times daily. ) 270 capsule 0    rivaroxaban (XARELTO) 20 MG TABS tablet Take 1 tablet by mouth daily 30 tablet 0    dilTIAZem (CARDIZEM CD) 120 MG extended release capsule Take 1 capsule by mouth daily 30 capsule 0    DULoxetine (CYMBALTA) 30 MG extended release capsule Take 30 mg by mouth daily      hydroxychloroquine (PLAQUENIL) 200 MG tablet Take 300 mg by mouth daily       pantoprazole (PROTONIX) 40 MG tablet Take 1 tablet by mouth daily 30 tablet 3    tiZANidine (ZANAFLEX) 4 MG tablet Take 4 mg by mouth 2 times daily as needed       ARMOUR THYROID PO Take 60 mg by mouth Daily       ALPRAZolam (XANAX) 0.25 MG tablet Take 0.25 mg by mouth nightly as needed for Sleep.  traZODone (DESYREL) 50 MG tablet Take 50 mg by mouth nightly. No current facility-administered medications for this visit. Assessment and plan:     Paroxysmal Atrial Fibrillation/Flutter  She has had no symptomatic recurrence of atrial fibrillation. EKG today junctional rhythm with PVC- medication may not be the best treatment in the future.    Onset 12/16/2020  [id number]- VASc score 3 (age and female and HTN)  Xarelto 20 mg daily, Creatinine 12/28/2020 0.6, creatinine clearance 106 ml/min  Cardizem 120 mg daily  S/p DCCV 12/17/2021  LHC 12/17/2021 Normal cors  - Treatment options including cardioversion, rate control strategy, antiarrhythmics, anticoagulation and possible ablation were discussed with patient. Risks, benefits and alternative of each treatment options were explained. All questions answered  The risks, benefits and alternatives of the ablation procedure were discussed with the patient. The risks including, but not limited to, the risks of bleeding, infection, radiation exposure, injury to vascular, cardiac and surrounding structures (including pneumothorax), stroke, cardiac perforation, tamponade, need for emergent open heart surgery, need for pacemaker implantation, Injury to the phrenic nerve, injury to the esophagus, myocardial infarction and death were discussed in detail. The patient opted to proceed with the ablation. HTN  -Controlled  -BP goal <130/80  -Home BP monitoring encouraged, printed information provided on how to accurately measure BP at home.  -Counseled to follow a low salt diet to assure blood pressure remains controlled for cardiovascular risk factor modification.   -The patient is counseled to get regular exercise 3-5 times per week and maintain a healthy weight reduce cardiovascular risk factors. Suspected sleep apnea    Appointment  with sleep medicine 03/08/2021     Junctional rhythm    She has history of being athletic and therefore this is due to him higher vagal tone. She is not symptomatic and therefore we will continue monitoring her.     Plan  Atrial fibrillation ablation

## 2022-03-02 NOTE — PROGRESS NOTES
Patient/report received from cath lab to PACU in stable condition, vss, drsg to right groin c/d/i, no hematoma noted, soft non-tender, patient denies pain

## 2022-03-02 NOTE — PROCEDURES
[facility]     Electrophysiology Procedure Note       Date of Procedure: 3/2/2022  Patient's Name: Radha Hood  YOB: 1955   Medical Record Number: [id number]  Referring Physician: Magy Beth MD  Procedure Performed by: Pauline Lyle MD    Procedure performed:     · Electrophysiology study with left atrial recording and mapping   · 3-D electroanatomical mapping of the left atrium and  right atium. · Electrophysiological study(EPS) and induction after IV drug infusion  · Transseptal puncture through an intact septum . · Intracardiac echocardiography. · Radiofrequency ablation of atrial fibrillation and pulmonary veins isolation  · LV pacing and sensing  ·    · Deployment of closure device on all   access sites. ·        Indications for procedure:   Symptomatic atrial fibrillation, failed antiarrhythmic therapy and cardioversion in the past   Radha Hood is a 74 y.o. female   Due to failure of antiarrhythmic therapy in the past, patient has been scheduled to have ablation for symptomatic atrial fibrillation. Details of Procedure: The risks, benefits and alternatives of the ablation procedure were discussed with the patient. The risks including, but not limited to, the risks of bleeding, infection, radiation exposure, injury to vascular, cardiac and surrounding structures (including pneumothorax), stroke, cardiac perforation, tamponade, need for emergent open heart surgery, need for pacemaker implantation, esophageal injury and fistula, myocardial infarction and death were discussed in detail. The patient opted to proceed with the ablation. Written informed consent was signed and placed in the chart. Patient was brought to the EP lab in a fasting non-sedate state. Patient underwent general anesthesia by anesthesia team. We initially performed a transesophageal echo that showed no left atrial appendage clot/thrombus.           Procedure was done without use of fluoroscopy. However, we used fluoroscopy to move the esophagus using ESOsure device. Both groins were prepped in a sterile fashion. We gained access in Right femoral vein. A 9-French sheath for ICE and 6F sheath for CS catheter and an Sr0 sheath for ablation and mapping catheters were  placed in the right femoral vein using modified seldinger technique. Ultrasound was used for femoral venous access. We gained access   modified Seldinger technique and ultrasound guidance. The femoral vein was identified with real time visualization of needle passage to the venous lumen. Using 3-D mapping system Carto the CS cathter was placed inside the coronary sinus  for recording and mapping of the left atrium. Using ICE we delineated the left pulmonary vein, left atrial appendage,  mitral valve, and right superior and right inferior pulmonary veins. Patient received a bolus of heparin prior transseptal puncture followed by continuous monitoring of the ACT and boluses of heparin during the procedure to keep the ACT more than 350. Also an esophageal temperature probe in addition to Esophastar catheter was advanced into the esophagus for mapping of the esophagus and careful monitoring of the esophageal temperature during ablation. The ICE was used to monitor location of temperature probe and move it close to ablation area. We stopped ablation when  temperature  increased 1 degree. A transseptal puncture through intact septum was done using ICE and  pressure monitoring . We placed a small curve  deflectable Sheath inside the left atrium. Then a Pentarray catheter with deflectable curve and an irrigated ablation catheter was advanced into the left atrium sequentially and alternatively as needed.      Using Penta ray  cathter and  Carto navigation system a three dimensional electro anatomical mapping of the left atrium, in addition to right and left sided pulmonary vein was created. Using Penta ray catheter voltage mapping of the left atrium was created. Then wide area circumferential ablation for right and left sided pulmonary veins were performed. Linear RF lesions was placed around both superior and inferior pulmonary vein in right and left side well outside the pulmonary vein ostium till all four pulmonary veins were isolated. The power was limited to 40-50  W on the posterior wall and careful monitoring of esophageal temperature was done to prevent injury to esophagus and lesions near esophagus were given only for 10 seconds. Elsewhere power was 48 W as needed. -400(posterior)-450-500(anterior). Lesions were limited to 10 seconds in most areas except for anterior wall. He could extend to 15 seconds. Also energy delivery was terminated if there was a 10 ohms drop in impedance. Following confirmation of pulmonary veins isolation by Penta ray catheter, isuprel IV was started and increased to 10 mcg to check for pulmonary veins reconnection and induction of any arrhythmia. No arrhythmia was induced. Burst pacing and programmed stimulation with up to three premature atrial stimuli did not induce any sustained arrhythmia. Following confirmation of pulmonary veins isolation by circular catheter, isuprel IV was started and increased to 10 mcg to check for pulmonary veins reconnection and induction of any arrhythmia. No arrhythmia was induced     Both entrance and exit block was confirmed using Pentaarray catheter and pacing maneuvers after 30 minutes waiting time. After ablation we removed the catheters and sheaths from left atrium and performed EP study and programmed stimulation using our CS catheter and ablation cathter to assess for other arrhythmias. The deca polar/ablation catheter were moved from CS to right atrium, RV apex, and His bundle position.  His bundle potentials was recorded and pacing was performed from right atrium, coronary sinus LV and RV apex with the following results:     Pacing from LV apex, 1:1 retrograde conduction over AV node (VA wenckebach) was 520 msec       AH interval was 63 msec  HV interval was 37 msec  Pacing from atrium, using CS catheter which was moved, showed 1:1 conduction over AV node with (AV wenckebach) was 420 msec  Pacing from atrium, AV ameya ERP was 600/350/380 msec   Pacing from LV apex, using CS cathter which was moved to the LV apex showed  1:1 conduction over AV node (VA wenckebach) was 520 mec  Pacing from RV apex, showed VERP of 600/300 msec. At the end of the procedure, using ICE we confirmed the lack of any pericardial effusion. Since patient was on anticoagulation with heparin with high ACT, we used Perclose  Vascade device for closure of access sites on the right and left femoral vein. The patient tolerated the procedure well and there were no complications. Patient was extubated and transferred to the floor in stable condition. Blood loss <27 cc  No complication  Conclusion:     - Pulmonary vein isolations using wide area circumferential radiofrequency ablation   ·         Plan:   The patient will   be admitted overnight to CVU. Patient will receive usual post ablation care.      Andrew Ba MD,   [place] [id number]   Office: [phone]

## 2022-03-02 NOTE — ANESTHESIA POSTPROCEDURE EVALUATION
Department of Anesthesiology  Postprocedure Note    Patient: Anthony Deleon  MRN: [id number]  YOB: 1955  Date of evaluation: 3/2/2022  Time:  10:42 AM     Procedure Summary     Date: 03/02/22 Room / Location: [facility] Cath Lab; [facility] Echocardiography    Anesthesia Start: 0736 Anesthesia Stop:     Procedure: ECHO/ABLATION WITH ANESTHESIA Diagnosis: PAF (paroxysmal atrial fibrillation) (HCC)    Scheduled Providers:  Responsible Provider: Viviana Pena MD    Anesthesia Type: general ASA Status: 3          Anesthesia Type: No value filed. Erin Phase I:      Erin Phase II:      Last vitals: Reviewed and per EMR flowsheets.        Anesthesia Post Evaluation    Patient location during evaluation: PACU  Patient participation: complete - patient participated  Level of consciousness: awake and alert  Pain score: 2  Airway patency: patent  Nausea & Vomiting: no vomiting  Complications: no  Cardiovascular status: blood pressure returned to baseline  Respiratory status: acceptable  Hydration status: euvolemic  Multimodal analgesia pain management approach

## 2022-03-03 VITALS
WEIGHT: 174.2 LBS | RESPIRATION RATE: 16 BRPM | HEART RATE: 66 BPM | DIASTOLIC BLOOD PRESSURE: 70 MMHG | TEMPERATURE: 97.4 F | BODY MASS INDEX: 30.87 KG/M2 | OXYGEN SATURATION: 95 % | HEIGHT: 63 IN | SYSTOLIC BLOOD PRESSURE: 113 MMHG

## 2022-03-03 LAB
ANION GAP SERPL CALCULATED.3IONS-SCNC: 12 MMOL/L (ref 3–16)
BUN BLDV-MCNC: 11 MG/DL (ref 7–20)
CALCIUM SERPL-MCNC: 8.7 MG/DL (ref 8.3–10.6)
CHLORIDE BLD-SCNC: 100 MMOL/L (ref 99–110)
CO2: 20 MMOL/L (ref 21–32)
CREAT SERPL-MCNC: 0.6 MG/DL (ref 0.6–1.2)
EKG ATRIAL RATE: 74 BPM
EKG DIAGNOSIS: NORMAL
EKG Q-T INTERVAL: 442 MS
EKG QRS DURATION: 110 MS
EKG QTC CALCULATION (BAZETT): 490 MS
EKG R AXIS: 55 DEGREES
EKG T AXIS: 32 DEGREES
EKG VENTRICULAR RATE: 74 BPM
GFR AFRICAN AMERICAN: >60
GFR NON-AFRICAN AMERICAN: >60
GLUCOSE BLD-MCNC: 127 MG/DL (ref 70–99)
POTASSIUM SERPL-SCNC: 3.9 MMOL/L (ref 3.5–5.1)
SODIUM BLD-SCNC: 132 MMOL/L (ref 136–145)

## 2022-03-03 PROCEDURE — 80048 BASIC METABOLIC PNL TOTAL CA: CPT

## 2022-03-03 PROCEDURE — 2580000003 HC RX 258: Performed by: INTERNAL MEDICINE

## 2022-03-03 PROCEDURE — 93010 ELECTROCARDIOGRAM REPORT: CPT | Performed by: INTERNAL MEDICINE

## 2022-03-03 PROCEDURE — 6370000000 HC RX 637 (ALT 250 FOR IP): Performed by: INTERNAL MEDICINE

## 2022-03-03 PROCEDURE — 99217 PR OBSERVATION CARE DISCHARGE MANAGEMENT: CPT | Performed by: NURSE PRACTITIONER

## 2022-03-03 PROCEDURE — G0378 HOSPITAL OBSERVATION PER HR: HCPCS

## 2022-03-03 PROCEDURE — 36415 COLL VENOUS BLD VENIPUNCTURE: CPT

## 2022-03-03 RX ORDER — METOPROLOL SUCCINATE 50 MG/1
50 TABLET, EXTENDED RELEASE ORAL DAILY
Qty: 30 TABLET | Refills: 3 | Status: SHIPPED | OUTPATIENT
Start: 2022-03-04 | End: 2022-06-28

## 2022-03-03 RX ORDER — FUROSEMIDE 10 MG/ML
20 INJECTION INTRAMUSCULAR; INTRAVENOUS ONCE
Status: DISCONTINUED | OUTPATIENT
Start: 2022-03-03 | End: 2022-03-03 | Stop reason: HOSPADM

## 2022-03-03 RX ADMIN — Medication 10 ML: at 09:33

## 2022-03-03 RX ADMIN — LEVOTHYROXINE, LIOTHYRONINE 60 MG: 19; 4.5 TABLET ORAL at 05:18

## 2022-03-03 RX ADMIN — CYANOCOBALAMIN TAB 1000 MCG 1000 MCG: 1000 TAB at 09:32

## 2022-03-03 RX ADMIN — Medication 1 TABLET: at 09:32

## 2022-03-03 RX ADMIN — RIVAROXABAN 20 MG: 20 TABLET, FILM COATED ORAL at 09:32

## 2022-03-03 RX ADMIN — TRAMADOL HYDROCHLORIDE 50 MG: 50 TABLET, COATED ORAL at 05:17

## 2022-03-03 RX ADMIN — LISINOPRIL 10 MG: 10 TABLET ORAL at 09:32

## 2022-03-03 RX ADMIN — PANTOPRAZOLE SODIUM 40 MG: 40 TABLET, DELAYED RELEASE ORAL at 09:32

## 2022-03-03 RX ADMIN — GABAPENTIN 600 MG: 300 CAPSULE ORAL at 09:31

## 2022-03-03 RX ADMIN — ESCITALOPRAM 10 MG: 10 TABLET, FILM COATED ORAL at 09:32

## 2022-03-03 RX ADMIN — HYDROXYCHLOROQUINE SULFATE 300 MG: 200 TABLET ORAL at 09:32

## 2022-03-03 RX ADMIN — METOPROLOL SUCCINATE 50 MG: 50 TABLET, EXTENDED RELEASE ORAL at 09:32

## 2022-03-03 ASSESSMENT — PAIN DESCRIPTION - PROGRESSION
CLINICAL_PROGRESSION: NOT CHANGED

## 2022-03-03 ASSESSMENT — PAIN DESCRIPTION - LOCATION: LOCATION: BACK;NECK

## 2022-03-03 ASSESSMENT — PAIN SCALES - GENERAL
PAINLEVEL_OUTOF10: 6
PAINLEVEL_OUTOF10: 0
PAINLEVEL_OUTOF10: 6

## 2022-03-03 ASSESSMENT — PAIN DESCRIPTION - ORIENTATION: ORIENTATION: LOWER

## 2022-03-03 ASSESSMENT — PAIN DESCRIPTION - PAIN TYPE: TYPE: ACUTE PAIN

## 2022-03-03 NOTE — PROGRESS NOTES
Data- discharge order received, pt verbalized agreement to discharge, disposition to previous residence, no needs for HHC/DME. Action- discharge instructions prepared and given to pt, pt verbalized understanding. Medication information packet given r/t NEW and/or CHANGED prescriptions emphasizing name/purpose/side effects, pt verbalized understanding. Discharge instruction summary: Diet- low sodium, Activity- reviewed postop restrictions, Primary Care Physician as follows: Gordo Grossman [phone] f/u appointment , immunizations reviewed , prescription medications filled e-prescribed to pts pharm. Inpatient surgical procedure precautions reviewed: yes CHF Education reviewed. Pt/ Family has had a total of 60 minutes CHF education this admission encounter. 1. WEIGHT: Admit Weight: 172 lb (78 kg) (03/02/22 0705)        Today  Weight: 174 lb 3.2 oz (79 kg) (03/03/22 0500)       2. O2 SAT.: SpO2: 95 % (03/03/22 0847)    Response- Pt belongings gathered, IV removed. Disposition is home (no HHC/DME needs), transported with son, taken to lobby via w/c w/ charge RN, no complications.

## 2022-03-03 NOTE — DISCHARGE SUMMARY
EP Discharge Summary  [address]    Patient :Tyron Mondragon  Room/Bed: 3AX-7431/1676-78  Medical Record: [id number]  YOB: 1955    Admit date: 3/2/2022  Discharge date: 03/03/22     Admitting Physician: Juana Jimenez MD   Discharge NP: Carol Iverson, APRN - CNP , CNP    Admission Diagnoses: PAF (paroxysmal atrial fibrillation) [facility]) [I48.0]  Discharge Diagnoses: Same    Discharged Condition: good    Hospital Course: Tyron Mondragon is a 74 y.o. female with a past medical history of HTN, anxiety, depression, and hypothyroidism.     In December of 2020, pt presented to hospital with malaise and tachycardia with reported HR >200. She was recently diagnosed with COVID on November 21st. She was found to be in atrial fibrillation/flutter. S/p DCCV (12/20)    Interval HX: Patient is s/p RFCA of AF with PVI 3/2/2022. Normal post-operative course. Denies pain or swelling to right groin. Ambulating, eating, and voiding without difficulty. No new complaints today and no major events overnight. Denies having chest pain, palpitations, shortness of breath, edema or dizziness at the time of this visit. Patient seen and examined. Notes, labs, and recent testing reviewed. Telemetry reviewed, pt in SR, occ PAC/PVC    Consults: none    Objective:   /70   Pulse 66   Temp 97.4 °F (36.3 °C) (Oral)   Resp 16   Ht 5' 3\" (1.6 m)   Wt 174 lb 3.2 oz (79 kg)   SpO2 95%   BMI 30.86 kg/m²      Intake/Output Summary (Last 24 hours) at 3/3/2022 0859  Last data filed at 3/2/2022 1201  Gross per 24 hour   Intake 2100 ml   Output 2250 ml   Net -150 ml     Physical Exam:  Telemetry: SR, PAC, PVC occ  General: Alert & Oriented x4 in no distress  Skin: Warm and dry, no cyanosis or bruising  Neck: JVD <8, no bruit  Respiratory: Respirations symmetric and unlabored. Lungs clear to auscultation bilaterally, no wheezing, rhonchi, or crackles  Cardiovascular: Regular rate and rhythm.  S1/S2 present without murmurs, rubs, or gallops. Abdomen: Soft, nontender, +bowel sounds  Extremities: No edema. Right groin ablation site soft, no hematoma/swelling/oozing noted. Significant Diagnostic Studies:   CLAUDINE:     Labs  CBC:   Lab Results   Component Value Date    WBC 4.9 03/02/2022    HGB 12.2 03/02/2022    HCT 35.0 03/02/2022    .7 03/02/2022     03/02/2022     BMP:   Lab Results   Component Value Date     03/02/2022    K 5.6 03/02/2022    K 3.9 10/30/2021     03/02/2022    CO2 21 03/02/2022    BUN 21 03/02/2022    CREATININE 0.6 03/02/2022    GFRAA >60 03/02/2022    GFRAA >60 01/25/2013      Estimated Creatinine Clearance: 89 mL/min (based on SCr of 0.6 mg/dL).      Thyroid:   Lab Results   Component Value Date    TSH 1.99 10/29/2021     Lipids:  Lab Results   Component Value Date    CHOL 155 05/25/2021    HDL 75 05/25/2021    HDL 79 06/13/2011    TRIG 82 05/25/2021     LFTS:   Lab Results   Component Value Date    ALT 35 10/29/2021    AST 30 10/29/2021    ALKPHOS 96 10/29/2021    PROT 7.4 10/29/2021    PROT 6.9 01/25/2013    AGRATIO 1.5 10/29/2021    BILITOT 0.6 10/29/2021     Cardiac Enzymes:   Lab Results   Component Value Date    TROPONINI <0.01 10/30/2021    TROPONINI <0.01 10/29/2021    TROPONINI 0.02 10/29/2021     Coags: No results found for: PROTIME, INR    Disposition: home    Home Medications:     Medication List      START taking these medications    metoprolol succinate 50 MG extended release tablet  Commonly known as: TOPROL XL  Take 1 tablet by mouth daily  Start taking on: March 4, 2022        Monique Berger taking these medications    alendronate 70 MG tablet  Commonly known as: Fosamax  Take 1 tablet by mouth every 7 days     ALPRAZolam 0.25 MG tablet  Commonly known as: XANAX     ARMOUR THYROID PO     Calcium 1000 + D 1000-800 MG-UNIT Tabs  Generic drug: Calcium Carb-Cholecalciferol     escitalopram 10 MG tablet  Commonly known as: LEXAPRO     gabapentin 300 MG capsule  Commonly known as: NEURONTIN  Take 2 capsules by mouth 3 times daily for 90 days.      hydroxychloroquine 200 MG tablet  Commonly known as: PLAQUENIL  Take 1.5 tablets by mouth daily     lisinopril 10 MG tablet  Commonly known as: PRINIVIL;ZESTRIL     pantoprazole 40 MG tablet  Commonly known as: Protonix  Take 1 tablet by mouth daily     rivaroxaban 20 MG Tabs tablet  Commonly known as: XARELTO  Take 1 tablet by mouth daily     therapeutic multivitamin-minerals tablet     tiZANidine 4 MG tablet  Commonly known as: ZANAFLEX     traMADol 50 MG tablet  Commonly known as: ULTRAM     traZODone 50 MG tablet  Commonly known as: DESYREL     vitamin B-12 1000 MCG tablet  Commonly known as: CYANOCOBALAMIN        STOP taking these medications    dilTIAZem 180 MG extended release capsule  Commonly known as: CARDIZEM CD     methylPREDNISolone 4 MG tablet  Commonly known as: MEDROL DOSEPACK           Where to Get Your Medications      These medications were sent to [place] [address] [phone] Alejandra Arndt [phone] [address] [facility] [address]    Hours: 24-hours Phone: [phone]   · metoprolol succinate 50 MG extended release tablet       Problem List:  Patient Active Problem List   Diagnosis    DDD (degenerative disc disease), cervical    Myofascial pain syndrome    Chronic migraine without aura    Perforated ulcer ([address]    Peritonitis ([address]    Typical atrial flutter (HCC)    PAF (paroxysmal atrial fibrillation) (HCC)    Hyponatremia    Hypertension    Obstructive sleep apnea    Paroxysmal supraventricular tachycardia (HCC)    Atrial fibrillation and flutter (HCC)    Rheumatoid arthritis with rheumatoid factor, unspecified    Rheumatoid arthritis, unspecified    Erosive osteoarthritis of both hands    Primary osteoarthritis involving multiple joints    DDD (degenerative disc disease), lumbar    High risk medication use    Age-related osteoporosis with current pathological fracture    Encounter for therapeutic drug monitoring      Assessment and Plan:  PAF   - SR today   - S/p DCCV in the past with recurrence   - S/p RFCA of AF with PVI 3/2/2022   - Continue with Xarelto    - Right groin CDI, no hematoma or oozing  Junctional rhythm   - None this admission  Hypertension   - Controlled. Continue home medications  MR   - Stable per echo  EILEEN   - Encouraged CPAP  Hypothyroid   - Continue Synthroid  Hyperkalemia/Hyponatremia   - Repeat labs   - Adequate hydration    - Overall the patient is stable for discharge from a CV standpoint  - Resume home medications  - PPI x 1 month  - Reviewed discharge instruction and pt VU    Activity: Activity as tolerated, no lifting over 10 lbs for 1 week  Diet: cardiac diet  Wound Care: Keep CDI, no lotions/ointments/powders- No baths x 1 week    F/U:   Follow-up with EP NP 2 months   Call [place] [address] at [phone] with any questions    Diet & Exercise:   The patient is counseled to follow a low salt diet to assure blood pressure remains controlled for cardiovascular risk factor modification   The patient is counseled to avoid excess caffeine, and energy drinks as this may exacerbated ectopy and arrhythmia   The patient is counseled to lose weight to control cardiovascular risk factors   Exercise program discussed: To improve overall cardiovascular health, the patient is instructed to increase cardiovascular related activities with a goal of 150 min/week of moderate level activity or 10,000 steps per day.  Encouraged to perform as much activity as tolerated    Signed:  EDDA Fiore CNP, CNP  3/3/2022  8:59 AM

## 2022-05-10 ENCOUNTER — OFFICE VISIT (OUTPATIENT)
Dept: CARDIOLOGY CLINIC | Age: 67
End: 2022-05-10
Payer: MEDICARE

## 2022-05-10 VITALS
SYSTOLIC BLOOD PRESSURE: 124 MMHG | HEART RATE: 64 BPM | WEIGHT: 169 LBS | OXYGEN SATURATION: 97 % | HEIGHT: 63 IN | DIASTOLIC BLOOD PRESSURE: 78 MMHG | BODY MASS INDEX: 29.95 KG/M2

## 2022-05-10 DIAGNOSIS — I48.91 ATRIAL FIBRILLATION AND FLUTTER (HCC): Primary | ICD-10-CM

## 2022-05-10 DIAGNOSIS — I10 BENIGN ESSENTIAL HTN: ICD-10-CM

## 2022-05-10 DIAGNOSIS — I48.92 ATRIAL FIBRILLATION AND FLUTTER (HCC): Primary | ICD-10-CM

## 2022-05-10 DIAGNOSIS — I34.0 MITRAL VALVE INSUFFICIENCY, UNSPECIFIED ETIOLOGY: ICD-10-CM

## 2022-05-10 DIAGNOSIS — R29.818 SUSPECTED SLEEP APNEA: ICD-10-CM

## 2022-05-10 PROCEDURE — 1123F ACP DISCUSS/DSCN MKR DOCD: CPT | Performed by: NURSE PRACTITIONER

## 2022-05-10 PROCEDURE — G8399 PT W/DXA RESULTS DOCUMENT: HCPCS | Performed by: NURSE PRACTITIONER

## 2022-05-10 PROCEDURE — 93000 ELECTROCARDIOGRAM COMPLETE: CPT | Performed by: NURSE PRACTITIONER

## 2022-05-10 PROCEDURE — 1036F TOBACCO NON-USER: CPT | Performed by: NURSE PRACTITIONER

## 2022-05-10 PROCEDURE — G8427 DOCREV CUR MEDS BY ELIG CLIN: HCPCS | Performed by: NURSE PRACTITIONER

## 2022-05-10 PROCEDURE — 99214 OFFICE O/P EST MOD 30 MIN: CPT | Performed by: NURSE PRACTITIONER

## 2022-05-10 PROCEDURE — 3017F COLORECTAL CA SCREEN DOC REV: CPT | Performed by: NURSE PRACTITIONER

## 2022-05-10 PROCEDURE — G8417 CALC BMI ABV UP PARAM F/U: HCPCS | Performed by: NURSE PRACTITIONER

## 2022-05-10 PROCEDURE — 4040F PNEUMOC VAC/ADMIN/RCVD: CPT | Performed by: NURSE PRACTITIONER

## 2022-05-10 PROCEDURE — 1090F PRES/ABSN URINE INCON ASSESS: CPT | Performed by: NURSE PRACTITIONER

## 2022-05-10 NOTE — PROGRESS NOTES
[facility]   Electrophysiology  Melisa Cordell, APRN-CNP  Attending EP: Dr. Dorcas Pham  Date: 5/10/2022  I had the privilege of visiting Oren Burr in the office. Chief Complaint:   Chief Complaint   Patient presents with    Follow-Up from Hospital     Ablation    Atrial Fibrillation     History of Present Illness: History obtained from patient and medical record. Oren Burr is 74 y.o. female with a past medical history of HTN, anxiety, depression, and hypothyroidism.     In December of 2020, pt presented to hospital with malaise and tachycardia with reported HR >200. She was recently diagnosed with COVID on November 21st. She was found to be in atrial fibrillation/flutter. S/p DCCV (12/20)    S/p RFCA of AF with PVI 3/2/2022. Interval history: Today, Oren Burr is being seen for PAF and hypertension. Reports that she is feeling well from cardiac perspective. Reports that she has intermittent SOB since she had covid infection 11/2020. Admits to palpitation with her \"breathing spells\" during this time her HR is elevated, however it is regular. Considering anxiety as a cause and her lexapro was increased. Wearing a mask makes it worse. 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 Her symptoms are worse when she is wearing a mask. She denies any episodes of palpitations, CP or dizziness. No knoen recurrence of AF. She tried CPAP for 6 months and she did not tolerate and she could never sleep with it. Denies having chest pain, palpitations, shortness of breath, orthopnea or dizziness at the time of this visit. With regard to medication therapy the patient has been compliant with prescribed regimen. She has tolerated therapy to date. Assessment:  Paroxysmal Atrial Fibrillation/Flutter  - ECG today shows SR  - On Toprol 50 mg daily  - [id number] score: 3 (age, gender, hypertension); [id number] Vasc score and anticoagulation discussed.  High risk for stroke and thromboembolism. Anticoagulation is recommended.   ~ On Xarelto 20 mg daily, no reports of bleeding  - Afib risk factors including age, HTN, obesity, inactivity and EILEEN were discussed with patient. Risk factor modification recommended   ~ TSH 1.5 (2/23/2022)     - Treatment options including cardioversion, rate control strategy, antiarrhythmics, anticoagulation and possible ablation were discussed with patient. Risks, benefits and alternative of each treatment options were explained. All questions answered    ~ S/p RFCA of AF with PVI 3/2/2022     - Groin healed, denies pain or AF  HTN-goal <130/80   - Controlled   - Continue current medications    - Encouraged patient to check BP at home, log and bring to office visits  - Discussed lifestyle modifications, weight loss, low sodium diet  MR   - Stable per echo  EILEEN   - Does not tolerate CPAP   - Discussed long term effects of unmanaged EILEEN on heart   Plan  - No medication changes  - Call office if symptoms worsen    F/U: Follow-up with EP in 6 months MXA or sooner if symptoms develop  Call [address] at [phone] with any questions    Allergies: Allergies   Allergen Reactions    Nsaids      Note: PERFORATED PEPTIC ULCER     Home Medications:  Prior to Visit Medications    Medication Sig Taking? Authorizing Provider   metoprolol succinate (TOPROL XL) 50 MG extended release tablet Take 1 tablet by mouth daily  EDDA Ram - HORACIO   hydroxychloroquine (PLAQUENIL) 200 MG tablet Take 1.5 tablets by mouth daily  Susanna Ag MD   gabapentin (NEURONTIN) 300 MG capsule Take 2 capsules by mouth 3 times daily for 90 days.   Susanna Ag MD   alendronate (FOSAMAX) 70 MG tablet Take 1 tablet by mouth every 7 days  Susanna Ag MD   traMADol (ULTRAM) 50 MG tablet TAKE 1 TABLET BY MOUTH EVERY 12 HOURS AS NEEDED  Historical Provider, MD   lisinopril (PRINIVIL;ZESTRIL) 10 MG tablet Take 10 mg by mouth daily  Historical Provider, MD escitalopram (LEXAPRO) 10 MG tablet Take 10 mg by mouth daily  Historical Provider, MD   Calcium Carb-Cholecalciferol (CALCIUM 1000 + D) 1000-800 MG-UNIT TABS Take 1 tablet by mouth daily  Historical Provider, MD   Multiple Vitamins-Minerals (THERAPEUTIC MULTIVITAMIN-MINERALS) tablet Take 1 tablet by mouth daily  Historical Provider, MD   vitamin B-12 (CYANOCOBALAMIN) 1000 MCG tablet Take 1,000 mcg by mouth daily  Historical Provider, MD   rivaroxaban (XARELTO) 20 MG TABS tablet Take 1 tablet by mouth daily  Sharon Tristan MD   pantoprazole (PROTONIX) 40 MG tablet Take 1 tablet by mouth daily  Nicolás Doan MD   tiZANidine (ZANAFLEX) 4 MG tablet Take 4 mg by mouth 2 times daily as needed   Historical Provider, MD   ARMCAMILA THYROID PO Take 60 mg by mouth Daily   Historical Provider, MD   ALPRAZolam (XANAX) 0.25 MG tablet Take 0.25 mg by mouth nightly as needed for Sleep. Historical Provider, MD   traZODone (DESYREL) 50 MG tablet Take 50 mg by mouth nightly. Historical Provider, MD      Past Medical History:  Past Medical History:   Diagnosis Date    Anxiety     Arthritis     Depression     Hypertension     Hypothyroidism      Past Surgical History:    has a past surgical history that includes Hysterectomy; bladder repair; Abdominal exploration surgery (02/12/2018); partial hysterectomy (cervix not removed); Breast enhancement surgery; and shoulder surgery. Social History:  Reviewed. reports that she has never smoked. She has never used smokeless tobacco. She reports current alcohol use. She reports that she does not use drugs. Family History:  Reviewed. family history includes Heart Attack in her mother. Denies family history of sudden cardiac death, arrhythmia, premature CAD    Review of System:  · Constitutional: No weight changes or weakness  · HEENT: No visual changes. No mouth sores or sore throat.   · Cardiovascular: denies chest pain, admits to dyspnea on exertion, denies palpitations or denies loss of consciousness. No cough, hemoptysis, denies pleuritic pain, or phlebitis. denies dizziness. · Respiratory: denies cough or wheezing. · Gastrointestinal: Negative, No blood in stools. · Genitourinary: No hematuria. · Neurological: No focal weakness  · Psychiatric: No confusion, anxiety, or depression. · Hem/Lymph: Denies abnormal bruising or bleeding. Physical Examination:  There were no vitals filed for this visit. Wt Readings from Last 3 Encounters:   03/03/22 174 lb 3.2 oz (79 kg)   02/23/22 172 lb (78 kg)   11/24/21 174 lb (78.9 kg)      Constitutional: Cooperative and in no apparent distress, and appears well nourished   Skin: Warm and pink; no cyanosis or bruising   HEENT: Symmetric and normocephalic. Conjunctiva pink with clear sclera. Mucus membranes pink and moist. No visible masses/goiter   Respiratory: Respirations symmetric and unlabored. Lungs clear to auscultation bilaterally, no wheezing, rhonchi, or crackles.  Cardiovascular:  regular rate and rhythm. S1 & S2 present, negative for murmur. negative elevation of JVP. No peripheral edema.  Musculoskeletal:  No focal weakness.  Neurological/Psych: Awake and orientated to person, place and time. Calm affect, appropriate mood. Pertinent labs, diagnostic, device, and imaging results reviewed as a part of this visit    LABS    CBC:   Lab Results   Component Value Date    WBC 4.9 03/02/2022    HGB 12.2 03/02/2022    HCT 35.0 (L) 03/02/2022    .7 (H) 03/02/2022     03/02/2022     BMP:   Lab Results   Component Value Date    CREATININE 0.6 03/03/2022    BUN 11 03/03/2022     (L) 03/03/2022    K 3.9 03/03/2022     03/03/2022    CO2 20 (L) 03/03/2022     CrCl cannot be calculated (Unknown ideal weight.).    No results found for: BNP    Thyroid:   Lab Results   Component Value Date    TSH 1.99 10/29/2021     Lipid Panel:   Lab Results   Component Value Date    CHOL 155 05/25/2021 HDL 75 2021    HDL 79 2011    TRIG 82 2021     LFTs:  Lab Results   Component Value Date    ALT 35 10/29/2021    AST 30 10/29/2021    ALKPHOS 96 10/29/2021    BILITOT 0.6 10/29/2021     Coags:   Lab Results   Component Value Date    APTT 31.7 2020     EC/10/2022 SR HR 60, , QRS 90, QTc 446    CLAUDINE: 3/2/2022   Summary   Left ventricular cavity size is normal with normal left ventricular wall   thickness. Overall left ventricular systolic function appears mildly reduced. Ejection fraction is visually estimated to be 45-50%. The mitral valve normal in structure and function. No evidence of mitral regurgitation or stenosis. There is no evidence of mass or thrombus in the left atrium or appendage. The left atrial size is normal.   The aortic root is normal in size. The thoracic aorta has mild plaque. The tricuspid valve is normal in structure and function. Mild to moderate tricuspid regurgitation. TTE: 2020  Summary   -Overall left ventricular systolic function is low normal .   -Ejection fraction is visually estimated to be 50-55 %. E/e'= 8.1   -No definitive wall motion abnormality.   -Indeterminate diastolic function.   -The tip of the anterior leaflet appears to be slightly redundant, and is   suggestive of some myxomatous change with minimal prolapse. -Moderate to severe mitral regurgitation. Two regurgitant jets are noted. -Mild tricuspid regurgitation.   -Mild Aortic regurgitation. -IVC size is normal (<2.1cm) and collapses > 50% with respiration consistent   with normal RA pressure (3mmHg). GXT: 2020   Summary    Small sized anteroapical fixed defect of intensity consistent with    infarction in the territory of the LAD .    Left ventricular ejection fraction of 45 %.    Overall findings represent a intermediate risk scan.      Cath: none    Diet & Exercise:   The patient is counseled to follow a low salt diet to assure blood pressure remains controlled for cardiovascular risk factor modification   The patient is counseled to avoid excess caffeine, and energy drinks as this may exacerbated ectopy and arrhythmia   The patient is counseled to lose weight to control cardiovascular risk factors   Exercise program discussed: To improve overall cardiovascular health, the patient is instructed to increase cardiovascular related activities with a goal of 150 min/week of moderate level activity or 10,000 steps per day. Encouraged to perform as much activity as tolerated     I have addressed the patient's cardiac risk factors and adjusted pharmacologic treatment as needed. In addition, I have reinforced the need for patient directed risk factor modification. I independently reviewed the ECG    All questions and concerns were addressed with the patient. Alternatives to treatment were discussed. Thank you for allowing to us to participate in the care of [address] [place] [address].     JERMAN Willard  [address]   Office: [phone]

## 2022-05-10 NOTE — PATIENT INSTRUCTIONS
- No medication changes  - Call office if symptoms worsen  - Check your blood pressure at home, if your top number blood pressure is >140/90 or <95/50 please call the office  - Check your heart rate at home, if it is <50 or >120 please call the office   - Follow up in 6 months Dr. Sonny Torres

## 2022-05-13 NOTE — PROGRESS NOTES
Jordy Lopez MD  [facility]) Physicians - Rheumatology    [x] [facility]:  [address] [facility] [] [place] [facility]:  [address]   Office: [phone]  Fax: [phone]     RHEUMATOLOGY PROGRESS NOTE    ASSESSMENT/PLAN:  Colen Snellen is a 74 y.o. female w/ erosive OA of the hands, generalized OA, DDD of cervical and lumbar spine (followed by Pain Management) and clinical osteoporosis (osteopenic T-scores w/ Hx of R sacral insufficiency fracture from a ground level fall in 10/2021). PMHx pertinent for pAfib on Xarelto, MR, HTN, EILEEN, hypothyroidism, anxiety, depression and insomnia.     Current rheum meds:   mg daily: started in 11/2020  Gabapentin 600 mg TID  Lexapro: per PCP  Voltaren gel PRN  Compound pain cream PRN  Alendronate 70 mg wkly: starte on 2/23/22  OTC vitamin D3 daily + calcium 500 mg BID     Prior rheum meds:  Prednisone taper starting w/ 20 mg daily: provided good pain relief  Duloxetine 30 mg daily: caused \"my head to swim\"  NSAIDs: pt states she was told to avoid d/t a \"perforated ulcer\" 3 yrs ago    1. Erosive osteoarthritis of both hands  Assessment & Plan:  - discussed the disease course of her erosive OA. She had mild synovitis in some of her DIP joints on exam today. - cont  mg daily. I do not think we need to escalate her immunotherapy currently. Orders:  -     hydroxychloroquine (PLAQUENIL) 200 MG tablet; Take 1.5 tablets by mouth daily, Disp-135 tablet, R-1Normal  -     gabapentin (NEURONTIN) 300 MG capsule; Take 2 capsules by mouth 3 times daily for 90 days. , Disp-540 capsule, R-1Normal  2. Primary osteoarthritis involving multiple joints  Assessment & Plan:  - cont Gabapentin 600 mg TID. Orders:  -     gabapentin (NEURONTIN) 300 MG capsule; Take 2 capsules by mouth 3 times daily for 90 days. , Disp-540 capsule, R-1Normal  3.  Multilevel degenerative disc disease  Assessment & Plan:  - following w/ Dr. Alexis Anchors for DDD of the cervical and lumbar spine. - cont Gabapentin. D/w pt that we cannot prescribe long term Tramadol, will defer to Pain Management. Orders:  -     gabapentin (NEURONTIN) 300 MG capsule; Take 2 capsules by mouth 3 times daily for 90 days. , Disp-540 capsule, R-1Normal  4. Age-related osteoporosis without current pathological fracture  Assessment & Plan:  - osteoporosis based on fragility fracture - pt sustained a R sacral insufficiency fracture from a ground level fall in 10/2021. Prior DEXA studies showed osteopenic T-scores. - plan on repeating DEXA study in 12/2022.  - discussed the disease course of osteoporosis and provided pt handout. - started on Alendronate in 2/2022, unable to tolerate d/t GI s/e. Placed therapy plan for IV Zoledronic acid. - cont daily vitamin D and calcium supplements. Vitamin D level at goal.  5. High risk medication use  Assessment & Plan:  - annual eye exam for HCQ toxicity monitoring. 6. Encounter for therapeutic drug monitoring  Assessment & Plan:  - check renal function prior to administering IV Zoledronic acid. Orders:  -     Creatinine; Future     Return in about 4 months (around 9/18/2022) for lab result discussion and treatment plan, medication monitoring. The risks and benefits of my recommendations, as well as other treatment options, benefits and side effects were discussed with the patient today. Questions were answered. NOTE: This report is transcribed by using voice recognition software dragon. Every effort is made to ensure accuracy; however, inadvertent computerized  transcription errors may be present. SUBJECTIVE:  Past medical/surgical history, medications and allergies are reviewed and updated as appropriate. Interval Hx:    Pt reports arthralgias in some of her DIP joints. She denies prolonged morning stiffness. She remains on  mg daily.     She states Alendronate causes significant GI distress, she feels very nauseous after taking it. She reports compliance w/ vitamin D and calcium. She is following w/ Dr. Mark Recinos for DDD of her cervical and lumbar spine. She reports good pain relief from Gabapentin. She takes Tramadol occasionally. Rheumatologic ROS:  Constitutional: denies chronic fatigue, fever/chills, night sweats, unintentional weight loss  Integumentary: denies photosensitivity, rash, patchy alopecia, or Sx of Raynaud's phenomenon  Eyes: denies dry eyes, redness or pain, visual disturbance, or floaters  Oral cavity: +extreme dry mouth which she attributes to her thyroid medicine, +canker sores  Cardiovascular: denies CP, palpitations, Hx of pericardial effusion or pericarditis  Respiratory: denies SOB, cough, hemoptysis, or pleurisy  Musculoskeletal:  refer to above HPI     Allergies   Allergen Reactions    Nsaids      Note: PERFORATED PEPTIC ULCER       Past Medical History:        Diagnosis Date    Age-related osteoporosis without current pathological fracture 5/18/2022    Anxiety     Arthritis     Depression     Hypertension     Hypothyroidism        Past Surgical History:        Procedure Laterality Date    ABDOMINAL EXPLORATION SURGERY  02/12/2018    LAPAROTOMY EXPLORATORY, REPAIR PERFORATED ULCER    BLADDER REPAIR      BREAST ENHANCEMENT SURGERY      HYSTERECTOMY      partial    PARTIAL HYSTERECTOMY      SHOULDER SURGERY      ligament moved up left shoulder       Medications:    Current Outpatient Medications   Medication Sig Dispense Refill    hydroxychloroquine (PLAQUENIL) 200 MG tablet Take 1.5 tablets by mouth daily 135 tablet 1    [START ON 5/20/2022] gabapentin (NEURONTIN) 300 MG capsule Take 2 capsules by mouth 3 times daily for 90 days.  540 capsule 1    metoprolol succinate (TOPROL XL) 50 MG extended release tablet Take 1 tablet by mouth daily 30 tablet 3    traMADol (ULTRAM) 50 MG tablet TAKE 1 TABLET BY MOUTH EVERY 12 HOURS AS NEEDED      lisinopril (PRINIVIL;ZESTRIL) 10 MG tablet Take 10 mg by mouth daily      escitalopram (LEXAPRO) 10 MG tablet Take 20 mg by mouth daily       Calcium Carb-Cholecalciferol (CALCIUM 1000 + D) 1000-800 MG-UNIT TABS Take 1 tablet by mouth daily      Multiple Vitamins-Minerals (THERAPEUTIC MULTIVITAMIN-MINERALS) tablet Take 1 tablet by mouth daily      vitamin B-12 (CYANOCOBALAMIN) 1000 MCG tablet Take 1,000 mcg by mouth daily      rivaroxaban (XARELTO) 20 MG TABS tablet Take 1 tablet by mouth daily 30 tablet 0    pantoprazole (PROTONIX) 40 MG tablet Take 1 tablet by mouth daily 30 tablet 3    tiZANidine (ZANAFLEX) 4 MG tablet Take 4 mg by mouth 2 times daily as needed       ARMOUR THYROID PO Take 60 mg by mouth Daily       ALPRAZolam (XANAX) 0.25 MG tablet Take 0.25 mg by mouth nightly as needed for Sleep.  traZODone (DESYREL) 50 MG tablet Take 50 mg by mouth nightly. No current facility-administered medications for this visit.         OBJECTIVE:  Physical Exam:  /74   Pulse 74   Ht 5' 3\" (1.6 m)   Wt 167 lb (75.8 kg)   SpO2 95%   BMI 29.58 kg/m²     GEN: AAOx3, in NAD, well-appearing  HEAD: normocephalic, atraumatic  EYES: no injection or icterus  CVS: RRR  LUNGS: in no acute respiratory distress, CTAB  MSK:  Upper extremities:              Hands: MCP joints w/o swelling NTTP, there is bony enlargement of the b/l PIP joints no active synovitis NTTP, DIP joints some slightly boggy TTP, +Smiley and Heberden nodes TTP, there is squaring of the b/l 1st CMC joints w/ thenar atrophy no active synovitis, full fist formation w/ good  strength              Wrist: no synovitis in the wrist joints b/l, FROM              Elbow: no synovitis or bursitis, FROM  Lower extremities:              Knees: no warmth or effusion present, FROM              Ankles: no synovitis, FROM, Achilles tendons w/o swelling or warmth NTTP              Feet: no toe swelling or pain or warmth on palpation w/ FROM, negative MTP squeeze test  INTEGUMENT: no rash or psoriatic lesions, no petechiae, bruises, or palpable purpura, no patchy alopecia, no nail or periungual changes, no clubbing or digital ulcers    DATA:  Labs: I personally reviewed interval labs and discussed w/ the pt in detail which showed:    Lab Results   Component Value Date    WBC 4.9 03/02/2022    HGB 12.2 03/02/2022    HCT 35.0 (L) 03/02/2022    .7 (H) 03/02/2022     03/02/2022    LYMPHOPCT 23.7 10/29/2021    RBC 3.44 (L) 03/02/2022    MCH 35.5 (H) 03/02/2022    MCHC 34.9 03/02/2022    RDW 14.3 03/02/2022     Lab Results   Component Value Date     (L) 03/03/2022    K 3.9 03/03/2022     03/03/2022    CO2 20 (L) 03/03/2022    BUN 11 03/03/2022    CREATININE 0.6 03/03/2022    GLUCOSE 127 (H) 03/03/2022    CALCIUM 8.7 03/03/2022    PROT 7.4 10/29/2021    LABALBU 4.4 10/29/2021    BILITOT 0.6 10/29/2021    ALKPHOS 96 10/29/2021    AST 30 10/29/2021    ALT 35 10/29/2021    LABGLOM >60 03/03/2022    GFRAA >60 03/03/2022    AGRATIO 1.5 10/29/2021    GLOB 2.4 05/25/2021     Lab Results   Component Value Date    VITD25 58.9 05/25/2021     Lab Results   Component Value Date    LABURIC 5.3 11/04/2020    LABURIC 5.1 05/23/2017     Lab Results   Component Value Date    CRP 5.9 (H) 12/17/2020    CRP 4.1 11/04/2020     Lab Results   Component Value Date    SEDRATE 13 12/17/2020    SEDRATE 10 05/23/2017     No results found for: CKTOTAL    Negative RF x 2 (5/23/17, 11/4/20)  Negative CCP (11/4/20)  Negative MARCIA x 2 (5/23/17, 11/4/20)  Negative SSA, SSB (11/4/20)  TSH wnl (5/29/20)  PTH intact wnl (11/4/20)  SPEP and UPEP: no monoclonal band (11/4/20)    Imaging:  I personally reviewed interval imaging and discussed w/ the pt in detail which included:    X-rays (11/4/20):  R hand:     There is no acute fracture. Polyarticular osteoarthritis is present worse in the interphalangeal and trapeziometacarpal joints.      L hand:  Left hand:   There is no acute fracture. calculated her FRAX scores:  Major osteoporosis fx risk 10%  Hip fx risk 1.4%    Above results were discussed w/ the pt in detail during today's visit.

## 2022-05-18 ENCOUNTER — OFFICE VISIT (OUTPATIENT)
Dept: RHEUMATOLOGY | Age: 67
End: 2022-05-18
Payer: MEDICARE

## 2022-05-18 VITALS
SYSTOLIC BLOOD PRESSURE: 128 MMHG | DIASTOLIC BLOOD PRESSURE: 74 MMHG | WEIGHT: 167 LBS | BODY MASS INDEX: 29.59 KG/M2 | OXYGEN SATURATION: 95 % | HEART RATE: 74 BPM | HEIGHT: 63 IN

## 2022-05-18 DIAGNOSIS — M53.9 MULTILEVEL DEGENERATIVE DISC DISEASE: ICD-10-CM

## 2022-05-18 DIAGNOSIS — M81.0 AGE-RELATED OSTEOPOROSIS WITHOUT CURRENT PATHOLOGICAL FRACTURE: ICD-10-CM

## 2022-05-18 DIAGNOSIS — Z51.81 ENCOUNTER FOR THERAPEUTIC DRUG MONITORING: ICD-10-CM

## 2022-05-18 DIAGNOSIS — Z79.899 HIGH RISK MEDICATION USE: ICD-10-CM

## 2022-05-18 DIAGNOSIS — M15.4 EROSIVE OSTEOARTHRITIS OF BOTH HANDS: Primary | ICD-10-CM

## 2022-05-18 DIAGNOSIS — M15.9 PRIMARY OSTEOARTHRITIS INVOLVING MULTIPLE JOINTS: ICD-10-CM

## 2022-05-18 LAB
CREAT SERPL-MCNC: 0.7 MG/DL (ref 0.6–1.2)
GFR AFRICAN AMERICAN: >60
GFR NON-AFRICAN AMERICAN: >60

## 2022-05-18 PROCEDURE — 1123F ACP DISCUSS/DSCN MKR DOCD: CPT | Performed by: INTERNAL MEDICINE

## 2022-05-18 PROCEDURE — 1090F PRES/ABSN URINE INCON ASSESS: CPT | Performed by: INTERNAL MEDICINE

## 2022-05-18 PROCEDURE — 99214 OFFICE O/P EST MOD 30 MIN: CPT | Performed by: INTERNAL MEDICINE

## 2022-05-18 PROCEDURE — G8417 CALC BMI ABV UP PARAM F/U: HCPCS | Performed by: INTERNAL MEDICINE

## 2022-05-18 PROCEDURE — 3017F COLORECTAL CA SCREEN DOC REV: CPT | Performed by: INTERNAL MEDICINE

## 2022-05-18 PROCEDURE — 1036F TOBACCO NON-USER: CPT | Performed by: INTERNAL MEDICINE

## 2022-05-18 PROCEDURE — G8427 DOCREV CUR MEDS BY ELIG CLIN: HCPCS | Performed by: INTERNAL MEDICINE

## 2022-05-18 PROCEDURE — G8399 PT W/DXA RESULTS DOCUMENT: HCPCS | Performed by: INTERNAL MEDICINE

## 2022-05-18 PROCEDURE — 4040F PNEUMOC VAC/ADMIN/RCVD: CPT | Performed by: INTERNAL MEDICINE

## 2022-05-18 RX ORDER — 0.9 % SODIUM CHLORIDE 0.9 %
250 INTRAVENOUS SOLUTION INTRAVENOUS ONCE
Status: CANCELLED | OUTPATIENT
Start: 2022-05-23 | End: 2022-05-23

## 2022-05-18 RX ORDER — SODIUM CHLORIDE 9 MG/ML
INJECTION, SOLUTION INTRAVENOUS ONCE
Status: CANCELLED | OUTPATIENT
Start: 2022-05-23 | End: 2022-05-23

## 2022-05-18 RX ORDER — ONDANSETRON 2 MG/ML
8 INJECTION INTRAMUSCULAR; INTRAVENOUS
Status: CANCELLED | OUTPATIENT
Start: 2022-05-23

## 2022-05-18 RX ORDER — HEPARIN SODIUM (PORCINE) LOCK FLUSH IV SOLN 100 UNIT/ML 100 UNIT/ML
500 SOLUTION INTRAVENOUS PRN
Status: CANCELLED | OUTPATIENT
Start: 2022-05-23

## 2022-05-18 RX ORDER — FAMOTIDINE 10 MG/ML
20 INJECTION, SOLUTION INTRAVENOUS
Status: CANCELLED | OUTPATIENT
Start: 2022-05-23

## 2022-05-18 RX ORDER — ACETAMINOPHEN 325 MG/1
650 TABLET ORAL
Status: CANCELLED | OUTPATIENT
Start: 2022-05-23

## 2022-05-18 RX ORDER — DIPHENHYDRAMINE HYDROCHLORIDE 50 MG/ML
50 INJECTION INTRAMUSCULAR; INTRAVENOUS
Status: CANCELLED | OUTPATIENT
Start: 2022-05-23

## 2022-05-18 RX ORDER — GABAPENTIN 300 MG/1
600 CAPSULE ORAL 3 TIMES DAILY
Qty: 540 CAPSULE | Refills: 1 | Status: SHIPPED | OUTPATIENT
Start: 2022-05-20 | End: 2022-09-21 | Stop reason: SDUPTHER

## 2022-05-18 RX ORDER — SODIUM CHLORIDE 0.9 % (FLUSH) 0.9 %
5-40 SYRINGE (ML) INJECTION PRN
Status: CANCELLED | OUTPATIENT
Start: 2022-05-23

## 2022-05-18 RX ORDER — EPINEPHRINE 1 MG/ML
0.3 INJECTION, SOLUTION, CONCENTRATE INTRAVENOUS PRN
Status: CANCELLED | OUTPATIENT
Start: 2022-05-23

## 2022-05-18 RX ORDER — ZOLEDRONIC ACID 5 MG/100ML
5 INJECTION, SOLUTION INTRAVENOUS ONCE
Status: CANCELLED | OUTPATIENT
Start: 2022-05-23 | End: 2022-05-23

## 2022-05-18 RX ORDER — HYDROXYCHLOROQUINE SULFATE 200 MG/1
300 TABLET, FILM COATED ORAL DAILY
Qty: 135 TABLET | Refills: 1 | Status: SHIPPED | OUTPATIENT
Start: 2022-05-18 | End: 2022-08-16

## 2022-05-18 RX ORDER — ALBUTEROL SULFATE 90 UG/1
4 AEROSOL, METERED RESPIRATORY (INHALATION) PRN
Status: CANCELLED | OUTPATIENT
Start: 2022-05-23

## 2022-05-18 RX ORDER — SODIUM CHLORIDE 9 MG/ML
5-250 INJECTION, SOLUTION INTRAVENOUS PRN
Status: CANCELLED | OUTPATIENT
Start: 2022-05-23

## 2022-05-18 RX ORDER — SODIUM CHLORIDE 9 MG/ML
INJECTION, SOLUTION INTRAVENOUS CONTINUOUS
Status: CANCELLED | OUTPATIENT
Start: 2022-05-23

## 2022-05-18 RX ORDER — ALENDRONATE SODIUM 70 MG/1
70 TABLET ORAL
Qty: 12 TABLET | Refills: 1 | Status: CANCELLED | OUTPATIENT
Start: 2022-05-18

## 2022-05-18 NOTE — ASSESSMENT & PLAN NOTE
- discussed the disease course of her erosive OA. She had mild synovitis in some of her DIP joints on exam today. - cont  mg daily. I do not think we need to escalate her immunotherapy currently.

## 2022-05-18 NOTE — ASSESSMENT & PLAN NOTE
- osteoporosis based on fragility fracture - pt sustained a R sacral insufficiency fracture from a ground level fall in 10/2021. Prior DEXA studies showed osteopenic T-scores. - plan on repeating DEXA study in 12/2022.  - discussed the disease course of osteoporosis and provided pt handout. - started on Alendronate in 2/2022, unable to tolerate d/t GI s/e. Placed therapy plan for IV Zoledronic acid. - cont daily vitamin D and calcium supplements.  Vitamin D level at goal.

## 2022-05-18 NOTE — ASSESSMENT & PLAN NOTE
- following w/ Dr. Julio Rich for DDD of the cervical and lumbar spine. - cont Gabapentin. D/w pt that we cannot prescribe long term Tramadol, will defer to Pain Management.

## 2022-05-25 DIAGNOSIS — M80.00XA AGE-RELATED OSTEOPOROSIS WITH CURRENT PATHOLOGICAL FRACTURE, INITIAL ENCOUNTER: ICD-10-CM

## 2022-05-25 RX ORDER — ALENDRONATE SODIUM 70 MG/1
70 TABLET ORAL
Qty: 12 TABLET | Refills: 0 | OUTPATIENT
Start: 2022-05-25

## 2022-06-03 ENCOUNTER — HOSPITAL ENCOUNTER (OUTPATIENT)
Dept: ONCOLOGY | Age: 67
Setting detail: INFUSION SERIES
Discharge: HOME | End: 2022-06-03
Payer: MEDICARE

## 2022-06-03 VITALS
HEART RATE: 67 BPM | DIASTOLIC BLOOD PRESSURE: 74 MMHG | SYSTOLIC BLOOD PRESSURE: 129 MMHG | TEMPERATURE: 98.4 F | RESPIRATION RATE: 14 BRPM

## 2022-06-03 DIAGNOSIS — M81.0 AGE-RELATED OSTEOPOROSIS WITHOUT CURRENT PATHOLOGICAL FRACTURE: Primary | ICD-10-CM

## 2022-06-03 PROCEDURE — 96365 THER/PROPH/DIAG IV INF INIT: CPT

## 2022-06-03 PROCEDURE — 2580000003 HC RX 258: Performed by: INTERNAL MEDICINE

## 2022-06-03 PROCEDURE — 99211 OFF/OP EST MAY X REQ PHY/QHP: CPT

## 2022-06-03 PROCEDURE — 6360000002 HC RX W HCPCS: Performed by: INTERNAL MEDICINE

## 2022-06-03 RX ORDER — SODIUM CHLORIDE 0.9 % (FLUSH) 0.9 %
5-40 SYRINGE (ML) INJECTION PRN
Status: CANCELLED | OUTPATIENT
Start: 2022-06-03

## 2022-06-03 RX ORDER — DIPHENHYDRAMINE HYDROCHLORIDE 50 MG/ML
50 INJECTION INTRAMUSCULAR; INTRAVENOUS
Status: CANCELLED | OUTPATIENT
Start: 2022-06-03

## 2022-06-03 RX ORDER — SODIUM CHLORIDE 9 MG/ML
INJECTION, SOLUTION INTRAVENOUS ONCE
Status: CANCELLED | OUTPATIENT
Start: 2022-06-03 | End: 2022-06-03

## 2022-06-03 RX ORDER — ZOLEDRONIC ACID 5 MG/100ML
5 INJECTION, SOLUTION INTRAVENOUS ONCE
Status: CANCELLED | OUTPATIENT
Start: 2022-06-03 | End: 2022-06-03

## 2022-06-03 RX ORDER — HEPARIN SODIUM (PORCINE) LOCK FLUSH IV SOLN 100 UNIT/ML 100 UNIT/ML
500 SOLUTION INTRAVENOUS PRN
Status: CANCELLED | OUTPATIENT
Start: 2022-06-03

## 2022-06-03 RX ORDER — ALBUTEROL SULFATE 90 UG/1
4 AEROSOL, METERED RESPIRATORY (INHALATION) PRN
Status: CANCELLED | OUTPATIENT
Start: 2022-06-03

## 2022-06-03 RX ORDER — ONDANSETRON 2 MG/ML
8 INJECTION INTRAMUSCULAR; INTRAVENOUS
Status: CANCELLED | OUTPATIENT
Start: 2022-06-03

## 2022-06-03 RX ORDER — SODIUM CHLORIDE 9 MG/ML
INJECTION, SOLUTION INTRAVENOUS ONCE
Status: COMPLETED | OUTPATIENT
Start: 2022-06-03 | End: 2022-06-03

## 2022-06-03 RX ORDER — 0.9 % SODIUM CHLORIDE 0.9 %
250 INTRAVENOUS SOLUTION INTRAVENOUS ONCE
Status: DISCONTINUED | OUTPATIENT
Start: 2022-06-03 | End: 2022-06-03

## 2022-06-03 RX ORDER — SODIUM CHLORIDE 9 MG/ML
INJECTION, SOLUTION INTRAVENOUS CONTINUOUS
Status: CANCELLED | OUTPATIENT
Start: 2022-06-03

## 2022-06-03 RX ORDER — ZOLEDRONIC ACID 5 MG/100ML
5 INJECTION, SOLUTION INTRAVENOUS ONCE
Status: COMPLETED | OUTPATIENT
Start: 2022-06-03 | End: 2022-06-03

## 2022-06-03 RX ORDER — SODIUM CHLORIDE 9 MG/ML
5-250 INJECTION, SOLUTION INTRAVENOUS PRN
Status: CANCELLED | OUTPATIENT
Start: 2022-06-03

## 2022-06-03 RX ORDER — SODIUM CHLORIDE 0.9 % (FLUSH) 0.9 %
5-40 SYRINGE (ML) INJECTION PRN
Status: DISCONTINUED | OUTPATIENT
Start: 2022-06-03 | End: 2022-06-03

## 2022-06-03 RX ORDER — 0.9 % SODIUM CHLORIDE 0.9 %
250 INTRAVENOUS SOLUTION INTRAVENOUS ONCE
Status: CANCELLED | OUTPATIENT
Start: 2022-06-03 | End: 2022-06-03

## 2022-06-03 RX ORDER — ACETAMINOPHEN 325 MG/1
650 TABLET ORAL
Status: CANCELLED | OUTPATIENT
Start: 2022-06-03

## 2022-06-03 RX ADMIN — SODIUM CHLORIDE: 9 INJECTION, SOLUTION INTRAVENOUS at 10:37

## 2022-06-03 RX ADMIN — ZOLEDRONIC ACID 5 MG: 0.05 INJECTION, SOLUTION INTRAVENOUS at 10:38

## 2022-06-28 RX ORDER — METOPROLOL SUCCINATE 50 MG/1
50 TABLET, EXTENDED RELEASE ORAL DAILY
Qty: 30 TABLET | Refills: 5 | Status: SHIPPED | OUTPATIENT
Start: 2022-06-28

## 2022-06-28 NOTE — TELEPHONE ENCOUNTER
Received refill request for Metoprolol Succ 50m from [facility] : 5/10/22 NPAL    Last EKG : 5/10/22    Last Refill : 3/4/22 30 w/3 refills    Next OV : 11/9/22 MIGUELINA

## 2022-09-17 NOTE — PROGRESS NOTES
Jolene Cooper MD  [facility]) Physicians - Rheumatology    [x] [facility]:  [facility]  [address] [facility] [] [facility] 94:  [address]   Office: [phone]  Fax: [phone]     RHEUMATOLOGY PROGRESS NOTE    ASSESSMENT/PLAN:  Dyllan Beasley is a 74 y.o. female w/ erosive OA of the hands, generalized OA, DDD of cervical and lumbar spine (followed by Pain Management) and clinical osteoporosis (osteopenic T-scores w/ Hx of R sacral insufficiency fracture from a ground level fall in 10/2021). PMHx pertinent for pAfib on Xarelto, MR, HTN, EILEEN, hypothyroidism, anxiety, depression and insomnia. Current rheum meds:   mg daily: started in 11/2020  Gabapentin 600 mg TID  Lexapro: per PCP  Voltaren gel PRN  Compound pain cream PRN  Reclast: per pt prescribed by PCP, received 1st dose on 6/3/22  OTC vitamin D3 daily + calcium 500 mg BID     Prior rheum meds:  Prednisone taper starting w/ 20 mg daily: provided good pain relief  Duloxetine 30 mg daily: caused \"my head to swim\"  NSAIDs: pt states she was told to avoid d/t a \"perforated ulcer\" 3 yrs ago  Alendronate 70 mg wkly: starte on 2/23/22, could not tolerate d/t GI s/e, switched to Reclast by PCP in 2022    1. Erosive osteoarthritis of both hands  Assessment & Plan:  - well controlled on HCQ, no active synovitis appreciated on exam today. - cont  mg daily. Orders:  -     gabapentin (NEURONTIN) 300 MG capsule; Take 2 capsules by mouth 3 times daily for 90 days. , Disp-540 capsule, R-1Normal  -     hydroxychloroquine (PLAQUENIL) 200 MG tablet; Take 1.5 tablets by mouth daily, Disp-135 tablet, R-0Normal  2. Primary osteoarthritis involving multiple joints  Assessment & Plan:  - cont Gabapentin 600 mg TID. - injected the L CMC joint in the office today. Please refer to below procedure note for details of the injection. Indication: L thumb pain    Procedure details:    The risks and benefits of the procedure were discussed with the pt who then gave verbal consent. The skin was first prepped w/ Chloroprep and alcohol swipe. The area of injection was anaesthetized with topical Ethylene Chloride spray. Using the sterile technique, the L CMC joint was entered with a 27 G needle and Kenalog 10 mg with 0.25 mL of Lidocaine 1% was injected into the joint and the needle was withdrawn. The pt tolerated the procedure well and there were no immediate post-procedure complications. Post injection care was discussed with patient. Pt was instructed to call or return to clinic PRN if such symptoms occur or there is failure to improve as anticipated. Orders:  -     gabapentin (NEURONTIN) 300 MG capsule; Take 2 capsules by mouth 3 times daily for 90 days. , Disp-540 capsule, R-1Normal  -     NE ARTHROCENTESIS ASPIR&/INJ SMALL JT/BURSA W/O US  -     triamcinolone acetonide (KENALOG-40) injection 10 mg; 10 mg, IntraMUSCular, ONCE, 1 dose, On Wed 9/21/22 at 1400  -     lidocaine 1 % injection 0.3 mL; 0.3 mL, IntraDERmal, ONCE, 1 dose, On Wed 9/21/22 at 1400  3. Multilevel degenerative disc disease  Assessment & Plan:  - following w/ Dr. Rosa Guo for DDD of the cervical and lumbar spine. - cont Gabapentin. Orders:  -     gabapentin (NEURONTIN) 300 MG capsule; Take 2 capsules by mouth 3 times daily for 90 days. , Disp-540 capsule, R-1Normal  4. Long-term use of hydroxychloroquine  Assessment & Plan:  - annual eye exam for HCQ toxicity monitoring. Return in about 3 months (around 12/21/2022) for lab result discussion and treatment plan, medication monitoring. The risks and benefits of my recommendations, as well as other treatment options, benefits and side effects were discussed with the patient today. Questions were answered. NOTE: This report is transcribed by using voice recognition software dragon.  Every effort is made to ensure accuracy; however, inadvertent computerized  transcription errors may be present. SUBJECTIVE:  Past medical/surgical history, medications and allergies are reviewed and updated as appropriate. Interval Hx:    Pt reports recent recurrence of pain in her L CMC joint. This was brought on after her dog pulled on the its leash and she was holding it w/ her L hand. She reports pain and swelling in the base of her L thumb today. She reports well controlled arthralgias in her DIP joints on HCQ. She reports good pain relief from Gabapentin. Pt states her primary care NP switched her from Alendronate to Reclast d/t GI s/e. Rheumatologic ROS:  Constitutional: denies chronic fatigue, fever/chills, night sweats, unintentional weight loss  Integumentary: denies photosensitivity, rash, patchy alopecia, or Sx of Raynaud's phenomenon  Eyes: denies dry eyes, redness or pain, visual disturbance, or floaters  Oral cavity: +extreme dry mouth which she attributes to her thyroid medicine, +canker sores  Cardiovascular: denies CP, palpitations, Hx of pericardial effusion or pericarditis  Respiratory: denies SOB, cough, hemoptysis, or pleurisy  Musculoskeletal:  refer to above HPI     Allergies   Allergen Reactions    Nsaids      Note: PERFORATED PEPTIC ULCER       Past Medical History:        Diagnosis Date    Age-related osteoporosis without current pathological fracture 5/18/2022    Anxiety     Arthritis     Depression     Hypertension     Hypothyroidism        Past Surgical History:        Procedure Laterality Date    ABDOMINAL EXPLORATION SURGERY  02/12/2018    LAPAROTOMY EXPLORATORY, REPAIR PERFORATED ULCER    BLADDER REPAIR      BREAST ENHANCEMENT SURGERY      HYSTERECTOMY (CERVIX STATUS UNKNOWN)      partial    PARTIAL HYSTERECTOMY (CERVIX NOT REMOVED)      SHOULDER SURGERY      ligament moved up left shoulder       Medications:    Current Outpatient Medications   Medication Sig Dispense Refill    gabapentin (NEURONTIN) 300 MG capsule Take 2 capsules by mouth 3 times daily for 90 days.  Margarita Farias capsule 1    hydroxychloroquine (PLAQUENIL) 200 MG tablet Take 1.5 tablets by mouth daily 135 tablet 0    metoprolol succinate (TOPROL XL) 50 MG extended release tablet TAKE 1 TABLET BY MOUTH DAILY 30 tablet 5    traMADol (ULTRAM) 50 MG tablet TAKE 1 TABLET BY MOUTH EVERY 12 HOURS AS NEEDED      lisinopril (PRINIVIL;ZESTRIL) 10 MG tablet Take 10 mg by mouth daily      escitalopram (LEXAPRO) 10 MG tablet Take 20 mg by mouth daily       Calcium Carb-Cholecalciferol (CALCIUM 1000 + D) 1000-800 MG-UNIT TABS Take 1 tablet by mouth daily      Multiple Vitamins-Minerals (THERAPEUTIC MULTIVITAMIN-MINERALS) tablet Take 1 tablet by mouth daily      vitamin B-12 (CYANOCOBALAMIN) 1000 MCG tablet Take 1,000 mcg by mouth daily      rivaroxaban (XARELTO) 20 MG TABS tablet Take 1 tablet by mouth daily 30 tablet 0    pantoprazole (PROTONIX) 40 MG tablet Take 1 tablet by mouth daily 30 tablet 3    tiZANidine (ZANAFLEX) 4 MG tablet Take 4 mg by mouth 2 times daily as needed       ARMOUR THYROID PO Take 60 mg by mouth Daily       ALPRAZolam (XANAX) 0.25 MG tablet Take 0.25 mg by mouth nightly as needed for Sleep.      traZODone (DESYREL) 50 MG tablet Take 50 mg by mouth nightly. No current facility-administered medications for this visit.         OBJECTIVE:  Physical Exam:  /72   Pulse 64   Wt 168 lb (76.2 kg)   BMI 29.76 kg/m²     GEN: AAOx3, in NAD, well-appearing  HEAD: normocephalic, atraumatic  EYES: no injection or icterus  CVS: RRR  LUNGS: in no acute respiratory distress, CTAB  MSK:  Upper extremities:              Hands: MCP joints w/o swelling NTTP, there is bony enlargement of the b/l PIP joints no active synovitis NTTP, DIP joints some slightly boggy TTP, +Smiley and Heberden nodes TTP, there is squaring of the b/l 1st CMC joints w/ thenar atrophy, L CMC joint w/ synovitis TTP, full fist formation w/ good  strength              Wrist: no synovitis in the wrist joints b/l, FROM              Elbow: no synovitis or bursitis, FROM  Lower extremities:              Knees: no warmth or effusion present, FROM              Ankles: no synovitis, FROM, Achilles tendons w/o swelling or warmth NTTP              Feet: no toe swelling or pain or warmth on palpation w/ FROM, negative MTP squeeze test  INTEGUMENT: no rash or psoriatic lesions, no petechiae, bruises, or palpable purpura, no patchy alopecia, no nail or periungual changes, no clubbing or digital ulcers    DATA:  Labs:   I personally reviewed interval labs and discussed w/ the pt in detail which showed:    Lab Results   Component Value Date    WBC 4.9 03/02/2022    HGB 12.2 03/02/2022    HCT 35.0 (L) 03/02/2022    .7 (H) 03/02/2022     03/02/2022    LYMPHOPCT 23.7 10/29/2021    RBC 3.44 (L) 03/02/2022    MCH 35.5 (H) 03/02/2022    MCHC 34.9 03/02/2022    RDW 14.3 03/02/2022     Lab Results   Component Value Date     (L) 03/03/2022    K 3.9 03/03/2022     03/03/2022    CO2 20 (L) 03/03/2022    BUN 11 03/03/2022    CREATININE 0.7 05/18/2022    GLUCOSE 127 (H) 03/03/2022    CALCIUM 8.7 03/03/2022    PROT 7.4 10/29/2021    LABALBU 4.4 10/29/2021    BILITOT 0.6 10/29/2021    ALKPHOS 96 10/29/2021    AST 30 10/29/2021    ALT 35 10/29/2021    LABGLOM >60 05/18/2022    GFRAA >60 05/18/2022    AGRATIO 1.5 10/29/2021    GLOB 2.4 05/25/2021     Lab Results   Component Value Date    VITD25 58.9 05/25/2021     Lab Results   Component Value Date    PTH 36.6 02/23/2022    CALCIUM 8.7 03/03/2022     Lab Results   Component Value Date    TSHFT4 1.50 02/23/2022    TSH 1.99 10/29/2021     Lab Results   Component Value Date    LABURIC 5.3 11/04/2020    LABURIC 5.1 05/23/2017     Lab Results   Component Value Date    CRP 5.9 (H) 12/17/2020    CRP 4.1 11/04/2020     Lab Results   Component Value Date    SEDRATE 13 12/17/2020    SEDRATE 10 05/23/2017     No results found for: CKTOTAL    Negative RF x 2 (5/23/17, 11/4/20)  Negative CCP (11/4/20)  Negative MARCIA x 2 (5/23/17, 11/4/20)  Negative SSA, SSB (11/4/20)  SPEP and UPEP: no monoclonal band (11/4/20)    Imaging:  I personally reviewed interval imaging and discussed w/ the pt in detail which included:    X-rays (11/4/20):  R hand: There is no acute fracture. Polyarticular osteoarthritis is present worse in the interphalangeal and trapeziometacarpal joints. L hand:  Left hand: There is no acute fracture. Polyarticular osteoarthritis is present worse in the interphalangeal and trapeziometacarpal joints. There is no destructive bone lesion seen. L-spine:  The vertebral body heights are maintained. There is grade 1 anterolisthesis of L4 on L5. Severe degenerative disc disease at L4-L5 and L5-S1. Facet osteoarthritis in the lower lumbar spine. Pelvis/hips:  No acute fractures seen. The hip joints are congruent. There is no destructive osseous lesion. I independently reviewed above x-rays, there are degenerative changes in the b/l PIP joints and significant joint space narrowing w/ gull wing deformities in the b/l DIP joints c/w erosive OA. MRI L-spine (12/8/21): IMPRESSION:  1. Acute/subacute right sacral insufficiency fractures. 2. Severe right and moderate left neural foraminal narrowing at L4-5 secondary to grade 1 anterolisthesis, disc bulge and facet arthropathy. 3. Left lateral recess disc protrusion at L3-4 superimposed upon a disc bulge and facet arthropathy effacing the left lateral recess and mildly narrowing the left neural foramen. 4. Mild bilateral neural foraminal narrowing at L5-S1. The findings were sent to the Radiology Results [id number] at 8:08 am on 12/9/2021to be communicated to a licensed caregiver. MRI C-spine (2/1/22): IMPRESSION:  Moderate multilevel cervical disc degeneration, uncovertebral joint and facet joint osteoarthritis with degenerative anterior listhesis C4-5 and C7-T1.   Mild central stenosis C6-7 with moderately severe bilateral foraminal narrowing. Moderate left foraminal narrowing C 2-3, moderately severe bilateral foraminal narrowing C3-4, mild bilateral foraminal narrowing C4-5 and mild left foraminal narrowing C5-6. DEXA study (4/24/15):  T-score -1.7 in L-spine  T-score of -1.5 in L femoral neck. DEXA study (12/8/20):  T-score -1.9 in L1-L4  T-score -1.6 in the L femoral neck  T-score -1.9 in the R femoral neck  I personally calculated her FRAX scores:  Major osteoporosis fx risk 10%  Hip fx risk 1.4%    Above results were discussed w/ the pt in detail during today's visit.

## 2022-09-21 ENCOUNTER — OFFICE VISIT (OUTPATIENT)
Dept: RHEUMATOLOGY | Age: 67
End: 2022-09-21
Payer: MEDICARE

## 2022-09-21 VITALS
BODY MASS INDEX: 29.76 KG/M2 | DIASTOLIC BLOOD PRESSURE: 72 MMHG | WEIGHT: 168 LBS | SYSTOLIC BLOOD PRESSURE: 118 MMHG | HEART RATE: 64 BPM

## 2022-09-21 DIAGNOSIS — M53.9 MULTILEVEL DEGENERATIVE DISC DISEASE: ICD-10-CM

## 2022-09-21 DIAGNOSIS — M15.4 EROSIVE OSTEOARTHRITIS OF BOTH HANDS: Primary | ICD-10-CM

## 2022-09-21 DIAGNOSIS — Z79.899 LONG-TERM USE OF HYDROXYCHLOROQUINE: ICD-10-CM

## 2022-09-21 DIAGNOSIS — M15.9 PRIMARY OSTEOARTHRITIS INVOLVING MULTIPLE JOINTS: ICD-10-CM

## 2022-09-21 PROBLEM — M80.00XA AGE-RELATED OSTEOPOROSIS WITH CURRENT PATHOLOGICAL FRACTURE: Status: RESOLVED | Noted: 2022-02-23 | Resolved: 2022-09-21

## 2022-09-21 PROBLEM — M06.9 RHEUMATOID ARTHRITIS, UNSPECIFIED (HCC): Status: RESOLVED | Noted: 2021-08-25 | Resolved: 2022-09-21

## 2022-09-21 PROBLEM — M05.9 RHEUMATOID ARTHRITIS WITH RHEUMATOID FACTOR, UNSPECIFIED (HCC): Status: RESOLVED | Noted: 2021-08-25 | Resolved: 2022-09-21

## 2022-09-21 PROCEDURE — 20600 DRAIN/INJ JOINT/BURSA W/O US: CPT | Performed by: INTERNAL MEDICINE

## 2022-09-21 PROCEDURE — 1123F ACP DISCUSS/DSCN MKR DOCD: CPT | Performed by: INTERNAL MEDICINE

## 2022-09-21 PROCEDURE — 3017F COLORECTAL CA SCREEN DOC REV: CPT | Performed by: INTERNAL MEDICINE

## 2022-09-21 PROCEDURE — 99214 OFFICE O/P EST MOD 30 MIN: CPT | Performed by: INTERNAL MEDICINE

## 2022-09-21 PROCEDURE — G8417 CALC BMI ABV UP PARAM F/U: HCPCS | Performed by: INTERNAL MEDICINE

## 2022-09-21 PROCEDURE — G8427 DOCREV CUR MEDS BY ELIG CLIN: HCPCS | Performed by: INTERNAL MEDICINE

## 2022-09-21 PROCEDURE — G8399 PT W/DXA RESULTS DOCUMENT: HCPCS | Performed by: INTERNAL MEDICINE

## 2022-09-21 PROCEDURE — 1090F PRES/ABSN URINE INCON ASSESS: CPT | Performed by: INTERNAL MEDICINE

## 2022-09-21 PROCEDURE — 1036F TOBACCO NON-USER: CPT | Performed by: INTERNAL MEDICINE

## 2022-09-21 RX ORDER — HYDROXYCHLOROQUINE SULFATE 200 MG/1
300 TABLET, FILM COATED ORAL DAILY
Qty: 135 TABLET | Refills: 0 | Status: SHIPPED | OUTPATIENT
Start: 2022-09-21 | End: 2022-12-20

## 2022-09-21 RX ORDER — GABAPENTIN 300 MG/1
600 CAPSULE ORAL 3 TIMES DAILY
Qty: 540 CAPSULE | Refills: 1 | Status: SHIPPED | OUTPATIENT
Start: 2022-09-21 | End: 2022-12-20

## 2022-09-21 RX ORDER — LIDOCAINE HYDROCHLORIDE 10 MG/ML
0.3 INJECTION, SOLUTION INFILTRATION; PERINEURAL ONCE
Status: COMPLETED | OUTPATIENT
Start: 2022-09-21 | End: 2022-09-21

## 2022-09-21 RX ORDER — TRIAMCINOLONE ACETONIDE 40 MG/ML
10 INJECTION, SUSPENSION INTRA-ARTICULAR; INTRAMUSCULAR ONCE
Status: COMPLETED | OUTPATIENT
Start: 2022-09-21 | End: 2022-09-21

## 2022-09-21 RX ADMIN — LIDOCAINE HYDROCHLORIDE 0.3 ML: 10 INJECTION, SOLUTION INFILTRATION; PERINEURAL at 13:37

## 2022-09-21 RX ADMIN — TRIAMCINOLONE ACETONIDE 10 MG: 40 INJECTION, SUSPENSION INTRA-ARTICULAR; INTRAMUSCULAR at 13:38

## 2022-09-21 NOTE — ASSESSMENT & PLAN NOTE
- cont Gabapentin 600 mg TID. - injected the L CMC joint in the office today. Please refer to below procedure note for details of the injection. Indication: L thumb pain    Procedure details: The risks and benefits of the procedure were discussed with the pt who then gave verbal consent. The skin was first prepped w/ Chloroprep and alcohol swipe. The area of injection was anaesthetized with topical Ethylene Chloride spray. Using the sterile technique, the L CMC joint was entered with a 27 G needle and Kenalog 10 mg with 0.25 mL of Lidocaine 1% was injected into the joint and the needle was withdrawn. The pt tolerated the procedure well and there were no immediate post-procedure complications. Post injection care was discussed with patient. Pt was instructed to call or return to clinic PRN if such symptoms occur or there is failure to improve as anticipated.

## 2022-11-08 NOTE — PROGRESS NOTES
[facility]   Electrophysiology Follow up   Date: 11/9/2022  I had the privilege of visiting Praful Patterson in the office. CC: Atrial fibrillation, shortness of breath    HPI: Praful Patterson is a 74 y.o. female  with Hx of HTN,  hypothyroid recovering from COVID 19 Dx on 11/21, was feeling bad 12/15/2020 and noticed her HR was >200. She was found to be   in atrial flutter and today she is in Atrial fibrillation. Did not convert with adenosine    She has Hx of palpitations but only a few seconds at a time. [facility] 12/17/2020 showed normal Cors. S/P DCCV 12/17/2020.     02/25/2021 s/p Successful DCCV     She was diagnosed with Covid 11/2021-  presetented to ER with atrial fibrillation. Cardizem increased      S/p RFCA Atrial fibrillation with PVI 03/02/2022       Interval History:  Ray Jansen presents today in follow up. She has episodes of SOB when she stands up from bending over. Also states she cannot walk 0.25 mile with SOB. She has Covid twice. States second case was the same day she received her booster. She gets SOB and fatigued much more quickly than she used to. She gets random shortness of breath episodes. She could do certain activity 1 day and have no problem and other days be very symptomatic and short of breath with it. She used to be able to dance hold night and now she gets out of breath with dancing only with one song.     Assessment and plan:   - Paroxysmal atrial fibrillation    EKG today sinus Rhythm    S/p RFCA atrial fibrillation with PVI,   03/02/2022    S/p successful DCCV 12/2020      Continue Toprol 50 mg daily    Patient has a [id number] Score of 3 ( age, gender,HTN)     ~ continue xarelto 20 mg daily    ~ creatinine clearance of 93  ml/ min based on creatinine of  0.7/05/18/2022       SOB    - decreased exercise tolerance and random SOB   It could be multifactorial including systolic or diastolic heart failure given the past low EF and also possibility of intermittent atrial fibrillation or other arrhythmias. She has had COVID infections and therefore possibility of lung issues is also present. I will do PFT, echo and 2-week Holter monitor to assess for the more important causes. -HTN  - Not well controlled 140/76   -BP goal <130/80  -Home BP monitoring encouraged, printed information provided on how to accurately measure BP at home.  -Counseled to follow a low salt diet to assure blood pressure remains controlled for cardiovascular risk factor modification.   -The patient is counseled to get regular exercise 3-5 times per week and maintain a healthy weight reduce cardiovascular risk factors. - continue current medication      Mitral Regurgitation    -none per CLAUDINE 03/2022    -  moderate severe per Echo  12/2020     obstructive sleep apnea    - does not tolerate CPAP   - discussed long term effects of untreated sleep panea.      Plan:   Repeat echo  Repeat monitor 14 days  PFT  3-4 months follow up, if she has any abnormalities found on test ,we may see her sooner  Patient Active Problem List    Diagnosis Date Noted    Typical atrial flutter ([address] 12/15/2020    Age-related osteoporosis without current pathological fracture 05/18/2022    Encounter for therapeutic drug monitoring 02/23/2022    Erosive osteoarthritis of both hands 11/17/2021    Primary osteoarthritis involving multiple joints 11/17/2021    Multilevel degenerative disc disease 11/17/2021    Long-term use of hydroxychloroquine 11/17/2021    Atrial fibrillation and flutter (HCC)     Paroxysmal supraventricular tachycardia (HCC)     Obstructive sleep apnea     PAF (paroxysmal atrial fibrillation) ([address]     Hyponatremia     Hypertension     Perforated ulcer ([address]     Peritonitis ([address]     Chronic migraine without aura 11/13/2014    DDD (degenerative disc disease), cervical 09/30/2014    Myofascial pain syndrome 09/30/2014     Diagnostic studies:   ECG 11/9/22  SR QTcH 445,QRS 90     Echo/ CLAUDINE 03/02/2022 Conclusions      Summary   Left ventricular cavity size is normal with normal left ventricular wall   thickness. Overall left ventricular systolic function appears mildly reduced. Ejection fraction is visually estimated to be 45-50%. The mitral valve normal in structure and function. No evidence of mitral regurgitation or stenosis. There is no evidence of mass or thrombus in the left atrium or appendage. The left atrial size is normal.   The aortic root is normal in size. The thoracic aorta has mild plaque. The tricuspid valve is normal in structure and function. Mild to moderate tricuspid regurgitation. Cath: 12/17/2021  Left Heart Cath  Dominance: Right       LM: no angiographic stenosis  LAD: no angiographic stenosis  LCx: no angiographic stenosis  RCA: no angiographic stenosis     LVEDP: 2 mmHg   LVEF: 50-55%   Impression/Recommendations:  Normal coronary study. Further treatment plan per Electrophysiology     ECHO: 12/16/20   -Overall left ventricular systolic function is low normal .   -Ejection fraction is visually estimated to be 50-55 %. E/e'= 8.1   -No definitive wall motion abnormality.   -Indeterminate diastolic function.   -The tip of the anterior leaflet appears to be slightly redundant, and is suggestive of some myxomatous change with minimal prolapse. -Moderate to severe mitral regurgitation. Two regurgitant jets are noted. -Mild tricuspid regurgitation.   -Mild Aortic regurgitation. -IVC size is normal (<2.1cm) and collapses > 50% with respiration consistent   with normal RA pressure (3mmHg). 12/16/2020 NM stress       Summary    Small sized anteroapical fixed defect of intensity consistent with    infarction in the territory of the LAD . Left ventricular ejection fraction of 45 %. Overall findings represent a intermediate risk scan. I independently reviewed the cardiac diagnostic studies, ECG and relevant imaging studies.      Lab Results Component Value Date    LVEF 48 03/02/2022    LVEFMODE Cardiac Cath 12/17/2020     Lab Results   Component Value Date    TSHFT4 1.50 02/23/2022    TSH 1.99 10/29/2021         Physical Examination:  Vitals:    11/09/22 1420   BP: (!) 140/76   Pulse: 79   SpO2: 97%      Wt Readings from Last 3 Encounters:   11/09/22 171 lb (77.6 kg)   09/21/22 168 lb (76.2 kg)   05/18/22 167 lb (75.8 kg)       Constitutional: Oriented. No distress. Head: Normocephalic and atraumatic. Mouth/Throat: Oropharynx is clear and moist.   Eyes: Conjunctivae normal. EOM are normal.   Neck: Neck supple. No rigidity. No JVD present. Cardiovascular: Normal rate, regular rhythm, S1&S2. Pulmonary/Chest: Bilateral respiratory sounds. No wheezes, No rhonchi. Abdominal: Soft. Bowel sounds present. No distension, No tenderness. Musculoskeletal: No tenderness. No edema    Lymphadenopathy: Has no cervical adenopathy. Neurological: Alert and oriented. Cranial nerve appears intact, No Gross deficit   Skin: Skin is warm and dry. No rash noted. Psychiatric: Has a normal behavior       Review of System:  [x] Full ROS obtained and negative except as mentioned in HPI    Prior to Admission medications    Medication Sig Start Date End Date Taking? Authorizing Provider   gabapentin (NEURONTIN) 300 MG capsule Take 2 capsules by mouth 3 times daily for 90 days.  9/21/22 12/20/22 Yes Nirav Dunlap MD   hydroxychloroquine (PLAQUENIL) 200 MG tablet Take 1.5 tablets by mouth daily 9/21/22 12/20/22 Yes Nirav Dunlap MD   metoprolol succinate (TOPROL XL) 50 MG extended release tablet TAKE 1 TABLET BY MOUTH DAILY 6/28/22  Yes EDDA Kong - CNP   lisinopril (PRINIVIL;ZESTRIL) 10 MG tablet Take 10 mg by mouth daily   Yes Historical Provider, MD   escitalopram (LEXAPRO) 10 MG tablet Take 20 mg by mouth daily    Yes Historical Provider, MD   Calcium Carb-Cholecalciferol (CALCIUM 1000 + D) 1000-800 MG-UNIT TABS Take 1 tablet by mouth daily Yes Historical Provider, MD   Multiple Vitamins-Minerals (THERAPEUTIC MULTIVITAMIN-MINERALS) tablet Take 1 tablet by mouth daily   Yes Historical Provider, MD   vitamin B-12 (CYANOCOBALAMIN) 1000 MCG tablet Take 1,000 mcg by mouth daily   Yes Historical Provider, MD   rivaroxaban (XARELTO) 20 MG TABS tablet Take 1 tablet by mouth daily 12/18/20  Yes Sal Reese MD   pantoprazole (PROTONIX) 40 MG tablet Take 1 tablet by mouth daily 2/18/18  Yes Maine Champagne MD   tiZANidine (ZANAFLEX) 4 MG tablet Take 4 mg by mouth 2 times daily as needed    Yes Historical Provider, MD   ARMOUR THYROID PO Take 60 mg by mouth Daily    Yes Historical Provider, MD   ALPRAZolam (XANAX) 0.25 MG tablet Take 0.25 mg by mouth nightly as needed for Sleep. Yes Historical Provider, MD   traZODone (DESYREL) 50 MG tablet Take 50 mg by mouth nightly. Yes Historical Provider, MD   traMADol (ULTRAM) 50 MG tablet TAKE 1 TABLET BY MOUTH EVERY 12 HOURS AS NEEDED  Patient not taking: Reported on 11/9/2022 9/11/21   Historical Provider, MD       Allergies   Allergen Reactions    Nsaids      Note: PERFORATED PEPTIC ULCER       Social History:  Reviewed. reports that she has never smoked. She has never used smokeless tobacco. She reports current alcohol use. She reports that she does not use drugs. Family History:  Reviewed. Reviewed. No family history of SCD. Relevant and available labs, and cardiovascular diagnostics reviewed. Reviewed. I independently reviewed relevant and available cardiac diagnostic tests ECG, CXR, Echo, Stress test, Device interrogation, Holter, CT scan. Outside medical records via Care everywhere reviewed and summarized in H&P above. Complex medical condition with multiple medical problems affecting prognosis and outcome of EP interventions       - The patient is counseled to follow a low salt diet to assure blood pressure remains controlled for cardiovascular risk factor modification.    - The patient is counseled to avoid excess caffeine, and energy drinks as this may exacerbated ectopy and arrhythmia. - The patient is counseled to get regular exercise 3-5 times per week to control cardiovascular risk factors. All questions and concerns were addressed to the patient/family. Alternatives to my treatment were discussed. I have discussed the above stated plan and the patient verbalized understanding and agreed with the plan. Scribe attestation: This note was scribed in the presence of  Jessica Tavera MD by Anival Humphrey RN    I, Dr. Jessica Tavera personally performed the services described in this documentation as scribed by RN in my presence, and it is both accurate and complete. NOTE: This report was transcribed using voice recognition software. Every effort was made to ensure accuracy, however, inadvertent computerized transcription errors may be present.      Jessica Tavera MD, [address]   Office: [phone]  Fax: [phone]

## 2022-11-09 ENCOUNTER — OFFICE VISIT (OUTPATIENT)
Dept: CARDIOLOGY CLINIC | Age: 67
End: 2022-11-09
Payer: MEDICARE

## 2022-11-09 VITALS
BODY MASS INDEX: 30.3 KG/M2 | SYSTOLIC BLOOD PRESSURE: 140 MMHG | HEART RATE: 79 BPM | HEIGHT: 63 IN | DIASTOLIC BLOOD PRESSURE: 76 MMHG | WEIGHT: 171 LBS | OXYGEN SATURATION: 97 %

## 2022-11-09 DIAGNOSIS — I10 HYPERTENSION, UNSPECIFIED TYPE: ICD-10-CM

## 2022-11-09 DIAGNOSIS — G47.33 OBSTRUCTIVE SLEEP APNEA: ICD-10-CM

## 2022-11-09 DIAGNOSIS — I48.0 PAF (PAROXYSMAL ATRIAL FIBRILLATION) (HCC): Primary | ICD-10-CM

## 2022-11-09 DIAGNOSIS — R06.02 SOB (SHORTNESS OF BREATH): ICD-10-CM

## 2022-11-09 PROCEDURE — 93000 ELECTROCARDIOGRAM COMPLETE: CPT | Performed by: INTERNAL MEDICINE

## 2022-11-09 PROCEDURE — G8484 FLU IMMUNIZE NO ADMIN: HCPCS | Performed by: INTERNAL MEDICINE

## 2022-11-09 PROCEDURE — G8399 PT W/DXA RESULTS DOCUMENT: HCPCS | Performed by: INTERNAL MEDICINE

## 2022-11-09 PROCEDURE — 3074F SYST BP LT 130 MM HG: CPT | Performed by: INTERNAL MEDICINE

## 2022-11-09 PROCEDURE — G8417 CALC BMI ABV UP PARAM F/U: HCPCS | Performed by: INTERNAL MEDICINE

## 2022-11-09 PROCEDURE — 1090F PRES/ABSN URINE INCON ASSESS: CPT | Performed by: INTERNAL MEDICINE

## 2022-11-09 PROCEDURE — 1036F TOBACCO NON-USER: CPT | Performed by: INTERNAL MEDICINE

## 2022-11-09 PROCEDURE — 99214 OFFICE O/P EST MOD 30 MIN: CPT | Performed by: INTERNAL MEDICINE

## 2022-11-09 PROCEDURE — 3078F DIAST BP <80 MM HG: CPT | Performed by: INTERNAL MEDICINE

## 2022-11-09 PROCEDURE — 3017F COLORECTAL CA SCREEN DOC REV: CPT | Performed by: INTERNAL MEDICINE

## 2022-11-09 PROCEDURE — G8427 DOCREV CUR MEDS BY ELIG CLIN: HCPCS | Performed by: INTERNAL MEDICINE

## 2022-11-09 PROCEDURE — 1123F ACP DISCUSS/DSCN MKR DOCD: CPT | Performed by: INTERNAL MEDICINE

## 2022-12-07 ENCOUNTER — HOSPITAL ENCOUNTER (OUTPATIENT)
Dept: NON INVASIVE DIAGNOSTICS | Age: 67
Discharge: HOME | End: 2022-12-07
Payer: MEDICARE

## 2022-12-07 ENCOUNTER — HOSPITAL ENCOUNTER (OUTPATIENT)
Dept: PULMONOLOGY | Age: 67
Discharge: HOME | End: 2022-12-07
Payer: MEDICARE

## 2022-12-07 VITALS — RESPIRATION RATE: 16 BRPM | HEART RATE: 72 BPM | OXYGEN SATURATION: 97 %

## 2022-12-07 DIAGNOSIS — R06.02 SOB (SHORTNESS OF BREATH): ICD-10-CM

## 2022-12-07 DIAGNOSIS — I48.0 PAF (PAROXYSMAL ATRIAL FIBRILLATION) (HCC): ICD-10-CM

## 2022-12-07 LAB
DLCO %PRED: 73 %
DLCO PRED: NORMAL
DLCO/VA %PRED: NORMAL
DLCO/VA PRED: NORMAL
DLCO/VA: NORMAL
DLCO: NORMAL
EXPIRATORY TIME-POST: NORMAL
EXPIRATORY TIME: NORMAL
FEF 25-75% %CHNG: NORMAL
FEF 25-75% %PRED-POST: NORMAL
FEF 25-75% %PRED-PRE: NORMAL
FEF 25-75% PRED: NORMAL
FEF 25-75%-POST: NORMAL
FEF 25-75%-PRE: NORMAL
FEV1 %PRED-POST: 73 %
FEV1 %PRED-PRE: 75 %
FEV1 PRED: NORMAL
FEV1-POST: NORMAL
FEV1-PRE: NORMAL
FEV1/FVC %PRED-POST: 94 %
FEV1/FVC %PRED-PRE: 91 %
FEV1/FVC PRED: NORMAL
FEV1/FVC-POST: NORMAL
FEV1/FVC-PRE: NORMAL
FVC %PRED-POST: NORMAL
FVC %PRED-PRE: NORMAL
FVC PRED: NORMAL
FVC-POST: NORMAL
FVC-PRE: NORMAL
GAW %PRED: NORMAL
GAW PRED: NORMAL
GAW: NORMAL
IC %PRED: NORMAL
IC PRED: NORMAL
IC: NORMAL
MEP: NORMAL
MIP: NORMAL
MVV %PRED-PRE: NORMAL
MVV PRED: NORMAL
MVV-PRE: NORMAL
PEF %PRED-POST: NORMAL
PEF %PRED-PRE: NORMAL
PEF PRED: NORMAL
PEF%CHNG: NORMAL
PEF-POST: NORMAL
PEF-PRE: NORMAL
RAW %PRED: NORMAL
RAW PRED: NORMAL
RAW: NORMAL
RV %PRED: NORMAL
RV PRED: NORMAL
RV: NORMAL
SVC %PRED: NORMAL
SVC PRED: NORMAL
SVC: NORMAL
TLC %PRED: 115 %
TLC PRED: NORMAL
TLC: NORMAL
VA %PRED: NORMAL
VA PRED: NORMAL
VA: NORMAL
VTG %PRED: NORMAL
VTG PRED: NORMAL
VTG: NORMAL

## 2022-12-07 PROCEDURE — 94729 DIFFUSING CAPACITY: CPT

## 2022-12-07 PROCEDURE — 94726 PLETHYSMOGRAPHY LUNG VOLUMES: CPT

## 2022-12-07 PROCEDURE — 94760 N-INVAS EAR/PLS OXIMETRY 1: CPT

## 2022-12-07 PROCEDURE — 93306 TTE W/DOPPLER COMPLETE: CPT

## 2022-12-07 PROCEDURE — 94060 EVALUATION OF WHEEZING: CPT

## 2022-12-07 PROCEDURE — 6370000000 HC RX 637 (ALT 250 FOR IP): Performed by: INTERNAL MEDICINE

## 2022-12-07 RX ORDER — ALBUTEROL SULFATE 90 UG/1
4 AEROSOL, METERED RESPIRATORY (INHALATION) ONCE
Status: COMPLETED | OUTPATIENT
Start: 2022-12-07 | End: 2022-12-07

## 2022-12-07 RX ADMIN — Medication 4 PUFF: at 13:28

## 2022-12-07 ASSESSMENT — PULMONARY FUNCTION TESTS
FEV1_PERCENT_PREDICTED_POST: 73
FEV1/FVC_PERCENT_PREDICTED_POST: 94
FEV1_PERCENT_PREDICTED_PRE: 75
FEV1/FVC_PERCENT_PREDICTED_PRE: 91

## 2022-12-08 NOTE — PROCEDURES
Pulmonary Function Testing      Patient name:  Dorette Sacks     [address] [place] [address] Unit #:   [id number]   Date of test:  12/7/2022  Date of interpretation:   12/8/2022    Ms. Dorette Sacks is a 74y.o. year-old non smoker. The spirometry data were acceptable and reproducible. Spirometry:  Flow volume loops were obstructed. The FEV-1/FVC ratio was decreased. The FEV-1 was 1.67 liters (75% of predicted), which was moderately decreased. The FVC was 2.42 liters (82% of predicted), which was normal. Response to inhaled bronchodilators (albuterol) was not significant. Lung volumes:  Lung volumes were tested by plethysmography. The total lung capacity was 5.32 liters (115% of predicted), which was increased. The residual volume was 2.79 liters (152% of predicted), which was increased. The ratio of residual volume to total lung capacity (RV/TLC) was 52, which was increased. Diffusion capacity was found to be 73% which is Moderately decreased. Interpretation:  Moderate obstructive airway disease with no significant bronchodilator reversibility.     Comments:

## 2022-12-15 ENCOUNTER — TELEPHONE (OUTPATIENT)
Dept: CARDIOLOGY CLINIC | Age: 67
End: 2022-12-15

## 2022-12-15 NOTE — TELEPHONE ENCOUNTER
Per [address] would like someone to call her and discuss her monitor. Discussed monitor results - verbalized understanding.   She will see Dr. Melanie Green 02/15/2022 and Dr Fabian Mackey 01/04/2022

## 2022-12-30 PROBLEM — R06.02 SOB (SHORTNESS OF BREATH): Status: ACTIVE | Noted: 2022-12-30

## 2022-12-30 NOTE — PATIENT INSTRUCTIONS
Schedule a chemical stress test to evaluate the blood flow to your heart - will call you with the results. Follow up on your sleep apnea to discuss Inspire implantation.

## 2022-12-30 NOTE — PROGRESS NOTES
[address]    2023    Tanja Burton (:  1955) is a 69 y.o. female here for shortness of breath. She had a recent echo and would like to discuss results. Referring Provider: Helen Reis MD    HISTORY: Tanja Burton has a history of Mitral regurgitation,  Htn, atrial fibrillation with hx of DCCV 20. LHC at that time showed normal cors. Another DCCV 21. S/p RFCA Atrial fibrillation with PVI 2022. She has had reports of shortness of breath with some activities. She has had Covid twice. Today, she notes some shortness of breath. REVIEW OF SYSTEMS:  A complete review of systems was reviewed and is negative except as noted in the history of present illness. Prior to Visit Medications    Medication Sig Taking? Authorizing Provider   BLAKE THYROID 90 MG tablet TAKE 1 TABLET BY MOUTH ONCE A DAY Yes Historical Provider, MD   gabapentin (NEURONTIN) 300 MG capsule Take 2 capsules by mouth 3 times daily for 90 days.  Yes Micha Peng MD   hydroxychloroquine (PLAQUENIL) 200 MG tablet Take 1.5 tablets by mouth daily Yes Micha Peng MD   traMADol (ULTRAM) 50 MG tablet  Yes Historical Provider, MD   lisinopril (PRINIVIL;ZESTRIL) 10 MG tablet Take 10 mg by mouth daily Yes Historical Provider, MD   escitalopram (LEXAPRO) 10 MG tablet Take 20 mg by mouth daily  Yes Historical Provider, MD   Calcium Carb-Cholecalciferol (CALCIUM 1000 + D) 1000-800 MG-UNIT TABS Take 1 tablet by mouth daily Yes Historical Provider, MD   Multiple Vitamins-Minerals (THERAPEUTIC MULTIVITAMIN-MINERALS) tablet Take 1 tablet by mouth daily Yes Historical Provider, MD   vitamin B-12 (CYANOCOBALAMIN) 1000 MCG tablet Take 1,000 mcg by mouth daily Yes Historical Provider, MD   rivaroxaban (XARELTO) 20 MG TABS tablet Take 1 tablet by mouth daily Yes Helen Reis MD   pantoprazole (PROTONIX) 40 MG tablet Take 1 tablet by mouth daily Yes Alisa Gerard MD tiZANidine (ZANAFLEX) 4 MG tablet Take 4 mg by mouth 2 times daily as needed  Yes Historical Provider, MD   ALPRAZolam (XANAX) 0.25 MG tablet Take 0.25 mg by mouth nightly as needed for Sleep. Yes Historical Provider, MD   traZODone (DESYREL) 50 MG tablet Take 50 mg by mouth nightly. Yes Historical Provider, MD   metoprolol succinate (TOPROL XL) 50 MG extended release tablet Take 1 tablet by mouth daily  Dimple Lainez, APRN - CNP        Allergies   Allergen Reactions    Nsaids      Note: PERFORATED PEPTIC ULCER       Past Medical History:   Diagnosis Date    Age-related osteoporosis without current pathological fracture 5/18/2022    Anxiety     Arthritis     Depression     Hypertension     Hypothyroidism        Past Surgical History:   Procedure Laterality Date    ABDOMINAL EXPLORATION SURGERY  02/12/2018    LAPAROTOMY EXPLORATORY, REPAIR PERFORATED ULCER    BLADDER REPAIR      BREAST ENHANCEMENT SURGERY      HYSTERECTOMY (CERVIX STATUS UNKNOWN)      partial    PARTIAL HYSTERECTOMY (CERVIX NOT REMOVED)      SHOULDER SURGERY      ligament moved up left shoulder       Social History     Tobacco Use    Smoking status: Never    Smokeless tobacco: Never   Substance Use Topics    Alcohol use: Yes     Alcohol/week: 0.0 standard drinks        Family History   Problem Relation Age of Onset    Heart Attack Mother        PHYSICAL EXAMINATION:  Vitals:    01/04/23 1159   BP: 118/60   Pulse: 84   SpO2: 96%   Weight: 178 lb (80.7 kg)   Height: 5' 3\" (1.6 m)     Estimated body mass index is 31.53 kg/m² as calculated from the following:    Height as of this encounter: 5' 3\" (1.6 m). Weight as of this encounter: 178 lb (80.7 kg). Physical Exam  HENT:      Head: Normocephalic and atraumatic. Eyes:      Extraocular Movements: Extraocular movements intact. Conjunctiva/sclera: Conjunctivae normal.   Cardiovascular:      Rate and Rhythm: Normal rate and regular rhythm.    Pulmonary:      Effort: Pulmonary effort is normal. Breath sounds: Normal breath sounds. Abdominal:      General: Bowel sounds are normal.      Palpations: Abdomen is soft. Musculoskeletal:         General: Normal range of motion. Cervical back: Normal range of motion and neck supple. Skin:     General: Skin is warm and dry. Neurological:      General: No focal deficit present. Mental Status: She is alert and oriented to person, place, and time. Psychiatric:         Mood and Affect: Mood normal.         Behavior: Behavior normal.         Thought Content: Thought content normal.         Judgment: Judgment normal.         LABS:  CBC:   Lab Results   Component Value Date/Time    WBC 4.9 03/02/2022 07:00 AM    RBC 3.44 03/02/2022 07:00 AM    HGB 12.2 03/02/2022 07:00 AM    HCT 35.0 03/02/2022 07:00 AM    .7 03/02/2022 07:00 AM    RDW 14.3 03/02/2022 07:00 AM     03/02/2022 07:00 AM     CMP:   Lab Results   Component Value Date/Time     03/03/2022 09:40 AM    K 3.9 03/03/2022 09:40 AM    K 3.9 10/30/2021 05:07 AM     03/03/2022 09:40 AM    CO2 20 03/03/2022 09:40 AM    BUN 11 03/03/2022 09:40 AM    CREATININE 0.7 05/18/2022 12:00 PM    GFRAA >60 05/18/2022 12:00 PM    GFRAA >60 01/25/2013 11:13 AM    AGRATIO 1.5 10/29/2021 01:41 PM    LABGLOM >60 05/18/2022 12:00 PM    GLUCOSE 127 03/03/2022 09:40 AM    PROT 7.4 10/29/2021 01:41 PM    PROT 6.9 01/25/2013 11:13 AM    CALCIUM 8.7 03/03/2022 09:40 AM    BILITOT 0.6 10/29/2021 01:41 PM    ALKPHOS 96 10/29/2021 01:41 PM    AST 30 10/29/2021 01:41 PM    ALT 35 10/29/2021 01:41 PM     LIPIDS: No components found for: TOTAL CHOLESTEROL,  HDL,  LDL,  TRIGLYCERIDES  PT/INR: No results found for: PTINR    Echo 12/7/22  Summary  Mild concentric left ventricular hypertrophy. Normal left ventricular cavity size. Borderline left ventricular systolic function with an estimated ejection fraction of 50-55%. No evident regional wall motion abnormalities seen.   Indeterminate diastolic function. The right ventricle is borderline in size with normal function. Bi-atrial enlargement. The aortic valve appears sclerotic but opens well. Mild aortic  insufficiency. The mitral valve is thickened with adequate excursion. Moderate, eccentric, mitral regurgitation. Mild to moderate tricuspid regurgitation with an estimated PASP of 45 mmHg. Moderate pulmonic insufficiency. 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 CLAUDINE 3/2/22  Summary  Left ventricular cavity size is normal with normal left ventricular wall thickness. Overall left ventricular systolic function appears mildly reduced. Ejection fraction is visually estimated to be 45-50%. The mitral valve normal in structure and function. No evidence of mitral regurgitation or stenosis. There is no evidence of mass or thrombus in the left atrium or appendage. The left atrial size is normal.  The aortic root is normal in size. The thoracic aorta has mild plaque. The tricuspid valve is normal in structure and function. Mild to moderate tricuspid regurgitation. Echo 12/16/20  Summary  Overall left ventricular systolic function is low normal .  Ejection fraction is visually estimated to be 50-55 %. E/e'= 8.1  No definitive wall motion abnormality. Indeterminate diastolic function. The tip of the anterior leaflet appears to be slightly redundant, and is suggestive of some myxomatous change with minimal prolapse. Moderate to severe mitral regurgitation. Two regurgitant jets are noted. Mild tricuspid regurgitation. Mild Aortic regurgitation. IVC size is normal (<2.1cm) and collapses > 50% with respiration consistent with normal RA pressure (3mmHg). ASSESSMENT/PLAN:  Shortness of breath  - echo 12/7/22 - EF 50-55%, mild LVH, moderate eccentric mitral regurgitation and tricuspid regurgitation. Coronary fistula. - PFT shows moderate obstructive disease.  Exposure to second hand smoke as a child.     Plan: Plan for a stress lexiscan. 2.  Atrial fibrillation  - s /p RFCA atrial bibrillatio with PVI 3/2022  - DCCV 12/2020  - Toprol 50 mg daily, Xarelto 20 mg daily  Plan: Plan per EP. 3.  Hypertension  - /60   Pulse 84   Ht 5' 3\" (1.6 m)   Wt 178 lb (80.7 kg)   SpO2 96%   BMI 31.53 kg/m²    - lisinopril 10 mg, Toprol Xl 50 mg  Plan: Stable. Continue current medication regimen. Lipid panel 5/25/21  TG 82 HDL 75 LDL 64. LFT normal.      4. Sleep apnea  - untreated, unable to wear mask (side sleeper and mouth breather)  Plan: follow up with sleep Dr to discuss Inspire option    5. Moderate mitral regurgitation  6. Mild to mod tricuspid regurgitation  7. Moderate pulmonic insufficiency  8. Bi atrial enlargement  - echo 12/7/22 mod MR, mild-mod TR, mod pulmonic insufficiency  -12/6/20 mod to severe MR, mild TR, mild AR    Plan:   Schedule a stress lexiscan  Follow up with sleep doctor for discussion of inspire  RTC in 6 months     Return in about 6 months (around 7/4/2023). Scribe's Attestation: This note was scribed in the presence of Dr. Mila Baeza MD by Anila Peacock RN. Physician Attestation: The scribe's documentation has been prepared under my direction and personally reviewed by me in its entirety. I confirm that the note above accurately reflects all work, treatment, procedures, and medical decision making performed by me. An  electronic signature was used to authenticate this note. Abdulaziz Espinoza MD, [facility] [place], [address]

## 2023-01-04 ENCOUNTER — OFFICE VISIT (OUTPATIENT)
Dept: CARDIOLOGY CLINIC | Age: 68
End: 2023-01-04
Payer: MEDICARE

## 2023-01-04 VITALS
DIASTOLIC BLOOD PRESSURE: 60 MMHG | BODY MASS INDEX: 31.54 KG/M2 | HEIGHT: 63 IN | HEART RATE: 84 BPM | WEIGHT: 178 LBS | OXYGEN SATURATION: 96 % | SYSTOLIC BLOOD PRESSURE: 118 MMHG

## 2023-01-04 DIAGNOSIS — I48.92 ATRIAL FIBRILLATION AND FLUTTER (HCC): Primary | ICD-10-CM

## 2023-01-04 DIAGNOSIS — I10 HYPERTENSION, UNSPECIFIED TYPE: ICD-10-CM

## 2023-01-04 DIAGNOSIS — R06.02 SOB (SHORTNESS OF BREATH): ICD-10-CM

## 2023-01-04 DIAGNOSIS — G47.33 OSA (OBSTRUCTIVE SLEEP APNEA): ICD-10-CM

## 2023-01-04 DIAGNOSIS — I48.91 ATRIAL FIBRILLATION AND FLUTTER (HCC): Primary | ICD-10-CM

## 2023-01-04 PROCEDURE — 1123F ACP DISCUSS/DSCN MKR DOCD: CPT | Performed by: INTERNAL MEDICINE

## 2023-01-04 PROCEDURE — 3078F DIAST BP <80 MM HG: CPT | Performed by: INTERNAL MEDICINE

## 2023-01-04 PROCEDURE — G8427 DOCREV CUR MEDS BY ELIG CLIN: HCPCS | Performed by: INTERNAL MEDICINE

## 2023-01-04 PROCEDURE — G8484 FLU IMMUNIZE NO ADMIN: HCPCS | Performed by: INTERNAL MEDICINE

## 2023-01-04 PROCEDURE — G8417 CALC BMI ABV UP PARAM F/U: HCPCS | Performed by: INTERNAL MEDICINE

## 2023-01-04 PROCEDURE — 99214 OFFICE O/P EST MOD 30 MIN: CPT | Performed by: INTERNAL MEDICINE

## 2023-01-04 PROCEDURE — 1036F TOBACCO NON-USER: CPT | Performed by: INTERNAL MEDICINE

## 2023-01-04 PROCEDURE — 3074F SYST BP LT 130 MM HG: CPT | Performed by: INTERNAL MEDICINE

## 2023-01-04 PROCEDURE — 1090F PRES/ABSN URINE INCON ASSESS: CPT | Performed by: INTERNAL MEDICINE

## 2023-01-04 PROCEDURE — G8399 PT W/DXA RESULTS DOCUMENT: HCPCS | Performed by: INTERNAL MEDICINE

## 2023-01-04 PROCEDURE — 3017F COLORECTAL CA SCREEN DOC REV: CPT | Performed by: INTERNAL MEDICINE

## 2023-01-04 RX ORDER — THYROID,PORK 90 MG
TABLET ORAL
COMMUNITY
Start: 2022-12-22

## 2023-01-05 RX ORDER — METOPROLOL SUCCINATE 50 MG/1
50 TABLET, EXTENDED RELEASE ORAL DAILY
Qty: 30 TABLET | Refills: 5 | OUTPATIENT
Start: 2023-01-05

## 2023-01-05 RX ORDER — METOPROLOL SUCCINATE 50 MG/1
50 TABLET, EXTENDED RELEASE ORAL DAILY
Qty: 30 TABLET | Refills: 3 | Status: SHIPPED | OUTPATIENT
Start: 2023-01-05

## 2023-01-05 NOTE — TELEPHONE ENCOUNTER
Pt call requesting refill on the metoprolol stating the pharmacy told them they do not have any refills left.     metoprolol succinate (TOPROL XL) 50 MG extended release tablet   50 mg  30 tablet  TAKE 1 TABLET BY MOUTH DAILY      [facility] [address] [place] [address], [address] [place] [id number]   Phone:  [phone]  Fax:  [phone]

## 2023-01-16 ENCOUNTER — TELEPHONE (OUTPATIENT)
Dept: CARDIOLOGY CLINIC | Age: 68
End: 2023-01-16

## 2023-01-16 ENCOUNTER — HOSPITAL ENCOUNTER (OUTPATIENT)
Dept: NON INVASIVE DIAGNOSTICS | Age: 68
Discharge: HOME | End: 2023-01-16
Payer: MEDICARE

## 2023-01-16 DIAGNOSIS — R94.2 ABNORMAL PFT: Primary | ICD-10-CM

## 2023-01-16 DIAGNOSIS — R06.02 SOB (SHORTNESS OF BREATH): ICD-10-CM

## 2023-01-16 LAB
LV EF: 59 %
LVEF MODALITY: NORMAL

## 2023-01-16 PROCEDURE — 6360000002 HC RX W HCPCS: Performed by: INTERNAL MEDICINE

## 2023-01-16 PROCEDURE — 78452 HT MUSCLE IMAGE SPECT MULT: CPT | Performed by: INTERNAL MEDICINE

## 2023-01-16 PROCEDURE — 3430000000 HC RX DIAGNOSTIC RADIOPHARMACEUTICAL: Performed by: INTERNAL MEDICINE

## 2023-01-16 PROCEDURE — 93017 CV STRESS TEST TRACING ONLY: CPT | Performed by: INTERNAL MEDICINE

## 2023-01-16 PROCEDURE — A9502 TC99M TETROFOSMIN: HCPCS | Performed by: INTERNAL MEDICINE

## 2023-01-16 RX ORDER — AMINOPHYLLINE DIHYDRATE 25 MG/ML
100 INJECTION, SOLUTION INTRAVENOUS ONCE
Status: COMPLETED | OUTPATIENT
Start: 2023-01-16 | End: 2023-01-16

## 2023-01-16 RX ADMIN — AMINOPHYLLINE 100 MG: 25 INJECTION, SOLUTION INTRAVENOUS at 14:03

## 2023-01-16 RX ADMIN — TETROFOSMIN 10 MILLICURIE: 1.38 INJECTION, POWDER, LYOPHILIZED, FOR SOLUTION INTRAVENOUS at 13:04

## 2023-01-16 RX ADMIN — TETROFOSMIN 30 MILLICURIE: 1.38 INJECTION, POWDER, LYOPHILIZED, FOR SOLUTION INTRAVENOUS at 13:56

## 2023-01-16 RX ADMIN — REGADENOSON 0.4 MG: 0.08 INJECTION, SOLUTION INTRAVENOUS at 13:46

## 2023-01-16 NOTE — PROGRESS NOTES
Instructed on Lexiscan Stress Test Procedure including possible side effects/ adverse reactions. Patient verbalizes  understanding and denies having any questions. See [address] [place] [address] Rd Cardiology.   Germán Benites RN

## 2023-01-16 NOTE — TELEPHONE ENCOUNTER
----- Message from Linda Brown MD sent at 1/14/2023 11:00 AM EST -----  Please refer to pulmonary for abnl PFT.       change

## 2023-02-06 ENCOUNTER — OFFICE VISIT (OUTPATIENT)
Dept: PULMONOLOGY | Age: 68
End: 2023-02-06
Payer: MEDICARE

## 2023-02-06 VITALS
HEART RATE: 63 BPM | BODY MASS INDEX: 31.54 KG/M2 | SYSTOLIC BLOOD PRESSURE: 118 MMHG | DIASTOLIC BLOOD PRESSURE: 72 MMHG | OXYGEN SATURATION: 96 % | HEIGHT: 63 IN | WEIGHT: 178 LBS

## 2023-02-06 DIAGNOSIS — J44.9 COPD, MODERATE (HCC): ICD-10-CM

## 2023-02-06 DIAGNOSIS — Z86.16 HISTORY OF COVID-19: ICD-10-CM

## 2023-02-06 DIAGNOSIS — R06.09 DOE (DYSPNEA ON EXERTION): Primary | ICD-10-CM

## 2023-02-06 PROCEDURE — G8417 CALC BMI ABV UP PARAM F/U: HCPCS | Performed by: INTERNAL MEDICINE

## 2023-02-06 PROCEDURE — G8484 FLU IMMUNIZE NO ADMIN: HCPCS | Performed by: INTERNAL MEDICINE

## 2023-02-06 PROCEDURE — G8427 DOCREV CUR MEDS BY ELIG CLIN: HCPCS | Performed by: INTERNAL MEDICINE

## 2023-02-06 PROCEDURE — 3074F SYST BP LT 130 MM HG: CPT | Performed by: INTERNAL MEDICINE

## 2023-02-06 PROCEDURE — 3078F DIAST BP <80 MM HG: CPT | Performed by: INTERNAL MEDICINE

## 2023-02-06 PROCEDURE — 3023F SPIROM DOC REV: CPT | Performed by: INTERNAL MEDICINE

## 2023-02-06 PROCEDURE — 1090F PRES/ABSN URINE INCON ASSESS: CPT | Performed by: INTERNAL MEDICINE

## 2023-02-06 PROCEDURE — 99204 OFFICE O/P NEW MOD 45 MIN: CPT | Performed by: INTERNAL MEDICINE

## 2023-02-06 RX ORDER — ALBUTEROL SULFATE 90 UG/1
2 AEROSOL, METERED RESPIRATORY (INHALATION) 4 TIMES DAILY PRN
Qty: 18 G | Refills: 5 | Status: SHIPPED | OUTPATIENT
Start: 2023-02-06

## 2023-02-06 RX ORDER — HYDROXYCHLOROQUINE SULFATE 200 MG/1
TABLET, FILM COATED ORAL DAILY
COMMUNITY

## 2023-02-06 RX ORDER — UMECLIDINIUM BROMIDE AND VILANTEROL TRIFENATATE 62.5; 25 UG/1; UG/1
1 POWDER RESPIRATORY (INHALATION) DAILY
Qty: 1 EACH | Refills: 3 | Status: SHIPPED | OUTPATIENT
Start: 2023-02-06

## 2023-02-06 ASSESSMENT — ENCOUNTER SYMPTOMS
RHINORRHEA: 0
SORE THROAT: 0
COUGH: 0
SINUS PRESSURE: 0
ABDOMINAL DISTENTION: 0
BACK PAIN: 0
APNEA: 0
SHORTNESS OF BREATH: 1
WHEEZING: 0
DIARRHEA: 0
CHOKING: 0
CONSTIPATION: 0
STRIDOR: 0
ABDOMINAL PAIN: 0
ANAL BLEEDING: 0
VOICE CHANGE: 0
CHEST TIGHTNESS: 0
BLOOD IN STOOL: 0

## 2023-02-06 NOTE — PROGRESS NOTES
Michael Look    YOB: 1955     Date of Service:  2/6/2023     Chief Complaint   Patient presents with    New Patient     Ref by Dr Rach Sams for abnormal PFT done 01/16/2023    Shortness of Breath         HPI patient referred for consultation by Dr. Kae Herrera for evaluation of shortness of breath and abnormal PFT study. Patient states that she was infected with COVID-19 twice [November 2020 and late 2022]. Neither of these events required hospitalization, but patient states that she is extremely fatigued and more short of breath with any form of activity since her bout of the last COVID 19 infection. She finds performing activities of daily living extremely difficult and has poor exercise tolerance as a result. Denies any significant cough or phlegm. Patient had PFT study performed in December 2022 which was suggestive of obstructive airway disease, hence a pulmonary consultation has been requested. Denies any chest pain, but states that she has increased palpitations/perception of fast heart rate with any form of activity. Denies any orthopnea or pedal edema. History of paroxysmal atrial fibrillation-status post DCCV in December 2020. Subsequently patient underwent RFA in March 2022, currently in sinus rhythm. Continues on Xarelto for anticoagulation. Non-smoker, but patient has significant secondhand smoke exposure-parents and first has been. 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 No prior history of asthma or allergies. History of EILEEN, sleep study from January 2021 showed severe sleep apnea with AHI of 36. Could not tolerate CPAP due to mask fitting related issues and also claustrophobia.     Allergies   Allergen Reactions    Nsaids      Note: PERFORATED PEPTIC ULCER     Outpatient Medications Marked as Taking for the 2/6/23 encounter (Office Visit) with Ferd Goodpasture, MD   Medication Sig Dispense Refill    hydroxychloroquine (PLAQUENIL) 200 MG tablet Take by mouth daily      Umeclidinium-Vilanterol (ANORO ELLIPTA) 62.5-25 MCG/ACT AEPB Inhale 1 puff into the lungs daily 1 each 3    albuterol sulfate HFA (VENTOLIN HFA) 108 (90 Base) MCG/ACT inhaler Inhale 2 puffs into the lungs 4 times daily as needed for Wheezing 18 g 5    metoprolol succinate (TOPROL XL) 50 MG extended release tablet Take 1 tablet by mouth daily 30 tablet 3    ARMOUR THYROID 90 MG tablet TAKE 1 TABLET BY MOUTH ONCE A DAY      gabapentin (NEURONTIN) 300 MG capsule Take 2 capsules by mouth 3 times daily for 90 days. 540 capsule 1    [DISCONTINUED] hydroxychloroquine (PLAQUENIL) 200 MG tablet Take 1.5 tablets by mouth daily 135 tablet 0    lisinopril (PRINIVIL;ZESTRIL) 10 MG tablet Take 10 mg by mouth daily      escitalopram (LEXAPRO) 10 MG tablet Take 20 mg by mouth daily       Calcium Carb-Cholecalciferol (CALCIUM 1000 + D) 1000-800 MG-UNIT TABS Take 1 tablet by mouth daily      Multiple Vitamins-Minerals (THERAPEUTIC MULTIVITAMIN-MINERALS) tablet Take 1 tablet by mouth daily      vitamin B-12 (CYANOCOBALAMIN) 1000 MCG tablet Take 1,000 mcg by mouth daily      rivaroxaban (XARELTO) 20 MG TABS tablet Take 1 tablet by mouth daily 30 tablet 0    pantoprazole (PROTONIX) 40 MG tablet Take 1 tablet by mouth daily 30 tablet 3    tiZANidine (ZANAFLEX) 4 MG tablet Take 4 mg by mouth 2 times daily as needed       ALPRAZolam (XANAX) 0.25 MG tablet Take 0.25 mg by mouth nightly as needed for Sleep.      traZODone (DESYREL) 50 MG tablet Take 50 mg by mouth nightly.          Immunization History   Administered Date(s) Administered    COVID-19, PFIZER GRAY top, DO NOT Dilute, (age 15 y+), IM, 30 mcg/0.3 mL 02/16/2022    COVID-19, PFIZER PURPLE top, DILUTE for use, (age 15 y+), 30mcg/0.3mL 02/22/2021, 03/18/2021    Influenza Virus Vaccine 10/09/2017       Past Medical History:   Diagnosis Date    Age-related osteoporosis without current pathological fracture 5/18/2022    Anxiety     Arthritis     Depression     Hypertension     Hypothyroidism      Past Surgical History:   Procedure Laterality Date    ABDOMINAL EXPLORATION SURGERY  02/12/2018    LAPAROTOMY EXPLORATORY, REPAIR PERFORATED ULCER    BLADDER REPAIR      BREAST ENHANCEMENT SURGERY      HYSTERECTOMY (CERVIX STATUS UNKNOWN)      partial    PARTIAL HYSTERECTOMY (CERVIX NOT REMOVED)      SHOULDER SURGERY      ligament moved up left shoulder     Family History   Problem Relation Age of Onset    Heart Attack Mother        Review of Systems:  Review of Systems   Constitutional:  Positive for fatigue. Negative for activity change, appetite change and fever. HENT:  Negative for congestion, ear discharge, ear pain, postnasal drip, rhinorrhea, sinus pressure, sneezing, sore throat, tinnitus and voice change. Respiratory:  Positive for shortness of breath. Negative for apnea, cough, choking, chest tightness, wheezing and stridor. Cardiovascular:  Negative for chest pain, palpitations and leg swelling. Gastrointestinal:  Negative for abdominal distention, abdominal pain, anal bleeding, blood in stool, constipation and diarrhea. Musculoskeletal:  Negative for arthralgias, back pain and gait problem. Skin:  Negative for pallor and rash. Allergic/Immunologic: Negative for environmental allergies. Neurological:  Negative for dizziness, tremors, seizures, syncope, speech difficulty, weakness, light-headedness, numbness and headaches. Hematological:  Negative for adenopathy. Does not bruise/bleed easily. Psychiatric/Behavioral:  Negative for sleep disturbance. Vitals:    02/06/23 1113   BP: 118/72   Pulse: 63   SpO2: 96%   Weight: 178 lb (80.7 kg)   Height: 5' 3\" (1.6 m)     Patient-Reported Vitals 4/9/2021   Patient-Reported Weight 158lbs   Patient-Reported Height 5' 2.5\"      Body mass index is 31.53 kg/m².      Wt Readings from Last 3 Encounters:   02/06/23 178 lb (80.7 kg)   01/04/23 178 lb (80.7 kg)   11/09/22 171 lb (77.6 kg)     BP Readings from Last 3 Encounters:   02/06/23 118/72   01/04/23 118/60   11/09/22 (!) 140/76         Physical Exam  Constitutional:       General: She is not in acute distress. Appearance: She is well-developed. She is not ill-appearing or diaphoretic. HENT:      Mouth/Throat:      Pharynx: No oropharyngeal exudate. Cardiovascular:      Rate and Rhythm: Normal rate and regular rhythm. Heart sounds: Normal heart sounds. No murmur heard. No friction rub. Pulmonary:      Effort: No respiratory distress. Breath sounds: Normal breath sounds. No wheezing, rhonchi or rales. Chest:      Chest wall: No tenderness. Abdominal:      General: There is no distension. Palpations: There is no mass. Tenderness: There is no abdominal tenderness. There is no guarding or rebound. Musculoskeletal:         General: No swelling or tenderness. Skin:     Coloration: Skin is not pale. Findings: No erythema or rash. Neurological:      Mental Status: She is alert and oriented to person, place, and time. Cranial Nerves: No cranial nerve deficit. Motor: No abnormal muscle tone.       Coordination: Coordination normal.      Deep Tendon Reflexes: Reflexes normal.           Health Maintenance   Topic Date Due    Pneumococcal 65+ years Vaccine (1 - PCV) 12/23/1961    Depression Screen  Never done    Hepatitis C screen  Never done    DTaP/Tdap/Td vaccine (1 - Tdap) Never done    Diabetes screen  Never done    Colorectal Cancer Screen  Never done    Shingles vaccine (1 of 2) Never done    Annual Wellness Visit (AWV)  Never done    COVID-19 Vaccine (4 - Booster for Pfizer series) 04/13/2022    Flu vaccine (1) 08/01/2022    Breast cancer screen  09/09/2022    Lipids  05/25/2026    DEXA (modify frequency per FRAX score)  Completed    Hepatitis A vaccine  Aged Out    Hib vaccine  Aged Out    Meningococcal (ACWY) vaccine  Aged Out Assessment/Plan:     Diagnosis Orders   1. SPEARS (dyspnea on exertion)  CT CHEST HIGH RESOLUTION      2. History of COVID-19  CT CHEST HIGH RESOLUTION           Patient's symptoms could be related to long COVID syndrome, given her shortness of breath associated post COVID. PFT from December 2022 revealed moderate obstruction, FEV1 75% predicted, % predicted and DLCO 73% predicted. Prior history of secondhand smoke exposure. We will empirically treat her with Anoro Ellipta and albuterol inhaler as needed. Prior CTA chest from October 2021 was personally reviewed-bibasal atelectasis noted but no significant lung nodules or evidence of lung opacities. Given her history of recurrent COVID-19 infection, we will obtain HRCT in order to rule out pulmonary fibrosis resulting from COVID-19 infection. Patient is already on anticoagulation with Xarelto which would make PE unlikely. Recent cardiac testing including nuclear stress test was noted to be negative. Patient follows with Dr. Elijah Galindo and Michele Lozano. Untreated EILEEN, severe in nature based on prior sleep study. Patient inquired and is interested in Pierre therapy, referral will be placed for patient to be reviewed by Adriana Ratliff. Return in about 1 month (around 3/6/2023).

## 2023-02-14 NOTE — PROGRESS NOTES
[address]   Electrophysiology Follow up   Date: 2/15/2023  I had the privilege of visiting Dyllan Beasley in the office. CC: Atrial fibrillation, shortness of breath    HPI: Dyllan Beasley is a 69 y.o. female  with Hx of HTN,  hypothyroid recovering from COVID 19 Dx on 11/21, was feeling bad 12/15/2020 and noticed her HR was >200. She was found to be   in atrial flutter and today she is in Atrial fibrillation. Did not convert with adenosine    She has Hx of palpitations but only a few seconds at a time. [facility] 12/17/2020 showed normal Cors. S/P DCCV 12/17/2020.     02/25/2021 s/p Successful DCCV     She was diagnosed with Covid 11/2021-  presetented to ER with atrial fibrillation. Cardizem increased      S/p RFCA Atrial fibrillation with PVI 03/02/2022 11/09/2022  Continued c/o SOB. Ordered PFT showed moderate obstructive airway disease. She was referred to pulmonary. Interval History:   Chris Manning presents today in follow up. She is still SOB and is seeing pulmonary. She states she was raised in second hand smoke and she did have Covid. She has a  CT ordered and is going to see Dr. Vaibhav Maxwell for Dille. She is doing well from a cardiac perspective     Assessment and plan:     Paroxysmal atrial fibrillation    EKG today sinus rhythm    Sargeant on monitor 11/2022 - 0%    S/p RFCA atrial fibrillation with PVI,   03/02/2022    S/p successful DCCV 12/2020      Continue Toprol 50 mg daily    Patient has a [id number] Score of 3 ( age, gender,HTN)     ~ continue xarelto 20 mg daily    ~ creatinine clearance of 93  ml/ min based on creatinine of  0.7/05/18/2022      No clinical recurrence. Continue to monitor. SOB    PFT  12/2022 showed  moderate obstructive airway  disease with no significant bronchodilator reversibility  - referred to pulmonary    Echo 12/2022  EF 50-55%, mild LVH, moderate eccentric mitral regurgitation and tricuspid regurgitation. Coronary fistula.    Stress test 01/2023 - low risk   Continue to treat lung issues and potentially diastolic heart failure. HTN  - Not well controlled 142/84    -BP goal <130/80  -Home BP monitoring encouraged, printed information provided on how to accurately measure BP at home.  -Counseled to follow a low salt diet to assure blood pressure remains controlled for cardiovascular risk factor modification.   -The patient is counseled to get regular exercise 3-5 times per week and maintain a healthy weight reduce cardiovascular risk factors. - continue current medication      Mitral Regurgitation    - moderate per echo 12/2022   - none per CLAUDINE 03/2022    -  moderate severe per Echo  12/2020    Follows with Dr. Nina Barksdale    obstructive sleep apnea    - severe per sleep study 01/2021    - does not tolerate CPAP   - discussed long term effects of untreated sleep panea.     - patient has been referred to Dr. Nikky Aguirre for [facility]    Plan:   Follow with Pulmonary as planned   Follow up in 9 months    Patient Active Problem List    Diagnosis Date Noted    Typical atrial flutter ([address] 12/15/2020    SOB (shortness of breath) 12/30/2022    Age-related osteoporosis without current pathological fracture 05/18/2022    Other osteoporosis without current pathological fracture 02/23/2022    Encounter for therapeutic drug monitoring 02/23/2022    Erosive osteoarthritis of both hands 11/17/2021    Primary osteoarthritis involving multiple joints 11/17/2021    Multilevel degenerative disc disease 11/17/2021    Long-term use of hydroxychloroquine 11/17/2021    Atrial fibrillation and flutter (HCC)     Paroxysmal supraventricular tachycardia (HCC)     Obstructive sleep apnea     PAF (paroxysmal atrial fibrillation) ([address]     Hyponatremia     Hypertension     Perforated ulcer ([address]     Peritonitis ([address]     Chronic migraine without aura 11/13/2014    DDD (degenerative disc disease), cervical 09/30/2014    Myofascial pain syndrome 09/30/2014     Diagnostic studies: ECG 2/15/23  Sinus , , QRS  92     NM stress 01/16/2023   Summary    There is breast attenuation artifact. The LV is enlarged. Normal myocardial perfusion. Normal LV size and systolic function. Low risk study. Echo 12/2022    Summary   Mild concentric left ventricular hypertrophy. Normal left ventricular cavity   size. Borderline left ventricular systolic function with an estimated   ejection fraction of 50-55%. No evident regional wall motion abnormalities   seen. Indeterminate diastolic function. The right ventricle is borderline in size with normal function. Bi-atrial enlargement. The aortic valve appears sclerotic but opens well. Mild aortic   insufficiency. The mitral valve is thickened with adequate excursion. Moderate, eccentric,   mitral regurgitation. Mild to moderate tricuspid regurgitation with an estimated PASP of 45 mmHg. Moderate pulmonic insufficiency. 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 Echo/ CLAUDINE 03/02/2022   Conclusions      Summary   Left ventricular cavity size is normal with normal left ventricular wall   thickness. Overall left ventricular systolic function appears mildly reduced. Ejection fraction is visually estimated to be 45-50%. The mitral valve normal in structure and function. No evidence of mitral regurgitation or stenosis. There is no evidence of mass or thrombus in the left atrium or appendage. The left atrial size is normal.   The aortic root is normal in size. The thoracic aorta has mild plaque. The tricuspid valve is normal in structure and function. Mild to moderate tricuspid regurgitation. Cath: 12/17/2021  Left Heart Cath  Dominance: Right       LM: no angiographic stenosis  LAD: no angiographic stenosis  LCx: no angiographic stenosis  RCA: no angiographic stenosis     LVEDP: 2 mmHg   LVEF: 50-55%   Impression/Recommendations:  Normal coronary study.   Further treatment plan per Electrophysiology     ECHO: 12/16/20   -Overall left ventricular systolic function is low normal .   -Ejection fraction is visually estimated to be 50-55 %. E/e'= 8.1   -No definitive wall motion abnormality.   -Indeterminate diastolic function.   -The tip of the anterior leaflet appears to be slightly redundant, and is suggestive of some myxomatous change with minimal prolapse. -Moderate to severe mitral regurgitation. Two regurgitant jets are noted. -Mild tricuspid regurgitation.   -Mild Aortic regurgitation. -IVC size is normal (<2.1cm) and collapses > 50% with respiration consistent   with normal RA pressure (3mmHg). 12/16/2020 NM stress       Summary    Small sized anteroapical fixed defect of intensity consistent with    infarction in the territory of the LAD . Left ventricular ejection fraction of 45 %. Overall findings represent a intermediate risk scan. I independently reviewed the cardiac diagnostic studies, ECG and relevant imaging studies. Lab Results   Component Value Date    LVEF 59 01/16/2023    LVEFMODE Cardiac Cath 12/17/2020     Lab Results   Component Value Date    TSHFT4 1.50 02/23/2022    TSH 1.99 10/29/2021         Physical Examination:  Vitals:    02/15/23 1301   BP: (!) 142/84   Pulse: 65   SpO2: 96%        Wt Readings from Last 3 Encounters:   02/15/23 182 lb (82.6 kg)   02/15/23 184 lb (83.5 kg)   02/06/23 178 lb (80.7 kg)       Constitutional: Oriented. No distress. Head: Normocephalic and atraumatic. Mouth/Throat: Oropharynx is clear and moist.   Eyes: Conjunctivae normal. EOM are normal.   Neck: Neck supple. No rigidity. No JVD present. Cardiovascular: Normal rate, regular rhythm, S1&S2. Pulmonary/Chest: Bilateral respiratory sounds. No wheezes, No rhonchi. Abdominal: Soft. Bowel sounds present. No distension, No tenderness. Musculoskeletal: No tenderness. No edema    Lymphadenopathy: Has no cervical adenopathy. Neurological: Alert and oriented. Cranial nerve appears intact, No Gross deficit   Skin: Skin is warm and dry. No rash noted. Psychiatric: Has a normal behavior       Review of System:  [x] Full ROS obtained and negative except as mentioned in HPI    Prior to Admission medications    Medication Sig Start Date End Date Taking? Authorizing Provider   gabapentin (NEURONTIN) 300 MG capsule Take 2 capsules by mouth 3 times daily for 90 days.  2/15/23 5/16/23 Yes Sal Zavaleta MD   hydroxychloroquine (PLAQUENIL) 200 MG tablet Take 1 tablet by mouth 2 times daily 2/15/23 5/16/23 Yes Sal Zavaleta MD   Umeclidinium-Vilanterol [facility] ELLIPTA) 62.5-25 MCG/ACT AEPB Inhale 1 puff into the lungs daily 2/6/23  Yes Al Leventhal, MD   albuterol sulfate HFA (VENTOLIN HFA) 108 (90 Base) MCG/ACT inhaler Inhale 2 puffs into the lungs 4 times daily as needed for Wheezing 2/6/23  Yes Al Leventhal, MD   metoprolol succinate (TOPROL XL) 50 MG extended release tablet Take 1 tablet by mouth daily 1/5/23  Yes Gurwinder Galaviz APRN - CNP   ARMOUR THYROID 90 MG tablet TAKE 1 TABLET BY MOUTH ONCE A DAY 12/22/22  Yes Historical Provider, MD   lisinopril (PRINIVIL;ZESTRIL) 10 MG tablet Take 10 mg by mouth daily   Yes Historical Provider, MD   escitalopram (LEXAPRO) 10 MG tablet Take 20 mg by mouth daily    Yes Historical Provider, MD   Calcium Carb-Cholecalciferol (CALCIUM 1000 + D) 1000-800 MG-UNIT TABS Take 1 tablet by mouth daily   Yes Historical Provider, MD   Multiple Vitamins-Minerals (THERAPEUTIC MULTIVITAMIN-MINERALS) tablet Take 1 tablet by mouth daily   Yes Historical Provider, MD   vitamin B-12 (CYANOCOBALAMIN) 1000 MCG tablet Take 1,000 mcg by mouth daily   Yes Historical Provider, MD   rivaroxaban (XARELTO) 20 MG TABS tablet Take 1 tablet by mouth daily 12/18/20  Yes Esperanza Leroy MD   pantoprazole (PROTONIX) 40 MG tablet Take 1 tablet by mouth daily 2/18/18  Yes Ana Medellin MD tiZANidine (ZANAFLEX) 4 MG tablet Take 4 mg by mouth 2 times daily as needed    Yes Historical Provider, MD   ALPRAZolam (XANAX) 0.25 MG tablet Take 0.25 mg by mouth nightly as needed for Sleep. Yes Historical Provider, MD   traZODone (DESYREL) 50 MG tablet Take 50 mg by mouth nightly. Yes Historical Provider, MD       Allergies   Allergen Reactions    Nsaids      Note: PERFORATED PEPTIC ULCER       Social History:  Reviewed. reports that she has never smoked. She has never used smokeless tobacco. She reports current alcohol use. She reports that she does not use drugs. Family History:  Reviewed. Reviewed. No family history of SCD. Relevant and available labs, and cardiovascular diagnostics reviewed. Reviewed. I independently reviewed relevant and available cardiac diagnostic tests ECG, CXR, Echo, Stress test, Device interrogation, Holter, CT scan. Outside medical records via Care everywhere reviewed and summarized in H&P above. Complex medical condition with multiple medical problems affecting prognosis and outcome of EP interventions       - The patient is counseled to follow a low salt diet to assure blood pressure remains controlled for cardiovascular risk factor modification.   - The patient is counseled to avoid excess caffeine, and energy drinks as this may exacerbated ectopy and arrhythmia. - The patient is counseled to get regular exercise 3-5 times per week to control cardiovascular risk factors. All questions and concerns were addressed to the patient/family. Alternatives to my treatment were discussed. I have discussed the above stated plan and the patient verbalized understanding and agreed with the plan. Scribe attestation: This note was scribed in the presence of Desmond Harper MD by Kika Banuelos RN    I, Dr. Desmond Harper personally performed the services described in this documentation as scribed by RN in my presence, and it is both accurate and complete. NOTE: This report was transcribed using voice recognition software. Every effort was made to ensure accuracy, however, inadvertent computerized transcription errors may be present.      Carole Babinski MD, Tiffanie Antonio [address] [place] [address]   Office: [phone]  Fax: [phone]

## 2023-02-15 ENCOUNTER — OFFICE VISIT (OUTPATIENT)
Dept: RHEUMATOLOGY | Age: 68
End: 2023-02-15
Payer: MEDICARE

## 2023-02-15 ENCOUNTER — OFFICE VISIT (OUTPATIENT)
Dept: CARDIOLOGY CLINIC | Age: 68
End: 2023-02-15
Payer: COMMERCIAL

## 2023-02-15 VITALS
DIASTOLIC BLOOD PRESSURE: 80 MMHG | HEART RATE: 70 BPM | WEIGHT: 184 LBS | BODY MASS INDEX: 32.59 KG/M2 | SYSTOLIC BLOOD PRESSURE: 134 MMHG

## 2023-02-15 VITALS
DIASTOLIC BLOOD PRESSURE: 84 MMHG | WEIGHT: 182 LBS | HEART RATE: 65 BPM | SYSTOLIC BLOOD PRESSURE: 142 MMHG | HEIGHT: 63 IN | BODY MASS INDEX: 32.25 KG/M2 | OXYGEN SATURATION: 96 %

## 2023-02-15 DIAGNOSIS — M81.8 OTHER OSTEOPOROSIS WITHOUT CURRENT PATHOLOGICAL FRACTURE: ICD-10-CM

## 2023-02-15 DIAGNOSIS — M15.9 PRIMARY OSTEOARTHRITIS INVOLVING MULTIPLE JOINTS: ICD-10-CM

## 2023-02-15 DIAGNOSIS — I48.0 PAF (PAROXYSMAL ATRIAL FIBRILLATION) (HCC): Primary | ICD-10-CM

## 2023-02-15 DIAGNOSIS — G47.33 OSA (OBSTRUCTIVE SLEEP APNEA): ICD-10-CM

## 2023-02-15 DIAGNOSIS — Z79.899 LONG-TERM USE OF HYDROXYCHLOROQUINE: ICD-10-CM

## 2023-02-15 DIAGNOSIS — M53.9 MULTILEVEL DEGENERATIVE DISC DISEASE: ICD-10-CM

## 2023-02-15 DIAGNOSIS — I10 HYPERTENSION, UNSPECIFIED TYPE: ICD-10-CM

## 2023-02-15 DIAGNOSIS — M15.4 EROSIVE OSTEOARTHRITIS OF BOTH HANDS: Primary | ICD-10-CM

## 2023-02-15 DIAGNOSIS — R06.02 SOB (SHORTNESS OF BREATH): ICD-10-CM

## 2023-02-15 LAB
BASOPHILS ABSOLUTE: 0 K/UL (ref 0–0.2)
BASOPHILS RELATIVE PERCENT: 0.8 %
EOSINOPHILS ABSOLUTE: 0.2 K/UL (ref 0–0.6)
EOSINOPHILS RELATIVE PERCENT: 3.4 %
HCT VFR BLD CALC: 34 % (ref 36–48)
HEMOGLOBIN: 12.1 G/DL (ref 12–16)
LYMPHOCYTES ABSOLUTE: 0.9 K/UL (ref 1–5.1)
LYMPHOCYTES RELATIVE PERCENT: 14.1 %
MCH RBC QN AUTO: 35.5 PG (ref 26–34)
MCHC RBC AUTO-ENTMCNC: 35.7 G/DL (ref 31–36)
MCV RBC AUTO: 99.6 FL (ref 80–100)
MONOCYTES ABSOLUTE: 0.6 K/UL (ref 0–1.3)
MONOCYTES RELATIVE PERCENT: 10.1 %
NEUTROPHILS ABSOLUTE: 4.3 K/UL (ref 1.7–7.7)
NEUTROPHILS RELATIVE PERCENT: 71.6 %
PARATHYROID HORMONE INTACT: 44.7 PG/ML (ref 14–72)
PDW BLD-RTO: 13.2 % (ref 12.4–15.4)
PLATELET # BLD: 224 K/UL (ref 135–450)
PMV BLD AUTO: 9 FL (ref 5–10.5)
RBC # BLD: 3.41 M/UL (ref 4–5.2)
TSH SERPL DL<=0.05 MIU/L-ACNC: 0.69 UIU/ML (ref 0.27–4.2)
VITAMIN D 25-HYDROXY: 45.9 NG/ML
WBC # BLD: 6.1 K/UL (ref 4–11)

## 2023-02-15 PROCEDURE — 93000 ELECTROCARDIOGRAM COMPLETE: CPT | Performed by: INTERNAL MEDICINE

## 2023-02-15 PROCEDURE — 1036F TOBACCO NON-USER: CPT | Performed by: INTERNAL MEDICINE

## 2023-02-15 PROCEDURE — G8399 PT W/DXA RESULTS DOCUMENT: HCPCS | Performed by: INTERNAL MEDICINE

## 2023-02-15 PROCEDURE — 99214 OFFICE O/P EST MOD 30 MIN: CPT | Performed by: INTERNAL MEDICINE

## 2023-02-15 PROCEDURE — 1123F ACP DISCUSS/DSCN MKR DOCD: CPT | Performed by: INTERNAL MEDICINE

## 2023-02-15 PROCEDURE — 3017F COLORECTAL CA SCREEN DOC REV: CPT | Performed by: INTERNAL MEDICINE

## 2023-02-15 PROCEDURE — G8484 FLU IMMUNIZE NO ADMIN: HCPCS | Performed by: INTERNAL MEDICINE

## 2023-02-15 PROCEDURE — 3075F SYST BP GE 130 - 139MM HG: CPT | Performed by: INTERNAL MEDICINE

## 2023-02-15 PROCEDURE — 1090F PRES/ABSN URINE INCON ASSESS: CPT | Performed by: INTERNAL MEDICINE

## 2023-02-15 PROCEDURE — 3079F DIAST BP 80-89 MM HG: CPT | Performed by: INTERNAL MEDICINE

## 2023-02-15 PROCEDURE — G8427 DOCREV CUR MEDS BY ELIG CLIN: HCPCS | Performed by: INTERNAL MEDICINE

## 2023-02-15 PROCEDURE — G8417 CALC BMI ABV UP PARAM F/U: HCPCS | Performed by: INTERNAL MEDICINE

## 2023-02-15 RX ORDER — HYDROXYCHLOROQUINE SULFATE 200 MG/1
200 TABLET, FILM COATED ORAL 2 TIMES DAILY
Qty: 180 TABLET | Refills: 1 | Status: SHIPPED | OUTPATIENT
Start: 2023-02-15 | End: 2023-05-16

## 2023-02-15 RX ORDER — GABAPENTIN 300 MG/1
600 CAPSULE ORAL 3 TIMES DAILY
Qty: 540 CAPSULE | Refills: 1 | Status: SHIPPED | OUTPATIENT
Start: 2023-02-15 | End: 2023-05-16

## 2023-02-15 NOTE — ASSESSMENT & PLAN NOTE
- she had mild synovitis in some of her PIP and DIP joints on exam today. We discussed adding MTX to better control her EOA however pt declined to escalate her immunotherapy currently. - increase HCQ dose from 300 mg daily to 200 mg BID based on her current weight. - avoid NSAIDs and steroids due to prior Hx of PUD. Recommended trying arthritis strength Acetaminophen up to 1000 mg TID PRN. - cont Gabapentin.

## 2023-02-15 NOTE — PROGRESS NOTES
Rosendo Camarillo MD  [address]  Physicians - Rheumatology    [x] [facility]:  [facility]  [address] [facility] [] [facility] 94:  [address] [facility], [address] [place] [address]   Office: [phone]  Fax: [phone]     RHEUMATOLOGY PROGRESS NOTE    ASSESSMENT/PLAN:  Cherylene Freud is a 69 y.o. female w/ EOA of the hands, generalized OA, DDD of cervical and lumbar spine (followed by Pain Management) and clinical osteoporosis (osteopenic T-scores w/ Hx of R sacral insufficiency fracture from a ground level fall in 10/2021). PMHx pertinent for pAfib on Xarelto, MR, HTN, EILEEN, hypothyroidism, anxiety, depression and insomnia. Current rheum meds:   mg daily: started in 11/2020  Gabapentin 600 mg TID  Lexapro: per PCP  Voltaren gel PRN  Compound pain cream PRN  Reclast: per pt prescribed by PCP, received 1st dose on 6/3/22  OTC vitamin D3 daily + calcium 500 mg BID     Prior rheum meds:  Prednisone taper starting w/ 20 mg daily: provided good pain relief  Duloxetine 30 mg daily: caused \"my head to swim\"  NSAIDs: pt states she was told to avoid d/t a \"perforated ulcer\" 3 yrs ago  Fosamax 70 mg wkly: starte on 2/23/22, could not tolerate d/t GI s/e, switched to Reclast by PCP in 2022    1. Erosive osteoarthritis of both hands  Assessment & Plan:  - she had mild synovitis in some of her PIP and DIP joints on exam today. We discussed adding MTX to better control her EOA however pt declined to escalate her immunotherapy currently. - increase HCQ dose from 300 mg daily to 200 mg BID based on her current weight. - avoid NSAIDs and steroids due to prior Hx of PUD. Recommended trying arthritis strength Acetaminophen up to 1000 mg TID PRN. - cont Gabapentin. Orders:  -     gabapentin (NEURONTIN) 300 MG capsule; Take 2 capsules by mouth 3 times daily for 90 days. , Disp-540 capsule, R-1Normal  -     hydroxychloroquine (PLAQUENIL) 200 MG tablet;  Take 1 tablet by mouth 2 times daily, Disp-180 tablet, R-1Normal  2. Primary osteoarthritis involving multiple joints  Assessment & Plan:  - avoid NSAIDs and steroids due to prior Hx of PUD. Recommended trying arthritis strength Acetaminophen up to 1000 mg TID PRN. - cont Gabapentin. Orders:  -     gabapentin (NEURONTIN) 300 MG capsule; Take 2 capsules by mouth 3 times daily for 90 days. , Disp-540 capsule, R-1Normal  3. Multilevel degenerative disc disease  Assessment & Plan:  - following w/ Dr. Kole Ortiz for DDD of the cervical and lumbar spine.  - avoid NSAIDs and steroids due to prior Hx of PUD. Recommended trying arthritis strength Acetaminophen up to 1000 mg TID PRN. - cont Gabapentin. Orders:  -     gabapentin (NEURONTIN) 300 MG capsule; Take 2 capsules by mouth 3 times daily for 90 days. , Disp-540 capsule, R-1Normal  4. Other osteoporosis without current pathological fracture   Assessment & Plan:  - osteoporosis based on fragility fracture - pt sustained a R sacral insufficiency fracture from a ground level fall in 10/2021. Prior DEXA studies showed osteopenic T-scores. - ordered repeat DEXA study. - started on Fosamax in 2/2022, unable to tolerate d/t GI s/e. Current receiving Reclast.  - cont daily vitamin D and calcium supplements. Vitamin D level at goal.  Orders:  -     DEXA BONE DENSITY 2 SITES; Future  -     Vitamin D 25 Hydroxy; Future  -     TSH; Future  -     PTH, Intact; Future  5. Long-term use of hydroxychloroquine  Assessment & Plan:  - annual eye exam for HCQ toxicity monitoring. Orders:  -     CBC with Auto Differential; Future     Return in about 6 months (around 8/15/2023) for lab result discussion and treatment plan, medication monitoring. The risks and benefits of my recommendations, as well as other treatment options, benefits and side effects were discussed with the patient today. Questions were answered. NOTE: This report is transcribed by using voice recognition software dragon.  Every effort is made to ensure accuracy; however, inadvertent computerized  transcription errors may be present. SUBJECTIVE:  Past medical/surgical history, medications and allergies are reviewed and updated as appropriate. Interval Hx:    Pt reports good pain relief from prior intra-articular corticosteroid injection into her left CMC joint at her last visit. She reports some arthralgias and stiffness in her DIP joints. Joint pain has overall improved on HCQ. Pt states that she hurts \"from my neck on down to the rest of my body\". She reports some pain relief from high-dose Gabapentin.     Rheumatologic ROS:  Constitutional: denies chronic fatigue, fever/chills, night sweats, unintentional weight loss  Integumentary: denies photosensitivity, rash, patchy alopecia, or Sx of Raynaud's phenomenon  Eyes: denies dry eyes, redness or pain, visual disturbance, or floaters  Oral cavity: +extreme dry mouth which she attributes to her thyroid medicine, +canker sores  Cardiovascular: denies CP, palpitations, Hx of pericardial effusion or pericarditis  Respiratory: denies SOB, cough, hemoptysis, or pleurisy  Musculoskeletal:  refer to above HPI     Allergies   Allergen Reactions    Nsaids      Note: PERFORATED PEPTIC ULCER       Past Medical History:        Diagnosis Date    Age-related osteoporosis without current pathological fracture 5/18/2022    Anxiety     Arthritis     Depression     Hypertension     Hypothyroidism        Past Surgical History:        Procedure Laterality Date    ABDOMINAL EXPLORATION SURGERY  02/12/2018    LAPAROTOMY EXPLORATORY, REPAIR PERFORATED ULCER    BLADDER REPAIR      BREAST ENHANCEMENT SURGERY      HYSTERECTOMY (CERVIX STATUS UNKNOWN)      partial    PARTIAL HYSTERECTOMY (CERVIX NOT REMOVED)      SHOULDER SURGERY      ligament moved up left shoulder       Medications:    Current Outpatient Medications   Medication Sig Dispense Refill    gabapentin (NEURONTIN) 300 MG capsule Take 2 capsules by mouth 3 times daily for 90 days. 540 capsule 1    hydroxychloroquine (PLAQUENIL) 200 MG tablet Take 1 tablet by mouth 2 times daily 180 tablet 1    Umeclidinium-Vilanterol (ANORO ELLIPTA) 62.5-25 MCG/ACT AEPB Inhale 1 puff into the lungs daily 1 each 3    albuterol sulfate HFA (VENTOLIN HFA) 108 (90 Base) MCG/ACT inhaler Inhale 2 puffs into the lungs 4 times daily as needed for Wheezing 18 g 5    metoprolol succinate (TOPROL XL) 50 MG extended release tablet Take 1 tablet by mouth daily 30 tablet 3    ARMOUR THYROID 90 MG tablet TAKE 1 TABLET BY MOUTH ONCE A DAY      lisinopril (PRINIVIL;ZESTRIL) 10 MG tablet Take 10 mg by mouth daily      escitalopram (LEXAPRO) 10 MG tablet Take 20 mg by mouth daily       Calcium Carb-Cholecalciferol (CALCIUM 1000 + D) 1000-800 MG-UNIT TABS Take 1 tablet by mouth daily      Multiple Vitamins-Minerals (THERAPEUTIC MULTIVITAMIN-MINERALS) tablet Take 1 tablet by mouth daily      vitamin B-12 (CYANOCOBALAMIN) 1000 MCG tablet Take 1,000 mcg by mouth daily      rivaroxaban (XARELTO) 20 MG TABS tablet Take 1 tablet by mouth daily 30 tablet 0    pantoprazole (PROTONIX) 40 MG tablet Take 1 tablet by mouth daily 30 tablet 3    tiZANidine (ZANAFLEX) 4 MG tablet Take 4 mg by mouth 2 times daily as needed       ALPRAZolam (XANAX) 0.25 MG tablet Take 0.25 mg by mouth nightly as needed for Sleep.      traZODone (DESYREL) 50 MG tablet Take 50 mg by mouth nightly. No current facility-administered medications for this visit.         OBJECTIVE:  Physical Exam:  /80   Pulse 70   Wt 184 lb (83.5 kg)   BMI 32.59 kg/m²     GEN: AAOx3, in NAD, well-appearing  HEAD: normocephalic, atraumatic  EYES: no injection or icterus  CVS: RRR  LUNGS: in no acute respiratory distress  MSK:  Upper extremities:              Hands: MCP joints w/o swelling NTTP, there is bony enlargement of the b/l PIP joints slightly boggy NTTP, DIP joints some slightly boggy TTP, +Smiley and Heberden nodes TTP, there is squaring of the b/l 1st [facility] joints w/ thenar atrophy, L CMC joint w/ synovitis TTP, full fist formation w/ good  strength              Wrist: no synovitis in the wrist joints b/l, FROM              Elbow: no synovitis or bursitis, FROM  Lower extremities:              Knees: no warmth or effusion present, FROM              Ankles: no synovitis, FROM, Achilles tendons w/o swelling or warmth NTTP              Feet: no toe swelling or pain or warmth on palpation w/ FROM, negative MTP squeeze test  INTEGUMENT: no rash or psoriatic lesions, no petechiae, bruises, or palpable purpura, no patchy alopecia, no nail or periungual changes, no clubbing or digital ulcers    DATA:  Labs:   I personally reviewed interval labs and discussed w/ the pt in detail which showed:    Lab Results   Component Value Date    WBC 4.9 03/02/2022    HGB 12.2 03/02/2022    HCT 35.0 (L) 03/02/2022    .7 (H) 03/02/2022     03/02/2022    LYMPHOPCT 23.7 10/29/2021    RBC 3.44 (L) 03/02/2022    MCH 35.5 (H) 03/02/2022    MCHC 34.9 03/02/2022    RDW 14.3 03/02/2022     Lab Results   Component Value Date     (L) 03/03/2022    K 3.9 03/03/2022     03/03/2022    CO2 20 (L) 03/03/2022    BUN 11 03/03/2022    CREATININE 0.7 05/18/2022    GLUCOSE 127 (H) 03/03/2022    CALCIUM 8.7 03/03/2022    PROT 7.4 10/29/2021    LABALBU 4.4 10/29/2021    BILITOT 0.6 10/29/2021    ALKPHOS 96 10/29/2021    AST 30 10/29/2021    ALT 35 10/29/2021    LABGLOM >60 05/18/2022    GFRAA >60 05/18/2022    AGRATIO 1.5 10/29/2021    GLOB 2.4 05/25/2021     Lab Results   Component Value Date    VITD25 58.9 05/25/2021     Lab Results   Component Value Date    PTH 36.6 02/23/2022    CALCIUM 8.7 03/03/2022     Lab Results   Component Value Date    TSHFT4 1.50 02/23/2022    TSH 1.99 10/29/2021     Lab Results   Component Value Date    LABURIC 5.3 11/04/2020    LABURIC 5.1 05/23/2017     Lab Results   Component Value Date    CRP 5.9 (H) 12/17/2020    CRP 4.1 11/04/2020     Lab Results   Component Value Date    SEDRATE 13 12/17/2020    SEDRATE 10 05/23/2017     No results found for: CKTOTAL    Negative RF x 2 (5/23/17, 11/4/20)  Negative CCP (11/4/20)  Negative MARCIA x 2 (5/23/17, 11/4/20)  Negative SSA, SSB (11/4/20)  SPEP and UPEP: no monoclonal band (11/4/20)    Imaging:  I personally reviewed interval imaging and discussed w/ the pt in detail which included:    X-rays (11/4/20):  R hand:     There is no acute fracture. Polyarticular osteoarthritis is present worse in the interphalangeal and trapeziometacarpal joints.      L hand:  Left hand:   There is no acute fracture. Polyarticular osteoarthritis is present worse in the interphalangeal and trapeziometacarpal joints. There is no destructive bone lesion seen.      L-spine:  The vertebral body heights are maintained. There is grade 1 anterolisthesis of L4 on L5. Severe degenerative disc disease at L4-L5 and L5-S1. Facet osteoarthritis in the lower lumbar spine.      Pelvis/hips:  No acute fractures seen. The hip joints are congruent. There is no destructive osseous lesion.      I independently reviewed above x-rays, there are degenerative changes in the b/l PIP joints and significant joint space narrowing w/ gull wing deformities in the b/l DIP joints c/w erosive OA.     MRI L-spine (12/8/21):  IMPRESSION:  1. Acute/subacute right sacral insufficiency fractures.  2. Severe right and moderate left neural foraminal narrowing at L4-5 secondary to grade 1 anterolisthesis, disc bulge and facet arthropathy.   3. Left lateral recess disc protrusion at L3-4 superimposed upon a disc bulge and facet arthropathy effacing the left lateral recess and mildly narrowing the left neural foramen.   4. Mild bilateral neural foraminal narrowing at L5-S1. The findings were sent to the Radiology Results Communication Center at 8:08 am on 12/9/2021to be communicated to a licensed caregiver.     MRI C-spine (2/1/22):  IMPRESSION:  Moderate multilevel cervical disc  degeneration, uncovertebral joint and facet joint osteoarthritis with degenerative anterior listhesis C4-5 and C7-T1. Mild central stenosis C6-7 with moderately severe bilateral foraminal narrowing. Moderate left foraminal narrowing C 2-3, moderately severe bilateral foraminal narrowing C3-4, mild bilateral foraminal narrowing C4-5 and mild left foraminal narrowing C5-6. DEXA study (4/24/15):  T-score -1.7 in L-spine  T-score of -1.5 in L femoral neck. DEXA study (12/8/20):  T-score -1.9 in L1-L4  T-score -1.6 in the L femoral neck  T-score -1.9 in the R femoral neck  I personally calculated her FRAX scores:  Major osteoporosis fx risk 10%  Hip fx risk 1.4%    Above results were discussed w/ the pt in detail during today's visit.

## 2023-02-15 NOTE — ASSESSMENT & PLAN NOTE
- following w/ Dr. Velma Garner for DDD of the cervical and lumbar spine.  - avoid NSAIDs and steroids due to prior Hx of PUD. Recommended trying arthritis strength Acetaminophen up to 1000 mg TID PRN. - cont Gabapentin.

## 2023-02-15 NOTE — ASSESSMENT & PLAN NOTE
- avoid NSAIDs and steroids due to prior Hx of PUD. Recommended trying arthritis strength Acetaminophen up to 1000 mg TID PRN. - cont Gabapentin.

## 2023-02-15 NOTE — ASSESSMENT & PLAN NOTE
- osteoporosis based on fragility fracture - pt sustained a R sacral insufficiency fracture from a ground level fall in 10/2021. Prior DEXA studies showed osteopenic T-scores. - ordered repeat DEXA study. - started on Fosamax in 2/2022, unable to tolerate d/t GI s/e. Current receiving Reclast.  - cont daily vitamin D and calcium supplements.  Vitamin D level at goal.

## 2023-02-15 NOTE — PROGRESS NOTES
Michelle Chen MD  [facility]) Physicians - Rheumatology    [x] [facility]:  [facility]  [address] [facility] [] [place] [address]:  [address], [address]   Office: [phone]  Fax: [phone]     RHEUMATOLOGY PROGRESS NOTE    ASSESSMENT/PLAN:  Berl Boas is a 69 y.o. female w/ erosive OA of the hands, generalized OA, DDD of cervical and lumbar spine (followed by Pain Management) and clinical osteoporosis (osteopenic T-scores w/ Hx of R sacral insufficiency fracture from a ground level fall in 10/2021). PMHx pertinent for pAfib on Xarelto, MR, HTN, EILEEN, hypothyroidism, anxiety, depression and insomnia. Current rheum meds:   mg daily: started in 11/2020  Gabapentin 600 mg TID  Lexapro: per PCP  Voltaren gel PRN  Compound pain cream PRN  Reclast: per pt prescribed by PCP, received 1st dose on 6/3/22  OTC vitamin D3 daily + calcium 500 mg BID     Prior rheum meds:  Prednisone taper starting w/ 20 mg daily: provided good pain relief  Duloxetine 30 mg daily: caused \"my head to swim\"  NSAIDs: pt states she was told to avoid d/t a \"perforated ulcer\" 3 yrs ago  Alendronate 70 mg wkly: starte on 2/23/22, could not tolerate d/t GI s/e, switched to Reclast by PCP in 2022    1. Erosive osteoarthritis of both hands  The following orders have not been finalized:  -     hydroxychloroquine (PLAQUENIL) 200 MG tablet  -     gabapentin (NEURONTIN) 300 MG capsule  2. Primary osteoarthritis involving multiple joints  The following orders have not been finalized:  -     gabapentin (NEURONTIN) 300 MG capsule  3. Multilevel degenerative disc disease  The following orders have not been finalized:  -     gabapentin (NEURONTIN) 300 MG capsule     No follow-ups on file. The risks and benefits of my recommendations, as well as other treatment options, benefits and side effects were discussed with the patient today. Questions were answered.     NOTE: This report is transcribed by using voice recognition software dragon. Every effort is made to ensure accuracy; however, inadvertent computerized  transcription errors may be present. SUBJECTIVE:  Past medical/surgical history, medications and allergies are reviewed and updated as appropriate. Interval Hx:    Pt reports recent recurrence of pain in her L CMC joint. This was brought on after her dog pulled on the its leash and she was holding it w/ her L hand. She reports pain and swelling in the base of her L thumb today. She reports well controlled arthralgias in her DIP joints on HCQ. She reports good pain relief from Gabapentin. Pt states her primary care NP switched her from Alendronate to Reclast d/t GI s/e.     Rheumatologic ROS:  Constitutional: denies chronic fatigue, fever/chills, night sweats, unintentional weight loss  Integumentary: denies photosensitivity, rash, patchy alopecia, or Sx of Raynaud's phenomenon  Eyes: denies dry eyes, redness or pain, visual disturbance, or floaters  Oral cavity: +extreme dry mouth which she attributes to her thyroid medicine, +canker sores  Cardiovascular: denies CP, palpitations, Hx of pericardial effusion or pericarditis  Respiratory: denies SOB, cough, hemoptysis, or pleurisy  Musculoskeletal:  refer to above HPI     Allergies   Allergen Reactions    Nsaids      Note: PERFORATED PEPTIC ULCER       Past Medical History:        Diagnosis Date    Age-related osteoporosis without current pathological fracture 5/18/2022    Anxiety     Arthritis     Depression     Hypertension     Hypothyroidism        Past Surgical History:        Procedure Laterality Date    ABDOMINAL EXPLORATION SURGERY  02/12/2018    LAPAROTOMY EXPLORATORY, REPAIR PERFORATED ULCER    BLADDER REPAIR      BREAST ENHANCEMENT SURGERY      HYSTERECTOMY (CERVIX STATUS UNKNOWN)      partial    PARTIAL HYSTERECTOMY (CERVIX NOT REMOVED)      SHOULDER SURGERY      ligament moved up left shoulder       Medications:    Current Outpatient Medications   Medication Sig Dispense Refill    hydroxychloroquine (PLAQUENIL) 200 MG tablet Take by mouth daily      Umeclidinium-Vilanterol (ANORO ELLIPTA) 62.5-25 MCG/ACT AEPB Inhale 1 puff into the lungs daily 1 each 3    albuterol sulfate HFA (VENTOLIN HFA) 108 (90 Base) MCG/ACT inhaler Inhale 2 puffs into the lungs 4 times daily as needed for Wheezing 18 g 5    metoprolol succinate (TOPROL XL) 50 MG extended release tablet Take 1 tablet by mouth daily 30 tablet 3    ARMOUR THYROID 90 MG tablet TAKE 1 TABLET BY MOUTH ONCE A DAY      gabapentin (NEURONTIN) 300 MG capsule Take 2 capsules by mouth 3 times daily for 90 days. 540 capsule 1    lisinopril (PRINIVIL;ZESTRIL) 10 MG tablet Take 10 mg by mouth daily      escitalopram (LEXAPRO) 10 MG tablet Take 20 mg by mouth daily       Calcium Carb-Cholecalciferol (CALCIUM 1000 + D) 1000-800 MG-UNIT TABS Take 1 tablet by mouth daily      Multiple Vitamins-Minerals (THERAPEUTIC MULTIVITAMIN-MINERALS) tablet Take 1 tablet by mouth daily      vitamin B-12 (CYANOCOBALAMIN) 1000 MCG tablet Take 1,000 mcg by mouth daily      rivaroxaban (XARELTO) 20 MG TABS tablet Take 1 tablet by mouth daily 30 tablet 0    pantoprazole (PROTONIX) 40 MG tablet Take 1 tablet by mouth daily 30 tablet 3    tiZANidine (ZANAFLEX) 4 MG tablet Take 4 mg by mouth 2 times daily as needed       ALPRAZolam (XANAX) 0.25 MG tablet Take 0.25 mg by mouth nightly as needed for Sleep.      traZODone (DESYREL) 50 MG tablet Take 50 mg by mouth nightly. No current facility-administered medications for this visit.         OBJECTIVE:  Physical Exam:  /80   Pulse 70   Wt 184 lb (83.5 kg)   BMI 32.59 kg/m²     GEN: AAOx3, in NAD, well-appearing  HEAD: normocephalic, atraumatic  EYES: no injection or icterus  CVS: RRR  LUNGS: in no acute respiratory distress, CTAB  MSK:  Upper extremities:              Hands: MCP joints w/o swelling NTTP, there is bony enlargement of the b/l PIP joints no active synovitis NTTP, DIP joints some slightly boggy TTP, +Smiley and Heberden nodes TTP, there is squaring of the b/l 1st CMC joints w/ thenar atrophy, L CMC joint w/ synovitis TTP, full fist formation w/ good  strength              Wrist: no synovitis in the wrist joints b/l, FROM              Elbow: no synovitis or bursitis, FROM  Lower extremities:              Knees: no warmth or effusion present, FROM              Ankles: no synovitis, FROM, Achilles tendons w/o swelling or warmth NTTP              Feet: no toe swelling or pain or warmth on palpation w/ FROM, negative MTP squeeze test  INTEGUMENT: no rash or psoriatic lesions, no petechiae, bruises, or palpable purpura, no patchy alopecia, no nail or periungual changes, no clubbing or digital ulcers    DATA:  Labs:   I personally reviewed interval labs and discussed w/ the pt in detail which showed:    Lab Results   Component Value Date    WBC 4.9 03/02/2022    HGB 12.2 03/02/2022    HCT 35.0 (L) 03/02/2022    .7 (H) 03/02/2022     03/02/2022    LYMPHOPCT 23.7 10/29/2021    RBC 3.44 (L) 03/02/2022    MCH 35.5 (H) 03/02/2022    MCHC 34.9 03/02/2022    RDW 14.3 03/02/2022     Lab Results   Component Value Date     (L) 03/03/2022    K 3.9 03/03/2022     03/03/2022    CO2 20 (L) 03/03/2022    BUN 11 03/03/2022    CREATININE 0.7 05/18/2022    GLUCOSE 127 (H) 03/03/2022    CALCIUM 8.7 03/03/2022    PROT 7.4 10/29/2021    LABALBU 4.4 10/29/2021    BILITOT 0.6 10/29/2021    ALKPHOS 96 10/29/2021    AST 30 10/29/2021    ALT 35 10/29/2021    LABGLOM >60 05/18/2022    GFRAA >60 05/18/2022    AGRATIO 1.5 10/29/2021    GLOB 2.4 05/25/2021     Lab Results   Component Value Date    VITD25 58.9 05/25/2021     Lab Results   Component Value Date    PTH 36.6 02/23/2022    CALCIUM 8.7 03/03/2022     Lab Results   Component Value Date    TSHFT4 1.50 02/23/2022    TSH 1.99 10/29/2021     Lab Results   Component Value Date    LABURIC 5.3 11/04/2020 LABURIC 5.1 05/23/2017     Lab Results   Component Value Date    CRP 5.9 (H) 12/17/2020    CRP 4.1 11/04/2020     Lab Results   Component Value Date    SEDRATE 13 12/17/2020    SEDRATE 10 05/23/2017     No results found for: CKTOTAL    Negative RF x 2 (5/23/17, 11/4/20)  Negative CCP (11/4/20)  Negative MARCIA x 2 (5/23/17, 11/4/20)  Negative SSA, SSB (11/4/20)  SPEP and UPEP: no monoclonal band (11/4/20)    Imaging:  I personally reviewed interval imaging and discussed w/ the pt in detail which included:    X-rays (11/4/20):  R hand: There is no acute fracture. Polyarticular osteoarthritis is present worse in the interphalangeal and trapeziometacarpal joints. L hand:  Left hand: There is no acute fracture. Polyarticular osteoarthritis is present worse in the interphalangeal and trapeziometacarpal joints. There is no destructive bone lesion seen. L-spine:  The vertebral body heights are maintained. There is grade 1 anterolisthesis of L4 on L5. Severe degenerative disc disease at L4-L5 and L5-S1. Facet osteoarthritis in the lower lumbar spine. Pelvis/hips:  No acute fractures seen. The hip joints are congruent. There is no destructive osseous lesion. I independently reviewed above x-rays, there are degenerative changes in the b/l PIP joints and significant joint space narrowing w/ gull wing deformities in the b/l DIP joints c/w erosive OA. MRI L-spine (12/8/21): IMPRESSION:  1. Acute/subacute right sacral insufficiency fractures. 2. Severe right and moderate left neural foraminal narrowing at L4-5 secondary to grade 1 anterolisthesis, disc bulge and facet arthropathy. 3. Left lateral recess disc protrusion at L3-4 superimposed upon a disc bulge and facet arthropathy effacing the left lateral recess and mildly narrowing the left neural foramen. 4. Mild bilateral neural foraminal narrowing at L5-S1.  The findings were sent to the Radiology Results [id number] at 8:08 am on 12/9/2021to be communicated to a licensed caregiver. MRI C-spine (2/1/22): IMPRESSION:  Moderate multilevel cervical disc degeneration, uncovertebral joint and facet joint osteoarthritis with degenerative anterior listhesis C4-5 and C7-T1. Mild central stenosis C6-7 with moderately severe bilateral foraminal narrowing. Moderate left foraminal narrowing C 2-3, moderately severe bilateral foraminal narrowing C3-4, mild bilateral foraminal narrowing C4-5 and mild left foraminal narrowing C5-6. DEXA study (4/24/15):  T-score -1.7 in L-spine  T-score of -1.5 in L femoral neck. DEXA study (12/8/20):  T-score -1.9 in L1-L4  T-score -1.6 in the L femoral neck  T-score -1.9 in the R femoral neck  I personally calculated her FRAX scores:  Major osteoporosis fx risk 10%  Hip fx risk 1.4%    Above results were discussed w/ the pt in detail during today's visit.

## 2023-02-24 ENCOUNTER — HOSPITAL ENCOUNTER (OUTPATIENT)
Dept: CT IMAGING | Age: 68
Discharge: HOME | End: 2023-02-24
Payer: MEDICARE

## 2023-02-24 DIAGNOSIS — Z86.16 HISTORY OF COVID-19: ICD-10-CM

## 2023-02-24 DIAGNOSIS — R06.09 DOE (DYSPNEA ON EXERTION): ICD-10-CM

## 2023-02-24 PROCEDURE — 71250 CT THORAX DX C-: CPT

## 2023-02-28 ENCOUNTER — HOSPITAL ENCOUNTER (INPATIENT)
Age: 68
LOS: 2 days | Discharge: HOME | End: 2023-03-02
Attending: INTERNAL MEDICINE | Admitting: INTERNAL MEDICINE
Payer: MEDICARE

## 2023-02-28 ENCOUNTER — APPOINTMENT (OUTPATIENT)
Dept: GENERAL RADIOLOGY | Age: 68
End: 2023-02-28
Payer: MEDICARE

## 2023-02-28 DIAGNOSIS — U07.1 COVID-19: ICD-10-CM

## 2023-02-28 DIAGNOSIS — I95.9 HYPOTENSION, UNSPECIFIED HYPOTENSION TYPE: ICD-10-CM

## 2023-02-28 DIAGNOSIS — R77.8 ELEVATED TROPONIN: Primary | ICD-10-CM

## 2023-02-28 PROBLEM — R94.31 PROLONGED Q-T INTERVAL ON ECG: Status: ACTIVE | Noted: 2023-02-28

## 2023-02-28 PROBLEM — R00.1 BRADYCARDIA: Status: ACTIVE | Noted: 2023-02-28

## 2023-02-28 LAB
A/G RATIO: 1.3 (ref 1.1–2.2)
ALBUMIN SERPL-MCNC: 3.9 G/DL (ref 3.4–5)
ALBUMIN SERPL-MCNC: 4 G/DL (ref 3.4–5)
ALP BLD-CCNC: 136 U/L (ref 40–129)
ALP BLD-CCNC: 137 U/L (ref 40–129)
ALT SERPL-CCNC: 92 U/L (ref 10–40)
ALT SERPL-CCNC: 99 U/L (ref 10–40)
ANION GAP SERPL CALCULATED.3IONS-SCNC: 10 MMOL/L (ref 3–16)
AST SERPL-CCNC: 53 U/L (ref 15–37)
AST SERPL-CCNC: 62 U/L (ref 15–37)
BACTERIA: ABNORMAL /HPF
BASOPHILS ABSOLUTE: 0 K/UL (ref 0–0.2)
BASOPHILS RELATIVE PERCENT: 0.8 %
BILIRUB SERPL-MCNC: 0.4 MG/DL (ref 0–1)
BILIRUB SERPL-MCNC: 0.4 MG/DL (ref 0–1)
BILIRUBIN DIRECT: <0.2 MG/DL (ref 0–0.3)
BILIRUBIN URINE: NEGATIVE
BILIRUBIN, INDIRECT: ABNORMAL MG/DL (ref 0–1)
BLOOD, URINE: NEGATIVE
BUN BLDV-MCNC: 18 MG/DL (ref 7–20)
CALCIUM SERPL-MCNC: 9 MG/DL (ref 8.3–10.6)
CHLORIDE BLD-SCNC: 108 MMOL/L (ref 99–110)
CLARITY: CLEAR
CO2: 22 MMOL/L (ref 21–32)
COLOR: YELLOW
CREAT SERPL-MCNC: 1.1 MG/DL (ref 0.6–1.2)
EOSINOPHILS ABSOLUTE: 0.2 K/UL (ref 0–0.6)
EOSINOPHILS RELATIVE PERCENT: 3.8 %
EPITHELIAL CELLS, UA: 10 /HPF (ref 0–5)
GFR SERPL CREATININE-BSD FRML MDRD: 54 ML/MIN/{1.73_M2}
GLUCOSE BLD-MCNC: 108 MG/DL (ref 70–99)
GLUCOSE URINE: NEGATIVE MG/DL
HCT VFR BLD CALC: 35.2 % (ref 36–48)
HEMOGLOBIN: 12.2 G/DL (ref 12–16)
HYALINE CASTS: 5 /LPF (ref 0–8)
KETONES, URINE: NEGATIVE MG/DL
LACTIC ACID, SEPSIS: 1.7 MMOL/L (ref 0.4–1.9)
LEUKOCYTE ESTERASE, URINE: ABNORMAL
LYMPHOCYTES ABSOLUTE: 1 K/UL (ref 1–5.1)
LYMPHOCYTES RELATIVE PERCENT: 19 %
MAGNESIUM: 2.9 MG/DL (ref 1.8–2.4)
MCH RBC QN AUTO: 34.8 PG (ref 26–34)
MCHC RBC AUTO-ENTMCNC: 34.8 G/DL (ref 31–36)
MCV RBC AUTO: 100 FL (ref 80–100)
MICROSCOPIC EXAMINATION: YES
MONOCYTES ABSOLUTE: 0.8 K/UL (ref 0–1.3)
MONOCYTES RELATIVE PERCENT: 15 %
NEUTROPHILS ABSOLUTE: 3.2 K/UL (ref 1.7–7.7)
NEUTROPHILS RELATIVE PERCENT: 61.4 %
NITRITE, URINE: NEGATIVE
PDW BLD-RTO: 13.6 % (ref 12.4–15.4)
PH UA: 6 (ref 5–8)
PLATELET # BLD: 257 K/UL (ref 135–450)
PMV BLD AUTO: 8.6 FL (ref 5–10.5)
POTASSIUM SERPL-SCNC: 4.5 MMOL/L (ref 3.5–5.1)
PRO-BNP: 1114 PG/ML (ref 0–124)
PROCALCITONIN: 0.19 NG/ML (ref 0–0.15)
PROTEIN UA: NEGATIVE MG/DL
RBC # BLD: 3.52 M/UL (ref 4–5.2)
RBC UA: 1 /HPF (ref 0–4)
SODIUM BLD-SCNC: 140 MMOL/L (ref 136–145)
SPECIFIC GRAVITY UA: 1.01 (ref 1–1.03)
TOTAL PROTEIN: 6.9 G/DL (ref 6.4–8.2)
TOTAL PROTEIN: 7.1 G/DL (ref 6.4–8.2)
TROPONIN: 0.01 NG/ML
TROPONIN: 0.03 NG/ML
URINE REFLEX TO CULTURE: ABNORMAL
URINE TYPE: ABNORMAL
UROBILINOGEN, URINE: 0.2 E.U./DL
WBC # BLD: 5.3 K/UL (ref 4–11)
WBC UA: 3 /HPF (ref 0–5)

## 2023-02-28 PROCEDURE — 83880 ASSAY OF NATRIURETIC PEPTIDE: CPT

## 2023-02-28 PROCEDURE — 99285 EMERGENCY DEPT VISIT HI MDM: CPT

## 2023-02-28 PROCEDURE — 83735 ASSAY OF MAGNESIUM: CPT

## 2023-02-28 PROCEDURE — 6370000000 HC RX 637 (ALT 250 FOR IP): Performed by: PHYSICIAN ASSISTANT

## 2023-02-28 PROCEDURE — 80053 COMPREHEN METABOLIC PANEL: CPT

## 2023-02-28 PROCEDURE — 36415 COLL VENOUS BLD VENIPUNCTURE: CPT

## 2023-02-28 PROCEDURE — 81001 URINALYSIS AUTO W/SCOPE: CPT

## 2023-02-28 PROCEDURE — 85025 COMPLETE CBC W/AUTO DIFF WBC: CPT

## 2023-02-28 PROCEDURE — 6370000000 HC RX 637 (ALT 250 FOR IP): Performed by: INTERNAL MEDICINE

## 2023-02-28 PROCEDURE — 99223 1ST HOSP IP/OBS HIGH 75: CPT | Performed by: INTERNAL MEDICINE

## 2023-02-28 PROCEDURE — 2580000003 HC RX 258: Performed by: INTERNAL MEDICINE

## 2023-02-28 PROCEDURE — 96360 HYDRATION IV INFUSION INIT: CPT

## 2023-02-28 PROCEDURE — 2060000000 HC ICU INTERMEDIATE R&B

## 2023-02-28 PROCEDURE — 84484 ASSAY OF TROPONIN QUANT: CPT

## 2023-02-28 PROCEDURE — 2580000003 HC RX 258: Performed by: PHYSICIAN ASSISTANT

## 2023-02-28 PROCEDURE — 83605 ASSAY OF LACTIC ACID: CPT

## 2023-02-28 PROCEDURE — 93005 ELECTROCARDIOGRAM TRACING: CPT | Performed by: PHYSICIAN ASSISTANT

## 2023-02-28 PROCEDURE — 84145 PROCALCITONIN (PCT): CPT

## 2023-02-28 PROCEDURE — 1200000000 HC SEMI PRIVATE

## 2023-02-28 PROCEDURE — 87040 BLOOD CULTURE FOR BACTERIA: CPT

## 2023-02-28 PROCEDURE — 71045 X-RAY EXAM CHEST 1 VIEW: CPT

## 2023-02-28 RX ORDER — GABAPENTIN 300 MG/1
600 CAPSULE ORAL 3 TIMES DAILY
Status: CANCELLED | OUTPATIENT
Start: 2023-02-28

## 2023-02-28 RX ORDER — ONDANSETRON 2 MG/ML
4 INJECTION INTRAMUSCULAR; INTRAVENOUS EVERY 6 HOURS PRN
Status: CANCELLED | OUTPATIENT
Start: 2023-02-28

## 2023-02-28 RX ORDER — IBUPROFEN 200 MG
400 TABLET ORAL EVERY 6 HOURS PRN
COMMUNITY

## 2023-02-28 RX ORDER — ACETAMINOPHEN 325 MG/1
650 TABLET ORAL EVERY 6 HOURS PRN
COMMUNITY

## 2023-02-28 RX ORDER — TRAZODONE HYDROCHLORIDE 50 MG/1
50 TABLET ORAL NIGHTLY
Status: CANCELLED | OUTPATIENT
Start: 2023-02-28

## 2023-02-28 RX ORDER — PANTOPRAZOLE SODIUM 40 MG/1
40 TABLET, DELAYED RELEASE ORAL DAILY
Status: DISCONTINUED | OUTPATIENT
Start: 2023-03-01 | End: 2023-03-02 | Stop reason: HOSPADM

## 2023-02-28 RX ORDER — ESCITALOPRAM OXALATE 10 MG/1
20 TABLET ORAL DAILY
Status: CANCELLED | OUTPATIENT
Start: 2023-02-28

## 2023-02-28 RX ORDER — LANOLIN ALCOHOL/MO/W.PET/CERES
1000 CREAM (GRAM) TOPICAL DAILY
Status: DISCONTINUED | OUTPATIENT
Start: 2023-03-01 | End: 2023-03-02 | Stop reason: HOSPADM

## 2023-02-28 RX ORDER — HYDROXYCHLOROQUINE SULFATE 200 MG/1
200 TABLET, FILM COATED ORAL 2 TIMES DAILY
Status: DISCONTINUED | OUTPATIENT
Start: 2023-02-28 | End: 2023-03-02 | Stop reason: HOSPADM

## 2023-02-28 RX ORDER — ALBUTEROL SULFATE 90 UG/1
2 AEROSOL, METERED RESPIRATORY (INHALATION) 4 TIMES DAILY PRN
Status: DISCONTINUED | OUTPATIENT
Start: 2023-02-28 | End: 2023-03-02 | Stop reason: HOSPADM

## 2023-02-28 RX ORDER — ACETAMINOPHEN 650 MG/1
650 SUPPOSITORY RECTAL EVERY 6 HOURS PRN
Status: DISCONTINUED | OUTPATIENT
Start: 2023-02-28 | End: 2023-03-02 | Stop reason: HOSPADM

## 2023-02-28 RX ORDER — SODIUM CHLORIDE 0.9 % (FLUSH) 0.9 %
5-40 SYRINGE (ML) INJECTION EVERY 12 HOURS SCHEDULED
Status: DISCONTINUED | OUTPATIENT
Start: 2023-02-28 | End: 2023-03-02 | Stop reason: HOSPADM

## 2023-02-28 RX ORDER — LEVOTHYROXINE AND LIOTHYRONINE 19; 4.5 UG/1; UG/1
90 TABLET ORAL DAILY
Status: DISCONTINUED | OUTPATIENT
Start: 2023-03-01 | End: 2023-03-02 | Stop reason: HOSPADM

## 2023-02-28 RX ORDER — ONDANSETRON 4 MG/1
4 TABLET, ORALLY DISINTEGRATING ORAL EVERY 8 HOURS PRN
Status: CANCELLED | OUTPATIENT
Start: 2023-02-28

## 2023-02-28 RX ORDER — POLYETHYLENE GLYCOL 3350 17 G/17G
17 POWDER, FOR SOLUTION ORAL DAILY PRN
Status: DISCONTINUED | OUTPATIENT
Start: 2023-02-28 | End: 2023-03-02 | Stop reason: HOSPADM

## 2023-02-28 RX ORDER — 0.9 % SODIUM CHLORIDE 0.9 %
1000 INTRAVENOUS SOLUTION INTRAVENOUS ONCE
Status: COMPLETED | OUTPATIENT
Start: 2023-02-28 | End: 2023-02-28

## 2023-02-28 RX ORDER — SODIUM CHLORIDE, SODIUM LACTATE, POTASSIUM CHLORIDE, CALCIUM CHLORIDE 600; 310; 30; 20 MG/100ML; MG/100ML; MG/100ML; MG/100ML
INJECTION, SOLUTION INTRAVENOUS CONTINUOUS
Status: ACTIVE | OUTPATIENT
Start: 2023-02-28 | End: 2023-03-01

## 2023-02-28 RX ORDER — ACETAMINOPHEN 325 MG/1
650 TABLET ORAL EVERY 6 HOURS PRN
Status: DISCONTINUED | OUTPATIENT
Start: 2023-02-28 | End: 2023-03-02 | Stop reason: HOSPADM

## 2023-02-28 RX ORDER — SODIUM CHLORIDE 9 MG/ML
INJECTION, SOLUTION INTRAVENOUS PRN
Status: DISCONTINUED | OUTPATIENT
Start: 2023-02-28 | End: 2023-03-02 | Stop reason: HOSPADM

## 2023-02-28 RX ORDER — ALPRAZOLAM 0.25 MG/1
0.25 TABLET ORAL NIGHTLY PRN
Status: DISCONTINUED | OUTPATIENT
Start: 2023-02-28 | End: 2023-03-01

## 2023-02-28 RX ORDER — ASPIRIN 325 MG
325 TABLET ORAL ONCE
Status: COMPLETED | OUTPATIENT
Start: 2023-02-28 | End: 2023-02-28

## 2023-02-28 RX ORDER — METOPROLOL SUCCINATE 50 MG/1
50 TABLET, EXTENDED RELEASE ORAL DAILY
Status: DISCONTINUED | OUTPATIENT
Start: 2023-03-01 | End: 2023-03-02 | Stop reason: HOSPADM

## 2023-02-28 RX ORDER — SODIUM CHLORIDE 0.9 % (FLUSH) 0.9 %
5-40 SYRINGE (ML) INJECTION PRN
Status: DISCONTINUED | OUTPATIENT
Start: 2023-02-28 | End: 2023-03-02 | Stop reason: HOSPADM

## 2023-02-28 RX ADMIN — HYDROXYCHLOROQUINE SULFATE 200 MG: 200 TABLET ORAL at 20:25

## 2023-02-28 RX ADMIN — ALPRAZOLAM 0.25 MG: 0.25 TABLET ORAL at 23:06

## 2023-02-28 RX ADMIN — Medication 5 ML: at 20:26

## 2023-02-28 RX ADMIN — ASPIRIN 325 MG: 325 TABLET ORAL at 14:20

## 2023-02-28 RX ADMIN — SODIUM CHLORIDE 1000 ML: 9 INJECTION, SOLUTION INTRAVENOUS at 12:25

## 2023-02-28 RX ADMIN — ACETAMINOPHEN 650 MG: 325 TABLET ORAL at 22:46

## 2023-02-28 ASSESSMENT — PAIN - FUNCTIONAL ASSESSMENT: PAIN_FUNCTIONAL_ASSESSMENT: NONE - DENIES PAIN

## 2023-02-28 ASSESSMENT — ENCOUNTER SYMPTOMS
NAUSEA: 0
SINUS PRESSURE: 1
VOMITING: 0
DIARRHEA: 0
COUGH: 1
RHINORRHEA: 0
ABDOMINAL PAIN: 0
SHORTNESS OF BREATH: 0
WHEEZING: 0

## 2023-02-28 ASSESSMENT — PAIN SCALES - GENERAL: PAINLEVEL_OUTOF10: 6

## 2023-02-28 ASSESSMENT — LIFESTYLE VARIABLES
HOW MANY STANDARD DRINKS CONTAINING ALCOHOL DO YOU HAVE ON A TYPICAL DAY: 3 OR 4
HOW OFTEN DO YOU HAVE A DRINK CONTAINING ALCOHOL: 4 OR MORE TIMES A WEEK

## 2023-02-28 ASSESSMENT — PAIN DESCRIPTION - LOCATION: LOCATION: BACK;NECK

## 2023-02-28 NOTE — CONSULTS
[facility]  Cardiac Consult     Referring Provider:  Xavier Alvarez MD         History of Present Illness:   70 y/o female followed by Dr Nayeli Monet and Bernadette Higginbotham who presents with COVID and hypotension and we are asked to see for elevated troponin and abnormal EKG. She says she developed cold symptoms a few days ago. Mainly congestion and fatigue. She has also had some diarrhea. Yesterday she took a COVID test and it was positive. She called he PCP and was started on Paxlovid. She has had 2 doses. Today she was having severe dizziness upon standing. She came to the ER and was found to be hypotensive with systolic BP in the 88'U. She has been given iv fluids with improvement. Labs revealed a slightly elevated troponin. She has no anginal chest pain and normal coronary arteries in 12/2020. Also EKG \"Abnormal\". It shows sinus bradycardia and prolonged QT. Past Medical History:   has a past medical history of Age-related osteoporosis without current pathological fracture, Anxiety, Arthritis, Depression, Hypertension, and Hypothyroidism. Surgical History:   has a past surgical history that includes Hysterectomy; bladder repair; Abdominal exploration surgery (02/12/2018); Partial hysterectomy; Breast enhancement surgery; and shoulder surgery. Social History:   reports that she has never smoked. She has never used smokeless tobacco. She reports current alcohol use. She reports that she does not use drugs. Family History:  family history includes Heart Attack in her mother. Medications:  No current facility-administered medications for this encounter. Current Outpatient Medications   Medication Sig Dispense Refill    gabapentin (NEURONTIN) 300 MG capsule Take 2 capsules by mouth 3 times daily for 90 days.  540 capsule 1    hydroxychloroquine (PLAQUENIL) 200 MG tablet Take 1 tablet by mouth 2 times daily 180 tablet 1    Umeclidinium-Vilanterol (ANORO ELLIPTA) 62.5-25 MCG/ACT AEPB Inhale 1 puff into the lungs daily 1 each 3    albuterol sulfate HFA (VENTOLIN HFA) 108 (90 Base) MCG/ACT inhaler Inhale 2 puffs into the lungs 4 times daily as needed for Wheezing 18 g 5    metoprolol succinate (TOPROL XL) 50 MG extended release tablet Take 1 tablet by mouth daily 30 tablet 3    ARMOUR THYROID 90 MG tablet TAKE 1 TABLET BY MOUTH ONCE A DAY      lisinopril (PRINIVIL;ZESTRIL) 10 MG tablet Take 10 mg by mouth daily      escitalopram (LEXAPRO) 10 MG tablet Take 20 mg by mouth daily       Calcium Carb-Cholecalciferol (CALCIUM 1000 + D) 1000-800 MG-UNIT TABS Take 1 tablet by mouth daily      Multiple Vitamins-Minerals (THERAPEUTIC MULTIVITAMIN-MINERALS) tablet Take 1 tablet by mouth daily      vitamin B-12 (CYANOCOBALAMIN) 1000 MCG tablet Take 1,000 mcg by mouth daily      rivaroxaban (XARELTO) 20 MG TABS tablet Take 1 tablet by mouth daily 30 tablet 0    pantoprazole (PROTONIX) 40 MG tablet Take 1 tablet by mouth daily 30 tablet 3    tiZANidine (ZANAFLEX) 4 MG tablet Take 4 mg by mouth 2 times daily as needed       ALPRAZolam (XANAX) 0.25 MG tablet Take 0.25 mg by mouth nightly as needed for Sleep.      traZODone (DESYREL) 50 MG tablet Take 50 mg by mouth nightly. Allergies:  Nsaids     [x] Medications and dosages reviewed.     ROS:  [x]Full ROS obtained and negative except as mentioned in HPI      Physical Examination:    Vitals:    02/28/23 1340   BP: 102/65   Pulse: 56   Resp: 21   Temp:    SpO2: 96%        GENERAL: Well developed, well nourished, No acute distress  NEUROLOGICAL: Alert and oriented  PSYCH: Calm affect  SKIN: Warm and dry, No visible rash,   EYES: Pupils equal and round, Sclera non-icteric,   HENT:  External ears and nose unremarkable, mucus membranes moist  MUSCULOSKELETAL: Normal cephalic, neck supple  CAROTID: Normal upstroke, no bruits  CARDIAC: JVP normal, Normal PMI, regular rate and rhythm, normal S1S2, no murmur, rub, or gallop  RESPIRATORY: Normal respiratory effort, clear to auscultation bilaterally  EXTREMITIES: No edema  GASTROINTESTINAL: normal bowel sounds, soft, non-tender, No hepatomegaly     All testing and labs listed below were personally reviewed. CXR-personally reviewed-Clear  Stable cardiomediastinal silhouette. No acute airspace infiltrate. No   pneumothorax or pleural effusion. Impression:  No acute cardiopulmonary findings     LABS  WBC5.3K/uL RBC3.52 Low M/uL Locuyzrnlz19.2g/dL Qvwnmnszhl75.2 Low % GNC252.0fL MCH34.8 High pg MCHC34.8g/dL RDW13.6% Iblhcycdj195L/uL     Lactic Acid, Sepsis1.7     Procalcitonin0.19 High ng/mL     Pro-BNP1,114 High pg/mL     Troponin0.03 High ng/mL     Calcium9.0mg/dL Total Protein6.9g/dL Albumin3.9g/dL Albumin/Globulin Ratio1. 3 Total Bilirubin0.4mg/dL Alkaline Owqqrnlnetw684 High U/L ALT99 High U/L AST62 High U/L     Bwllvf163voxb/L Potassium4.5mmol/L Dtmtsbkl278dvfc/L UU841ysty/L Anion Gap10 Buerple419 High mg/dL YOG18jm/dL Creatinine1.1mg/dL     ECHO 12/2022   Summary   Mild concentric left ventricular hypertrophy. Normal left ventricular cavity   size. Borderline left ventricular systolic function with an estimated   ejection fraction of 50-55%. No evident regional wall motion abnormalities   seen. Indeterminate diastolic function. The right ventricle is borderline in size with normal function. Bi-atrial enlargement. The aortic valve appears sclerotic but opens well. Mild aortic   insufficiency. The mitral valve is thickened with adequate excursion. Moderate, eccentric,   mitral regurgitation. Mild to moderate tricuspid regurgitation with an estimated PASP of 45 mmHg. Moderate pulmonic insufficiency. 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 EKG-Personally interpreted  Sinus skip, NSST, Prolonged QT    MYOVIEW 1/2023       Summary    There is breast attenuation artifact. The LV is enlarged. Normal myocardial perfusion.     Normal LV size and systolic function. Low risk study. CARDIAC CATH 12/2020  Left Heart Cath  Dominance: Right       LM: no angiographic stenosis  LAD: no angiographic stenosis  LCx: no angiographic stenosis  RCA: no angiographic stenosis     LVEDP: 2 mmHg   LVEF: 50-55%   Impression/Recommendations:  Normal coronary study. ASSESSMENT:    Elevated Troponin:  Likely supply demand mismatch. Hypotension and COVID. Normal cors 12/2020 and recent normal stress. No ischemic symptoms. Follow but if flat no further evaluation needed. Abn EKG:  Prolonged QT and sinus skip. Prolonged QT likely due to meds. Multiple issues including lexipro, trazadone and plaquinil. Paxlovid can also interfere with trazadone which interferes with lexipro. I would not give her Paxlovid due to this. Hold lexipro and Trazadone. Plaquinil was just increased as outpt. Unclear if this can be decreased or held. She may also have low Mg with diarrhea and would check Mg. I have asked Dr. Rosemarie Wilcox to see her from EP to help with this. Issue. (Severe toxicity/side effect of meds)    COVID:  COVID positive  I would not use Paxlovid due to drug interactions    Elevated LFTS:  Posibly due to hypotension vs meds  Follow    Hypotension:  Likely dehydration from [address] with hydration  follow    Plan:  Follow troponin  Hydrate  Hold all QT prolonging meds as possible  Check Mg  Follow EKG daily. Discussed with Dr. Rosemarie Wilcox.        Thank you for allowing me to participate in the care of this individual.      Bettye Turner M.D., [facility] [place]

## 2023-02-28 NOTE — H&P
HOSPITALISTS HISTORY AND PHYSICAL    2/28/2023 3:38 PM    Patient Information:  Meeta Prather is a 69 y.o. female [id number]  PCP:  Bascom Mcardle, MD (Tel: [phone] )    Chief complaint:    Chief Complaint   Patient presents with    Dizziness     From home via EMS cc dizziness, fatigue, cough, malaise onset 02/24. Positive COVID test on 02/27. History of Present Illness:  Malinda Farnsworth is a 69 y.o. female Saturday has been having no smell feeling nauseous poor appetite along with liquid diarrhea chills and sweats no fever. Dry cough some subjective shortness of breath no chest pain. Patient has a because of her COVID yesterday came to the ED as fatigue was getting worse in the ED patient was hypotensive corrected with IV fluid appears to be dehydrated. REVIEW OF SYSTEMS:   Constitutional: see above  ENT: Negative for rhinorrhea, epistaxis, hoarseness, sore throat. Respiratory: see above  Cardiovascular: Negative for chest pain, palpitations   Gastrointestinal: see above  Genitourinary: Negative for polyuria, dysuria   Hematologic/Lymphatic: Negative for bleeding tendency, easy bruising  Musculoskeletal: Negative for myalgias and arthralgias  Neurologic: Negative for confusion,dysarthria. Skin: Negative for itching,rash  Psychiatric: Negative for depression,anxiety, agitation. Endocrine: Negative for polydipsia,polyuria,heat /cold intolerance. Past Medical History:   has a past medical history of Age-related osteoporosis without current pathological fracture, Anxiety, Arthritis, Depression, Hypertension, and Hypothyroidism. Past Surgical History:   has a past surgical history that includes Hysterectomy; bladder repair; Abdominal exploration surgery (02/12/2018); Partial hysterectomy; Breast enhancement surgery; and shoulder surgery.      Medications:  No current facility-administered medications on file prior to encounter. Current Outpatient Medications on File Prior to Encounter   Medication Sig Dispense Refill    ibuprofen (ADVIL;MOTRIN) 200 MG tablet Take 400 mg by mouth every 6 hours as needed for Pain      acetaminophen (TYLENOL) 325 MG tablet Take 650 mg by mouth every 6 hours as needed for Pain      gabapentin (NEURONTIN) 300 MG capsule Take 2 capsules by mouth 3 times daily for 90 days. 540 capsule 1    hydroxychloroquine (PLAQUENIL) 200 MG tablet Take 1 tablet by mouth 2 times daily 180 tablet 1    Umeclidinium-Vilanterol (ANORO ELLIPTA) 62.5-25 MCG/ACT AEPB Inhale 1 puff into the lungs daily 1 each 3    albuterol sulfate HFA (VENTOLIN HFA) 108 (90 Base) MCG/ACT inhaler Inhale 2 puffs into the lungs 4 times daily as needed for Wheezing 18 g 5    metoprolol succinate (TOPROL XL) 50 MG extended release tablet Take 1 tablet by mouth daily 30 tablet 3    ARMOUR THYROID 90 MG tablet TAKE 1 TABLET BY MOUTH ONCE A DAY      lisinopril (PRINIVIL;ZESTRIL) 10 MG tablet Take 10 mg by mouth daily      escitalopram (LEXAPRO) 10 MG tablet Take 20 mg by mouth daily       Calcium Carb-Cholecalciferol (CALCIUM 1000 + D) 1000-800 MG-UNIT TABS Take 1 tablet by mouth daily      Multiple Vitamins-Minerals (THERAPEUTIC MULTIVITAMIN-MINERALS) tablet Take 1 tablet by mouth daily      vitamin B-12 (CYANOCOBALAMIN) 1000 MCG tablet Take 1,000 mcg by mouth daily      rivaroxaban (XARELTO) 20 MG TABS tablet Take 1 tablet by mouth daily 30 tablet 0    pantoprazole (PROTONIX) 40 MG tablet Take 1 tablet by mouth daily 30 tablet 3    tiZANidine (ZANAFLEX) 4 MG tablet Take 4 mg by mouth 2 times daily as needed       ALPRAZolam (XANAX) 0.25 MG tablet Take 0.25 mg by mouth nightly as needed for Sleep.      traZODone (DESYREL) 50 MG tablet Take 50 mg by mouth nightly. Allergies:   Allergies   Allergen Reactions    Nsaids      Note: PERFORATED PEPTIC ULCER        Social History:  Patient Lives at home   reports that she has never smoked. She has never used smokeless tobacco. She reports current alcohol use. She reports that she does not use drugs. Family History:  family history includes Heart Attack in her mother. ,     Physical Exam:  /61   Pulse 61   Temp 97.8 °F (36.6 °C)   Resp 16   Ht 5' 3\" (1.6 m)   Wt 182 lb (82.6 kg)   SpO2 98%   BMI 32.24 kg/m²     General appearance:  Appears comfortable. AAOx3  HEENT: atraumatic, Pupils equal, muscous membranes moist, no masses appreciated  Cardiovascular: Regular rate and rhythm no murmurs appreciated  Respiratory: CTAB no wheezing  Gastrointestinal: Abdomen soft, non-tender, BS+  EXT: no edema  Neurology: no gross focal deficts  Psychiatry: Appropriate affect. Not agitated  Skin: Warm, dry, no rashes appreciated    Labs:  CBC:   Lab Results   Component Value Date/Time    WBC 5.3 02/28/2023 12:11 PM    RBC 3.52 02/28/2023 12:11 PM    HGB 12.2 02/28/2023 12:11 PM    HCT 35.2 02/28/2023 12:11 PM    .0 02/28/2023 12:11 PM    MCH 34.8 02/28/2023 12:11 PM    MCHC 34.8 02/28/2023 12:11 PM    RDW 13.6 02/28/2023 12:11 PM     02/28/2023 12:11 PM    MPV 8.6 02/28/2023 12:11 PM     BMP:    Lab Results   Component Value Date/Time     02/28/2023 12:11 PM    K 4.5 02/28/2023 12:11 PM    K 3.9 10/30/2021 05:07 AM     02/28/2023 12:11 PM    CO2 22 02/28/2023 12:11 PM    BUN 18 02/28/2023 12:11 PM    CREATININE 1.1 02/28/2023 12:11 PM    CALCIUM 9.0 02/28/2023 12:11 PM    GFRAA >60 05/18/2022 12:00 PM    GFRAA >60 01/25/2013 11:13 AM    LABGLOM 54 02/28/2023 12:11 PM    GLUCOSE 108 02/28/2023 12:11 PM     XR CHEST PORTABLE   Final Result   No acute cardiopulmonary findings             Recent imaging reviewed    Problem List  Principal Problem:    Hypotension  Resolved Problems:    * No resolved hospital problems.  *        Assessment/Plan:   Hypotension likely secondary to dehydration from nausea and diarrhea from covid 19 infection  - ivf  - repeat bmp in am    Elevated troponin likely secondary to above repeat and monitor cards on board    Qtc prolongation hold qt prolonging meds ekg in am cards onboard    Elevated lfts slikeyl secondary to covid repeat labs in am    PAF home meds xarelto      DVT prophylaxis xarelto  Code status full code        Admit as inpatient I anticipate hospitalization spanning more than two midnights for investigation and treatment of the above medically necessary diagnoses. Please note that some part of this chart was generated using Dragon dictation software. Although every effort was made to ensure the accuracy of this automated transcription, some errors in transcription may have occurred inadvertently. If you may need any clarification, please do not hesitate to contact me through [facility].        Gio Mcmahan MD    2/28/2023 3:38 PM

## 2023-02-28 NOTE — ED PROVIDER NOTES
I did not perform history or physical on [address] [place] [address] Road. This patient was seen independently by an SKIP. I did review the EKG, which is documented below. EKG  The Ekg interpreted by me in the absence of a cardiologist shows. sinus bradycardia, rate=53  Axis is   Normal  QTc is   prolonged  Intervals and Durations are unremarkable.       Anterior T wave changes, nonspecific  Anterior T-wave changes are new in comparison to previous EKG from 2/15/2023          Libby Narayanan MD  02/28/23 [id number]

## 2023-02-28 NOTE — ED PROVIDER NOTES
[facility]        Pt Name: Aurea Batista  MRN: [id number]  [place] 1955  Date of evaluation: 2/28/2023  Provider: Carol Ann Reynoso PA-C  PCP: Helene Lake MD  Note Started: 11:54 AM EST 2/28/23      SKIP. I have evaluated this patient. My supervising physician was available for consultation. CHIEF COMPLAINT       Chief Complaint   Patient presents with    Dizziness     From home via EMS cc dizziness, fatigue, cough, malaise onset 02/24. Positive COVID test on 02/27. HISTORY OF PRESENT ILLNESS: 1 or more Elements     History From: patient  Limitations to history : None    Aurea Batista is a 69 y.o. female who presents for evaluation of lightheadedness weakness and fatigue that started this morning. Patient states that she started with a cough, congestion, headache and sinus pressure 4 days ago. Tested positive for COVID yesterday with a home test.  She also reports shortness of breath. No chest pain. No abdominal pain nausea vomiting or diarrhea. She has no other complaints or concerns at this time    Nursing Notes were all reviewed and agreed with or any disagreements were addressed in the HPI. REVIEW OF SYSTEMS :      Review of Systems   Constitutional:  Positive for chills, diaphoresis and fatigue. Negative for appetite change and fever. HENT:  Positive for congestion and sinus pressure. Negative for rhinorrhea. Respiratory:  Positive for cough. Negative for shortness of breath and wheezing. Cardiovascular:  Negative for chest pain. Gastrointestinal:  Negative for abdominal pain, diarrhea, nausea and vomiting. Genitourinary:  Negative for difficulty urinating, dysuria and hematuria. Musculoskeletal:  Negative for neck pain and neck stiffness. Skin:  Negative for rash. Neurological:  Positive for light-headedness and headaches. Negative for dizziness, syncope and weakness. Positives and Pertinent negatives as per HPI. SURGICAL HISTORY     Past Surgical History:   Procedure Laterality Date    ABDOMINAL EXPLORATION SURGERY  02/12/2018    LAPAROTOMY EXPLORATORY, REPAIR PERFORATED ULCER    BLADDER REPAIR      BREAST ENHANCEMENT SURGERY      HYSTERECTOMY (CERVIX STATUS UNKNOWN)      partial    PARTIAL HYSTERECTOMY (CERVIX NOT REMOVED)      SHOULDER SURGERY      ligament moved up left shoulder       CURRENTMEDICATIONS       Previous Medications    ALBUTEROL SULFATE HFA (VENTOLIN HFA) 108 (90 BASE) MCG/ACT INHALER    Inhale 2 puffs into the lungs 4 times daily as needed for Wheezing    ALPRAZOLAM (XANAX) 0.25 MG TABLET    Take 0.25 mg by mouth nightly as needed for Sleep. ARMOUR THYROID 90 MG TABLET    TAKE 1 TABLET BY MOUTH ONCE A DAY    CALCIUM CARB-CHOLECALCIFEROL (CALCIUM 1000 + D) 1000-800 MG-UNIT TABS    Take 1 tablet by mouth daily    ESCITALOPRAM (LEXAPRO) 10 MG TABLET    Take 20 mg by mouth daily     GABAPENTIN (NEURONTIN) 300 MG CAPSULE    Take 2 capsules by mouth 3 times daily for 90 days. HYDROXYCHLOROQUINE (PLAQUENIL) 200 MG TABLET    Take 1 tablet by mouth 2 times daily    LISINOPRIL (PRINIVIL;ZESTRIL) 10 MG TABLET    Take 10 mg by mouth daily    METOPROLOL SUCCINATE (TOPROL XL) 50 MG EXTENDED RELEASE TABLET    Take 1 tablet by mouth daily    MULTIPLE VITAMINS-MINERALS (THERAPEUTIC MULTIVITAMIN-MINERALS) TABLET    Take 1 tablet by mouth daily    PANTOPRAZOLE (PROTONIX) 40 MG TABLET    Take 1 tablet by mouth daily    RIVAROXABAN (XARELTO) 20 MG TABS TABLET    Take 1 tablet by mouth daily    TIZANIDINE (ZANAFLEX) 4 MG TABLET    Take 4 mg by mouth 2 times daily as needed     TRAZODONE (DESYREL) 50 MG TABLET    Take 50 mg by mouth nightly.     UMECLIDINIUM-VILANTEROL (ANORO ELLIPTA) 62.5-25 MCG/ACT AEPB    Inhale 1 puff into the lungs daily    VITAMIN B-12 (CYANOCOBALAMIN) 1000 MCG TABLET    Take 1,000 mcg by mouth daily       ALLERGIES     Nsaids    FAMILYHISTORY Family History   Problem Relation Age of Onset    Heart Attack Mother         SOCIAL HISTORY       Social History     Tobacco Use    Smoking status: Never    Smokeless tobacco: Never   Substance Use Topics    Alcohol use: Yes     Alcohol/week: 0.0 standard drinks    Drug use: No       SCREENINGS        [place] Coma Scale  Eye Opening: Spontaneous  Best Verbal Response: Oriented  Best Motor Response: Obeys commands  [place] Coma Scale Score: 15                CIWA Assessment  BP: 102/65  Heart Rate: 56           PHYSICAL EXAM  1 or more Elements     ED Triage Vitals [02/28/23 1152]   BP Temp Temp src Heart Rate Resp SpO2 Height Weight   (!) 78/52 97.8 °F (36.6 °C) -- 52 24 95 % 5' 3\" (1.6 m) 182 lb (82.6 kg)       Physical Exam  Vitals and nursing note reviewed. Constitutional:       General: She is not in acute distress. Appearance: She is well-developed. She is ill-appearing. She is not toxic-appearing or diaphoretic. HENT:      Head: Normocephalic and atraumatic. Right Ear: External ear normal.      Left Ear: External ear normal.      Nose: Nose normal.      Mouth/Throat:      Mouth: Mucous membranes are dry. Eyes:      General:         Right eye: No discharge. Left eye: No discharge. Extraocular Movements: Extraocular movements intact. Conjunctiva/sclera: Conjunctivae normal.      Pupils: Pupils are equal, round, and reactive to light. Cardiovascular:      Rate and Rhythm: Regular rhythm. Bradycardia present. Heart sounds: Normal heart sounds. Pulmonary:      Effort: Pulmonary effort is normal. No respiratory distress. Breath sounds: Normal breath sounds. No wheezing, rhonchi or rales. Chest:      Chest wall: No tenderness. Abdominal:      General: There is no distension. Palpations: Abdomen is soft. Tenderness: There is no abdominal tenderness. Musculoskeletal:         General: Normal range of motion.       Cervical back: Normal range of motion and neck supple. Skin:     General: Skin is warm and dry. Neurological:      Mental Status: She is alert and oriented to person, place, and time. Mental status is at baseline. GCS: GCS eye subscore is 4. GCS verbal subscore is 5. GCS motor subscore is 6. Cranial Nerves: Cranial nerves 2-12 are intact. Psychiatric:         Behavior: Behavior normal.         DIAGNOSTIC RESULTS   LABS:    Labs Reviewed   CBC WITH AUTO DIFFERENTIAL - Abnormal; Notable for the following components:       Result Value    RBC 3.52 (*)     Hematocrit 35.2 (*)     MCH 34.8 (*)     All other components within normal limits   COMPREHENSIVE METABOLIC PANEL - Abnormal; Notable for the following components:    Glucose 108 (*)     Est, Glom Filt Rate 54 (*)     Alkaline Phosphatase 137 (*)     ALT 99 (*)     AST 62 (*)     All other components within normal limits   TROPONIN - Abnormal; Notable for the following components:    Troponin 0.03 (*)     All other components within normal limits   BRAIN NATRIURETIC PEPTIDE - Abnormal; Notable for the following components:    Pro-BNP 1,114 (*)     All other components within normal limits   PROCALCITONIN - Abnormal; Notable for the following components:    Procalcitonin 0.19 (*)     All other components within normal limits   CULTURE, BLOOD 1   CULTURE, BLOOD 2   LACTATE, SEPSIS   URINALYSIS WITH REFLEX TO CULTURE   LACTATE, SEPSIS       When ordered only abnormal lab results are displayed. All other labs were within normal range or not returned as of this dictation. EKG: When ordered, EKG's are interpreted by the Emergency Department Physician in the absence of a cardiologist.  Please see their note for interpretation of EKG.     RADIOLOGY:   Non-plain film images such as CT, Ultrasound and MRI are read by the radiologist. Plain radiographic images are visualized and preliminarily interpreted by the ED Provider with the below findings:    Negative     Interpretation per the Radiologist below, if available at the time of this note:    XR CHEST PORTABLE   Final Result   No acute cardiopulmonary findings           CT CHEST HIGH RESOLUTION    Result Date: 2/27/2023  EXAMINATION: CT IMAGES OF THE CHEST WITHOUT CONTRAST, HIGH RESOLUTION 2/24/2023 TECHNIQUE: CT of the chest was performed without the administration of intravenous contrast. High resolution CT imaging was performed of the lungs. Multiplanar reformatted images are provided for review. Automated exposure control, iterative reconstruction, and/or weight based adjustment of the mA/kV was utilized to reduce the radiation dose to as low as reasonably achievable. High resolution CT images were performed in the supine inspiration, supine expiration, and prone inspiration positions. COMPARISON: Chest CTs dated 10/29/2021 and 12/15/2020. Abdominal CT dated 02/11/2018. HISTORY: ORDERING SYSTEM PROVIDED HISTORY: SPEARS (dyspnea on exertion) TECHNOLOGIST PROVIDED HISTORY: Reason for exam:->to evaluate for pulmonary fibrosis, history of recurrent COVID 19. Reason for Exam: to evaluate for pulmonary fibrosis, history of recurrent COVID 19 FINDINGS: Mediastinum: The thyroid is unremarkable. There is no pathologic lymphadenopathy. There is mild atherosclerotic disease of the thoracic aorta, without evidence of aneurysm. There is coronary atherosclerosis. No pericardial abnormality is identified. HRCT Findings/Lungs/pleura: Dedicated HRCT imaging demonstrates no evidence of interstitial lung disease. Namely, there is no evidence of pulmonary fibrosis. There is no evidence of bronchiectasis, lung cyst formation, or honeycombing. Expiratory images demonstrate no evidence of air trapping. Standard lung windows demonstrate that the central airways are patent. There is minimal biapical pleural and parenchymal scarring. There is mild dependent atelectasis within the right upper and right lower lobes.   There is mild parenchymal scarring along the right minor fissure and within the bilateral lower lobes. There is no evidence of a pneumothorax or pleural effusion. There is a stable left-sided Bochdalek hernia with mild compressive atelectasis within the medial aspect of the basilar left lower lobe. There are scattered calcified granulomata. There are multiple stable subcentimeter pulmonary nodules within both lungs, unchanged from 12/15/2020, consistent with benign granulomatous disease given their stability. However, there is a new 4 mm ground-glass pulmonary nodule within the subpleural portion of the lingula, image 64 of series 4. This is new from the prior exams. No additional suspicious pulmonary nodule or mass is identified. Upper Abdomen: The adrenal glands are unremarkable bilaterally. The remainder of the upper abdomen is unremarkable. Soft Tissues/Bones: There is minimal degenerative change throughout the thoracic spine. No osteolytic or osteoblastic lesion is seen. There are bilateral breast prostheses. 1. No HRCT evidence of interstitial lung disease. Namely, no evidence of pulmonary fibrosis. 2. No acute intrapulmonary findings. 3. Mild dependent atelectasis within the right upper and right lower lobes. 4. Mild parenchymal scarring along the right minor fissure and within both lower lobes. 5. New 4 mm ground-glass pulmonary nodule within the lingula. This is most likely benign, and requires no further follow-up. See below. RECOMMENDATIONS: Fleischner Society guidelines for follow-up and management of incidentally detected subsolid pulmonary nodules: Solitary ground glass nodule < 6 MM - NO ROUTINE FOLLOW-UP. > than or equal to 6 mm - CT at 6-12 months to confirm persistence, then CT every 2 years until 5 years. Solitary part-solid nodules < 6 mm - No routine follow-up. > than or equal to 6 mm - CT at 3-6 to confirm persistence. If unchanged and solid component remains less than 6 mm, annual CT should be performed for 5 years.  Multiple subsolid nodules < 6 mm - CT at 3-6 months. If stable, consider CT at 2 and 4 years. > than or equal to 6 mm - CT at 3-6 months. Subsequent management based on the most suspicious nodule(s). Radiology 2017 http://pubs. rsna.org/doi/full/10.1148/radiol. [email or web address]       No results found. PROCEDURES   Unless otherwise noted below, none     Procedures    CRITICAL CARE TIME (.cctime)   There was a high probability of life-threatening clinical deterioration in the patient's condition requiring my urgent intervention. I personally saw the patient and independently provided 41 minutes of non-concurrent critical care out of the total shared critical care time provided, excluding separately reportable procedures. PAST MEDICAL HISTORY      has a past medical history of Age-related osteoporosis without current pathological fracture (5/18/2022), Anxiety, Arthritis, Depression, Hypertension, and Hypothyroidism. EMERGENCY DEPARTMENT COURSE and DIFFERENTIAL DIAGNOSIS/MDM:   Vitals:    Vitals:    02/28/23 1255 02/28/23 1320 02/28/23 1330 02/28/23 1340   BP: 99/77 (!) 103/50 104/77 102/65   Pulse: 57 52 51 56   Resp: 17 21 15 21   Temp:       SpO2: 97% 96% 97% 96%   Weight:       Height:           Patient was given the following medications:  Medications   aspirin tablet 325 mg (has no administration in time range)   0.9 % sodium chloride bolus (0 mLs IntraVENous Stopped 2/28/23 1328)             Is this patient to be included in the SEP-1 Core Measure due to severe sepsis or septic shock? No   Exclusion criteria - the patient is NOT to be included for SEP-1 Core Measure due to:  Viral etiology found or highly suspected (including COVID-19) without concomitant bacterial infection    Chronic Conditions affecting care:    has a past medical history of Age-related osteoporosis without current pathological fracture (5/18/2022), Anxiety, Arthritis, Depression, Hypertension, and Hypothyroidism.     CONSULTS: (Who and What was discussed)  IP CONSULT TO CARDIOLOGY      Social Determinants Significantly Affecting Health : None    Records Reviewed (External and Source) n/a    CC/HPI Summary, DDx, ED Course, and Reassessment: Patient presents for evaluation of increasing weakness and lightheadedness with recent diagnosis of COVID-19. On exam, she is is ill-appearing but in no acute distress and nontoxic. She is hypotensive, tachypneic and satting at 92% on room air. Afebrile. Lungs are clear to auscultation bilaterally. Abdomen is soft, nontender. Patient was given fluid resuscitation for correction of hypotension and will be reevaluated. Please see attending note for EKG interpretation    Disposition Considerations (tests considered but not done, Admit vs D/C, Shared Decision Making, Pt Expectation of Test or Tx.): CBC and CMP are unremarkable. Troponin elevated at 0.03. BNP 1114. Procalcitonin is 0.19. Lactic acid 1.7. Chest x-ray is negative. Blood cultures are pending. Patient has mild ischemic changes noted on EKG with elevated troponin. This likely demand ischemia secondary to hypotension and volume depletion. I do believe she warrants admission for further evaluation and management of hypotension and dehydration as well as delta troponin and cardiology consultation. Hospitalist will resume care of the patient at this time. She was given dose of aspirin. She was informed and agreeable. Stable for admission. I am the Primary Clinician of Record. FINAL IMPRESSION      1. Elevated troponin    2. COVID-19    3. Hypotension, unspecified hypotension type          DISPOSITION/PLAN     DISPOSITION Decision To Admit 02/28/2023 02:07:34 PM      PATIENT REFERRED TO:  No follow-up provider specified.     DISCHARGE MEDICATIONS:  New Prescriptions    No medications on file       DISCONTINUED MEDICATIONS:  Discontinued Medications    No medications on file              (Please note that portions of this note were completed with a voice recognition program.  Efforts were made to edit the dictations but occasionally words are mis-transcribed.)    Joanne Soto PA-C (electronically signed)           Lin Morrell PA-C  02/28/23 9901

## 2023-02-28 NOTE — PROGRESS NOTES
Pharmacy Home Medication Reconciliation Note    A medication reconciliation has been completed for Farhana Noyola 1955    Pharmacy: [facility] [address] [place] [address]  Information provided by: patient    The patient's home medication list is as follows: No current facility-administered medications on file prior to encounter. Current Outpatient Medications on File Prior to Encounter   Medication Sig Dispense Refill    ibuprofen (ADVIL;MOTRIN) 200 MG tablet Take 400 mg by mouth every 6 hours as needed for Pain      acetaminophen (TYLENOL) 325 MG tablet Take 650 mg by mouth every 6 hours as needed for Pain      gabapentin (NEURONTIN) 300 MG capsule Take 2 capsules by mouth 3 times daily for 90 days.  540 capsule 1    hydroxychloroquine (PLAQUENIL) 200 MG tablet Take 1 tablet by mouth 2 times daily 180 tablet 1    Umeclidinium-Vilanterol (ANORO ELLIPTA) 62.5-25 MCG/ACT AEPB Inhale 1 puff into the lungs daily 1 each 3    albuterol sulfate HFA (VENTOLIN HFA) 108 (90 Base) MCG/ACT inhaler Inhale 2 puffs into the lungs 4 times daily as needed for Wheezing 18 g 5    metoprolol succinate (TOPROL XL) 50 MG extended release tablet Take 1 tablet by mouth daily 30 tablet 3    ARMOUR THYROID 90 MG tablet TAKE 1 TABLET BY MOUTH ONCE A DAY      lisinopril (PRINIVIL;ZESTRIL) 10 MG tablet Take 10 mg by mouth daily      escitalopram (LEXAPRO) 10 MG tablet Take 20 mg by mouth daily       Calcium Carb-Cholecalciferol (CALCIUM 1000 + D) 1000-800 MG-UNIT TABS Take 1 tablet by mouth daily      Multiple Vitamins-Minerals (THERAPEUTIC MULTIVITAMIN-MINERALS) tablet Take 1 tablet by mouth daily      vitamin B-12 (CYANOCOBALAMIN) 1000 MCG tablet Take 1,000 mcg by mouth daily      rivaroxaban (XARELTO) 20 MG TABS tablet Take 1 tablet by mouth daily 30 tablet 0    pantoprazole (PROTONIX) 40 MG tablet Take 1 tablet by mouth daily 30 tablet 3    tiZANidine (ZANAFLEX) 4 MG tablet Take 4 mg by mouth 2 times daily as needed ALPRAZolam (XANAX) 0.25 MG tablet Take 0.25 mg by mouth nightly as needed for Sleep.      traZODone (DESYREL) 50 MG tablet Take 50 mg by mouth nightly. Timing of last doses updated. Thank you,  Kylie Kennedy

## 2023-02-28 NOTE — CONSULTS
[facility]   Electrophysiology Consultation   Date: 2/28/2023  Reason for Consultation: Bradycardia and prolonged QT  Consult Requesting Physician: No att. providers found     Chief Complaint   Patient presents with    Dizziness     From home via EMS cc dizziness, fatigue, cough, malaise onset 02/24. Positive COVID test on 02/27. HPI: Steffanie Linder is a 69 y.o. female with history of paroxysmal atrial fibrillation status post ablation in March 2022, hypertension, hypothyroidism, history of COVID infection in November 2021, moderate obstructive airway disease on PFT, who was admitted since she developed cold symptoms few days ago with fatigue and diarrhea and was started on Paxlovid. She was having dizziness upon standing today and came to the emergency room and was found to have low blood pressure in 56A systolic. Her EKG showed sinus bradycardia and prolonged QT. She also had elevated troponin. She had a left heart cath in 2020 which was normal.      Past Medical History:   Diagnosis Date    Age-related osteoporosis without current pathological fracture 5/18/2022    Anxiety     Arthritis     Depression     Hypertension     Hypothyroidism         Past Surgical History:   Procedure Laterality Date    ABDOMINAL EXPLORATION SURGERY  02/12/2018    LAPAROTOMY EXPLORATORY, REPAIR PERFORATED ULCER    BLADDER REPAIR      BREAST ENHANCEMENT SURGERY      HYSTERECTOMY (CERVIX STATUS UNKNOWN)      partial    PARTIAL HYSTERECTOMY (CERVIX NOT REMOVED)      SHOULDER SURGERY      ligament moved up left shoulder       Allergies   Allergen Reactions    Nsaids      Note: PERFORATED PEPTIC ULCER       Social History:  Reviewed. reports that she has never smoked. She has never used smokeless tobacco. She reports current alcohol use. She reports that she does not use drugs. Family History:  Reviewed. family history includes Heart Attack in her mother.      Review of System:  All other systems reviewed and are negative except for that noted above. Pertinent negatives are:     General: negative for fever, chills   Ophthalmic ROS: negative for - eye pain or loss of vision  ENT ROS: negative for - headaches, sore throat   Respiratory: negative for - cough, sputum  Cardiovascular: Reviewed in HPI  Gastrointestinal: negative for - abdominal pain, diarrhea, N/V  Hematology: negative for - bleeding, blood clots, bruising or jaundice  Genito-Urinary:  negative for - Dysuria or incontinence  Musculoskeletal: negative for - Joint swelling, muscle pain  Neurological: negative for - confusion, dizziness, headaches   Psychiatric: No anxiety, no depression. Dermatological: negative for - rash    Physical Examination:  Vitals:    23 1420   BP: (!) 107/90   Pulse: 61   Resp: 17   Temp:    SpO2: 92%      No intake/output data recorded. Wt Readings from Last 3 Encounters:   23 182 lb (82.6 kg)   02/15/23 182 lb (82.6 kg)   02/15/23 184 lb (83.5 kg)     Temp  Av.8 °F (36.6 °C)  Min: 97.8 °F (36.6 °C)  Max: 97.8 °F (36.6 °C)  Pulse  Av.1  Min: 51  Max: 61  BP  Min: 73/39  Max: 107/90  SpO2  Av %  Min: 91 %  Max: 97 %  No intake or output data in the 24 hours ending 23 1431    Telemetry: Sinus rhythm/bradycardia  Constitutional: Oriented. No distress. Head: Normocephalic and atraumatic. Mouth/Throat: Oropharynx is clear and moist.   Eyes: Conjunctivae normal. EOM are normal.   Neck: Neck supple. No rigidity. No JVD present. Cardiovascular: Normal rate, regular rhythm, S1&S2. Pulmonary/Chest: Bilateral respiratory sounds. No wheezes, No rhonchi. Abdominal: Soft. Bowel sounds present. No distension, No tenderness. Musculoskeletal: No tenderness. No edema    Lymphadenopathy: Has no cervical adenopathy. Neurological: Alert and oriented. Cranial nerve appears intact, No Gross deficit   Skin: Skin is warm and dry. No rash noted.    Psychiatric: Has a normal behavior     Labs, diagnostic and imaging results reviewed. Reviewed. Recent Labs     02/28/23  1211      K 4.5      CO2 22   BUN 18   CREATININE 1.1     Recent Labs     02/28/23  1211   WBC 5.3   HGB 12.2   HCT 35.2*   .0        Lab Results   Component Value Date/Time    TROPONINI 0.03 02/28/2023 12:11 PM     No results found for: BNP  No results found for: PROTIME, INR  Lab Results   Component Value Date/Time    CHOL 155 05/25/2021 10:24 AM    HDL 75 05/25/2021 10:24 AM    HDL 79 06/13/2011 07:50 AM    TRIG 82 05/25/2021 10:24 AM       ECG: Sinus bradycardiaST & T wave abnormality, consider anterolateral ischemiaProlonged QT, QTc 495  Echo: 12/7/2022   Conclusions      Summary   Mild concentric left ventricular hypertrophy. Normal left ventricular cavity   size. Borderline left ventricular systolic function with an estimated   ejection fraction of 50-55%. No evident regional wall motion abnormalities   seen. Indeterminate diastolic function. The right ventricle is borderline in size with normal function. Bi-atrial enlargement. The aortic valve appears sclerotic but opens well. Mild aortic   insufficiency. The mitral valve is thickened with adequate excursion. Moderate, eccentric,   mitral regurgitation. Mild to moderate tricuspid regurgitation with an estimated PASP of 45 mmHg. Moderate pulmonic insufficiency. 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 Cath: 12/17/2021  Left Heart Cath  Dominance: Right       LM: no angiographic stenosis  LAD: no angiographic stenosis  LCx: no angiographic stenosis  RCA: no angiographic stenosis     LVEDP: 2 mmHg   LVEF: 50-55%   Impression/Recommendations:  Normal coronary study. Scheduled Meds:  Continuous Infusions:  PRN Meds:.    X-ray    Impression   No acute cardiopulmonary findings         Patient Active Problem List    Diagnosis Date Noted    Typical atrial flutter ([address] 12/15/2020    SOB (shortness of breath) 12/30/2022    Age-related osteoporosis without current pathological fracture 05/18/2022    Other osteoporosis without current pathological fracture 02/23/2022    Encounter for therapeutic drug monitoring 02/23/2022    Erosive osteoarthritis of both hands 11/17/2021    Primary osteoarthritis involving multiple joints 11/17/2021    Multilevel degenerative disc disease 11/17/2021    Long-term use of hydroxychloroquine 11/17/2021    Atrial fibrillation and flutter (HCC)     Paroxysmal supraventricular tachycardia (HCC)     Obstructive sleep apnea     PAF (paroxysmal atrial fibrillation) ([address]     Hyponatremia     Hypertension     Perforated ulcer ([address]     Peritonitis ([address]     Chronic migraine without aura 11/13/2014    DDD (degenerative disc disease), cervical 09/30/2014    Myofascial pain syndrome 09/30/2014      There are no active hospital problems to display for this patient. Assessment:       Plan:    -Prolonged QT and sinus bradycardia    I reviewed the medications that she is taking. There is interaction between Plaquenil, trazodone, Lexapro and also some interaction with Paxil with. This combination can cause prolongation of QT from multiple of the interactions. Since her COVID symptoms are not very severe, I would stop the Paxlovid. If possible, Plaquenil dose should be decreased back to previous level. Potassium is normal but we have not had any results for magnesium. I have ordered it. If she has any arrhythmias, an IV dose of supplemental magnesium can be given even if the results of the levels are not back. Review of her prior EKGs in the last year shows normal QT intervals. She has had prolonged QT measurements in the context of taking amiodarone or Zofran and acute illnesses in the past.  QT prolonging medication should be avoided. If a medication is absolutely necessary, close monitoring by prescribing physician is recommended.       The bradycardia is also perhaps related to acute metabolic and drug interaction factors. I would hold metoprolol for the time being and may consider resuming it at a lower dose in the future. -Hypotension  Multifactorial related to acute COVID, poor intake, diarrhea, medication related. Adequate hydration and treatment of underlying condition. Hold off on lisinopril and metoprolol.    -Paroxysmal atrial fibrillation    Patient remains in sinus rhythm. Continue Xarelto if no contraindication.  -Elevated troponin. Could be related to inflammation from COVID. Hypotension and poor perfusion could be another factor. Treatment of underlying condition and maintaining adequate hydration and blood pressure is recommended. -COVID    Continue supportive care. I independently reviewed and interpreted ECG, cardiac labs, other relevant labs,  echo  CXR  . Unless otherwise reflected in the note, my interpretation is in agreement with the report. Thank you for allowing me to participate in the care of [address] [place] [address]     NOTE: This report was transcribed using voice recognition software. Every effort was made to ensure accuracy, however, inadvertent computerized transcription errors may be present.

## 2023-03-01 LAB
ANION GAP SERPL CALCULATED.3IONS-SCNC: 9 MMOL/L (ref 3–16)
BASOPHILS ABSOLUTE: 0 K/UL (ref 0–0.2)
BASOPHILS RELATIVE PERCENT: 0.8 %
BUN BLDV-MCNC: 13 MG/DL (ref 7–20)
C DIFF TOXIN/ANTIGEN: NORMAL
CALCIUM SERPL-MCNC: 8.9 MG/DL (ref 8.3–10.6)
CHLORIDE BLD-SCNC: 108 MMOL/L (ref 99–110)
CO2: 22 MMOL/L (ref 21–32)
CREAT SERPL-MCNC: 0.5 MG/DL (ref 0.6–1.2)
EKG ATRIAL RATE: 53 BPM
EKG ATRIAL RATE: 64 BPM
EKG DIAGNOSIS: NORMAL
EKG DIAGNOSIS: NORMAL
EKG P AXIS: 50 DEGREES
EKG P-R INTERVAL: 118 MS
EKG P-R INTERVAL: 138 MS
EKG Q-T INTERVAL: 476 MS
EKG Q-T INTERVAL: 528 MS
EKG QRS DURATION: 88 MS
EKG QRS DURATION: 90 MS
EKG QTC CALCULATION (BAZETT): 491 MS
EKG QTC CALCULATION (BAZETT): 495 MS
EKG R AXIS: 48 DEGREES
EKG R AXIS: 57 DEGREES
EKG T AXIS: 27 DEGREES
EKG T AXIS: 48 DEGREES
EKG VENTRICULAR RATE: 53 BPM
EKG VENTRICULAR RATE: 64 BPM
EOSINOPHILS ABSOLUTE: 0.3 K/UL (ref 0–0.6)
EOSINOPHILS RELATIVE PERCENT: 5.2 %
GFR SERPL CREATININE-BSD FRML MDRD: >60 ML/MIN/{1.73_M2}
GLUCOSE BLD-MCNC: 97 MG/DL (ref 70–99)
HCT VFR BLD CALC: 35.2 % (ref 36–48)
HEMOGLOBIN: 11.9 G/DL (ref 12–16)
LYMPHOCYTES ABSOLUTE: 1 K/UL (ref 1–5.1)
LYMPHOCYTES RELATIVE PERCENT: 21.2 %
MCH RBC QN AUTO: 33.4 PG (ref 26–34)
MCHC RBC AUTO-ENTMCNC: 33.8 G/DL (ref 31–36)
MCV RBC AUTO: 98.8 FL (ref 80–100)
MONOCYTES ABSOLUTE: 0.7 K/UL (ref 0–1.3)
MONOCYTES RELATIVE PERCENT: 13.9 %
NEUTROPHILS ABSOLUTE: 2.9 K/UL (ref 1.7–7.7)
NEUTROPHILS RELATIVE PERCENT: 58.9 %
PDW BLD-RTO: 13.2 % (ref 12.4–15.4)
PLATELET # BLD: 208 K/UL (ref 135–450)
PMV BLD AUTO: 8.1 FL (ref 5–10.5)
POTASSIUM REFLEX MAGNESIUM: 4.2 MMOL/L (ref 3.5–5.1)
RBC # BLD: 3.56 M/UL (ref 4–5.2)
SODIUM BLD-SCNC: 139 MMOL/L (ref 136–145)
WBC # BLD: 4.9 K/UL (ref 4–11)

## 2023-03-01 PROCEDURE — 6370000000 HC RX 637 (ALT 250 FOR IP): Performed by: INTERNAL MEDICINE

## 2023-03-01 PROCEDURE — 93005 ELECTROCARDIOGRAM TRACING: CPT | Performed by: INTERNAL MEDICINE

## 2023-03-01 PROCEDURE — 87449 NOS EACH ORGANISM AG IA: CPT

## 2023-03-01 PROCEDURE — 2580000003 HC RX 258: Performed by: INTERNAL MEDICINE

## 2023-03-01 PROCEDURE — 36415 COLL VENOUS BLD VENIPUNCTURE: CPT

## 2023-03-01 PROCEDURE — 85025 COMPLETE CBC W/AUTO DIFF WBC: CPT

## 2023-03-01 PROCEDURE — 93010 ELECTROCARDIOGRAM REPORT: CPT | Performed by: INTERNAL MEDICINE

## 2023-03-01 PROCEDURE — 99232 SBSQ HOSP IP/OBS MODERATE 35: CPT | Performed by: INTERNAL MEDICINE

## 2023-03-01 PROCEDURE — 87324 CLOSTRIDIUM AG IA: CPT

## 2023-03-01 PROCEDURE — 1200000000 HC SEMI PRIVATE

## 2023-03-01 PROCEDURE — 80048 BASIC METABOLIC PNL TOTAL CA: CPT

## 2023-03-01 RX ORDER — SODIUM CHLORIDE, SODIUM LACTATE, POTASSIUM CHLORIDE, CALCIUM CHLORIDE 600; 310; 30; 20 MG/100ML; MG/100ML; MG/100ML; MG/100ML
INJECTION, SOLUTION INTRAVENOUS CONTINUOUS
Status: ACTIVE | OUTPATIENT
Start: 2023-03-01 | End: 2023-03-02

## 2023-03-01 RX ORDER — ALPRAZOLAM 0.25 MG/1
0.25 TABLET ORAL 2 TIMES DAILY PRN
Status: DISCONTINUED | OUTPATIENT
Start: 2023-03-01 | End: 2023-03-02 | Stop reason: HOSPADM

## 2023-03-01 RX ORDER — GABAPENTIN 400 MG/1
400 CAPSULE ORAL 3 TIMES DAILY
Status: DISCONTINUED | OUTPATIENT
Start: 2023-03-01 | End: 2023-03-02 | Stop reason: HOSPADM

## 2023-03-01 RX ADMIN — ALPRAZOLAM 0.25 MG: 0.25 TABLET ORAL at 21:08

## 2023-03-01 RX ADMIN — ACETAMINOPHEN 650 MG: 325 TABLET ORAL at 05:46

## 2023-03-01 RX ADMIN — PANTOPRAZOLE SODIUM 40 MG: 40 TABLET, DELAYED RELEASE ORAL at 08:50

## 2023-03-01 RX ADMIN — THYROID, PORCINE 90 MG: 30 TABLET ORAL at 09:00

## 2023-03-01 RX ADMIN — HYDROXYCHLOROQUINE SULFATE 200 MG: 200 TABLET ORAL at 21:08

## 2023-03-01 RX ADMIN — HYDROXYCHLOROQUINE SULFATE 200 MG: 200 TABLET ORAL at 08:50

## 2023-03-01 RX ADMIN — GABAPENTIN 400 MG: 400 CAPSULE ORAL at 21:08

## 2023-03-01 RX ADMIN — ACETAMINOPHEN 650 MG: 325 TABLET ORAL at 13:23

## 2023-03-01 RX ADMIN — RIVAROXABAN 20 MG: 20 TABLET, FILM COATED ORAL at 08:50

## 2023-03-01 RX ADMIN — CYANOCOBALAMIN TAB 1000 MCG 1000 MCG: 1000 TAB at 08:00

## 2023-03-01 RX ADMIN — Medication 10 ML: at 08:51

## 2023-03-01 RX ADMIN — METOPROLOL SUCCINATE 50 MG: 50 TABLET, EXTENDED RELEASE ORAL at 08:50

## 2023-03-01 RX ADMIN — ALPRAZOLAM 0.25 MG: 0.25 TABLET ORAL at 14:38

## 2023-03-01 RX ADMIN — GABAPENTIN 400 MG: 400 CAPSULE ORAL at 13:23

## 2023-03-01 RX ADMIN — Medication 10 ML: at 21:12

## 2023-03-01 ASSESSMENT — PAIN SCALES - GENERAL: PAINLEVEL_OUTOF10: 7

## 2023-03-01 ASSESSMENT — PAIN DESCRIPTION - LOCATION: LOCATION: NECK

## 2023-03-01 NOTE — PROGRESS NOTES
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 Reports no concerns about d/c home. Patient stated is walking in room indep, performed bathing, dressing and toileting on her own this AM.    Will d/c PT and OT at this time due to patient stating is indep and   does not want any therapy evaluations at this time. Discussed with RN, RN in agreement. Will d/c PT and OT orders at this time. Thank you,  Maria Victoria Woo.  Lizzette Winklers, OTR/L [address]  Ancelmo Slider, PT, DPT #[id number]

## 2023-03-01 NOTE — CARE COORDINATION
Patient admitted  with an anticipated short hospitalization length of stay. Chart reviewed and it appears that patient has minimal needs for discharge at this time. Discussed with patient’s nurse and requested that case management be notified if discharge needs are identified.     Case management will continue to follow progress and update discharge plan as needed.       Electronically signed by PIA Castillo on 3/1/2023 at 9:02 AM    normal

## 2023-03-01 NOTE — PROGRESS NOTES
[address]   Progress Note  Cardiology    CC:  COVID, Diarrhea, Elevated troponin, Prolonged QT    HPI:  She still feels bad. Frequent diarrhea. No chest pain. Repeat troponin normal.      Medications/Labs all Reviewed    Lab Results   Component Value Date    WBC 4.9 03/01/2023    HGB 11.9 (L) 03/01/2023    HCT 35.2 (L) 03/01/2023    MCV 98.8 03/01/2023     03/01/2023     Lab Results   Component Value Date    CREATININE 0.5 (L) 03/01/2023    BUN 13 03/01/2023     03/01/2023    K 4.2 03/01/2023     03/01/2023    CO2 22 03/01/2023     No results found for: INR, PROTIME     PHYSICAL EXAM   BP (!) 161/88   Pulse 68   Temp 97.6 °F (36.4 °C) (Oral)   Resp 18   Ht 5' 3\" (1.6 m)   Wt 182 lb (82.6 kg)   SpO2 96%   BMI 32.24 kg/m²      Respiratory:  Resp Assessment: Normal respiratory effort  Resp Auscultation: Clear to auscultation bilaterally   Cardiovascular: Auscultation: regular rate and rhythm, normal S1S2, no murmur, rub or gallop  Palpation:  Nl PMI  JVP:  normal  Extremities: No Edema  Abdomen:  Soft, non-tender  Normal bowel sounds  Extremities:   No Cyanosis or Clubbing  Neurological/Psychiatric:  Oriented to time, place, and person  Non-anxious  Skin:   Warm and dry    TELE; (tracings personally reviewed)  sinus    EKG:  NSR, QT improved, 470 range        ASSESSMENT:     Elevated Troponin:  Likely supply demand mismatch. Hypotension and COVID. Normal cors 12/2020 and recent normal stress. No ischemic symptoms. Repeat troponin <0.01. No further evaluation indicated     Abn EKG:  Prolonged QT and sinus skip. Prolonged QT likely due to meds. Multiple issues including lexipro, trazadone and plaquinil. Paxlovid can also interfere with trazadone which interferes with lexipro. I would not give her Paxlovid due to this. Hold lexipro and Trazadone. Plaquinil was just increased as outpt. Unclear if this can be decreased or held.   Mg was actually elevated 2.9     QT improved today, but still borderline.   EP seeing and hopefully can help with suggestions of restarting chronic meds    I would not used paxlovid for COVID      COVID:  COVID positive  I would not use Paxlovid due to drug interactions  Unchanged     Elevated LFTS/Diarrhea:  Posibly due to hypotension vs meds  Recheck LFTs tomorrow  If still elevated consider checking hepatitis screen     Hypotension:  Likely dehydration from [address] with hydration  follow     Plan:  Troponin now normal  No ischemic eval needed  Follow LFTs  EP to help with suggestions on how to deal with chronic meds          Rebecca Benson MD, 3/1/2023 11:53 AM

## 2023-03-01 NOTE — PROGRESS NOTES
02/28/23 1918   Respiratory   Respiratory Pattern Regular   Respiratory Depth Normal   Respiratory Quality/Effort Unlabored   Chest Assessment Chest expansion symmetrical   L Breath Sounds Clear   R Breath Sounds Clear   Level of Activity/Mobility 0   Breath Sounds   Right Upper Lobe Clear   Right Middle Lobe Clear   Right Lower Lobe Rales   Left Upper Lobe Clear   Left Lower Lobe Rales   Cough/Sputum   Sputum How Obtained Cough on request   Cough Productive;Strong   Frequency Infrequent   Sputum Amount Small   Sputum Color Yellow   Tenacity Thick   Modified Mihai Scale   Pre-Post Procedure Pre-procedure   Modified Mihai Scale 1   Additional Respiratory Assessments   Heart Rate 64   Resp 15   SpO2 97 %   Position Semi-Rivero's

## 2023-03-01 NOTE — PLAN OF CARE
Problem: Safety - Adult  Goal: Free from fall injury  Outcome: Progressing  Note: Safety precautions in place. Bed locked, lowest position. Call light in reach, gripper socks on. Educated on using call light to ask for assistance. Verbalizes understanding. No falls pr physical injury.      Problem: ABCDS Injury Assessment  Goal: Absence of physical injury  Outcome: Progressing     Problem: Pain  Goal: Verbalizes/displays adequate comfort level or baseline comfort level  Outcome: Progressing

## 2023-03-01 NOTE — PROGRESS NOTES
[facility]    3/1/2023 12:38 PM        Name: Denzel Cota . Admitted: 2/28/2023  Primary Care Provider: Clarisse Palma MD (Tel: [phone])      Subjective:    Still feeling fatigue and having 7-8 loose stools no chest pain fevers    Reviewed interval ancillary notes    Current Medications  lactated ringers IV soln infusion, Continuous  gabapentin (NEURONTIN) capsule 400 mg, TID  albuterol sulfate HFA (PROVENTIL;VENTOLIN;PROAIR) 108 (90 Base) MCG/ACT inhaler 2 puff, 4x Daily PRN  ALPRAZolam (XANAX) tablet 0.25 mg, Nightly PRN  thyroid (ARMOUR) tablet 90 mg, Daily  hydroxychloroquine (PLAQUENIL) tablet 200 mg, BID  metoprolol succinate (TOPROL XL) extended release tablet 50 mg, Daily  pantoprazole (PROTONIX) tablet 40 mg, Daily  rivaroxaban (XARELTO) tablet 20 mg, Daily  vitamin B-12 (CYANOCOBALAMIN) tablet 1,000 mcg, Daily  sodium chloride flush 0.9 % injection 5-40 mL, 2 times per day  sodium chloride flush 0.9 % injection 5-40 mL, PRN  0.9 % sodium chloride infusion, PRN  polyethylene glycol (GLYCOLAX) packet 17 g, Daily PRN  acetaminophen (TYLENOL) tablet 650 mg, Q6H PRN   Or  acetaminophen (TYLENOL) suppository 650 mg, Q6H PRN        Objective:  BP (!) 161/88   Pulse 68   Temp 97.6 °F (36.4 °C) (Oral)   Resp 18   Ht 5' 3\" (1.6 m)   Wt 182 lb (82.6 kg)   SpO2 96%   BMI 32.24 kg/m²   No intake or output data in the 24 hours ending 03/01/23 1238   Wt Readings from Last 3 Encounters:   02/28/23 182 lb (82.6 kg)   02/15/23 182 lb (82.6 kg)   02/15/23 184 lb (83.5 kg)       General appearance:  Appears comfortable.  AAOx3  HEENT: atraumatic, Pupils equal, muscous membranes moist, no masses appreciated  Cardiovascular: Regular rate and rhythm no murmurs appreciated  Respiratory: CTAB no wheezing  Gastrointestinal: Abdomen soft, non-tender, BS+  EXT: no edema  Neurology: no gross focal deficts  Psychiatry: Appropriate affect. Not agitated  Skin: Warm, dry, no rashes appreciated    Labs and Tests:  CBC:   Recent Labs     02/28/23  1211 03/01/23  0629   WBC 5.3 4.9   HGB 12.2 11.9*    208     BMP:    Recent Labs     02/28/23  1211 03/01/23  0628    139   K 4.5 4.2    108   CO2 22 22   BUN 18 13   CREATININE 1.1 0.5*   GLUCOSE 108* 97     Hepatic:   Recent Labs     02/28/23  1211 02/28/23  1912   AST 62* 53*   ALT 99* 92*   BILITOT 0.4 0.4   ALKPHOS 137* 136*     XR CHEST PORTABLE   Final Result   No acute cardiopulmonary findings             Recent imaging reviewed    Problem List  Principal Problem:    Hypotension  Active Problems:    Prolonged Q-T interval on ECG    Bradycardia    COVID  Resolved Problems:    * No resolved hospital problems.  *       Assessment/Plan:   Hypotension likely secondary to dehydration from nausea and diarrhea from covid 19 infection  - continue ivf @75 cc.hr  - bmp stable repeat in am     Elevated troponin likely secondary to above      Qtc prolongation slithgly improved hold meds cards on board     Elevated lfts likely secondary to covid stable repeat labs in am     PAF home meds xarelto        DVT prophylaxis xarelto  Code status full code      Vanessa Marte MD   3/1/2023 12:38 PM

## 2023-03-01 NOTE — PLAN OF CARE
Problem: Discharge Planning  Goal: Discharge to home or other facility with appropriate resources  Outcome: Progressing  Flowsheets (Taken 3/1/2023 0845)  Discharge to home or other facility with appropriate resources:   Identify barriers to discharge with patient and caregiver   Arrange for needed discharge resources and transportation as appropriate     Problem: Safety - Adult  Goal: Free from fall injury  3/1/2023 0955 by Gaetano Poe RN  Outcome: Progressing     Problem: ABCDS Injury Assessment  Goal: Absence of physical injury  Recent Flowsheet Documentation  Taken 3/1/2023 0954 by Gaetano Poe RN  Absence of Physical Injury: Implement safety measures based on patient assessment    Problem: Pain  Goal: Verbalizes/displays adequate comfort level or baseline comfort level  3/1/2023 0955 by Gaetano Poe RN  Outcome: Progressing   Pt verbalizes not complaints of pain at this time.

## 2023-03-01 NOTE — PROGRESS NOTES
Admission assessment complete. VSS no SOB or any distress noted. Oriented to room, call light, and floor policies. Plan of care reviewed with patient. Tele in place reading NS rhythm with a rate of 60s. Pt rates a medium fall risk. Safety precautions in place; Call light within reach, pt encouraged to call if any needs arise. No further requests at this time. Pt VU. Will continue to monitor.

## 2023-03-02 ENCOUNTER — TELEPHONE (OUTPATIENT)
Dept: CARDIOLOGY CLINIC | Age: 68
End: 2023-03-02

## 2023-03-02 VITALS
SYSTOLIC BLOOD PRESSURE: 144 MMHG | RESPIRATION RATE: 16 BRPM | BODY MASS INDEX: 32.25 KG/M2 | DIASTOLIC BLOOD PRESSURE: 81 MMHG | HEART RATE: 63 BPM | OXYGEN SATURATION: 95 % | HEIGHT: 63 IN | WEIGHT: 182 LBS | TEMPERATURE: 97.8 F

## 2023-03-02 LAB
ALBUMIN SERPL-MCNC: 4 G/DL (ref 3.4–5)
ALP BLD-CCNC: 125 U/L (ref 40–129)
ALT SERPL-CCNC: 60 U/L (ref 10–40)
ANION GAP SERPL CALCULATED.3IONS-SCNC: 10 MMOL/L (ref 3–16)
AST SERPL-CCNC: 29 U/L (ref 15–37)
BASOPHILS ABSOLUTE: 0 K/UL (ref 0–0.2)
BASOPHILS RELATIVE PERCENT: 0.8 %
BILIRUB SERPL-MCNC: 0.4 MG/DL (ref 0–1)
BILIRUBIN DIRECT: <0.2 MG/DL (ref 0–0.3)
BILIRUBIN, INDIRECT: ABNORMAL MG/DL (ref 0–1)
BUN BLDV-MCNC: 10 MG/DL (ref 7–20)
CALCIUM SERPL-MCNC: 9.2 MG/DL (ref 8.3–10.6)
CHLORIDE BLD-SCNC: 105 MMOL/L (ref 99–110)
CO2: 23 MMOL/L (ref 21–32)
CREAT SERPL-MCNC: 0.5 MG/DL (ref 0.6–1.2)
EKG ATRIAL RATE: 54 BPM
EKG DIAGNOSIS: NORMAL
EKG P AXIS: 80 DEGREES
EKG P-R INTERVAL: 130 MS
EKG Q-T INTERVAL: 472 MS
EKG QRS DURATION: 92 MS
EKG QTC CALCULATION (BAZETT): 447 MS
EKG R AXIS: 53 DEGREES
EKG T AXIS: 4 DEGREES
EKG VENTRICULAR RATE: 54 BPM
EOSINOPHILS ABSOLUTE: 0.2 K/UL (ref 0–0.6)
EOSINOPHILS RELATIVE PERCENT: 4.8 %
GFR SERPL CREATININE-BSD FRML MDRD: >60 ML/MIN/{1.73_M2}
GLUCOSE BLD-MCNC: 110 MG/DL (ref 70–99)
HCT VFR BLD CALC: 35.2 % (ref 36–48)
HEMOGLOBIN: 12 G/DL (ref 12–16)
LYMPHOCYTES ABSOLUTE: 1.2 K/UL (ref 1–5.1)
LYMPHOCYTES RELATIVE PERCENT: 23.6 %
MCH RBC QN AUTO: 33.7 PG (ref 26–34)
MCHC RBC AUTO-ENTMCNC: 34.1 G/DL (ref 31–36)
MCV RBC AUTO: 98.8 FL (ref 80–100)
MONOCYTES ABSOLUTE: 0.5 K/UL (ref 0–1.3)
MONOCYTES RELATIVE PERCENT: 9.9 %
NEUTROPHILS ABSOLUTE: 3 K/UL (ref 1.7–7.7)
NEUTROPHILS RELATIVE PERCENT: 60.9 %
PDW BLD-RTO: 13.3 % (ref 12.4–15.4)
PLATELET # BLD: 225 K/UL (ref 135–450)
PMV BLD AUTO: 8.1 FL (ref 5–10.5)
POTASSIUM REFLEX MAGNESIUM: 3.8 MMOL/L (ref 3.5–5.1)
RBC # BLD: 3.56 M/UL (ref 4–5.2)
SODIUM BLD-SCNC: 138 MMOL/L (ref 136–145)
TOTAL PROTEIN: 7.2 G/DL (ref 6.4–8.2)
WBC # BLD: 4.9 K/UL (ref 4–11)

## 2023-03-02 PROCEDURE — 93010 ELECTROCARDIOGRAM REPORT: CPT | Performed by: INTERNAL MEDICINE

## 2023-03-02 PROCEDURE — 6370000000 HC RX 637 (ALT 250 FOR IP): Performed by: INTERNAL MEDICINE

## 2023-03-02 PROCEDURE — 85025 COMPLETE CBC W/AUTO DIFF WBC: CPT

## 2023-03-02 PROCEDURE — 80048 BASIC METABOLIC PNL TOTAL CA: CPT

## 2023-03-02 PROCEDURE — 99233 SBSQ HOSP IP/OBS HIGH 50: CPT | Performed by: INTERNAL MEDICINE

## 2023-03-02 PROCEDURE — 93005 ELECTROCARDIOGRAM TRACING: CPT | Performed by: INTERNAL MEDICINE

## 2023-03-02 PROCEDURE — 36415 COLL VENOUS BLD VENIPUNCTURE: CPT

## 2023-03-02 PROCEDURE — 2580000003 HC RX 258: Performed by: INTERNAL MEDICINE

## 2023-03-02 PROCEDURE — 80076 HEPATIC FUNCTION PANEL: CPT

## 2023-03-02 RX ADMIN — Medication 10 ML: at 08:51

## 2023-03-02 RX ADMIN — METOPROLOL SUCCINATE 50 MG: 50 TABLET, EXTENDED RELEASE ORAL at 08:52

## 2023-03-02 RX ADMIN — PANTOPRAZOLE SODIUM 40 MG: 40 TABLET, DELAYED RELEASE ORAL at 08:52

## 2023-03-02 RX ADMIN — GABAPENTIN 400 MG: 400 CAPSULE ORAL at 14:10

## 2023-03-02 RX ADMIN — RIVAROXABAN 20 MG: 20 TABLET, FILM COATED ORAL at 08:52

## 2023-03-02 RX ADMIN — THYROID, PORCINE 90 MG: 30 TABLET ORAL at 08:52

## 2023-03-02 RX ADMIN — HYDROXYCHLOROQUINE SULFATE 200 MG: 200 TABLET ORAL at 08:52

## 2023-03-02 RX ADMIN — CYANOCOBALAMIN TAB 1000 MCG 1000 MCG: 1000 TAB at 08:51

## 2023-03-02 RX ADMIN — GABAPENTIN 400 MG: 400 CAPSULE ORAL at 08:52

## 2023-03-02 ASSESSMENT — PAIN SCALES - GENERAL: PAINLEVEL_OUTOF10: 3

## 2023-03-02 ASSESSMENT — PAIN DESCRIPTION - LOCATION: LOCATION: OTHER (COMMENT)

## 2023-03-02 NOTE — PLAN OF CARE
Problem: Discharge Planning  Goal: Discharge to home or other facility with appropriate resources  Outcome: Progressing     Problem: Safety - Adult  Goal: Free from fall injury  Outcome: Progressing     Problem: ABCDS Injury Assessment  Goal: Absence of physical injury  Outcome: Progressing     Problem: Pain  Goal: Verbalizes/displays adequate comfort level or baseline comfort level  Outcome: Progressing  Flowsheets (Taken 3/1/2023 1815 by Cezar Claire RN)  Verbalizes/displays adequate comfort level or baseline comfort level:   Encourage patient to monitor pain and request assistance   Assess pain using appropriate pain scale     Problem: Respiratory - Adult  Goal: Achieves optimal ventilation and oxygenation  Outcome: Progressing     Problem: Cardiovascular - Adult  Goal: Maintains optimal cardiac output and hemodynamic stability  Outcome: Progressing     Problem: Skin/Tissue Integrity - Adult  Goal: Skin integrity remains intact  Outcome: Progressing     Problem: Gastrointestinal - Adult  Goal: Maintains or returns to baseline bowel function  Outcome: Progressing  Goal: Maintains adequate nutritional intake  Outcome: Progressing     Problem: Metabolic/Fluid and Electrolytes - Adult  Goal: Electrolytes maintained within normal limits  Outcome: Progressing  Goal: Hemodynamic stability and optimal renal function maintained  Outcome: Progressing     Problem: Hematologic - Adult  Goal: Maintains hematologic stability  Outcome: Progressing     Problem: Skin/Tissue Integrity  Goal: Absence of new skin breakdown  Description: 1. Monitor for areas of redness and/or skin breakdown  2. Assess vascular access sites hourly  3. Every 4-6 hours minimum:  Change oxygen saturation probe site  4. Every 4-6 hours:  If on nasal continuous positive airway pressure, respiratory therapy assess nares and determine need for appliance change or resting period.   Outcome: Progressing

## 2023-03-02 NOTE — RT PROTOCOL NOTE
RT Inhaler-Nebulizer Bronchodilator Protocol Note    There is a bronchodilator order in the chart from a provider indicating to follow the RT Bronchodilator Protocol and there is an Initiate RT Inhaler-Nebulizer Bronchodilator Protocol order as well (see protocol at bottom of note). CXR Findings:  XR CHEST PORTABLE    Result Date: 2/28/2023  No acute cardiopulmonary findings       The findings from the last RT Protocol Assessment were as follows:   History Pulmonary Disease: None or smoker <15 pack years  Respiratory Pattern: Regular pattern and RR 12-20 bpm  Breath Sounds: Clear breath sounds  Cough: Strong, spontaneous, non-productive  Indication for Bronchodilator Therapy:    Bronchodilator Assessment Score: 0    Aerosolized bronchodilator medication orders have been revised according to the RT Inhaler-Nebulizer Bronchodilator Protocol below. Respiratory Therapist to perform RT Therapy Protocol Assessment initially then follow the protocol. Repeat RT Therapy Protocol Assessment PRN for score 0-3 or on second treatment, BID, and PRN for scores above 3. No Indications - adjust the frequency to every 6 hours PRN wheezing or bronchospasm, if no treatments needed after 48 hours then discontinue using Per Protocol order mode. If indication present, adjust the RT bronchodilator orders based on the Bronchodilator Assessment Score as indicated below. Use Inhaler orders unless patient has one or more of the following: on home nebulizer, not able to hold breath for 10 seconds, is not alert and oriented, cannot activate and use MDI correctly, or respiratory rate 25 breaths per minute or more, then use the equivalent nebulizer order(s) with same Frequency and PRN reasons based on the score. If a patient is on this medication at home then do not decrease Frequency below that used at home.     0-3 - enter or revise RT bronchodilator order(s) to equivalent RT Bronchodilator order with Frequency of every 4 hours PRN for wheezing or increased work of breathing using Per Protocol order mode. 4-6 - enter or revise RT Bronchodilator order(s) to two equivalent RT bronchodilator orders with one order with BID Frequency and one order with Frequency of every 4 hours PRN wheezing or increased work of breathing using Per Protocol order mode. 7-10 - enter or revise RT Bronchodilator order(s) to two equivalent RT bronchodilator orders with one order with TID Frequency and one order with Frequency of every 4 hours PRN wheezing or increased work of breathing using Per Protocol order mode. 11-13 - enter or revise RT Bronchodilator order(s) to one equivalent RT bronchodilator order with QID Frequency and an Albuterol order with Frequency of every 4 hours PRN wheezing or increased work of breathing using Per Protocol order mode. Greater than 13 - enter or revise RT Bronchodilator order(s) to one equivalent RT bronchodilator order with every 4 hours Frequency and an Albuterol order with Frequency of every 2 hours PRN wheezing or increased work of breathing using Per Protocol order mode. RT to enter RT Home Evaluation for COPD & MDI Assessment order using Per Protocol order mode.     Electronically signed by Kylie Sr RCP on 3/2/2023 at 11:22 AM

## 2023-03-02 NOTE — PROGRESS NOTES
Data- discharge order received, pt verbalized agreement to discharge, disposition to previous residence, no needs for HHC/DME. Action- discharge instructions prepared and given to patient, pt verbalized understanding. Medication information packet given r/t NEW and/or CHANGED prescriptions emphasizing name/purpose/side effects, pt verbalized understanding. Discharge instruction summary: Diet- general, Activity- as tolerated, Primary Care Physician as follows: Jeremy Rg [phone] f/u appointment in 1 week, immunizations reviewed, no new prescription medications to be filled. 1. WEIGHT: Admit Weight: 182 lb (82.6 kg) (02/28/23 1152)        Today  Weight: 182 lb (82.6 kg) (02/28/23 1152)       2. O2 SAT.: SpO2: 95 % (03/02/23 1140)    Response- Pt belongings gathered, IV removed. Disposition is home (no HHC/DME needs), transported with significant other, taken to lobby via w/c w/ RN, no complications.

## 2023-03-02 NOTE — PROGRESS NOTES
03/02/23 1121   RT Protocol   History Pulmonary Disease 0   Respiratory pattern 0   Breath sounds 0   Cough 0   Bronchodilator Assessment Score 0

## 2023-03-02 NOTE — PROGRESS NOTES
Physician Progress Note      Geovanni Sood  CSN #:                  [id number]  :                       1955  ADMIT DATE:       2023 11:40 AM  [address] [place] DATE:  RESPONDING  PROVIDER #:        Blenda Fabry MD          QUERY TEXT:    Pt admitted with COVID. Noted documentation of demand ischemia on the ED. If   possible, please document in progress notes and discharge summary:    The medical record reflects the following:  Risk Factors: COVID, dehydration, Hypotension,  Clinical Indicators: Per ED: Patient has mild ischemic changes noted on EKG   with elevated troponin. This likely demand ischemia secondary to hypotension   and volume depletion. Per Cardiology: Elevated Troponin Likely supply demand   mismatch. Troponin 0.03 - 0.01. Treatment: EKG, Trend troponins, Cardiology consult. Options provided:  -- Demand ischemia confirmed present on admission  -- Demand ischemia ruled out  -- Other - I will add my own diagnosis  -- Disagree - Not applicable / Not valid  -- Disagree - Clinically unable to determine / Unknown  -- Refer to Clinical Documentation Reviewer    PROVIDER RESPONSE TEXT:    The diagnosis of demand ischemia was confirmed as present on admission.     Query created by: Som Carrion on 3/2/2023 12:47 PM      Electronically signed by:  Blenda Fabry MD 3/2/2023 1:08 PM

## 2023-03-02 NOTE — PLAN OF CARE
Problem: Discharge Planning  Goal: Discharge to home or other facility with appropriate resources  3/2/2023 1109 by Jovita Norwood RN  Outcome: Progressing     Problem: Safety - Adult  Goal: Free from fall injury  3/2/2023 1109 by Jovita Norwood RN  Outcome: Progressing     Problem: ABCDS Injury Assessment  Goal: Absence of physical injury  3/2/2023 1109 by Jovita Norwood RN  Outcome: Progressing     Problem: Pain  Goal: Verbalizes/displays adequate comfort level or baseline comfort level  3/2/2023 1109 by Jovita Norwood RN  Outcome: Progressing     Problem: Respiratory - Adult  Goal: Achieves optimal ventilation and oxygenation  3/2/2023 1109 by Jovita Norwood RN  Outcome: Progressing     Problem: Cardiovascular - Adult  Goal: Maintains optimal cardiac output and hemodynamic stability  3/2/2023 1109 by Jovita Norwood RN  Outcome: Progressing     Problem: Gastrointestinal - Adult  Goal: Maintains or returns to baseline bowel function  3/2/2023 1109 by Jovita Norwood RN  Outcome: Progressing     Problem: Gastrointestinal - Adult  Goal: Maintains adequate nutritional intake  3/2/2023 1109 by Jovita Norwood RN  Outcome: Progressing     Problem: Metabolic/Fluid and Electrolytes - Adult  Goal: Electrolytes maintained within normal limits  3/2/2023 1109 by Jovita Norwood RN  Outcome: Progressing     Problem: Metabolic/Fluid and Electrolytes - Adult  Goal: Hemodynamic stability and optimal renal function maintained  3/2/2023 1109 by Jovita Norwood RN  Outcome: Progressing     Problem: Hematologic - Adult  Goal: Maintains hematologic stability  3/2/2023 1109 by Jovita Norwood RN  Outcome: Progressing     Problem: Skin/Tissue Integrity  Goal: Absence of new skin breakdown  Description: 1. Monitor for areas of redness and/or skin breakdown  2. Assess vascular access sites hourly  3. Every 4-6 hours minimum:  Change oxygen saturation probe site  4.   Every 4-6 hours:  If on nasal continuous positive airway pressure, respiratory therapy assess nares and determine need for appliance change or resting period.   3/2/2023 1109 by Jarrod Najera RN  Outcome: Progressing

## 2023-03-02 NOTE — DISCHARGE SUMMARY
Hospital Medicine Discharge Summary    Patient: Malinda Farnsworth     Gender: female  : 1955   Age: 69 y.o. MRN: [id number]    Admitting Physician: Blenda Fabry, MD  Discharge Physician: Blenda Fabry, MD     Code Status: Full Code     Admit Date: 2023   Discharge Date:   3/2/2023    Disposition:  Home  Time spent arranging discharge: 31 minutes    Discharge Diagnoses: Active Hospital Problems    Diagnosis Date Noted    Hypotension [I95.9] 2023     Priority: Medium    Prolonged Q-T interval on ECG [R94.31] 2023     Priority: Medium    Bradycardia [R00.1] 2023     Priority: Medium    COVID [U07.1] 2023     Priority: Medium         Condition at Discharge:  [address] Course:   Interhospital with hypotension secondary to diarrhea from COVID-19 is treated with IV fluids and this resolved patient had QT prolongation likely secondary to Paxlovid QT improved to 447 was restarted on hydroxy chloroquine trazodone and Lexapro were discontinued would slowly start them back at the outpatient and monitor QTc if needed    Discharge Exam:    BP (!) 148/80   Pulse 68   Temp 97.5 °F (36.4 °C) (Oral)   Resp 18   Ht 5' 3\" (1.6 m)   Wt 182 lb (82.6 kg)   SpO2 97%   BMI 32.24 kg/m²   General appearance:  Appears comfortable. AAOx3  HEENT: atraumatic, Pupils equal, muscous membranes moist, no masses appreciated  Cardiovascular: Regular rate and rhythm no murmurs appreciated  Respiratory: CTAB no wheezing  Gastrointestinal: Abdomen soft, non-tender, BS+  EXT: no edema  Neurology: no gross focal deficts  Psychiatry: Appropriate affect.  Not agitated  Skin: Warm, dry, no rashes appreciated    Discharge Medications:   Current Discharge Medication List        Current Discharge Medication List        Current Discharge Medication List        CONTINUE these medications which have NOT CHANGED    Details   ibuprofen (ADVIL;MOTRIN) 200 MG tablet Take 400 mg by mouth every 6 hours as needed for Pain      acetaminophen (TYLENOL) 325 MG tablet Take 650 mg by mouth every 6 hours as needed for Pain      gabapentin (NEURONTIN) 300 MG capsule Take 2 capsules by mouth 3 times daily for 90 days. Qty: 540 capsule, Refills: 1    Associated Diagnoses: Erosive osteoarthritis of both hands; Primary osteoarthritis involving multiple joints; Multilevel degenerative disc disease      hydroxychloroquine (PLAQUENIL) 200 MG tablet Take 1 tablet by mouth 2 times daily  Qty: 180 tablet, Refills: 1    Associated Diagnoses: Erosive osteoarthritis of both hands      Umeclidinium-Vilanterol (ANORO ELLIPTA) 62.5-25 MCG/ACT AEPB Inhale 1 puff into the lungs daily  Qty: 1 each, Refills: 3      albuterol sulfate HFA (VENTOLIN HFA) 108 (90 Base) MCG/ACT inhaler Inhale 2 puffs into the lungs 4 times daily as needed for Wheezing  Qty: 18 g, Refills: 5      metoprolol succinate (TOPROL XL) 50 MG extended release tablet Take 1 tablet by mouth daily  Qty: 30 tablet, Refills: 3      ARMOUR THYROID 90 MG tablet TAKE 1 TABLET BY MOUTH ONCE A DAY      lisinopril (PRINIVIL;ZESTRIL) 10 MG tablet Take 10 mg by mouth daily      escitalopram (LEXAPRO) 10 MG tablet Take 20 mg by mouth daily       Calcium Carb-Cholecalciferol (CALCIUM 1000 + D) 1000-800 MG-UNIT TABS Take 1 tablet by mouth daily      Multiple Vitamins-Minerals (THERAPEUTIC MULTIVITAMIN-MINERALS) tablet Take 1 tablet by mouth daily      vitamin B-12 (CYANOCOBALAMIN) 1000 MCG tablet Take 1,000 mcg by mouth daily      rivaroxaban (XARELTO) 20 MG TABS tablet Take 1 tablet by mouth daily  Qty: 30 tablet, Refills: 0      pantoprazole (PROTONIX) 40 MG tablet Take 1 tablet by mouth daily  Qty: 30 tablet, Refills: 3      tiZANidine (ZANAFLEX) 4 MG tablet Take 4 mg by mouth 2 times daily as needed       ALPRAZolam (XANAX) 0.25 MG tablet Take 0.25 mg by mouth nightly as needed for Sleep.      traZODone (DESYREL) 50 MG tablet Take 50 mg by mouth nightly.            Current Discharge Medication List          Labs: For convenience and continuity at follow-up the following most recent labs are provided:    Lab Results   Component Value Date/Time    WBC 4.9 03/02/2023 05:55 AM    HGB 12.0 03/02/2023 05:55 AM    HCT 35.2 03/02/2023 05:55 AM    MCV 98.8 03/02/2023 05:55 AM     03/02/2023 05:55 AM     03/02/2023 05:55 AM    K 3.8 03/02/2023 05:55 AM     03/02/2023 05:55 AM    CO2 23 03/02/2023 05:55 AM    BUN 10 03/02/2023 05:55 AM    CREATININE 0.5 03/02/2023 05:55 AM    CALCIUM 9.2 03/02/2023 05:55 AM    ALKPHOS 125 03/02/2023 05:55 AM    ALT 60 03/02/2023 05:55 AM    AST 29 03/02/2023 05:55 AM    BILITOT 0.4 03/02/2023 05:55 AM    BILIDIR <0.2 03/02/2023 05:55 AM    LABALBU 4.0 03/02/2023 05:55 AM    LDLCALC 64 05/25/2021 10:24 AM    TRIG 82 05/25/2021 10:24 AM     No results found for: INR    Radiology:  XR CHEST PORTABLE    Result Date: 2/28/2023  EXAMINATION: ONE XRAY VIEW OF THE CHEST 2/28/2023 12:13 pm COMPARISON: Chest x-ray dated 29 October 2021 HISTORY: ORDERING SYSTEM PROVIDED HISTORY: SOB TECHNOLOGIST PROVIDED HISTORY: Reason for exam:->SOB Reason for Exam: sob FINDINGS: Stable cardiomediastinal silhouette. No acute airspace infiltrate. No pneumothorax or pleural effusion. No acute cardiopulmonary findings     CT CHEST HIGH RESOLUTION    Result Date: 2/27/2023  EXAMINATION: CT IMAGES OF THE CHEST WITHOUT CONTRAST, HIGH RESOLUTION 2/24/2023 TECHNIQUE: CT of the chest was performed without the administration of intravenous contrast. High resolution CT imaging was performed of the lungs. Multiplanar reformatted images are provided for review. Automated exposure control, iterative reconstruction, and/or weight based adjustment of the mA/kV was utilized to reduce the radiation dose to as low as reasonably achievable. High resolution CT images were performed in the supine inspiration, supine expiration, and prone inspiration positions.  COMPARISON: Chest CTs dated 10/29/2021 and 12/15/2020. Abdominal CT dated 02/11/2018. HISTORY: ORDERING SYSTEM PROVIDED HISTORY: SPEARS (dyspnea on exertion) TECHNOLOGIST PROVIDED HISTORY: Reason for exam:->to evaluate for pulmonary fibrosis, history of recurrent COVID 19. Reason for Exam: to evaluate for pulmonary fibrosis, history of recurrent COVID 19 FINDINGS: Mediastinum: The thyroid is unremarkable. There is no pathologic lymphadenopathy. There is mild atherosclerotic disease of the thoracic aorta, without evidence of aneurysm. There is coronary atherosclerosis. No pericardial abnormality is identified. HRCT Findings/Lungs/pleura: Dedicated HRCT imaging demonstrates no evidence of interstitial lung disease. Namely, there is no evidence of pulmonary fibrosis. There is no evidence of bronchiectasis, lung cyst formation, or honeycombing. Expiratory images demonstrate no evidence of air trapping. Standard lung windows demonstrate that the central airways are patent. There is minimal biapical pleural and parenchymal scarring. There is mild dependent atelectasis within the right upper and right lower lobes. There is mild parenchymal scarring along the right minor fissure and within the bilateral lower lobes. There is no evidence of a pneumothorax or pleural effusion. There is a stable left-sided Bochdalek hernia with mild compressive atelectasis within the medial aspect of the basilar left lower lobe. There are scattered calcified granulomata. There are multiple stable subcentimeter pulmonary nodules within both lungs, unchanged from 12/15/2020, consistent with benign granulomatous disease given their stability. However, there is a new 4 mm ground-glass pulmonary nodule within the subpleural portion of the lingula, image 64 of series 4. This is new from the prior exams. No additional suspicious pulmonary nodule or mass is identified. Upper Abdomen: The adrenal glands are unremarkable bilaterally.   The remainder of the upper abdomen is unremarkable. Soft Tissues/Bones: There is minimal degenerative change throughout the thoracic spine. No osteolytic or osteoblastic lesion is seen. There are bilateral breast prostheses. 1. No HRCT evidence of interstitial lung disease. Namely, no evidence of pulmonary fibrosis. 2. No acute intrapulmonary findings. 3. Mild dependent atelectasis within the right upper and right lower lobes. 4. Mild parenchymal scarring along the right minor fissure and within both lower lobes. 5. New 4 mm ground-glass pulmonary nodule within the lingula. This is most likely benign, and requires no further follow-up. See below. RECOMMENDATIONS: Fleischner Society guidelines for follow-up and management of incidentally detected subsolid pulmonary nodules: Solitary ground glass nodule < 6 MM - NO ROUTINE FOLLOW-UP. > than or equal to 6 mm - CT at 6-12 months to confirm persistence, then CT every 2 years until 5 years. Solitary part-solid nodules < 6 mm - No routine follow-up. > than or equal to 6 mm - CT at 3-6 to confirm persistence. If unchanged and solid component remains less than 6 mm, annual CT should be performed for 5 years. Multiple subsolid nodules < 6 mm - CT at 3-6 months. If stable, consider CT at 2 and 4 years. > than or equal to 6 mm - CT at 3-6 months. Subsequent management based on the most suspicious nodule(s). Radiology 2017 http://pubs. rsna.org/doi/full/10.1148/radiol. [email or web address]         Signed:    Brian Yoo MD   3/2/2023

## 2023-03-02 NOTE — TELEPHONE ENCOUNTER
----- Message from Aylin Segundo MD sent at 3/2/2023  2:37 PM EST -----  She had COVID. She needs EKG this coming Monday, next Friday and the Thursday or Friday after. Please see below    She will start Lexapro today and have an EKG done on Monday. If the QT remains normal, she can add Thorazine on Monday and have another EKG done on Friday and if it is okay, we can add trazodone. Another EKG needs to be done the following week to make sure that the QT remains within the same range as before. I will also resume her on low-dose metoprolol.

## 2023-03-02 NOTE — PROGRESS NOTES
[address]   Electrophysiology progress note  Date: 3/2/2023  Reason for Consultation: Bradycardia and prolonged QT  Consult Requesting Physician: Karson Womack MD     Chief Complaint   Patient presents with    Dizziness     From home via EMS cc dizziness, fatigue, cough, malaise onset 02/24. Positive COVID test on 02/27. HPI: Sal Smyth is a 69 y.o. female with history of paroxysmal atrial fibrillation status post ablation in March 2022, hypertension, hypothyroidism, history of COVID infection in November 2021, moderate obstructive airway disease on PFT, who was admitted since she developed cold symptoms few days ago with fatigue and diarrhea and was started on Paxlovid. She was having dizziness upon standing today and came to the emergency room and was found to have low blood pressure in 69C systolic. Her EKG showed sinus bradycardia and prolonged QT. She also had elevated troponin. She had a left heart cath in 2020 which was normal.    HPI and Interval History:   Patient seen and examined. Clinical notes reviewed. Telemetry reviewed. No new complaint today. No major events overnight. Denies having chest pain, shortness of breath, dyspnea on exertion, Orthopnea, PND at the time of this visit. She is feeling better. Past Medical History:   Diagnosis Date    Age-related osteoporosis without current pathological fracture 5/18/2022    Anxiety     Arthritis     Depression     Hypertension     Hypothyroidism         Past Surgical History:   Procedure Laterality Date    ABDOMINAL EXPLORATION SURGERY  02/12/2018    LAPAROTOMY EXPLORATORY, REPAIR PERFORATED ULCER    BLADDER REPAIR      BREAST ENHANCEMENT SURGERY      HYSTERECTOMY (CERVIX STATUS UNKNOWN)      partial    PARTIAL HYSTERECTOMY (CERVIX NOT REMOVED)      SHOULDER SURGERY      ligament moved up left shoulder       Allergies   Allergen Reactions    Nsaids      Note: PERFORATED PEPTIC ULCER       Social History:  Reviewed. reports that she has never smoked. She has never used smokeless tobacco. She reports current alcohol use. She reports that she does not use drugs. Family History:  Reviewed. family history includes Heart Attack in her mother. Review of System:  All other systems reviewed and are negative except for that noted above. Pertinent negatives are:     General: negative for fever, chills   Ophthalmic ROS: negative for - eye pain or loss of vision  ENT ROS: negative for - headaches, sore throat   Respiratory: negative for - cough, sputum  Cardiovascular: Reviewed in HPI  Gastrointestinal: negative for - abdominal pain, diarrhea, N/V  Hematology: negative for - bleeding, blood clots, bruising or jaundice  Genito-Urinary:  negative for - Dysuria or incontinence  Musculoskeletal: negative for - Joint swelling, muscle pain  Neurological: negative for - confusion, dizziness, headaches   Psychiatric: No anxiety, no depression. Dermatological: negative for - rash    Physical Examination:  Vitals:    23 0700   BP: (!) 148/80   Pulse: 68   Resp: 18   Temp: 97.5 °F (36.4 °C)   SpO2: 97%      No intake/output data recorded. Wt Readings from Last 3 Encounters:   23 182 lb (82.6 kg)   02/15/23 182 lb (82.6 kg)   02/15/23 184 lb (83.5 kg)     Temp  Av.6 °F (36.4 °C)  Min: 97.3 °F (36.3 °C)  Max: 98 °F (36.7 °C)  Pulse  Av.8  Min: 68  Max: 70  BP  Min: 141/92  Max: 161/88  SpO2  Av.6 %  Min: 93 %  Max: 97 %  No intake or output data in the 24 hours ending 23 1139    Telemetry: Sinus rhythm/  Constitutional: Oriented. No distress. Head: Normocephalic and atraumatic. Mouth/Throat: Oropharynx is clear and moist.   Eyes: Conjunctivae normal. EOM are normal.   Neck: Neck supple. No rigidity. No JVD present. Cardiovascular: Normal rate, regular rhythm, S1&S2. Pulmonary/Chest: Bilateral respiratory sounds. No wheezes, No rhonchi. Abdominal: Soft. Bowel sounds present.  No distension, No tenderness. Musculoskeletal: No tenderness. No edema    Lymphadenopathy: Has no cervical adenopathy. Neurological: Alert and oriented. Cranial nerve appears intact, No Gross deficit   Skin: Skin is warm and dry. No rash noted. Psychiatric: Has a normal behavior     Labs, diagnostic and imaging results reviewed. Reviewed. Recent Labs     02/28/23  1211 03/01/23  0628 03/02/23  0555    139 138   K 4.5 4.2 3.8    108 105   CO2 22 22 23   BUN 18 13 10   CREATININE 1.1 0.5* 0.5*     Recent Labs     02/28/23  1211 03/01/23  0629 03/02/23  0555   WBC 5.3 4.9 4.9   HGB 12.2 11.9* 12.0   HCT 35.2* 35.2* 35.2*   .0 98.8 98.8    208 225     Lab Results   Component Value Date/Time    TROPONINI 0.01 02/28/2023 05:42 PM     No results found for: BNP  No results found for: PROTIME, INR  Lab Results   Component Value Date/Time    CHOL 155 05/25/2021 10:24 AM    HDL 75 05/25/2021 10:24 AM    HDL 79 06/13/2011 07:50 AM    TRIG 82 05/25/2021 10:24 AM       ECG: Sinus bradycardiaST & T wave abnormality, consider anterolateral ischemiaProlonged QT, QTc 495  Echo: 12/7/2022   Conclusions      Summary   Mild concentric left ventricular hypertrophy. Normal left ventricular cavity   size. Borderline left ventricular systolic function with an estimated   ejection fraction of 50-55%. No evident regional wall motion abnormalities   seen. Indeterminate diastolic function. The right ventricle is borderline in size with normal function. Bi-atrial enlargement. The aortic valve appears sclerotic but opens well. Mild aortic   insufficiency. The mitral valve is thickened with adequate excursion. Moderate, eccentric,   mitral regurgitation. Mild to moderate tricuspid regurgitation with an estimated PASP of 45 mmHg. Moderate pulmonic insufficiency. 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   Cath: 12/17/2021  Left Heart Cath  Dominance: Right       LM: no angiographic stenosis  LAD: no angiographic stenosis  LCx: no angiographic stenosis  RCA: no angiographic stenosis     LVEDP: 2 mmHg   LVEF: 50-55%   Impression/Recommendations:  Normal coronary study. Scheduled Meds:  Continuous Infusions:  PRN Meds:. X-ray    Impression   No acute cardiopulmonary findings         Patient Active Problem List    Diagnosis Date Noted    Typical atrial flutter ([address] 12/15/2020    Hypotension 02/28/2023    Prolonged Q-T interval on ECG 02/28/2023    Bradycardia 02/28/2023    COVID 02/28/2023    SOB (shortness of breath) 12/30/2022    Age-related osteoporosis without current pathological fracture 05/18/2022    Other osteoporosis without current pathological fracture 02/23/2022    Encounter for therapeutic drug monitoring 02/23/2022    Erosive osteoarthritis of both hands 11/17/2021    Primary osteoarthritis involving multiple joints 11/17/2021    Multilevel degenerative disc disease 11/17/2021    Long-term use of hydroxychloroquine 11/17/2021    Atrial fibrillation and flutter (HCC)     Paroxysmal supraventricular tachycardia (HCC)     Obstructive sleep apnea     PAF (paroxysmal atrial fibrillation) ([address]     Hyponatremia     Hypertension     Perforated ulcer ([address]     Peritonitis ([address]     Chronic migraine without aura 11/13/2014    DDD (degenerative disc disease), cervical 09/30/2014    Myofascial pain syndrome 09/30/2014      Active Hospital Problems    Diagnosis Date Noted    Hypotension [I95.9] 02/28/2023     Priority: Medium    Prolonged Q-T interval on ECG [R94.31] 02/28/2023     Priority: Medium    Bradycardia [R00.1] 02/28/2023     Priority: Medium    COVID [U07.1] 02/28/2023     Priority: Medium       Assessment:       Plan:    -Prolonged QT and sinus bradycardia    Possibly the reason for prolongation of QT was due to administration of Paxlovid. Her QT c has decreased and is 447 now. I discussed the resumption of her medications.   She will start Lexapro today and have an EKG done on Monday. If the QT remains normal, she can add Thorazine on Monday and have another EKG done on Friday and if it is okay, we can add trazodone. Another EKG needs to be done the following week to make sure that the QT remains within the same range as before. I will also resume her on low-dose metoprolol.      -Hypotension  Multifactorial related to acute COVID, poor intake, diarrhea, medication related. Adequate hydration and treatment of underlying condition. Hold off on lisinopril and metoprolol.    -Paroxysmal atrial fibrillation    Patient remains in sinus rhythm. Continue Xarelto if no contraindication.  -Elevated troponin. Could be related to inflammation from COVID. Hypotension and poor perfusion could be another factor. Treatment of underlying condition and maintaining adequate hydration and blood pressure is recommended. -COVID    Continue supportive care. I independently reviewed and interpreted ECG, cardiac labs, other relevant labs,  echo  CXR  . Unless otherwise reflected in the note, my interpretation is in agreement with the report. Thank you for allowing me to participate in the care of [address] [place] [address]     NOTE: This report was transcribed using voice recognition software. Every effort was made to ensure accuracy, however, inadvertent computerized transcription errors may be present.

## 2023-03-02 NOTE — TELEPHONE ENCOUNTER
Scheduled the  EKGs - She states she does not take thorzine but sshe does take tizandine (zanaflex)  - clarified with MXA - May add Tizanidine on Monday if QT okay

## 2023-03-03 ENCOUNTER — TELEPHONE (OUTPATIENT)
Dept: CARDIOLOGY CLINIC | Age: 68
End: 2023-03-03

## 2023-03-03 ENCOUNTER — CARE COORDINATION (OUTPATIENT)
Dept: CASE MANAGEMENT | Age: 68
End: 2023-03-03

## 2023-03-03 NOTE — CARE COORDINATION
[facility] Care Transitions Initial Follow Up Call    Call within 2 business days of discharge: Yes    Patient Current Location:  Home: [address] [place] [address]    Care Transition Nurse contacted the patient by telephone to perform post hospital discharge assessment. Verified name and  with patient as identifiers. Provided introduction to self, and explanation of the Care Transition Nurse role. Patient: Farhana Noyola Patient : 1955   MRN: [id number]  Reason for Admission: hypotension secondary to diarrhea from COVID-19  Discharge Date: 3/2/23 RARS: Readmission Risk Score: 9.3      Last Discharge [facility] Facility       Date Complaint Diagnosis Description Type Department Provider    23 Dizziness Elevated troponin . .. ED to Hosp-Admission (Discharged) (Franklyn Rodriguez) Clfiton Chase MD            Was this an external facility discharge? No Discharge Facility:     Challenges to be reviewed by the provider   Additional needs identified to be addressed with provider: No  none               Method of communication with provider: none. Patient reports that she is doing well, denies any symptoms, questions or concerns. CTN will resolve episode and remain available. Care Transition Nurse reviewed discharge instructions and red flags with patient who verbalized understanding. The patient was given an opportunity to ask questions and does not have any further questions or concerns at this time. Were discharge instructions available to patient? Yes. Reviewed appropriate site of care based on symptoms and resources available to patient including: PCP  Urgent care clinics  When to call 911. The patient agrees to contact the PCP office for questions related to their healthcare. Advance Care Planning:   Does patient have an Advance Directive: not on file; education provided.     Medication reconciliation was performed with patient, who verbalizes understanding of administration of home medications. Medications reviewed, 1111F entered: N/A    Non-face-to-face services provided:  Obtained and reviewed discharge summary and/or continuity of care documents    Offered patient enrollment in the Remote Patient Monitoring (RPM) program for in-home monitoring: Patient is not eligible for RPM program.    Care Transitions 24 Hour Call    Do you have a copy of your discharge instructions?: Yes  Do you have all of your prescriptions and are they filled?: Yes  Have you been contacted by a [facility] Pharmacist?: No  Have you scheduled your follow up appointment?: Yes  How are you going to get to your appointment?: Car - family or friend to transport  Do you feel like you have everything you need to keep you well at home?: Yes  Care Transitions Interventions         Discussed follow-up appointments. If no appointment was previously scheduled, appointment scheduling offered: Yes. Is follow up appointment scheduled within 7 days of discharge? Yes. Follow Up  Future Appointments   Date Time Provider Patricia Price   3/6/2023 10:45 AM Dante Barajas MD PULM & CC MMA   3/6/2023  1:30 PM SCHEDULE, [facility] Cardio [facility]   3/10/2023 10:00 AM SCHEDULE, [facility] FF Cardio MMA   3/15/2023  2:00 PM SCHEDULE, [facility] FF Cardio MMA   7/19/2023  1:15 PM Vera Robb MD FF Cardio MMA   12/20/2023  2:15 PM Robbie Torres MD  Cardio MMA       Care Transition Nurse provided contact information.   No further follow-up call indicated     Danica Villeda RN

## 2023-03-03 NOTE — TELEPHONE ENCOUNTER
Pt needs return to work note from [employer] [facility] so she can go back to work on Tuesday 3/7. Please fax to [phone] attn: Kat Garcia. Please advise of any problems/issues. Thank you.

## 2023-03-04 LAB
BLOOD CULTURE, ROUTINE: NORMAL
CULTURE, BLOOD 2: NORMAL

## 2023-03-06 ENCOUNTER — NURSE ONLY (OUTPATIENT)
Dept: CARDIOLOGY CLINIC | Age: 68
End: 2023-03-06
Payer: MEDICARE

## 2023-03-06 ENCOUNTER — OFFICE VISIT (OUTPATIENT)
Dept: PULMONOLOGY | Age: 68
End: 2023-03-06
Payer: MEDICARE

## 2023-03-06 VITALS
BODY MASS INDEX: 31.01 KG/M2 | WEIGHT: 175 LBS | OXYGEN SATURATION: 96 % | HEIGHT: 63 IN | SYSTOLIC BLOOD PRESSURE: 112 MMHG | HEART RATE: 94 BPM | DIASTOLIC BLOOD PRESSURE: 68 MMHG

## 2023-03-06 DIAGNOSIS — J44.9 COPD, MODERATE (HCC): Primary | ICD-10-CM

## 2023-03-06 DIAGNOSIS — Z86.16 HISTORY OF COVID-19: ICD-10-CM

## 2023-03-06 DIAGNOSIS — R94.31 PROLONGED Q-T INTERVAL ON ECG: Primary | ICD-10-CM

## 2023-03-06 PROCEDURE — 3078F DIAST BP <80 MM HG: CPT | Performed by: INTERNAL MEDICINE

## 2023-03-06 PROCEDURE — 93000 ELECTROCARDIOGRAM COMPLETE: CPT | Performed by: INTERNAL MEDICINE

## 2023-03-06 PROCEDURE — 1036F TOBACCO NON-USER: CPT | Performed by: INTERNAL MEDICINE

## 2023-03-06 PROCEDURE — 3017F COLORECTAL CA SCREEN DOC REV: CPT | Performed by: INTERNAL MEDICINE

## 2023-03-06 PROCEDURE — 1090F PRES/ABSN URINE INCON ASSESS: CPT | Performed by: INTERNAL MEDICINE

## 2023-03-06 PROCEDURE — G8427 DOCREV CUR MEDS BY ELIG CLIN: HCPCS | Performed by: INTERNAL MEDICINE

## 2023-03-06 PROCEDURE — G8399 PT W/DXA RESULTS DOCUMENT: HCPCS | Performed by: INTERNAL MEDICINE

## 2023-03-06 PROCEDURE — 99214 OFFICE O/P EST MOD 30 MIN: CPT | Performed by: INTERNAL MEDICINE

## 2023-03-06 PROCEDURE — G8417 CALC BMI ABV UP PARAM F/U: HCPCS | Performed by: INTERNAL MEDICINE

## 2023-03-06 PROCEDURE — G8484 FLU IMMUNIZE NO ADMIN: HCPCS | Performed by: INTERNAL MEDICINE

## 2023-03-06 PROCEDURE — 1123F ACP DISCUSS/DSCN MKR DOCD: CPT | Performed by: INTERNAL MEDICINE

## 2023-03-06 PROCEDURE — 1111F DSCHRG MED/CURRENT MED MERGE: CPT | Performed by: INTERNAL MEDICINE

## 2023-03-06 PROCEDURE — 3023F SPIROM DOC REV: CPT | Performed by: INTERNAL MEDICINE

## 2023-03-06 PROCEDURE — 3074F SYST BP LT 130 MM HG: CPT | Performed by: INTERNAL MEDICINE

## 2023-03-06 RX ORDER — ESCITALOPRAM OXALATE 20 MG/1
20 TABLET ORAL DAILY
COMMUNITY

## 2023-03-06 ASSESSMENT — ENCOUNTER SYMPTOMS
DIARRHEA: 0
SORE THROAT: 0
CONSTIPATION: 0
CHEST TIGHTNESS: 0
APNEA: 0
SHORTNESS OF BREATH: 1
BLOOD IN STOOL: 0
RHINORRHEA: 0
STRIDOR: 0
WHEEZING: 0
ABDOMINAL PAIN: 0
VOICE CHANGE: 0
BACK PAIN: 0
CHOKING: 0
ANAL BLEEDING: 0
ABDOMINAL DISTENTION: 0
COUGH: 0
SINUS PRESSURE: 0

## 2023-03-06 NOTE — LETTER
March 6, 2023       [address] [facility] YOB: 1955   [address] Date of Visit:  3/6/2023       To Whom It May Concern: It is my medical opinion that [facility] may return to full duty immediately with no restrictions. If you have any questions or concerns, please don't hesitate to call.     Sincerely,        Jd Miller MD

## 2023-03-06 NOTE — PROGRESS NOTES
London Wright    YOB: 1955     Date of Service:  3/6/2023     Chief Complaint   Patient presents with    1 Month Follow-Up    Results     Ct 02/24/2023         HPI patient had third episode of COVID-19 infection-tested positive on 2/27. Patient was hospitalized between 2/28 and 3/2 for hypotension thought to be related to volume depletion from severe diarrhea from COVID-19 infection. She was also noted to have prolonged QT, thought to be related to use of Paxlovid and concurrent use/interaction with Plaquenil, Lexapro and trazodone. Patient currently states that she has some dyspnea with exertion, denies any significant cough or phlegm. Almost back to baseline. Prolonged QT has resolved. Allergies   Allergen Reactions    Paxlovid [Nirmatrelvir-Ritonavir] Other (See Comments)     QT prolongation    Nsaids      Note: PERFORATED PEPTIC ULCER     Outpatient Medications Marked as Taking for the 3/6/23 encounter (Office Visit) with Charlie Bojorquez MD   Medication Sig Dispense Refill    escitalopram (LEXAPRO) 20 MG tablet Take 20 mg by mouth daily      ibuprofen (ADVIL;MOTRIN) 200 MG tablet Take 400 mg by mouth every 6 hours as needed for Pain      acetaminophen (TYLENOL) 325 MG tablet Take 650 mg by mouth every 6 hours as needed for Pain      gabapentin (NEURONTIN) 300 MG capsule Take 2 capsules by mouth 3 times daily for 90 days.  540 capsule 1    hydroxychloroquine (PLAQUENIL) 200 MG tablet Take 1 tablet by mouth 2 times daily 180 tablet 1    Umeclidinium-Vilanterol (ANORO ELLIPTA) 62.5-25 MCG/ACT AEPB Inhale 1 puff into the lungs daily 1 each 3    albuterol sulfate HFA (VENTOLIN HFA) 108 (90 Base) MCG/ACT inhaler Inhale 2 puffs into the lungs 4 times daily as needed for Wheezing 18 g 5    ARMOUR THYROID 90 MG tablet TAKE 1 TABLET BY MOUTH ONCE A DAY      lisinopril (PRINIVIL;ZESTRIL) 10 MG tablet Take 10 mg by mouth daily      Calcium Carb-Cholecalciferol (CALCIUM 1000 + D) 1000-800 MG-UNIT TABS Take 1 tablet by mouth daily      Multiple Vitamins-Minerals (THERAPEUTIC MULTIVITAMIN-MINERALS) tablet Take 1 tablet by mouth daily      vitamin B-12 (CYANOCOBALAMIN) 1000 MCG tablet Take 1,000 mcg by mouth daily      rivaroxaban (XARELTO) 20 MG TABS tablet Take 1 tablet by mouth daily 30 tablet 0    pantoprazole (PROTONIX) 40 MG tablet Take 1 tablet by mouth daily 30 tablet 3    tiZANidine (ZANAFLEX) 4 MG tablet Take 4 mg by mouth 2 times daily as needed       ALPRAZolam (XANAX) 0.25 MG tablet Take 0.25 mg by mouth nightly as needed for Sleep. Immunization History   Administered Date(s) Administered    COVID-19, PFIZER GRAY top, DO NOT Dilute, (age 15 y+), IM, 30 mcg/0.3 mL 02/16/2022    COVID-19, PFIZER PURPLE top, DILUTE for use, (age 15 y+), 30mcg/0.3mL 02/22/2021, 03/18/2021    Influenza Virus Vaccine 10/09/2017       Past Medical History:   Diagnosis Date    Age-related osteoporosis without current pathological fracture 5/18/2022    Anxiety     Arthritis     Depression     Hypertension     Hypothyroidism      Past Surgical History:   Procedure Laterality Date    ABDOMINAL EXPLORATION SURGERY  02/12/2018    LAPAROTOMY EXPLORATORY, REPAIR PERFORATED ULCER    BLADDER REPAIR      BREAST ENHANCEMENT SURGERY      HYSTERECTOMY (CERVIX STATUS UNKNOWN)      partial    PARTIAL HYSTERECTOMY (CERVIX NOT REMOVED)      SHOULDER SURGERY      ligament moved up left shoulder     Family History   Problem Relation Age of Onset    Heart Attack Mother        Review of Systems:  Review of Systems   Constitutional:  Positive for fatigue. Negative for activity change, appetite change and fever. HENT:  Negative for congestion, ear discharge, ear pain, postnasal drip, rhinorrhea, sinus pressure, sneezing, sore throat, tinnitus and voice change. Respiratory:  Positive for shortness of breath. Negative for apnea, cough, choking, chest tightness, wheezing and stridor.     Cardiovascular:  Negative for chest pain, palpitations and leg swelling. Gastrointestinal:  Negative for abdominal distention, abdominal pain, anal bleeding, blood in stool, constipation and diarrhea. Musculoskeletal:  Negative for arthralgias, back pain and gait problem. Skin:  Negative for pallor and rash. Allergic/Immunologic: Negative for environmental allergies. Neurological:  Negative for dizziness, tremors, seizures, syncope, speech difficulty, weakness, light-headedness, numbness and headaches. Hematological:  Negative for adenopathy. Does not bruise/bleed easily. Psychiatric/Behavioral:  Negative for sleep disturbance. Vitals:    03/06/23 1057   BP: 112/68   Pulse: 94   SpO2: 96%   Weight: 175 lb (79.4 kg)   Height: 5' 3\" (1.6 m)     Patient-Reported Vitals 4/9/2021   Patient-Reported Weight 158lbs   Patient-Reported Height 5' 2.5\"      Body mass index is 31 kg/m². Wt Readings from Last 3 Encounters:   03/06/23 175 lb (79.4 kg)   02/28/23 182 lb (82.6 kg)   02/15/23 182 lb (82.6 kg)     BP Readings from Last 3 Encounters:   03/06/23 112/68   03/02/23 (!) 144/81   02/15/23 (!) 142/84         Physical Exam  Constitutional:       General: She is not in acute distress. Appearance: She is well-developed. She is not ill-appearing or diaphoretic. HENT:      Mouth/Throat:      Pharynx: No oropharyngeal exudate. Cardiovascular:      Rate and Rhythm: Normal rate and regular rhythm. Heart sounds: Normal heart sounds. No murmur heard. No friction rub. Pulmonary:      Effort: No respiratory distress. Breath sounds: Normal breath sounds. No wheezing, rhonchi or rales. Chest:      Chest wall: No tenderness. Abdominal:      General: There is no distension. Palpations: There is no mass. Tenderness: There is no abdominal tenderness. There is no guarding or rebound. Musculoskeletal:         General: No swelling or tenderness. Skin:     Coloration: Skin is not pale.       Findings: No erythema or rash. Neurological:      Mental Status: She is alert and oriented to person, place, and time. Cranial Nerves: No cranial nerve deficit. Motor: No abnormal muscle tone. Coordination: Coordination normal.      Deep Tendon Reflexes: Reflexes normal.           Health Maintenance   Topic Date Due    Pneumococcal 65+ years Vaccine (1 - PCV) 12/23/1961    Depression Screen  Never done    Hepatitis C screen  Never done    DTaP/Tdap/Td vaccine (1 - Tdap) Never done    Diabetes screen  Never done    Colorectal Cancer Screen  Never done    Shingles vaccine (1 of 2) Never done    COVID-19 Vaccine (4 - Booster for Pfizer series) 04/13/2022    Flu vaccine (1) 08/01/2022    Breast cancer screen  09/09/2022    Annual Wellness Visit (AWV)  02/28/2023    Lipids  05/25/2026    DEXA (modify frequency per FRAX score)  Completed    Hepatitis A vaccine  Aged Out    Hib vaccine  Aged Out    Meningococcal (ACWY) vaccine  Aged Out          Assessment/Plan:    Recurrent COVID-19 infection-third episode noted last month. Hypotension from volume depletion/diarrhea and prolonged QT related to Paxlovid interaction with patient's home medications. Overall patient appears to have improved significantly since recent hospitalization. PFT from December 2022 showed moderate obstruction, FEV1 of 75% predicted. Secondhand smoke exposure only, patient is a non-smoker. Continue with Anoro Ellipta and albuterol inhaler as needed. This is unrelated to patient's prior history of COVID-19 infection. HRCT performed on 2/24 was personally reviewed, minimal parenchymal scarring noted which could be related to prior COVID-19 infection, however no obvious lung nodules or infiltrates of significance. Findings on CT was reassured with the patient. No further CT imaging would be necessary. Return in about 3 months (around 6/6/2023).

## 2023-03-10 ENCOUNTER — NURSE ONLY (OUTPATIENT)
Dept: CARDIOLOGY CLINIC | Age: 68
End: 2023-03-10
Payer: MEDICARE

## 2023-03-10 DIAGNOSIS — R94.31 PROLONGED Q-T INTERVAL ON ECG: Primary | ICD-10-CM

## 2023-03-10 PROCEDURE — 93000 ELECTROCARDIOGRAM COMPLETE: CPT | Performed by: INTERNAL MEDICINE

## 2023-03-15 ENCOUNTER — NURSE ONLY (OUTPATIENT)
Dept: CARDIOLOGY CLINIC | Age: 68
End: 2023-03-15
Payer: MEDICARE

## 2023-03-15 DIAGNOSIS — I48.3 TYPICAL ATRIAL FLUTTER (HCC): Primary | ICD-10-CM

## 2023-03-15 PROCEDURE — 93000 ELECTROCARDIOGRAM COMPLETE: CPT | Performed by: INTERNAL MEDICINE

## 2023-04-17 ENCOUNTER — HOSPITAL ENCOUNTER (OUTPATIENT)
Dept: GENERAL RADIOLOGY | Age: 68
Discharge: HOME | End: 2023-04-17
Payer: MEDICARE

## 2023-04-17 ENCOUNTER — HOSPITAL ENCOUNTER (OUTPATIENT)
Dept: WOMENS IMAGING | Age: 68
Discharge: HOME | End: 2023-04-17
Payer: MEDICARE

## 2023-04-17 VITALS — HEIGHT: 63 IN | BODY MASS INDEX: 31.01 KG/M2 | WEIGHT: 175 LBS

## 2023-04-17 DIAGNOSIS — N95.8 POSTARTIFICIAL MENOPAUSAL SYNDROME: ICD-10-CM

## 2023-04-17 DIAGNOSIS — Z12.31 ENCOUNTER FOR SCREENING MAMMOGRAM FOR BREAST CANCER: ICD-10-CM

## 2023-04-17 PROCEDURE — 77067 SCR MAMMO BI INCL CAD: CPT

## 2023-04-17 PROCEDURE — 77080 DXA BONE DENSITY AXIAL: CPT

## 2023-04-21 ENCOUNTER — APPOINTMENT (OUTPATIENT)
Dept: CT IMAGING | Age: 68
End: 2023-04-21
Payer: MEDICARE

## 2023-04-21 ENCOUNTER — APPOINTMENT (OUTPATIENT)
Dept: GENERAL RADIOLOGY | Age: 68
End: 2023-04-21
Payer: MEDICARE

## 2023-04-21 ENCOUNTER — HOSPITAL ENCOUNTER (EMERGENCY)
Age: 68
Discharge: ANOTHER HOSPITAL | End: 2023-04-22
Attending: EMERGENCY MEDICINE
Payer: MEDICARE

## 2023-04-21 DIAGNOSIS — S52.022A CLOSED FRACTURE OF OLECRANON PROCESS OF LEFT ULNA, INITIAL ENCOUNTER: ICD-10-CM
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
## 2023-04-21 DIAGNOSIS — S32.402A CLOSED NONDISPLACED FRACTURE OF LEFT ACETABULUM, UNSPECIFIED PORTION OF ACETABULUM, INITIAL ENCOUNTER (HCC): Primary | ICD-10-CM

## 2023-04-21 PROCEDURE — 96375 TX/PRO/DX INJ NEW DRUG ADDON: CPT

## 2023-04-21 PROCEDURE — 85730 THROMBOPLASTIN TIME PARTIAL: CPT

## 2023-04-21 PROCEDURE — 85025 COMPLETE CBC W/AUTO DIFF WBC: CPT

## 2023-04-21 PROCEDURE — 85610 PROTHROMBIN TIME: CPT

## 2023-04-21 PROCEDURE — 96374 THER/PROPH/DIAG INJ IV PUSH: CPT

## 2023-04-21 PROCEDURE — 96376 TX/PRO/DX INJ SAME DRUG ADON: CPT

## 2023-04-21 PROCEDURE — 36415 COLL VENOUS BLD VENIPUNCTURE: CPT

## 2023-04-21 PROCEDURE — 80048 BASIC METABOLIC PNL TOTAL CA: CPT

## 2023-04-21 PROCEDURE — 99285 EMERGENCY DEPT VISIT HI MDM: CPT

## 2023-04-21 RX ORDER — HYDROMORPHONE HYDROCHLORIDE 1 MG/ML
1 INJECTION, SOLUTION INTRAMUSCULAR; INTRAVENOUS; SUBCUTANEOUS ONCE
Status: COMPLETED | OUTPATIENT
Start: 2023-04-21 | End: 2023-04-22

## 2023-04-21 RX ORDER — 0.9 % SODIUM CHLORIDE 0.9 %
1000 INTRAVENOUS SOLUTION INTRAVENOUS ONCE
Status: COMPLETED | OUTPATIENT
Start: 2023-04-21 | End: 2023-04-22

## 2023-04-21 RX ORDER — ONDANSETRON 2 MG/ML
4 INJECTION INTRAMUSCULAR; INTRAVENOUS EVERY 6 HOURS PRN
Status: DISCONTINUED | OUTPATIENT
Start: 2023-04-21 | End: 2023-04-22 | Stop reason: HOSPADM

## 2023-04-21 ASSESSMENT — PAIN - FUNCTIONAL ASSESSMENT: PAIN_FUNCTIONAL_ASSESSMENT: 0-10

## 2023-04-21 ASSESSMENT — PAIN SCALES - GENERAL: PAINLEVEL_OUTOF10: 10

## 2023-04-22 ENCOUNTER — APPOINTMENT (OUTPATIENT)
Dept: GENERAL RADIOLOGY | Age: 68
End: 2023-04-22
Payer: MEDICARE

## 2023-04-22 ENCOUNTER — APPOINTMENT (OUTPATIENT)
Dept: CT IMAGING | Age: 68
End: 2023-04-22
Payer: MEDICARE

## 2023-04-22 VITALS
DIASTOLIC BLOOD PRESSURE: 61 MMHG | SYSTOLIC BLOOD PRESSURE: 128 MMHG | TEMPERATURE: 98.3 F | RESPIRATION RATE: 18 BRPM | WEIGHT: 175 LBS | OXYGEN SATURATION: 95 % | BODY MASS INDEX: 31 KG/M2 | HEART RATE: 78 BPM

## 2023-04-22 LAB
ANION GAP SERPL CALCULATED.3IONS-SCNC: 14 MMOL/L (ref 3–16)
APTT BLD: 29 SEC (ref 22.7–35.9)
BASOPHILS # BLD: 0.1 K/UL (ref 0–0.2)
BASOPHILS NFR BLD: 1 %
BUN SERPL-MCNC: 24 MG/DL (ref 7–20)
CALCIUM SERPL-MCNC: 9.3 MG/DL (ref 8.3–10.6)
CHLORIDE SERPL-SCNC: 102 MMOL/L (ref 99–110)
CO2 SERPL-SCNC: 19 MMOL/L (ref 21–32)
CREAT SERPL-MCNC: 0.8 MG/DL (ref 0.6–1.2)
DEPRECATED RDW RBC AUTO: 13.8 % (ref 12.4–15.4)
EOSINOPHIL # BLD: 0.1 K/UL (ref 0–0.6)
EOSINOPHIL NFR BLD: 1.9 %
GFR SERPLBLD CREATININE-BSD FMLA CKD-EPI: >60 ML/MIN/{1.73_M2}
GLUCOSE SERPL-MCNC: 118 MG/DL (ref 70–99)
HCT VFR BLD AUTO: 33.5 % (ref 36–48)
HGB BLD-MCNC: 11.7 G/DL (ref 12–16)
INR PPP: 1.1 (ref 0.84–1.16)
LYMPHOCYTES # BLD: 0.9 K/UL (ref 1–5.1)
LYMPHOCYTES NFR BLD: 11.6 %
MCH RBC QN AUTO: 34.9 PG (ref 26–34)
MCHC RBC AUTO-ENTMCNC: 34.9 G/DL (ref 31–36)
MCV RBC AUTO: 99.8 FL (ref 80–100)
MONOCYTES # BLD: 0.7 K/UL (ref 0–1.3)
MONOCYTES NFR BLD: 9 %
NEUTROPHILS # BLD: 6 K/UL (ref 1.7–7.7)
NEUTROPHILS NFR BLD: 76.5 %
PLATELET # BLD AUTO: 259 K/UL (ref 135–450)
PMV BLD AUTO: 8.6 FL (ref 5–10.5)
POTASSIUM SERPL-SCNC: 3.7 MMOL/L (ref 3.5–5.1)
PROTHROMBIN TIME: 14.2 SEC (ref 11.5–14.8)
RBC # BLD AUTO: 3.36 M/UL (ref 4–5.2)
SODIUM SERPL-SCNC: 135 MMOL/L (ref 136–145)
WBC # BLD AUTO: 7.9 K/UL (ref 4–11)

## 2023-04-22 PROCEDURE — 71045 X-RAY EXAM CHEST 1 VIEW: CPT

## 2023-04-22 PROCEDURE — 96374 THER/PROPH/DIAG INJ IV PUSH: CPT

## 2023-04-22 PROCEDURE — 73070 X-RAY EXAM OF ELBOW: CPT

## 2023-04-22 PROCEDURE — 70450 CT HEAD/BRAIN W/O DYE: CPT

## 2023-04-22 PROCEDURE — 6360000002 HC RX W HCPCS: Performed by: EMERGENCY MEDICINE

## 2023-04-22 PROCEDURE — 96375 TX/PRO/DX INJ NEW DRUG ADDON: CPT

## 2023-04-22 PROCEDURE — 73700 CT LOWER EXTREMITY W/O DYE: CPT

## 2023-04-22 PROCEDURE — 6360000002 HC RX W HCPCS: Performed by: PHYSICIAN ASSISTANT

## 2023-04-22 PROCEDURE — 2580000003 HC RX 258: Performed by: PHYSICIAN ASSISTANT

## 2023-04-22 PROCEDURE — 72125 CT NECK SPINE W/O DYE: CPT

## 2023-04-22 PROCEDURE — 96376 TX/PRO/DX INJ SAME DRUG ADON: CPT

## 2023-04-22 PROCEDURE — 73502 X-RAY EXAM HIP UNI 2-3 VIEWS: CPT

## 2023-04-22 RX ORDER — HYDROMORPHONE HYDROCHLORIDE 1 MG/ML
1 INJECTION, SOLUTION INTRAMUSCULAR; INTRAVENOUS; SUBCUTANEOUS ONCE
Status: COMPLETED | OUTPATIENT
Start: 2023-04-22 | End: 2023-04-22

## 2023-04-22 RX ORDER — HYDROMORPHONE HYDROCHLORIDE 1 MG/ML
1 INJECTION, SOLUTION INTRAMUSCULAR; INTRAVENOUS; SUBCUTANEOUS EVERY 4 HOURS PRN
Status: COMPLETED | OUTPATIENT
Start: 2023-04-22 | End: 2023-04-22

## 2023-04-22 RX ORDER — DIAZEPAM 5 MG/ML
5 INJECTION, SOLUTION INTRAMUSCULAR; INTRAVENOUS ONCE
Status: COMPLETED | OUTPATIENT
Start: 2023-04-22 | End: 2023-04-22

## 2023-04-22 RX ADMIN — HYDROMORPHONE HYDROCHLORIDE 1 MG: 1 INJECTION, SOLUTION INTRAMUSCULAR; INTRAVENOUS; SUBCUTANEOUS at 00:09

## 2023-04-22 RX ADMIN — HYDROMORPHONE HYDROCHLORIDE 1 MG: 1 INJECTION, SOLUTION INTRAMUSCULAR; INTRAVENOUS; SUBCUTANEOUS at 02:02

## 2023-04-22 RX ADMIN — SODIUM CHLORIDE 1000 ML: 9 INJECTION, SOLUTION INTRAVENOUS at 02:32

## 2023-04-22 RX ADMIN — HYDROMORPHONE HYDROCHLORIDE 1 MG: 1 INJECTION, SOLUTION INTRAMUSCULAR; INTRAVENOUS; SUBCUTANEOUS at 05:32

## 2023-04-22 RX ADMIN — DIAZEPAM 5 MG: 5 INJECTION, SOLUTION INTRAMUSCULAR; INTRAVENOUS at 04:23

## 2023-04-22 RX ADMIN — ONDANSETRON 4 MG: 2 INJECTION INTRAMUSCULAR; INTRAVENOUS at 00:07

## 2023-04-22 RX ADMIN — HYDROMORPHONE HYDROCHLORIDE 1 MG: 1 INJECTION, SOLUTION INTRAMUSCULAR; INTRAVENOUS; SUBCUTANEOUS at 05:31

## 2023-04-22 ASSESSMENT — ENCOUNTER SYMPTOMS
SHORTNESS OF BREATH: 0
COUGH: 0
RHINORRHEA: 0
NAUSEA: 0
ABDOMINAL PAIN: 0
VOMITING: 0
DIARRHEA: 0

## 2023-04-22 ASSESSMENT — PAIN SCALES - GENERAL
PAINLEVEL_OUTOF10: 8
PAINLEVEL_OUTOF10: 5
PAINLEVEL_OUTOF10: 10
PAINLEVEL_OUTOF10: 9
PAINLEVEL_OUTOF10: 5

## 2023-04-22 ASSESSMENT — PAIN DESCRIPTION - ORIENTATION
ORIENTATION: LEFT
ORIENTATION: LEFT

## 2023-04-22 ASSESSMENT — PAIN DESCRIPTION - LOCATION
LOCATION: ELBOW
LOCATION: ELBOW;HIP

## 2023-04-22 ASSESSMENT — PAIN - FUNCTIONAL ASSESSMENT: PAIN_FUNCTIONAL_ASSESSMENT: 0-10

## 2023-04-22 NOTE — PROGRESS NOTES
Second time back to ct.   Ct tech as jon if she wanted a ct scan notifying that pt was fx based off xray

## 2023-04-22 NOTE — ED PROVIDER NOTES
every 2 years. The testing frequency can be increased to one year for patients who have rapidly progressing disease, those who are receiving or discontinuing medical therapy to restore bone mass or have additional risk factors. Template code:  [id number]       No results found. PROCEDURES   Unless otherwise noted below, none     Procedures    CRITICAL CARE TIME (.cctime)       PAST MEDICAL HISTORY      has a past medical history of Age-related osteoporosis without current pathological fracture (5/18/2022), Anxiety, Arthritis, Depression, Hypertension, and Hypothyroidism. EMERGENCY DEPARTMENT COURSE and DIFFERENTIAL DIAGNOSIS/MDM:   Vitals:    Vitals:    04/22/23 0300 04/22/23 0315 04/22/23 0330 04/22/23 0345   BP: (!) 131/56 (!) 127/56 131/66 128/61   Pulse:       Resp:       Temp:       TempSrc:       SpO2: 95% 95% 96% 95%   Weight:           Patient was given the following medications:  Medications   ondansetron (ZOFRAN) injection 4 mg (4 mg IntraVENous Given 4/22/23 0007)   diazePAM (VALIUM) injection 5 mg (has no administration in time range)   0.9 % sodium chloride bolus (0 mLs IntraVENous Stopped 4/22/23 0352)   HYDROmorphone HCl PF (DILAUDID) injection 1 mg (1 mg IntraVENous Given 4/22/23 0009)   HYDROmorphone HCl PF (DILAUDID) injection 1 mg (1 mg IntraVENous Given 4/22/23 0202)             Is this patient to be included in the SEP-1 Core Measure due to severe sepsis or septic shock? No   Exclusion criteria - the patient is NOT to be included for SEP-1 Core Measure due to: Infection is not suspected    Chronic Conditions affecting care:  has a past medical history of Age-related osteoporosis without current pathological fracture (5/18/2022), Anxiety, Arthritis, Depression, Hypertension, and Hypothyroidism. CONSULTS: (Who and What was discussed)  IP CONSULT TO [facility] [address], trauma, agrees with the plan to transfer.   Dr. Hever Groves, ED accepts the patient    Inpatient consult to

## 2023-04-25 ENCOUNTER — TELEPHONE (OUTPATIENT)
Dept: CARDIOLOGY CLINIC | Age: 68
End: 2023-04-25

## 2023-04-25 NOTE — TELEPHONE ENCOUNTER
Nella Ruiz called in. He is concerned about [place] as she is at [facility] receiving treatment. Asked if MXA could call UC to discuss her care. I explained that he is not in the office this week. He requested that the NP call UC to discuss her care. I explained that I will forward to NPSR but cannot guarantee that he can call today. He VU.  Call complete

## 2023-04-26 NOTE — TELEPHONE ENCOUNTER
Called and spoke to pt's spouse. He is concerned about her status and states she is back in atrial fibrillation. He does not think a cardiologist is seeing her and is concerned about her status.  I recommend he request a cardiology referral. No further questions    Ave Pedersen, APRN-CNP

## 2023-07-11 RX ORDER — UMECLIDINIUM BROMIDE AND VILANTEROL TRIFENATATE 62.5; 25 UG/1; UG/1
1 POWDER RESPIRATORY (INHALATION) DAILY
Qty: 180 EACH | Refills: 3 | Status: SHIPPED | OUTPATIENT
Start: 2023-07-11

## 2023-07-11 NOTE — TELEPHONE ENCOUNTER
Last appointment:  3/6/2023    Next appointment:  8/8/2023    PHARMACY IS REQUESTING A 90 DAY SUPPLY

## 2023-07-18 NOTE — PROGRESS NOTES
MCV 99.8 04/21/2023 11:44 PM    RDW 13.8 04/21/2023 11:44 PM     04/21/2023 11:44 PM     CMP:   Lab Results   Component Value Date/Time     04/21/2023 11:44 PM    K 3.7 04/21/2023 11:44 PM    K 3.8 03/02/2023 05:55 AM     04/21/2023 11:44 PM    CO2 19 04/21/2023 11:44 PM    BUN 24 04/21/2023 11:44 PM    CREATININE 0.8 04/21/2023 11:44 PM    GFRAA >60 05/18/2022 12:00 PM    GFRAA >60 01/25/2013 11:13 AM    AGRATIO 1.3 02/28/2023 12:11 PM    LABGLOM >60 04/21/2023 11:44 PM    GLUCOSE 118 04/21/2023 11:44 PM    PROT 7.2 03/02/2023 05:55 AM    PROT 6.9 01/25/2013 11:13 AM    CALCIUM 9.3 04/21/2023 11:44 PM    BILITOT 0.4 03/02/2023 05:55 AM    ALKPHOS 125 03/02/2023 05:55 AM    AST 29 03/02/2023 05:55 AM    ALT 60 03/02/2023 05:55 AM     LIPIDS: No components found for: TOTAL CHOLESTEROL,  HDL,  LDL,  TRIGLYCERIDES  PT/INR: No results found for: PTINR    Stress 1/16/23  Summary   There is breast attenuation artifact. The LV is enlarged. Normal myocardial perfusion. Normal LV size and systolic function. Low risk study. Echo 12/7/22  Summary  Mild concentric left ventricular hypertrophy. Normal left ventricular cavity size. Borderline left ventricular systolic function with an estimated ejection fraction of 50-55%. No evident regional wall motion abnormalities seen. Indeterminate diastolic function. The right ventricle is borderline in size with normal function. Bi-atrial enlargement. The aortic valve appears sclerotic but opens well. Mild aortic  insufficiency. The mitral valve is thickened with adequate excursion. Moderate, eccentric, mitral regurgitation. Mild to moderate tricuspid regurgitation with an estimated PASP of 45 mmHg. Moderate pulmonic insufficiency. 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 CLAUDINE 3/2/22  Summary  Left ventricular cavity size is normal with normal left ventricular wall thickness.   Overall left

## 2023-07-19 ENCOUNTER — OFFICE VISIT (OUTPATIENT)
Dept: CARDIOLOGY CLINIC | Age: 68
End: 2023-07-19

## 2023-07-19 VITALS
HEART RATE: 77 BPM | OXYGEN SATURATION: 96 % | DIASTOLIC BLOOD PRESSURE: 80 MMHG | BODY MASS INDEX: 30.83 KG/M2 | HEIGHT: 63 IN | WEIGHT: 174 LBS | SYSTOLIC BLOOD PRESSURE: 140 MMHG

## 2023-07-19 DIAGNOSIS — R06.02 SOB (SHORTNESS OF BREATH): ICD-10-CM

## 2023-07-19 DIAGNOSIS — I10 HYPERTENSION, UNSPECIFIED TYPE: ICD-10-CM

## 2023-07-19 DIAGNOSIS — I34.0 NONRHEUMATIC MITRAL VALVE REGURGITATION: ICD-10-CM

## 2023-07-19 DIAGNOSIS — I48.91 ATRIAL FIBRILLATION AND FLUTTER (HCC): Primary | ICD-10-CM

## 2023-07-19 DIAGNOSIS — I48.92 ATRIAL FIBRILLATION AND FLUTTER (HCC): Primary | ICD-10-CM

## 2023-07-19 DIAGNOSIS — I37.1 NONRHEUMATIC PULMONARY VALVE INSUFFICIENCY: ICD-10-CM

## 2023-07-24 RX ORDER — METOPROLOL SUCCINATE 25 MG/1
25 TABLET, EXTENDED RELEASE ORAL DAILY
Qty: 90 TABLET | Refills: 2 | Status: SHIPPED | OUTPATIENT
Start: 2023-07-24

## 2023-07-24 NOTE — TELEPHONE ENCOUNTER
Received refill request for metoprolol from [facility] pharmacy.     Last ov: 02/15/2023 MXA    Last Refill: 04/12/2023 #30 w/ 3 refills    Next appointment: 12/20/2023 MIGUELINA

## 2023-08-08 ENCOUNTER — OFFICE VISIT (OUTPATIENT)
Dept: PULMONOLOGY | Age: 68
End: 2023-08-08
Payer: MEDICARE

## 2023-08-08 VITALS
WEIGHT: 172 LBS | BODY MASS INDEX: 30.48 KG/M2 | HEART RATE: 71 BPM | SYSTOLIC BLOOD PRESSURE: 122 MMHG | DIASTOLIC BLOOD PRESSURE: 72 MMHG | HEIGHT: 63 IN | OXYGEN SATURATION: 97 %

## 2023-08-08 DIAGNOSIS — J44.9 COPD, MODERATE (HCC): Primary | ICD-10-CM

## 2023-08-08 DIAGNOSIS — Z86.16 HISTORY OF COVID-19: ICD-10-CM

## 2023-08-08 PROCEDURE — G8427 DOCREV CUR MEDS BY ELIG CLIN: HCPCS | Performed by: INTERNAL MEDICINE

## 2023-08-08 PROCEDURE — G8399 PT W/DXA RESULTS DOCUMENT: HCPCS | Performed by: INTERNAL MEDICINE

## 2023-08-08 PROCEDURE — 99214 OFFICE O/P EST MOD 30 MIN: CPT | Performed by: INTERNAL MEDICINE

## 2023-08-08 PROCEDURE — 3078F DIAST BP <80 MM HG: CPT | Performed by: INTERNAL MEDICINE

## 2023-08-08 PROCEDURE — 3074F SYST BP LT 130 MM HG: CPT | Performed by: INTERNAL MEDICINE

## 2023-08-08 PROCEDURE — 1123F ACP DISCUSS/DSCN MKR DOCD: CPT | Performed by: INTERNAL MEDICINE

## 2023-08-08 PROCEDURE — 1090F PRES/ABSN URINE INCON ASSESS: CPT | Performed by: INTERNAL MEDICINE

## 2023-08-08 PROCEDURE — 1036F TOBACCO NON-USER: CPT | Performed by: INTERNAL MEDICINE

## 2023-08-08 PROCEDURE — 3017F COLORECTAL CA SCREEN DOC REV: CPT | Performed by: INTERNAL MEDICINE

## 2023-08-08 PROCEDURE — G8417 CALC BMI ABV UP PARAM F/U: HCPCS | Performed by: INTERNAL MEDICINE

## 2023-08-08 PROCEDURE — 3023F SPIROM DOC REV: CPT | Performed by: INTERNAL MEDICINE

## 2023-08-08 RX ORDER — UMECLIDINIUM BROMIDE AND VILANTEROL TRIFENATATE 62.5; 25 UG/1; UG/1
1 POWDER RESPIRATORY (INHALATION) DAILY
Qty: 180 EACH | Refills: 3 | Status: SHIPPED | OUTPATIENT
Start: 2023-08-08

## 2023-08-08 RX ORDER — ALBUTEROL SULFATE 90 UG/1
2 AEROSOL, METERED RESPIRATORY (INHALATION) 4 TIMES DAILY PRN
Qty: 18 G | Refills: 5 | Status: SHIPPED | OUTPATIENT
Start: 2023-08-08

## 2023-08-08 ASSESSMENT — ENCOUNTER SYMPTOMS
CHEST TIGHTNESS: 0
COUGH: 0
DIARRHEA: 0
SORE THROAT: 0
APNEA: 0
WHEEZING: 0
VOICE CHANGE: 0
CHOKING: 0
STRIDOR: 0
ABDOMINAL DISTENTION: 0
SINUS PRESSURE: 0
ABDOMINAL PAIN: 0
SHORTNESS OF BREATH: 0
BACK PAIN: 0
RHINORRHEA: 0
BLOOD IN STOOL: 0
ANAL BLEEDING: 0
CONSTIPATION: 0

## 2023-08-08 NOTE — PROGRESS NOTES
Brodie Collado    YOB: 1955     Date of Service:  8/8/2023     Chief Complaint   Patient presents with    3 Month Follow-Up    COPD         HPI patient had an accidental fall in April, was transferred to -had ORIF of left acetabular fracture, left total hip arthroplasty and ORIF for left olecranon fracture. Appears to be recovering well with improvement in range of motion and mobility. No significant dyspnea or cough.     Allergies   Allergen Reactions    Paxlovid [Nirmatrelvir-Ritonavir] Other (See Comments)     QT prolongation    Nsaids      Note: PERFORATED PEPTIC ULCER     Outpatient Medications Marked as Taking for the 8/8/23 encounter (Office Visit) with Qamar Callahan MD   Medication Sig Dispense Refill    umeclidinium-vilanterol (ANORO ELLIPTA) 62.5-25 MCG/ACT inhaler Inhale 1 puff into the lungs daily 180 each 3    albuterol sulfate HFA (VENTOLIN HFA) 108 (90 Base) MCG/ACT inhaler Inhale 2 puffs into the lungs 4 times daily as needed for Wheezing 18 g 5    metoprolol succinate (TOPROL XL) 25 MG extended release tablet TAKE 1 TABLET BY MOUTH DAILY 90 tablet 2    escitalopram (LEXAPRO) 20 MG tablet Take 1 tablet by mouth daily      ibuprofen (ADVIL;MOTRIN) 200 MG tablet Take 2 tablets by mouth every 6 hours as needed for Pain      acetaminophen (TYLENOL) 325 MG tablet Take 2 tablets by mouth every 6 hours as needed for Pain      ARMOUR THYROID 90 MG tablet TAKE 1 TABLET BY MOUTH ONCE A DAY      lisinopril (PRINIVIL;ZESTRIL) 10 MG tablet Take 1 tablet by mouth daily      Calcium Carb-Cholecalciferol (CALCIUM 1000 + D) 1000-800 MG-UNIT TABS Take 1 tablet by mouth daily      Multiple Vitamins-Minerals (THERAPEUTIC MULTIVITAMIN-MINERALS) tablet Take 1 tablet by mouth daily      vitamin B-12 (CYANOCOBALAMIN) 1000 MCG tablet Take 1 tablet by mouth daily      rivaroxaban (XARELTO) 20 MG TABS tablet Take 1 tablet by mouth daily 30 tablet 0    pantoprazole (PROTONIX) 40 MG tablet

## 2023-08-24 ENCOUNTER — PROCEDURE VISIT (OUTPATIENT)
Dept: CARDIOLOGY CLINIC | Age: 68
End: 2023-08-24

## 2023-08-24 DIAGNOSIS — I34.0 NONRHEUMATIC MITRAL VALVE REGURGITATION: ICD-10-CM

## 2023-08-24 DIAGNOSIS — I37.1 NONRHEUMATIC PULMONARY VALVE INSUFFICIENCY: ICD-10-CM

## 2023-08-24 LAB
LV EF: 53 %
LVEF MODALITY: NORMAL

## 2023-08-28 NOTE — TELEPHONE ENCOUNTER
Please assist -  Per Dr. Melendez patient needs to be seen in GI ASAP    Dx: Diarrhea    Ongoing for months     Spoke to the patient regarding her results  Order sent to [address] [place] [address] due to location  31-90 day f/u was scheduled  Patient was told to bring unit to f/u

## 2023-10-04 ENCOUNTER — OFFICE VISIT (OUTPATIENT)
Dept: PULMONOLOGY | Age: 68
End: 2023-10-04
Payer: MEDICARE

## 2023-10-04 VITALS
DIASTOLIC BLOOD PRESSURE: 75 MMHG | HEIGHT: 63 IN | HEART RATE: 76 BPM | BODY MASS INDEX: 30.44 KG/M2 | SYSTOLIC BLOOD PRESSURE: 140 MMHG | WEIGHT: 171.8 LBS | OXYGEN SATURATION: 97 %

## 2023-10-04 DIAGNOSIS — G47.33 OSA (OBSTRUCTIVE SLEEP APNEA): Primary | ICD-10-CM

## 2023-10-04 DIAGNOSIS — E66.09 CLASS 1 OBESITY DUE TO EXCESS CALORIES WITH SERIOUS COMORBIDITY AND BODY MASS INDEX (BMI) OF 30.0 TO 30.9 IN ADULT: ICD-10-CM

## 2023-10-04 DIAGNOSIS — Z86.16 HISTORY OF COVID-19: ICD-10-CM

## 2023-10-04 PROCEDURE — 1123F ACP DISCUSS/DSCN MKR DOCD: CPT | Performed by: INTERNAL MEDICINE

## 2023-10-04 PROCEDURE — G8484 FLU IMMUNIZE NO ADMIN: HCPCS | Performed by: INTERNAL MEDICINE

## 2023-10-04 PROCEDURE — G8417 CALC BMI ABV UP PARAM F/U: HCPCS | Performed by: INTERNAL MEDICINE

## 2023-10-04 PROCEDURE — 3078F DIAST BP <80 MM HG: CPT | Performed by: INTERNAL MEDICINE

## 2023-10-04 PROCEDURE — 3077F SYST BP >= 140 MM HG: CPT | Performed by: INTERNAL MEDICINE

## 2023-10-04 PROCEDURE — 1036F TOBACCO NON-USER: CPT | Performed by: INTERNAL MEDICINE

## 2023-10-04 PROCEDURE — G8427 DOCREV CUR MEDS BY ELIG CLIN: HCPCS | Performed by: INTERNAL MEDICINE

## 2023-10-04 PROCEDURE — 1090F PRES/ABSN URINE INCON ASSESS: CPT | Performed by: INTERNAL MEDICINE

## 2023-10-04 PROCEDURE — 3017F COLORECTAL CA SCREEN DOC REV: CPT | Performed by: INTERNAL MEDICINE

## 2023-10-04 PROCEDURE — 99214 OFFICE O/P EST MOD 30 MIN: CPT | Performed by: INTERNAL MEDICINE

## 2023-10-04 PROCEDURE — G8399 PT W/DXA RESULTS DOCUMENT: HCPCS | Performed by: INTERNAL MEDICINE

## 2023-10-04 NOTE — PROGRESS NOTES
without alcohol 0   In a car, while stopped for a few minutes in traffic 0   Randolph Sleepiness Score 1       STOP-BANG Questionnaire    Snore Loudly Yes   Often feel Tired/Fatigued/Sleepy Yes   Observed breathing pauses No   Blood pressure problems No   BMI >35 Kg/m2  29.32     Age>50 69 y.o. Neck Circumference>16 inches      Gender Male? female     Total 3         REVIEW OF SYSTEMS:   8 point review of system is negative except that mentioned in the subjective portion. PAST MEDICAL HISTORY:   Past Medical History:   Diagnosis Date    Age-related osteoporosis without current pathological fracture 5/18/2022    Anxiety     Arthritis     Depression     Hypertension     Hypothyroidism        PAST SURGICAL HISTORY:   Past Surgical History:   Procedure Laterality Date    ABDOMINAL EXPLORATION SURGERY  02/12/2018    LAPAROTOMY EXPLORATORY, REPAIR PERFORATED ULCER    BLADDER REPAIR      BREAST ENHANCEMENT SURGERY      1983 and 2011    HYSTERECTOMY (CERVIX STATUS UNKNOWN)      partial  age 43    PARTIAL HYSTERECTOMY (CERVIX NOT REMOVED)      SHOULDER SURGERY      ligament moved up left shoulder       SOCIAL HISTORY:   Social History     Tobacco Use    Smoking status: Never    Smokeless tobacco: Never   Vaping Use    Vaping Use: Never used   Substance Use Topics    Alcohol use:  Yes     Alcohol/week: 0.0 standard drinks of alcohol    Drug use: No       FAMILY HISTORY:   Family History   Problem Relation Age of Onset    Heart Attack Mother     Breast Cancer Sister     Breast Cancer Maternal Aunt     Ovarian Cancer Maternal Aunt     Breast Cancer Paternal Aunt        MEDICATIONS:     Current Outpatient Medications on File Prior to Visit   Medication Sig Dispense Refill    umeclidinium-vilanterol (ANORO ELLIPTA) 62.5-25 MCG/ACT inhaler Inhale 1 puff into the lungs daily 180 each 3    albuterol sulfate HFA (VENTOLIN HFA) 108 (90 Base) MCG/ACT inhaler Inhale 2 puffs into the lungs 4 times daily as needed for Wheezing 18 g 5

## 2023-10-11 ENCOUNTER — HOSPITAL ENCOUNTER (OUTPATIENT)
Dept: SLEEP CENTER | Age: 68
Discharge: HOME | End: 2023-10-11
Payer: MEDICARE

## 2023-10-11 DIAGNOSIS — G47.33 OSA (OBSTRUCTIVE SLEEP APNEA): ICD-10-CM

## 2023-10-11 PROCEDURE — 95810 POLYSOM 6/> YRS 4/> PARAM: CPT

## 2023-10-12 PROBLEM — G47.33 OSA (OBSTRUCTIVE SLEEP APNEA): Status: ACTIVE | Noted: 2023-10-12

## 2023-10-16 ENCOUNTER — TELEPHONE (OUTPATIENT)
Dept: PULMONOLOGY | Age: 68
End: 2023-10-16

## 2023-10-16 DIAGNOSIS — G47.33 OSA (OBSTRUCTIVE SLEEP APNEA): Primary | ICD-10-CM

## 2023-10-16 NOTE — TELEPHONE ENCOUNTER
Spoke with patient. Informed of PSG results. Will refer to Dr. Tj Garay for DISE since she could not tolerate CPAP. Referral placed.

## 2023-10-23 ENCOUNTER — OFFICE VISIT (OUTPATIENT)
Dept: ENT CLINIC | Age: 68
End: 2023-10-23
Payer: MEDICARE

## 2023-10-23 VITALS
BODY MASS INDEX: 31.36 KG/M2 | OXYGEN SATURATION: 94 % | HEART RATE: 70 BPM | TEMPERATURE: 97.8 F | DIASTOLIC BLOOD PRESSURE: 86 MMHG | WEIGHT: 177 LBS | HEIGHT: 63 IN | SYSTOLIC BLOOD PRESSURE: 138 MMHG

## 2023-10-23 DIAGNOSIS — H91.93 BILATERAL HEARING LOSS, UNSPECIFIED HEARING LOSS TYPE: ICD-10-CM

## 2023-10-23 DIAGNOSIS — G47.33 OSA (OBSTRUCTIVE SLEEP APNEA): Primary | ICD-10-CM

## 2023-10-23 DIAGNOSIS — H60.541 DERMATITIS OF RIGHT EAR CANAL: ICD-10-CM

## 2023-10-23 PROCEDURE — G8427 DOCREV CUR MEDS BY ELIG CLIN: HCPCS | Performed by: STUDENT IN AN ORGANIZED HEALTH CARE EDUCATION/TRAINING PROGRAM

## 2023-10-23 PROCEDURE — G8399 PT W/DXA RESULTS DOCUMENT: HCPCS | Performed by: STUDENT IN AN ORGANIZED HEALTH CARE EDUCATION/TRAINING PROGRAM

## 2023-10-23 PROCEDURE — 1090F PRES/ABSN URINE INCON ASSESS: CPT | Performed by: STUDENT IN AN ORGANIZED HEALTH CARE EDUCATION/TRAINING PROGRAM

## 2023-10-23 PROCEDURE — 1036F TOBACCO NON-USER: CPT | Performed by: STUDENT IN AN ORGANIZED HEALTH CARE EDUCATION/TRAINING PROGRAM

## 2023-10-23 PROCEDURE — G8417 CALC BMI ABV UP PARAM F/U: HCPCS | Performed by: STUDENT IN AN ORGANIZED HEALTH CARE EDUCATION/TRAINING PROGRAM

## 2023-10-23 PROCEDURE — 99204 OFFICE O/P NEW MOD 45 MIN: CPT | Performed by: STUDENT IN AN ORGANIZED HEALTH CARE EDUCATION/TRAINING PROGRAM

## 2023-10-23 PROCEDURE — 1123F ACP DISCUSS/DSCN MKR DOCD: CPT | Performed by: STUDENT IN AN ORGANIZED HEALTH CARE EDUCATION/TRAINING PROGRAM

## 2023-10-23 PROCEDURE — 3017F COLORECTAL CA SCREEN DOC REV: CPT | Performed by: STUDENT IN AN ORGANIZED HEALTH CARE EDUCATION/TRAINING PROGRAM

## 2023-10-23 PROCEDURE — 3075F SYST BP GE 130 - 139MM HG: CPT | Performed by: STUDENT IN AN ORGANIZED HEALTH CARE EDUCATION/TRAINING PROGRAM

## 2023-10-23 PROCEDURE — G8484 FLU IMMUNIZE NO ADMIN: HCPCS | Performed by: STUDENT IN AN ORGANIZED HEALTH CARE EDUCATION/TRAINING PROGRAM

## 2023-10-23 PROCEDURE — 4130F TOPICAL PREP RX AOE: CPT | Performed by: STUDENT IN AN ORGANIZED HEALTH CARE EDUCATION/TRAINING PROGRAM

## 2023-10-23 PROCEDURE — 3079F DIAST BP 80-89 MM HG: CPT | Performed by: STUDENT IN AN ORGANIZED HEALTH CARE EDUCATION/TRAINING PROGRAM

## 2023-10-23 RX ORDER — FLUOCINOLONE ACETONIDE 0.11 MG/ML
3 OIL AURICULAR (OTIC) 2 TIMES DAILY PRN
Qty: 1 EACH | Refills: 2 | Status: SHIPPED | OUTPATIENT
Start: 2023-10-23

## 2023-10-23 NOTE — PROGRESS NOTES
[facility] (formerly [facility] SURGERY  NEW PATIENT HISTORY AND PHYSICAL NOTE      Patient Name: Edmundo Woodward  Medical Record Number:  [id number]  Primary Care Physician:  Rossi Lynn MD    ChiefComplaint     Chief Complaint   Patient presents with    Other     DISE, Inspire consult        History of Present Illness     Edmundo Woodward is an 69 y.o. female presenting with obstructive sleep apnea. Referred by her sleep medicine physician, Dr. Marge Scanlon, for consideration of inspire Implantation. Unable to tolerate CPAP or BiPAP therapy secondary to claustrophobia - caused panic attacks and would wake up more tired and anxious. She has tried several mask including nasal pillows but unable to tolerate these. She has gotten skin irritation and swelling from the positive pressure. She eventually returned her CPAP machine. No hx of neck or chest surgery. On xeralto daily for a-fib, hx of cardiac ablation. Also hx of mild COPD. On Ellpita inhaler. Takes metoprolol daily. Right ear itches often and feels like there is fluid in there. Feels like she has chronic hearing loss. [address] [address] turn up the TV. NO tinnitus. No hx of ear surgery. + loud noise exposures - loud music. No family history of early onset hearing loss.           Past Medical History     Past Medical History:   Diagnosis Date    Age-related osteoporosis without current pathological fracture 5/18/2022    Anxiety     Arthritis     Depression     Hypertension     Hypothyroidism        Past Surgical History     Past Surgical History:   Procedure Laterality Date    ABDOMINAL EXPLORATION SURGERY  02/12/2018    LAPAROTOMY EXPLORATORY, REPAIR PERFORATED ULCER    BLADDER REPAIR      BREAST ENHANCEMENT SURGERY      1983 and 2011    HYSTERECTOMY (CERVIX STATUS UNKNOWN)      partial  age 43    PARTIAL HYSTERECTOMY (CERVIX NOT REMOVED)      SHOULDER SURGERY      ligament moved up left shoulder       Family History

## 2023-11-13 NOTE — PROGRESS NOTES
Name_______________________________________Printed:____________________  Date and time of surgery____11/15/23 0800____________________Arrival Time:_0630  [facility] [place]   1. The instructions given regarding when and if a patient needs to stop oral intake prior to surgery varies. Follow the specific instructions you were given                  _x__Nothing to eat or to drink after Midnight the night before.                   ____Carbo loading or instructions will be given to select patients-if you have been given those instructions -please do the following                           The evening before your surgery after dinner before midnight drink 40 ounces of gatorade. If you are diabetic use sugar free. The morning of surgery drink 40 ounces of water. This needs to be finished 3 hours prior to your surgery start time. 2. Take the following pills with a small sip of water on the morning of surgery__amour thyroid, gabapentin, metoprolol, pantoprazole, escitalopram_________________________________________________                  Do not take blood pressure medications ending in pril or sartan the geraldo prior to surgery or the morning of surgery. Dr Gregory Santos patient are not to take any medications the AM of surgery. 3. Aspirin, Ibuprofen, Advil, Naproxen, Vitamin E and other Anti-inflammatory products and supplements should be stopped for 5 -7days before surgery or as directed by your physician. 4. Check with your Doctor regarding stopping Plavix, Coumadin,Eliquis, Lovenox,Effient,Pradaxa,Xarelto, Fragmin or other blood thinners and follow their instructions. 5. Do not smoke, and do not drink any alcoholic beverages 24 hours prior to surgery. This includes NA Beer. Refrain from the usage of any recreational drugs. 6. You may brush your teeth and gargle the morning of surgery. DO NOT SWALLOW WATER   7.  You MUST make arrangements for a responsible adult to stay on site while you are here and take you

## 2023-11-15 ENCOUNTER — ANESTHESIA EVENT (OUTPATIENT)
Dept: OPERATING ROOM | Age: 68
End: 2023-11-15
Payer: MEDICARE

## 2023-11-15 ENCOUNTER — ANESTHESIA (OUTPATIENT)
Dept: OPERATING ROOM | Age: 68
End: 2023-11-15
Payer: MEDICARE

## 2023-11-15 ENCOUNTER — HOSPITAL ENCOUNTER (OUTPATIENT)
Age: 68
Setting detail: OUTPATIENT SURGERY
Discharge: HOME | End: 2023-11-15
Attending: STUDENT IN AN ORGANIZED HEALTH CARE EDUCATION/TRAINING PROGRAM | Admitting: STUDENT IN AN ORGANIZED HEALTH CARE EDUCATION/TRAINING PROGRAM
Payer: MEDICARE

## 2023-11-15 VITALS
SYSTOLIC BLOOD PRESSURE: 133 MMHG | HEART RATE: 59 BPM | OXYGEN SATURATION: 100 % | TEMPERATURE: 97.3 F | HEIGHT: 63 IN | RESPIRATION RATE: 16 BRPM | BODY MASS INDEX: 31.54 KG/M2 | WEIGHT: 178 LBS | DIASTOLIC BLOOD PRESSURE: 73 MMHG

## 2023-11-15 PROCEDURE — 6360000002 HC RX W HCPCS

## 2023-11-15 PROCEDURE — 3700000000 HC ANESTHESIA ATTENDED CARE: Performed by: STUDENT IN AN ORGANIZED HEALTH CARE EDUCATION/TRAINING PROGRAM

## 2023-11-15 PROCEDURE — 2580000003 HC RX 258: Performed by: STUDENT IN AN ORGANIZED HEALTH CARE EDUCATION/TRAINING PROGRAM

## 2023-11-15 PROCEDURE — 2709999900 HC NON-CHARGEABLE SUPPLY: Performed by: STUDENT IN AN ORGANIZED HEALTH CARE EDUCATION/TRAINING PROGRAM

## 2023-11-15 PROCEDURE — 7100000010 HC PHASE II RECOVERY - FIRST 15 MIN: Performed by: STUDENT IN AN ORGANIZED HEALTH CARE EDUCATION/TRAINING PROGRAM

## 2023-11-15 PROCEDURE — 42975 DISE EVAL SLP DO BRTH FLX DX: CPT | Performed by: STUDENT IN AN ORGANIZED HEALTH CARE EDUCATION/TRAINING PROGRAM

## 2023-11-15 PROCEDURE — 7100000000 HC PACU RECOVERY - FIRST 15 MIN: Performed by: STUDENT IN AN ORGANIZED HEALTH CARE EDUCATION/TRAINING PROGRAM

## 2023-11-15 PROCEDURE — 2500000003 HC RX 250 WO HCPCS

## 2023-11-15 PROCEDURE — 3600000002 HC SURGERY LEVEL 2 BASE: Performed by: STUDENT IN AN ORGANIZED HEALTH CARE EDUCATION/TRAINING PROGRAM

## 2023-11-15 PROCEDURE — 2720000010 HC SURG SUPPLY STERILE: Performed by: STUDENT IN AN ORGANIZED HEALTH CARE EDUCATION/TRAINING PROGRAM

## 2023-11-15 PROCEDURE — 6370000000 HC RX 637 (ALT 250 FOR IP): Performed by: STUDENT IN AN ORGANIZED HEALTH CARE EDUCATION/TRAINING PROGRAM

## 2023-11-15 PROCEDURE — 7100000011 HC PHASE II RECOVERY - ADDTL 15 MIN: Performed by: STUDENT IN AN ORGANIZED HEALTH CARE EDUCATION/TRAINING PROGRAM

## 2023-11-15 PROCEDURE — 7100000001 HC PACU RECOVERY - ADDTL 15 MIN: Performed by: STUDENT IN AN ORGANIZED HEALTH CARE EDUCATION/TRAINING PROGRAM

## 2023-11-15 RX ORDER — SODIUM CHLORIDE 0.9 % (FLUSH) 0.9 %
5-40 SYRINGE (ML) INJECTION EVERY 12 HOURS SCHEDULED
Status: DISCONTINUED | OUTPATIENT
Start: 2023-11-15 | End: 2023-11-15 | Stop reason: HOSPADM

## 2023-11-15 RX ORDER — HYDROXYCHLOROQUINE SULFATE 200 MG/1
200 TABLET, FILM COATED ORAL DAILY
COMMUNITY

## 2023-11-15 RX ORDER — OXYMETAZOLINE HYDROCHLORIDE 0.05 G/100ML
3 SPRAY NASAL ONCE
Status: COMPLETED | OUTPATIENT
Start: 2023-11-15 | End: 2023-11-15

## 2023-11-15 RX ORDER — ONDANSETRON 2 MG/ML
4 INJECTION INTRAMUSCULAR; INTRAVENOUS
Status: DISCONTINUED | OUTPATIENT
Start: 2023-11-15 | End: 2023-11-15 | Stop reason: HOSPADM

## 2023-11-15 RX ORDER — SODIUM CHLORIDE 9 MG/ML
INJECTION, SOLUTION INTRAVENOUS PRN
Status: DISCONTINUED | OUTPATIENT
Start: 2023-11-15 | End: 2023-11-15 | Stop reason: HOSPADM

## 2023-11-15 RX ORDER — PROPOFOL 10 MG/ML
INJECTION, EMULSION INTRAVENOUS PRN
Status: DISCONTINUED | OUTPATIENT
Start: 2023-11-15 | End: 2023-11-15 | Stop reason: SDUPTHER

## 2023-11-15 RX ORDER — OXYCODONE HYDROCHLORIDE 5 MG/1
5 TABLET ORAL
Status: DISCONTINUED | OUTPATIENT
Start: 2023-11-15 | End: 2023-11-15 | Stop reason: HOSPADM

## 2023-11-15 RX ORDER — GABAPENTIN 300 MG/1
600 CAPSULE ORAL 3 TIMES DAILY
COMMUNITY

## 2023-11-15 RX ORDER — SODIUM CHLORIDE 0.9 % (FLUSH) 0.9 %
5-40 SYRINGE (ML) INJECTION PRN
Status: DISCONTINUED | OUTPATIENT
Start: 2023-11-15 | End: 2023-11-15 | Stop reason: HOSPADM

## 2023-11-15 RX ORDER — HYDROMORPHONE HYDROCHLORIDE 2 MG/ML
0.5 INJECTION, SOLUTION INTRAMUSCULAR; INTRAVENOUS; SUBCUTANEOUS EVERY 5 MIN PRN
Status: DISCONTINUED | OUTPATIENT
Start: 2023-11-15 | End: 2023-11-15 | Stop reason: HOSPADM

## 2023-11-15 RX ORDER — FENTANYL CITRATE 50 UG/ML
50 INJECTION, SOLUTION INTRAMUSCULAR; INTRAVENOUS EVERY 5 MIN PRN
Status: DISCONTINUED | OUTPATIENT
Start: 2023-11-15 | End: 2023-11-15 | Stop reason: HOSPADM

## 2023-11-15 RX ORDER — SODIUM CHLORIDE 9 MG/ML
INJECTION, SOLUTION INTRAVENOUS CONTINUOUS
Status: DISCONTINUED | OUTPATIENT
Start: 2023-11-15 | End: 2023-11-15 | Stop reason: HOSPADM

## 2023-11-15 RX ORDER — LIDOCAINE HYDROCHLORIDE 20 MG/ML
INJECTION, SOLUTION INFILTRATION; PERINEURAL PRN
Status: DISCONTINUED | OUTPATIENT
Start: 2023-11-15 | End: 2023-11-15 | Stop reason: SDUPTHER

## 2023-11-15 RX ADMIN — PROPOFOL 100 MG: 10 INJECTION, EMULSION INTRAVENOUS at 08:03

## 2023-11-15 RX ADMIN — PROPOFOL 100 MCG/KG/MIN: 10 INJECTION, EMULSION INTRAVENOUS at 08:04

## 2023-11-15 RX ADMIN — NASAL DECONGESTANT 3 SPRAY: 0.05 SPRAY NASAL at 07:37

## 2023-11-15 RX ADMIN — SODIUM CHLORIDE: 9 INJECTION, SOLUTION INTRAVENOUS at 07:18

## 2023-11-15 RX ADMIN — LIDOCAINE HYDROCHLORIDE 100 MG: 20 INJECTION, SOLUTION INFILTRATION; PERINEURAL at 08:03

## 2023-11-15 ASSESSMENT — ENCOUNTER SYMPTOMS: SHORTNESS OF BREATH: 1

## 2023-11-15 ASSESSMENT — PAIN - FUNCTIONAL ASSESSMENT: PAIN_FUNCTIONAL_ASSESSMENT: 0-10

## 2023-11-15 NOTE — H&P
Date of Surgery Update:  Fantasma Brown was seen, history and physical examination reviewed, and patient examined by me today. There have been no significant clinical changes since the completion of the previous history and physical.    The risk, benefits, and alternatives of the proposed procedure have been explained to the patient (or appropriate guardian) and understanding verbalized. All questions answered. Patient wishes to proceed.     Electronically signed by: Don Dobbins DO,11/15/2023,7:32 AM

## 2023-11-15 NOTE — ANESTHESIA PRE PROCEDURE
No results found for: \"PHART\", \"PO2ART\", \"KSW3WKL\", \"QXT5NJI\", \"BEART\", \"N8UENFPT\"     Type & Screen (If Applicable):  No results found for: \"LABABO\", \"LABRH\"    Drug/Infectious Status (If Applicable):  No results found for: \"HIV\", \"HEPCAB\"    COVID-19 Screening (If Applicable):   Lab Results   Component Value Date/Time    COVID19 Not Detected 02/24/2022 04:37 PM    COVID19 NOT DETECTED 02/11/2021 11:03 AM           Anesthesia Evaluation  Patient summary reviewed and Nursing notes reviewed  Airway: Mallampati: II  TM distance: >3 FB   Neck ROM: full  Mouth opening: > = 3 FB   Dental: normal exam         Pulmonary:normal exam  breath sounds clear to auscultation  (+)  COPD:  shortness of breath:   sleep apnea: on CPAP and noncompliant,                                  Cardiovascular:  Exercise tolerance: poor (<4 METS)  (+) hypertension: moderate, dysrhythmias: atrial flutter and atrial fibrillation, SPEARS:        Rhythm: regular    Echocardiogram reviewed         Beta Blocker:  Dose within 24 Hrs      ROS comment: Echo 2023    Summary   -Limited echocardiogram was performed to evaluate valvular insufficiency.   -Normal left ventricle size, mild to moderate concentric wall thickness, and   borderline systolic function with an estimated ejection fraction of 50-55%. -Diffuse global hypokinesis. Neuro/Psych:   (+) neuromuscular disease:, headaches: migraine headaches, psychiatric history: stable with treatment            GI/Hepatic/Renal:   (+) PUD          Endo/Other:    (+) hypothyroidism, blood dyscrasia: anticoagulation therapy, arthritis: rheumatoid. .                 Abdominal:   (+) obese          Vascular: Other Findings:             Anesthesia Plan      general     ASA 3     (  Pt seen and examined, chart reviewed (including anesthesia, drug and allergy history). No interval changes to history and physical examination.   Anesthetic plan, risks, benefits, alternatives, and personnel involved

## 2023-11-15 NOTE — DISCHARGE INSTRUCTIONS
ANESTHESIA DISCHARGE INSTRUCTIONS    Wear your seatbelt home. You are under the influence of drugs-do not drink alcohol, drive, operate machinery, make any important decisions or sign any legal documents for 24 hours. A responsible adult needs to be with you for 24 hours. You may experience lightheadedness, dizziness, or sleepiness following surgery. Rest at home today- increase activity as tolerated. Progress slowly to a regular diet unless your physician has instructed you otherwise. Drink plenty of water. If persistent nausea and vomiting becomes a problem, call your physician. Coughing, sore throat and muscle aches are other side effects of anesthesia, and should improve with time. Do not drive or operate machinery while taking narcotics.

## 2023-11-15 NOTE — PROGRESS NOTES
Pt arrived from OR to PACU bay 7. Reported received from [employer] [email or web address] staff. Pt arousable to voice. Pt on 4L NC, NSR, and VSS. Will continue to monitor.

## 2023-11-15 NOTE — PROGRESS NOTES
Discharge instructions review with patient. All home medications have been reviewed, pt v/u. Pt discharged via wheelchair. Pt discharged with all belongings. Significant other, Galina Florentino, taking stable pt home.

## 2023-11-15 NOTE — ANESTHESIA POSTPROCEDURE EVALUATION
Department of Anesthesiology  Postprocedure Note    Patient: Michelle Davila  MRN: [id number]  YOB: 1955  Date of evaluation: 11/15/2023      Procedure Summary       Date: 11/15/23 Room / Location: [address]    Anesthesia Start: 5154 Anesthesia Stop: 6885    Procedure: DRUG INDUCED SLEEP ENDOSCOPY (Nose) Diagnosis:       Obstructive sleep apnea      (Obstructive sleep apnea [G47.33])    Surgeons: Sena Mackey DO Responsible Provider: Corina Cosby MD    Anesthesia Type: MAC ASA Status: 3            Anesthesia Type: MAC    Erin Phase I: Erin Score: 10    Erin Phase II:        Anesthesia Post Evaluation    Patient location during evaluation: bedside  Patient participation: complete - patient participated  Level of consciousness: awake and alert  Pain score: 2  Airway patency: patent  Nausea & Vomiting: no vomiting  Complications: no  Cardiovascular status: hemodynamically stable  Respiratory status: nonlabored ventilation  Hydration status: stable  Multimodal analgesia pain management approach  Pain management: adequate

## 2023-11-15 NOTE — OP NOTE
[facility] SURGERY  OPERATIVE REPORT    Patient Name: Anny Last  YOB: 1955  Medical Record Number:  [id number]  Billing Number:  [id number]  Date of Procedure: [unfilled]  Time: 0800    Pre Operative Diagnoses: Obstructive Sleep Apnea  Post Operative Diagnoses:  Obstructive Sleep Apnea           Procedure:  1. Drug Induced Sleep endoscopy (11084)       Surgeon: Becky Sherman DO    OR Staff/ Assistant:  Circulator: Allison Banuelos RN  Scrub Person First: Baldo Ramachandran  Circulator Assist: Emma Sunshine RN    Anesthesia:  Sedation     Findings:  1) Anterior posterior collapse    Indications: This is a 69 y.o. female with history of moderate to severe symptomatic obstructive sleep apnea, who is intolerant unable to achieve benefit with positive pressure therapy, presents today for drug-induced sleep endoscopy to better characterize her locations and pattern of obstruction to predict appropriate medical and/or surgical options moving forward. Risks and benefits discussed with the patient including alternate treatment options, Informed consent was obtained, the patient elected to proceed with the planned procedure. DETAILS OF PROCEDURE(S):       The patient was brought to the operating room and was anesthetized via the standard drug-induced sleep endoscopy protocol. The propofol infusion rate was started at 100 MCG and increase gradually to level at which conditions that mimic sleep were gradually observed. With the patient unresponsive to verbal commands, but still with spontaneous respiration, sleep disordered breathing events and associated desaturations were clearly observed. Under these conditions, flexible endoscope was inserted to examine both sides of the nose as well as the pharynx. The nose was relatively unremarkable. The retropatellar space showed a more obliquely oriented palate.   A mild lateral wall component was noted, but

## 2023-11-27 ENCOUNTER — PROCEDURE VISIT (OUTPATIENT)
Dept: AUDIOLOGY | Age: 68
End: 2023-11-27
Payer: MEDICARE

## 2023-11-27 ENCOUNTER — OFFICE VISIT (OUTPATIENT)
Dept: ENT CLINIC | Age: 68
End: 2023-11-27
Payer: MEDICARE

## 2023-11-27 VITALS
WEIGHT: 176 LBS | OXYGEN SATURATION: 95 % | DIASTOLIC BLOOD PRESSURE: 81 MMHG | SYSTOLIC BLOOD PRESSURE: 138 MMHG | TEMPERATURE: 97.1 F | HEIGHT: 63 IN | HEART RATE: 75 BPM | BODY MASS INDEX: 31.18 KG/M2

## 2023-11-27 DIAGNOSIS — H90.A32 MIXED CONDUCTIVE AND SENSORINEURAL HEARING LOSS OF LEFT EAR WITH RESTRICTED HEARING OF RIGHT EAR: ICD-10-CM

## 2023-11-27 DIAGNOSIS — H60.541 DERMATITIS OF RIGHT EAR CANAL: ICD-10-CM

## 2023-11-27 DIAGNOSIS — G47.33 OSA (OBSTRUCTIVE SLEEP APNEA): Primary | ICD-10-CM

## 2023-11-27 DIAGNOSIS — H90.A21 SENSORINEURAL HEARING LOSS (SNHL) OF RIGHT EAR WITH RESTRICTED HEARING OF LEFT EAR: Primary | ICD-10-CM

## 2023-11-27 PROCEDURE — 92557 COMPREHENSIVE HEARING TEST: CPT

## 2023-11-27 PROCEDURE — G8417 CALC BMI ABV UP PARAM F/U: HCPCS | Performed by: STUDENT IN AN ORGANIZED HEALTH CARE EDUCATION/TRAINING PROGRAM

## 2023-11-27 PROCEDURE — 92567 TYMPANOMETRY: CPT

## 2023-11-27 PROCEDURE — 1090F PRES/ABSN URINE INCON ASSESS: CPT | Performed by: STUDENT IN AN ORGANIZED HEALTH CARE EDUCATION/TRAINING PROGRAM

## 2023-11-27 PROCEDURE — G8484 FLU IMMUNIZE NO ADMIN: HCPCS | Performed by: STUDENT IN AN ORGANIZED HEALTH CARE EDUCATION/TRAINING PROGRAM

## 2023-11-27 PROCEDURE — 3075F SYST BP GE 130 - 139MM HG: CPT | Performed by: STUDENT IN AN ORGANIZED HEALTH CARE EDUCATION/TRAINING PROGRAM

## 2023-11-27 PROCEDURE — 1123F ACP DISCUSS/DSCN MKR DOCD: CPT | Performed by: STUDENT IN AN ORGANIZED HEALTH CARE EDUCATION/TRAINING PROGRAM

## 2023-11-27 PROCEDURE — 1036F TOBACCO NON-USER: CPT | Performed by: STUDENT IN AN ORGANIZED HEALTH CARE EDUCATION/TRAINING PROGRAM

## 2023-11-27 PROCEDURE — G8399 PT W/DXA RESULTS DOCUMENT: HCPCS | Performed by: STUDENT IN AN ORGANIZED HEALTH CARE EDUCATION/TRAINING PROGRAM

## 2023-11-27 PROCEDURE — 4130F TOPICAL PREP RX AOE: CPT | Performed by: STUDENT IN AN ORGANIZED HEALTH CARE EDUCATION/TRAINING PROGRAM

## 2023-11-27 PROCEDURE — 3079F DIAST BP 80-89 MM HG: CPT | Performed by: STUDENT IN AN ORGANIZED HEALTH CARE EDUCATION/TRAINING PROGRAM

## 2023-11-27 PROCEDURE — G8427 DOCREV CUR MEDS BY ELIG CLIN: HCPCS | Performed by: STUDENT IN AN ORGANIZED HEALTH CARE EDUCATION/TRAINING PROGRAM

## 2023-11-27 PROCEDURE — 99214 OFFICE O/P EST MOD 30 MIN: CPT | Performed by: STUDENT IN AN ORGANIZED HEALTH CARE EDUCATION/TRAINING PROGRAM

## 2023-11-27 PROCEDURE — 3017F COLORECTAL CA SCREEN DOC REV: CPT | Performed by: STUDENT IN AN ORGANIZED HEALTH CARE EDUCATION/TRAINING PROGRAM

## 2023-11-27 NOTE — PATIENT INSTRUCTIONS
alert you to the doorbell, a ringing telephone, or a baby monitor. Television closed-captioning. This shows the words at the bottom of the screen. Most new TVs can do this. TTY (text telephone). This lets you type messages back and forth on the telephone instead of talking or listening. These devices are also called TDD. When messages are typed on the keyboard, they are sent over the phone line to a receiving TTY. The message is shown on a monitor. Use pagers, fax machines, text, and email if it is hard for you to communicate by telephone. Try to learn a listening technique called speech-reading. It is not lip-reading. You pay attention to people's gestures, expressions, posture, and tone of voice. These clues can help you understand what a person is saying. Face the person you are talking to, and have him or her face you. Make sure the lighting is good. You need to see the other person's face clearly. Think about counseling if you need help to adjust to your hearing loss. When should you call for help? Watch closely for changes in your health, and be sure to contact your doctor if:    You think your hearing is getting worse. You have new symptoms, such as dizziness or nausea. Good Communication Strategies    Communication can be challenging for anyone, but can be especially difficult for those with some degree of hearing loss. While we may not be able to control every factor that may lead to difficulty with communication, there are Good Communication Strategies that we can all use in our day-to-day lives. Communication takes both parties working together for it to be successful. Tips as a Listener:   Control your environment. It is important to limit the amount of background noise in the room when possible. You should also consider having a good light source in the room to best see the other person. Ask for clarification.   Instead of saying \"What?\", you can use parts of what you

## 2023-11-27 NOTE — PROGRESS NOTES
list at 50 dBHL using recorded speech stimuli. Tympanometry: Normal peak pressure and compliance, Type A tympanogram, consistent with normal middle ear function. LEFT EAR:  Hearing Sensitivity: Normal sloping to Moderate Mixed hearing loss  Speech Recognition Threshold: 20 dB HL  Word Recognition: Excellent 100%, based on NU-6 25-word list at 60m dBHL using recorded speech stimuli. Tympanometry: Negative peak pressure with normal compliance, Type C tympanogram, consistent with ETD/history of otitis media. Reliability: Good   Transducer: HF Headphones - checked with inserts    See scanned audiogram dated 11/27/2023 for results. PATIENT EDUCATION:       The following items were discussed with the patient:   - Good Communication Strategies  - Hearing Loss and Hearing Aids    Educational information was shared in the After Visit Summary. RECOMMENDATIONS:                                                                                                                                                                                                                                                            The following items are recommended based on patient report and results from today's appointment:   - Continue medical follow-up with Tiney Canavan, DO.   - Retest hearing as medically indicated and/or sooner if a change in hearing is noted. - If desired, schedule a Hearing Aid Evaluation (HAE) appointment to discuss hearing aid options. - Utilize \"Good Communication Strategies\" as discussed to assist in speech understanding with communication partners.     Tee Desai  Audiologist     Chart CC'd to: Tiney Canavan, DO        Degree of   Hearing Sensitivity dB Range   Within Normal Limits (WNL) 0 - 20   Mild 20 - 40   Moderate 40 - 55   Moderately-Severe 55 - 70   Severe 70 - 90   Profound 90 +

## 2023-11-27 NOTE — PROGRESS NOTES
lymphadenopathy or neck masses, no crepitus  CHEST: Normal respiratory effort, breathing comfortably, no retractions  SKIN: No rashes, normal appearing skin, no evidence of skin lesions/tumors  NEURO: Cranial Nerves 2, 3, 4, 5, 6, 7, 11, 12 grossly intact bilaterally     I have performed a head and neck physical exam personally or was physically present during the key or critical portions of the service. Data/Imaging Review     Media Information      Document Information     Procedure:  Sleep Lab   Baseline Sleep Study Report 10/11/23   10/11/2023   Attached To: Hospital Encounter on 10/11/23 with [facility] SLEEP LAB ROOM 2      Source Information     [facility]       Assessment and Plan     1. EILEEN (obstructive sleep apnea)  - AHI 40.2 on PSG performed on 10/11/2023. BMI 31.18.  -Unable to tolerate CPAP secondary to claustrophobia anxiety  -Recent drug-induced sleep endoscopy with anterior posterior collapse  - Candidate for Inspire hypoglossal nerve stimulation therapy, patient would like to proceed with this. Description of the procedure as well as the associated risks, benefits, and alternatives (including not doing surgery) of implantation discussed with the patient. Discussed risk included but were not limited to infection, nerve damage, bleeding, infection of implant, need for future explantation, need for additional surgery, hypoglossal nerve damage and paralysis, damage to the lining of the lung resulting in pneumothorax, hematoma formation, anesthesia risk. All questions were answered consent was signed in the office today. - PCP for preoperative clearance prior to surgery. - Will also need to see cardiology pre-op since she is on Xeralto and will need to come off of the jeniffer-operatively    2. Dermatitis of right ear canal  - Doing well with DermOtic oil as needed. Continue. Follow-Up     Return for post operative visit.       Dr. Violette Benjamin, [facility]

## 2024-01-02 ENCOUNTER — TELEPHONE (OUTPATIENT)
Dept: PULMONOLOGY | Age: 69
End: 2024-01-02

## 2024-01-02 NOTE — TELEPHONE ENCOUNTER
Pt called and said she has switched insurance for the new year and Anoro is not listed on formulary.  She said that the insurance company said if we sent over a pharmacy exception letter they would cover it.    [phone]

## 2024-02-09 ENCOUNTER — OFFICE VISIT (OUTPATIENT)
Dept: PULMONOLOGY | Age: 69
End: 2024-02-09
Payer: MEDICARE

## 2024-02-09 VITALS
WEIGHT: 178 LBS | OXYGEN SATURATION: 97 % | BODY MASS INDEX: 31.54 KG/M2 | HEIGHT: 63 IN | SYSTOLIC BLOOD PRESSURE: 138 MMHG | HEART RATE: 66 BPM | DIASTOLIC BLOOD PRESSURE: 80 MMHG

## 2024-02-09 DIAGNOSIS — J44.9 COPD, MODERATE (HCC): ICD-10-CM

## 2024-02-09 DIAGNOSIS — R91.1 LUNG NODULE: Primary | ICD-10-CM

## 2024-02-09 PROCEDURE — 1123F ACP DISCUSS/DSCN MKR DOCD: CPT | Performed by: INTERNAL MEDICINE

## 2024-02-09 PROCEDURE — 3017F COLORECTAL CA SCREEN DOC REV: CPT | Performed by: INTERNAL MEDICINE

## 2024-02-09 PROCEDURE — G8417 CALC BMI ABV UP PARAM F/U: HCPCS | Performed by: INTERNAL MEDICINE

## 2024-02-09 PROCEDURE — G8427 DOCREV CUR MEDS BY ELIG CLIN: HCPCS | Performed by: INTERNAL MEDICINE

## 2024-02-09 PROCEDURE — 99213 OFFICE O/P EST LOW 20 MIN: CPT | Performed by: INTERNAL MEDICINE

## 2024-02-09 PROCEDURE — 3079F DIAST BP 80-89 MM HG: CPT | Performed by: INTERNAL MEDICINE

## 2024-02-09 PROCEDURE — G8399 PT W/DXA RESULTS DOCUMENT: HCPCS | Performed by: INTERNAL MEDICINE

## 2024-02-09 PROCEDURE — G8484 FLU IMMUNIZE NO ADMIN: HCPCS | Performed by: INTERNAL MEDICINE

## 2024-02-09 PROCEDURE — 3023F SPIROM DOC REV: CPT | Performed by: INTERNAL MEDICINE

## 2024-02-09 PROCEDURE — 3075F SYST BP GE 130 - 139MM HG: CPT | Performed by: INTERNAL MEDICINE

## 2024-02-09 PROCEDURE — 1090F PRES/ABSN URINE INCON ASSESS: CPT | Performed by: INTERNAL MEDICINE

## 2024-02-09 PROCEDURE — 1036F TOBACCO NON-USER: CPT | Performed by: INTERNAL MEDICINE

## 2024-02-09 ASSESSMENT — ENCOUNTER SYMPTOMS
CHEST TIGHTNESS: 0
CONSTIPATION: 0
ABDOMINAL DISTENTION: 0
COLOR CHANGE: 0
WHEEZING: 0
APNEA: 0
CHOKING: 0
BLOOD IN STOOL: 0
SHORTNESS OF BREATH: 1
BACK PAIN: 0
VOICE CHANGE: 0
SINUS PRESSURE: 0
SORE THROAT: 0
RHINORRHEA: 0
VOMITING: 0
COUGH: 0
ABDOMINAL PAIN: 0
STRIDOR: 0
DIARRHEA: 0

## 2024-02-09 NOTE — PROGRESS NOTES
Kasandra Cantor    YOB: 1955     Date of Service:  2/9/2024     Chief Complaint   Patient presents with    COPD       HPI patient appears to be doing quite well.  She is frustrated that Anoro Ellipta is now being switched over to Bevespi inhaler per insurance.  Dyspnea has improved significantly, rarely uses albuterol inhaler.  Patient is due for inspire surgery on 1/23.    Allergies   Allergen Reactions    Nirmatrelvir-Ritonavir Other (See Comments)     QT prolongation    Blood pressure drops    Nsaids      Note: PERFORATED PEPTIC ULCER     Outpatient Medications Marked as Taking for the 2/9/24 encounter (Office Visit) with Ino Mccrary MD   Medication Sig Dispense Refill    glycopyrrolate-formoterol (BEVESPI AEROSPHERE) 9-4.8 MCG/ACT AERO Inhale 2 puffs into the lungs 2 times daily 10.7 g 1    hydroxychloroquine (PLAQUENIL) 200 MG tablet Take 1 tablet by mouth daily      gabapentin (NEURONTIN) 300 MG capsule Take 2 capsules by mouth 3 times daily.      fluocinolone (DERMOTIC) 0.01 % OIL oil Place 3 drops in ear(s) 2 times daily as needed (ear itching) 1 each 2    albuterol sulfate HFA (VENTOLIN HFA) 108 (90 Base) MCG/ACT inhaler Inhale 2 puffs into the lungs 4 times daily as needed for Wheezing 18 g 5    metoprolol succinate (TOPROL XL) 25 MG extended release tablet TAKE 1 TABLET BY MOUTH DAILY 90 tablet 2    escitalopram (LEXAPRO) 20 MG tablet Take 1 tablet by mouth daily      ibuprofen (ADVIL;MOTRIN) 200 MG tablet Take 2 tablets by mouth every 6 hours as needed for Pain      acetaminophen (TYLENOL) 325 MG tablet Take 2 tablets by mouth every 6 hours as needed for Pain      ARMOUR THYROID 90 MG tablet TAKE 1 TABLET BY MOUTH ONCE A DAY      lisinopril (PRINIVIL;ZESTRIL) 10 MG tablet Take 1 tablet by mouth daily      Calcium Carb-Cholecalciferol (CALCIUM 1000 + D) 1000-800 MG-UNIT TABS Take 1 tablet by mouth daily      Multiple Vitamins-Minerals (THERAPEUTIC MULTIVITAMIN-MINERALS)

## 2024-02-19 ENCOUNTER — TELEPHONE (OUTPATIENT)
Dept: ENT CLINIC | Age: 69
End: 2024-02-19

## 2024-02-19 RX ORDER — TRAZODONE HYDROCHLORIDE 50 MG/1
50 TABLET ORAL NIGHTLY
COMMUNITY

## 2024-02-19 NOTE — TELEPHONE ENCOUNTER
Patient's cardiologist is only recommending holding Xarelto for 2 days prior to her inspire implant on Friday 2/23/2024. Just wanted you to be aware it is less than the 5-7 days usually recommended for surgery.

## 2024-02-19 NOTE — PROGRESS NOTES
Name_______________________________________Printed:____________________  Date and time of surgery__2/23/2024____0730________________Arrival Time:__0600  masc______________   1. The instructions given regarding when and if a patient needs to stop oral intake prior to surgery varies.Follow the specific instructions you were given                  _x__Nothing to eat or to drink after Midnight the night before.                   ____Carbo loading or instructions will be given to select patients-if you have been given those instructions -please do the following                           The evening before your surgery after dinner before midnight drink 40 ounces of gatorade.If you are diabetic use sugar free.  The morning of surgery drink 40 ounces of water.This needs to be finished 3 hours prior to your surgery start time.    2. Take the following pills with a small sip of water on the morning of surgery_inhalers, metoprolol, thyroid, _protonix_________________________________________________                  Do not take blood pressure medications ending in pril or sartan the geraldo prior to surgery or the morning of surgery. Dr Zelaya's patient are not to take any medications the AM of surgery.         3. Aspirin, Ibuprofen, Advil, Naproxen, Vitamin E and other Anti-inflammatory products and supplements should be stopped for 5 -7days before surgery or as directed by your physician.   4. Check with your Doctor regarding stopping Plavix, Coumadin,Eliquis, Lovenox,Effient,Pradaxa,Xarelto, Fragmin or other blood thinners and follow their instructions.   5. Do not smoke, and do not drink any alcoholic beverages 24 hours prior to surgery.  This includes NA Beer.Refrain from the usage of any recreational drugs.   6. You may brush your teeth and gargle the morning of surgery.  DO NOT SWALLOW WATER   7. You MUST make arrangements for a responsible adult to stay on site while you are here and take you home after your surgery. You

## 2024-02-22 ENCOUNTER — ANESTHESIA EVENT (OUTPATIENT)
Dept: OPERATING ROOM | Age: 69
End: 2024-02-22
Payer: MEDICARE

## 2024-02-22 ENCOUNTER — PREP FOR PROCEDURE (OUTPATIENT)
Dept: OTOLARYNGOLOGY | Age: 69
End: 2024-02-22

## 2024-02-22 RX ORDER — SODIUM CHLORIDE 9 MG/ML
INJECTION, SOLUTION INTRAVENOUS PRN
Status: CANCELLED | OUTPATIENT
Start: 2024-02-22

## 2024-02-22 RX ORDER — SODIUM CHLORIDE 0.9 % (FLUSH) 0.9 %
5-40 SYRINGE (ML) INJECTION PRN
Status: CANCELLED | OUTPATIENT
Start: 2024-02-22

## 2024-02-22 RX ORDER — SODIUM CHLORIDE 0.9 % (FLUSH) 0.9 %
5-40 SYRINGE (ML) INJECTION EVERY 12 HOURS SCHEDULED
Status: CANCELLED | OUTPATIENT
Start: 2024-02-22

## 2024-02-23 ENCOUNTER — ANESTHESIA (OUTPATIENT)
Dept: OPERATING ROOM | Age: 69
End: 2024-02-23
Payer: MEDICARE

## 2024-02-23 ENCOUNTER — APPOINTMENT (OUTPATIENT)
Dept: GENERAL RADIOLOGY | Age: 69
End: 2024-02-23
Attending: STUDENT IN AN ORGANIZED HEALTH CARE EDUCATION/TRAINING PROGRAM
Payer: MEDICARE

## 2024-02-23 ENCOUNTER — HOSPITAL ENCOUNTER (OUTPATIENT)
Age: 69
Setting detail: OUTPATIENT SURGERY
Discharge: HOME | End: 2024-02-23
Attending: STUDENT IN AN ORGANIZED HEALTH CARE EDUCATION/TRAINING PROGRAM | Admitting: STUDENT IN AN ORGANIZED HEALTH CARE EDUCATION/TRAINING PROGRAM
Payer: MEDICARE

## 2024-02-23 VITALS
DIASTOLIC BLOOD PRESSURE: 59 MMHG | WEIGHT: 176.3 LBS | HEART RATE: 68 BPM | OXYGEN SATURATION: 92 % | SYSTOLIC BLOOD PRESSURE: 130 MMHG | HEIGHT: 63 IN | TEMPERATURE: 96.9 F | RESPIRATION RATE: 17 BRPM | BODY MASS INDEX: 31.24 KG/M2

## 2024-02-23 DIAGNOSIS — G89.18 ACUTE POST-OPERATIVE PAIN: Primary | ICD-10-CM

## 2024-02-23 DIAGNOSIS — G47.33 OBSTRUCTIVE SLEEP APNEA: ICD-10-CM

## 2024-02-23 DIAGNOSIS — Z91.199 POOR COMPLIANCE WITH CPAP TREATMENT: ICD-10-CM

## 2024-02-23 PROCEDURE — 6370000000 HC RX 637 (ALT 250 FOR IP): Performed by: STUDENT IN AN ORGANIZED HEALTH CARE EDUCATION/TRAINING PROGRAM

## 2024-02-23 PROCEDURE — 2580000003 HC RX 258: Performed by: STUDENT IN AN ORGANIZED HEALTH CARE EDUCATION/TRAINING PROGRAM

## 2024-02-23 PROCEDURE — C1778 LEAD, NEUROSTIMULATOR: HCPCS | Performed by: STUDENT IN AN ORGANIZED HEALTH CARE EDUCATION/TRAINING PROGRAM

## 2024-02-23 PROCEDURE — 7100000011 HC PHASE II RECOVERY - ADDTL 15 MIN: Performed by: STUDENT IN AN ORGANIZED HEALTH CARE EDUCATION/TRAINING PROGRAM

## 2024-02-23 PROCEDURE — 70360 X-RAY EXAM OF NECK: CPT

## 2024-02-23 PROCEDURE — A4217 STERILE WATER/SALINE, 500 ML: HCPCS | Performed by: STUDENT IN AN ORGANIZED HEALTH CARE EDUCATION/TRAINING PROGRAM

## 2024-02-23 PROCEDURE — 2720000010 HC SURG SUPPLY STERILE: Performed by: STUDENT IN AN ORGANIZED HEALTH CARE EDUCATION/TRAINING PROGRAM

## 2024-02-23 PROCEDURE — 3700000001 HC ADD 15 MINUTES (ANESTHESIA): Performed by: STUDENT IN AN ORGANIZED HEALTH CARE EDUCATION/TRAINING PROGRAM

## 2024-02-23 PROCEDURE — 71045 X-RAY EXAM CHEST 1 VIEW: CPT

## 2024-02-23 PROCEDURE — C1787 PATIENT PROGR, NEUROSTIM: HCPCS | Performed by: STUDENT IN AN ORGANIZED HEALTH CARE EDUCATION/TRAINING PROGRAM

## 2024-02-23 PROCEDURE — C1767 GENERATOR, NEURO NON-RECHARG: HCPCS | Performed by: STUDENT IN AN ORGANIZED HEALTH CARE EDUCATION/TRAINING PROGRAM

## 2024-02-23 PROCEDURE — 7100000010 HC PHASE II RECOVERY - FIRST 15 MIN: Performed by: STUDENT IN AN ORGANIZED HEALTH CARE EDUCATION/TRAINING PROGRAM

## 2024-02-23 PROCEDURE — 3600000014 HC SURGERY LEVEL 4 ADDTL 15MIN: Performed by: STUDENT IN AN ORGANIZED HEALTH CARE EDUCATION/TRAINING PROGRAM

## 2024-02-23 PROCEDURE — 2580000003 HC RX 258: Performed by: NURSE ANESTHETIST, CERTIFIED REGISTERED

## 2024-02-23 PROCEDURE — P9045 ALBUMIN (HUMAN), 5%, 250 ML: HCPCS | Performed by: NURSE ANESTHETIST, CERTIFIED REGISTERED

## 2024-02-23 PROCEDURE — 7100000001 HC PACU RECOVERY - ADDTL 15 MIN: Performed by: STUDENT IN AN ORGANIZED HEALTH CARE EDUCATION/TRAINING PROGRAM

## 2024-02-23 PROCEDURE — 7100000000 HC PACU RECOVERY - FIRST 15 MIN: Performed by: STUDENT IN AN ORGANIZED HEALTH CARE EDUCATION/TRAINING PROGRAM

## 2024-02-23 PROCEDURE — 3700000000 HC ANESTHESIA ATTENDED CARE: Performed by: STUDENT IN AN ORGANIZED HEALTH CARE EDUCATION/TRAINING PROGRAM

## 2024-02-23 PROCEDURE — 3600000004 HC SURGERY LEVEL 4 BASE: Performed by: STUDENT IN AN ORGANIZED HEALTH CARE EDUCATION/TRAINING PROGRAM

## 2024-02-23 PROCEDURE — 6360000002 HC RX W HCPCS: Performed by: STUDENT IN AN ORGANIZED HEALTH CARE EDUCATION/TRAINING PROGRAM

## 2024-02-23 PROCEDURE — 6360000002 HC RX W HCPCS: Performed by: NURSE ANESTHETIST, CERTIFIED REGISTERED

## 2024-02-23 PROCEDURE — 2500000003 HC RX 250 WO HCPCS: Performed by: STUDENT IN AN ORGANIZED HEALTH CARE EDUCATION/TRAINING PROGRAM

## 2024-02-23 PROCEDURE — 2709999900 HC NON-CHARGEABLE SUPPLY: Performed by: STUDENT IN AN ORGANIZED HEALTH CARE EDUCATION/TRAINING PROGRAM

## 2024-02-23 PROCEDURE — 2500000003 HC RX 250 WO HCPCS: Performed by: NURSE ANESTHETIST, CERTIFIED REGISTERED

## 2024-02-23 DEVICE — LEAD STIMULATION UPPER AIRWAY INSPIRE: Type: IMPLANTABLE DEVICE | Site: CHEST | Status: FUNCTIONAL

## 2024-02-23 DEVICE — LEAD SENSING RESPIRATORY INSPIRE: Type: IMPLANTABLE DEVICE | Site: CHEST | Status: FUNCTIONAL

## 2024-02-23 DEVICE — GENERATOR PULSE IMPLANTABLE INSPIRE: Type: IMPLANTABLE DEVICE | Site: CHEST | Status: FUNCTIONAL

## 2024-02-23 RX ORDER — GLYCOPYRROLATE 0.2 MG/ML
INJECTION INTRAMUSCULAR; INTRAVENOUS PRN
Status: DISCONTINUED | OUTPATIENT
Start: 2024-02-23 | End: 2024-02-23 | Stop reason: SDUPTHER

## 2024-02-23 RX ORDER — PROPOFOL 10 MG/ML
INJECTION, EMULSION INTRAVENOUS PRN
Status: DISCONTINUED | OUTPATIENT
Start: 2024-02-23 | End: 2024-02-23 | Stop reason: SDUPTHER

## 2024-02-23 RX ORDER — SODIUM CHLORIDE 9 MG/ML
INJECTION, SOLUTION INTRAVENOUS PRN
Status: DISCONTINUED | OUTPATIENT
Start: 2024-02-23 | End: 2024-02-23 | Stop reason: HOSPADM

## 2024-02-23 RX ORDER — CEPHALEXIN 500 MG/1
500 CAPSULE ORAL 3 TIMES DAILY
Qty: 15 CAPSULE | Refills: 0 | Status: SHIPPED | OUTPATIENT
Start: 2024-02-23

## 2024-02-23 RX ORDER — SODIUM CHLORIDE 0.9 % (FLUSH) 0.9 %
5-40 SYRINGE (ML) INJECTION PRN
Status: DISCONTINUED | OUTPATIENT
Start: 2024-02-23 | End: 2024-02-23 | Stop reason: HOSPADM

## 2024-02-23 RX ORDER — OXYCODONE HYDROCHLORIDE 5 MG/1
5 TABLET ORAL PRN
Status: COMPLETED | OUTPATIENT
Start: 2024-02-23 | End: 2024-02-23

## 2024-02-23 RX ORDER — DEXMEDETOMIDINE HYDROCHLORIDE 100 UG/ML
INJECTION, SOLUTION INTRAVENOUS PRN
Status: DISCONTINUED | OUTPATIENT
Start: 2024-02-23 | End: 2024-02-23 | Stop reason: SDUPTHER

## 2024-02-23 RX ORDER — HALOPERIDOL 5 MG/ML
1 INJECTION INTRAMUSCULAR
Status: DISCONTINUED | OUTPATIENT
Start: 2024-02-23 | End: 2024-02-23 | Stop reason: HOSPADM

## 2024-02-23 RX ORDER — ALBUMIN, HUMAN INJ 5% 5 %
SOLUTION INTRAVENOUS PRN
Status: DISCONTINUED | OUTPATIENT
Start: 2024-02-23 | End: 2024-02-23 | Stop reason: SDUPTHER

## 2024-02-23 RX ORDER — MAGNESIUM HYDROXIDE 1200 MG/15ML
LIQUID ORAL CONTINUOUS PRN
Status: COMPLETED | OUTPATIENT
Start: 2024-02-23 | End: 2024-02-23

## 2024-02-23 RX ORDER — ACETAMINOPHEN 500 MG
1000 TABLET ORAL ONCE
Status: COMPLETED | OUTPATIENT
Start: 2024-02-23 | End: 2024-02-23

## 2024-02-23 RX ORDER — DEXAMETHASONE SODIUM PHOSPHATE 4 MG/ML
INJECTION, SOLUTION INTRA-ARTICULAR; INTRALESIONAL; INTRAMUSCULAR; INTRAVENOUS; SOFT TISSUE PRN
Status: DISCONTINUED | OUTPATIENT
Start: 2024-02-23 | End: 2024-02-23 | Stop reason: SDUPTHER

## 2024-02-23 RX ORDER — LABETALOL HYDROCHLORIDE 5 MG/ML
10 INJECTION, SOLUTION INTRAVENOUS
Status: DISCONTINUED | OUTPATIENT
Start: 2024-02-23 | End: 2024-02-23 | Stop reason: HOSPADM

## 2024-02-23 RX ORDER — LIDOCAINE HYDROCHLORIDE 20 MG/ML
INJECTION, SOLUTION INFILTRATION; PERINEURAL PRN
Status: DISCONTINUED | OUTPATIENT
Start: 2024-02-23 | End: 2024-02-23 | Stop reason: SDUPTHER

## 2024-02-23 RX ORDER — IPRATROPIUM BROMIDE AND ALBUTEROL SULFATE 2.5; .5 MG/3ML; MG/3ML
1 SOLUTION RESPIRATORY (INHALATION)
Status: DISCONTINUED | OUTPATIENT
Start: 2024-02-23 | End: 2024-02-23 | Stop reason: HOSPADM

## 2024-02-23 RX ORDER — HYDROCODONE BITARTRATE AND ACETAMINOPHEN 5; 325 MG/1; MG/1
1 TABLET ORAL EVERY 4 HOURS PRN
Qty: 18 TABLET | Refills: 0 | Status: SHIPPED | OUTPATIENT
Start: 2024-02-23 | End: 2024-02-26

## 2024-02-23 RX ORDER — OXYCODONE HYDROCHLORIDE 5 MG/1
10 TABLET ORAL PRN
Status: COMPLETED | OUTPATIENT
Start: 2024-02-23 | End: 2024-02-23

## 2024-02-23 RX ORDER — SODIUM CHLORIDE 9 MG/ML
INJECTION, SOLUTION INTRAVENOUS CONTINUOUS
Status: DISCONTINUED | OUTPATIENT
Start: 2024-02-23 | End: 2024-02-23 | Stop reason: HOSPADM

## 2024-02-23 RX ORDER — DIPHENHYDRAMINE HYDROCHLORIDE 50 MG/ML
12.5 INJECTION INTRAMUSCULAR; INTRAVENOUS
Status: DISCONTINUED | OUTPATIENT
Start: 2024-02-23 | End: 2024-02-23 | Stop reason: HOSPADM

## 2024-02-23 RX ORDER — ONDANSETRON HYDROCHLORIDE 8 MG/1
8 TABLET, FILM COATED ORAL EVERY 8 HOURS PRN
Qty: 10 TABLET | Refills: 0 | Status: SHIPPED | OUTPATIENT
Start: 2024-02-23

## 2024-02-23 RX ORDER — HYDRALAZINE HYDROCHLORIDE 20 MG/ML
10 INJECTION INTRAMUSCULAR; INTRAVENOUS
Status: DISCONTINUED | OUTPATIENT
Start: 2024-02-23 | End: 2024-02-23 | Stop reason: HOSPADM

## 2024-02-23 RX ORDER — ONDANSETRON 2 MG/ML
4 INJECTION INTRAMUSCULAR; INTRAVENOUS
Status: DISCONTINUED | OUTPATIENT
Start: 2024-02-23 | End: 2024-02-23 | Stop reason: HOSPADM

## 2024-02-23 RX ORDER — EPHEDRINE SULFATE/0.9% NACL/PF 50 MG/5 ML
SYRINGE (ML) INTRAVENOUS PRN
Status: DISCONTINUED | OUTPATIENT
Start: 2024-02-23 | End: 2024-02-23 | Stop reason: SDUPTHER

## 2024-02-23 RX ORDER — HYDROMORPHONE HYDROCHLORIDE 2 MG/ML
INJECTION, SOLUTION INTRAMUSCULAR; INTRAVENOUS; SUBCUTANEOUS PRN
Status: DISCONTINUED | OUTPATIENT
Start: 2024-02-23 | End: 2024-02-23 | Stop reason: SDUPTHER

## 2024-02-23 RX ORDER — HYDROMORPHONE HYDROCHLORIDE 2 MG/ML
0.25 INJECTION, SOLUTION INTRAMUSCULAR; INTRAVENOUS; SUBCUTANEOUS EVERY 5 MIN PRN
Status: DISCONTINUED | OUTPATIENT
Start: 2024-02-23 | End: 2024-02-23 | Stop reason: HOSPADM

## 2024-02-23 RX ORDER — HYDROMORPHONE HYDROCHLORIDE 2 MG/ML
0.5 INJECTION, SOLUTION INTRAMUSCULAR; INTRAVENOUS; SUBCUTANEOUS EVERY 5 MIN PRN
Status: DISCONTINUED | OUTPATIENT
Start: 2024-02-23 | End: 2024-02-23 | Stop reason: HOSPADM

## 2024-02-23 RX ORDER — SODIUM CHLORIDE 0.9 % (FLUSH) 0.9 %
5-40 SYRINGE (ML) INJECTION EVERY 12 HOURS SCHEDULED
Status: DISCONTINUED | OUTPATIENT
Start: 2024-02-23 | End: 2024-02-23 | Stop reason: HOSPADM

## 2024-02-23 RX ORDER — ONDANSETRON 2 MG/ML
INJECTION INTRAMUSCULAR; INTRAVENOUS PRN
Status: DISCONTINUED | OUTPATIENT
Start: 2024-02-23 | End: 2024-02-23 | Stop reason: SDUPTHER

## 2024-02-23 RX ORDER — SUCCINYLCHOLINE/SOD CL,ISO/PF 200MG/10ML
SYRINGE (ML) INTRAVENOUS PRN
Status: DISCONTINUED | OUTPATIENT
Start: 2024-02-23 | End: 2024-02-23 | Stop reason: SDUPTHER

## 2024-02-23 RX ORDER — FENTANYL CITRATE 50 UG/ML
INJECTION, SOLUTION INTRAMUSCULAR; INTRAVENOUS PRN
Status: DISCONTINUED | OUTPATIENT
Start: 2024-02-23 | End: 2024-02-23 | Stop reason: SDUPTHER

## 2024-02-23 RX ADMIN — DEXMEDETOMIDINE HYDROCHLORIDE 4 MCG: 100 INJECTION, SOLUTION INTRAVENOUS at 11:24

## 2024-02-23 RX ADMIN — PHENYLEPHRINE HYDROCHLORIDE 25 MCG/MIN: 10 INJECTION INTRAVENOUS at 08:29

## 2024-02-23 RX ADMIN — GLYCOPYRROLATE 0.2 MG: 0.2 INJECTION, SOLUTION INTRAMUSCULAR; INTRAVENOUS at 09:50

## 2024-02-23 RX ADMIN — HYDROMORPHONE HYDROCHLORIDE 0.4 MG: 2 INJECTION, SOLUTION INTRAMUSCULAR; INTRAVENOUS; SUBCUTANEOUS at 11:29

## 2024-02-23 RX ADMIN — GLYCOPYRROLATE 0.4 MG: 0.2 INJECTION, SOLUTION INTRAMUSCULAR; INTRAVENOUS at 07:56

## 2024-02-23 RX ADMIN — PROPOFOL 100 MG: 10 INJECTION, EMULSION INTRAVENOUS at 07:49

## 2024-02-23 RX ADMIN — Medication 10 MG: at 09:47

## 2024-02-23 RX ADMIN — LIDOCAINE HYDROCHLORIDE 100 MG: 20 INJECTION, SOLUTION INFILTRATION; PERINEURAL at 07:40

## 2024-02-23 RX ADMIN — FENTANYL CITRATE 50 MCG: 50 INJECTION, SOLUTION INTRAMUSCULAR; INTRAVENOUS at 11:07

## 2024-02-23 RX ADMIN — PROPOFOL 50 MG: 10 INJECTION, EMULSION INTRAVENOUS at 11:27

## 2024-02-23 RX ADMIN — CEFAZOLIN 2000 MG: 2 INJECTION, POWDER, FOR SOLUTION INTRAMUSCULAR; INTRAVENOUS at 11:19

## 2024-02-23 RX ADMIN — DEXMEDETOMIDINE HYDROCHLORIDE 4 MCG: 100 INJECTION, SOLUTION INTRAVENOUS at 11:31

## 2024-02-23 RX ADMIN — ONDANSETRON 4 MG: 2 INJECTION INTRAMUSCULAR; INTRAVENOUS at 08:05

## 2024-02-23 RX ADMIN — Medication 10 MG: at 08:26

## 2024-02-23 RX ADMIN — ACETAMINOPHEN 1000 MG: 500 TABLET ORAL at 07:16

## 2024-02-23 RX ADMIN — ALBUMIN (HUMAN) 12.5 G: 12.5 INJECTION, SOLUTION INTRAVENOUS at 09:26

## 2024-02-23 RX ADMIN — SODIUM CHLORIDE: 9 INJECTION, SOLUTION INTRAVENOUS at 06:50

## 2024-02-23 RX ADMIN — DEXAMETHASONE SODIUM PHOSPHATE 8 MG: 4 INJECTION, SOLUTION INTRAMUSCULAR; INTRAVENOUS at 08:05

## 2024-02-23 RX ADMIN — Medication 10 MG: at 08:15

## 2024-02-23 RX ADMIN — CEFAZOLIN 2000 MG: 2 INJECTION, POWDER, FOR SOLUTION INTRAMUSCULAR; INTRAVENOUS at 07:29

## 2024-02-23 RX ADMIN — Medication 10 MG: at 08:04

## 2024-02-23 RX ADMIN — HYDROMORPHONE HYDROCHLORIDE 0.4 MG: 2 INJECTION, SOLUTION INTRAMUSCULAR; INTRAVENOUS; SUBCUTANEOUS at 11:35

## 2024-02-23 RX ADMIN — SODIUM CHLORIDE: 9 INJECTION, SOLUTION INTRAVENOUS at 07:47

## 2024-02-23 RX ADMIN — Medication 100 MG: at 07:41

## 2024-02-23 RX ADMIN — OXYCODONE 5 MG: 5 TABLET ORAL at 12:23

## 2024-02-23 RX ADMIN — PROPOFOL 200 MG: 10 INJECTION, EMULSION INTRAVENOUS at 07:40

## 2024-02-23 RX ADMIN — Medication 10 MG: at 09:22

## 2024-02-23 RX ADMIN — FENTANYL CITRATE 50 MCG: 50 INJECTION, SOLUTION INTRAMUSCULAR; INTRAVENOUS at 07:30

## 2024-02-23 ASSESSMENT — PAIN DESCRIPTION - ORIENTATION: ORIENTATION: RIGHT;LEFT;LOWER

## 2024-02-23 ASSESSMENT — ENCOUNTER SYMPTOMS: SHORTNESS OF BREATH: 1

## 2024-02-23 ASSESSMENT — PAIN - FUNCTIONAL ASSESSMENT: PAIN_FUNCTIONAL_ASSESSMENT: 0-10

## 2024-02-23 ASSESSMENT — PAIN SCALES - GENERAL
PAINLEVEL_OUTOF10: 5
PAINLEVEL_OUTOF10: 3

## 2024-02-23 ASSESSMENT — PAIN DESCRIPTION - LOCATION: LOCATION: BUTTOCKS;HIP

## 2024-02-23 NOTE — H&P
Date of Surgery Update:  Kasandra Cantor was seen, history and physical examination reviewed, and patient examined by me today. There have been no significant clinical changes since the completion of the previous history and physical performed on 2/12/24.    The risk, benefits, and alternatives of the proposed procedure have been explained to the patient (or appropriate guardian) and understanding verbalized. All questions answered. Patient wishes to proceed.    Electronically signed by: Charan Gilmore DO,2/23/2024,7:15 AM

## 2024-02-23 NOTE — PROGRESS NOTES
Pt arrived from OR to PACU bay 4. Reported received from OR staff. Pt asleep, minimally arousable to voice. Surgical incisions dressings in place to neck and chest. Pt arrives with simple mask in place, infusing 6L oxygen, vitals as charted.

## 2024-02-23 NOTE — DISCHARGE INSTRUCTIONS
Inspire Post-Operative Instructions    DIET:  Resume normal diet    HYGIENE:  Please wait until 48 hours after surgery before getting incisions on neck and chest wet.  In the first 48 hours after surgery, will likely need to take sponge baths.    WOUND CARE:  Please leave pressure dressing on for 24 hours after surgery.  After 48 hours, you may get incisions wet with warm soap and water, but do not soak the incisions or let shower water hit it directly.  Pat area dry gently.  Immediately place antibiotic ointment.  Take oral antibiotics as prescribed (Keflex)  If skin around incision starts to get red (> 1cm), swollen, and/or more painful, please call the office    ACTIVITY:  Try to avoid sleeping on the side of your surgery, to the extent possible.    You may walk for exercise starting the day after surgery.  For 2 weeks:  Do not  anything greater than 5 pounds with the hand/arm that's on the same side as the surgery.  After 2 weeks, you may increase weight to 10 pounds.    Remember to perform neck rolls (10 clockwise and 10 counterclockwise) at least 3x/day.  For 4 weeks, no strenuous activity (running, jogging, lifting weights, gardening, sports) or until cleared by physician.      PAIN MEDICATIONS:  You will be prescribed Norco for any severe pain.    If pain is not severe, consider taking Tylenol 650mg every 6 hours  Avoid aspirin for 7 days after surgery  Avoid direct heat (such as heating pads) to incision sites.    May gently place ice over surgery sites as needed.  Please place a thin clean towel over skin first and then place ice bag over towel.  Ice for 10 minutes at a time only.  A prescription for zofran was sent to pharmacy in case you experience post-anesthesia nausea  OK to restart Xarelto tomorrow evening    POST-OPERATIVE CLINIC APPOINTMENTS:  Around 2 weeks: wound check in the office.   1 month: device activation with Sleep Medicine and wound check in the office.  2.5 months: check in visit

## 2024-02-23 NOTE — PROGRESS NOTES
Discharge instructions review with patient and pt s/o bedside. All home medications have been reviewed, pt v/u.  Pt discharged via wheelchair. Pt discharged with instructions, prescriptions picked up by s/o and all belongings. Pt S/O taking stable pt home.

## 2024-02-23 NOTE — OP NOTE
OPERATIVE NOTE    Patient Name: Kasandra Cantor  YOB: 1955  Medical Record Number:  [id number]  Billing Number:  [id number]  Date of Procedure: 02/23/24   Time: 0730    Pre Operative Diagnoses:   Obstructive Sleep Apnea  Intolerance of CPAP    Post Operative Diagnoses: Same as above    Procedure:  Inspire implantation, right neck and upper chest (CPT - 03221)    Surgeon: Dr. Charan Gilmore,     OR Staff/ Assistant: Circulator: Annita Traylor RN  Surgical Assistant: Therese Jamison Assistant: Lin Parker RN  Relief Circulator: Lin Parker RN  Relief Scrub: Lin Parker RN  Scrub Person First: Angelica Burgos    Anesthesia: General anesthesia.     Operative Findings: Successful implantation of Inspire device     Operative Indications: 68 y.o. [place] with a past history significant for obstructive sleep apnea, and intolerance to CPAP.  Sleep endoscopy demonstrated anterior posterior collapse.  Patient was approved by insurance company.  Risk benefits alternatives discussed with patient in detail.  All questions answered.     OPERATIVE PROCEDURE: The patient  was brought to the operating room and was anesthetized via general endotracheal anesthesia without complication. A shoulder roll was placed and the patient was prepped and draped in usual sterile fashion with the head turned to the left. Prior to prepping and draping, electrodes were placed in the genioglossus and styloglossus muscle and connected to the NIM box for intraoperative nerve monitoring.      A 5 cm incision was made in the right upper neck approximately 1 cm from midline midway between mandible and hyoid bone. Dissection was carried down through the subcutaneous tissue and platysma. The inferior border of the submandibular gland was identified as well as the digastric tendon. The submandibular gland and the overlying fascia with the marginal mandibular nerve were retracted superiorly and posteriorly.

## 2024-02-23 NOTE — ANESTHESIA POSTPROCEDURE EVALUATION
Department of Anesthesiology  Postprocedure Note    Patient: Kasandra Cantor  MRN: [id number]  YOB: 1955  Date of evaluation: 2/23/2024    Procedure Summary       Date: 02/23/24 Room / Location: [address]    Anesthesia Start: 0730 Anesthesia Stop:     Procedure: INSPIRE IMPLANT PLACEMENT - RIGHT NECK (Right: Chest) Diagnosis:       Obstructive sleep apnea      Poor compliance with CPAP treatment      (Obstructive sleep apnea [G47.33])      (Poor compliance with CPAP treatment [Z91.199])    Surgeons: Charan Gilmore DO Responsible Provider: Adarsh Kathleen MD    Anesthesia Type: general ASA Status: 4            Anesthesia Type: No value filed.    Erin Phase I: Erin Score: 10    Erin Phase II:      Anesthesia Post Evaluation    Patient location during evaluation: PACU  Patient participation: complete - patient participated  Level of consciousness: awake and alert  Pain score: 3  Airway patency: patent  Nausea & Vomiting: no nausea  Cardiovascular status: blood pressure returned to baseline  Respiratory status: acceptable  Hydration status: euvolemic  Pain management: adequate    No notable events documented.

## 2024-02-23 NOTE — PROGRESS NOTES
Pt awake and alert at this time. Pt on RA, and VSS. Pt denies pain and nausea, tolerating PO.  Skin warm to surrounding surgical area.  Pt meets criteria to be discharged from Phase 1.

## 2024-02-23 NOTE — ANESTHESIA PRE PROCEDURE
ejection fraction of 50-55%.   -Diffuse global hypokinesis.   -At least moderate eccentric mitral regurgitation. ERO 0.29 cm2   -Mild-to-Moderate aortic regurgitation. ERO 0.25 cm2   -Severe tricuspid regurgitation.   -Moderate pulmonic regurgitation present.   -Estimated pulmonary artery systolic pressure is mildly elevated at 45 mmHg   assuming a right atrial pressure of 8 mmHg.   -Biatrial enlargement.   -Degree of MR might be underestimated due to eccentricity of jet; consider   CLAUDINE for further evaluation.      Signature      ------------------------------------------------------------------   Electronically signed by Khang Lazaro MD (Interpreting   physician) on 08/24/2023 at 04:21 PM       Neuro/Psych:   (+) neuromuscular disease:, headaches:, psychiatric history:            GI/Hepatic/Renal:   (+) PUD          Endo/Other:    (+) hypothyroidism::..                 Abdominal: normal exam            Vascular:          Other Findings:         Anesthesia Plan      general     ASA 4       Induction: intravenous.    MIPS: Postoperative opioids intended.  Anesthetic plan and risks discussed with patient.    Use of blood products discussed with patient whom consented to blood products.    Plan discussed with CRNA.    Attending anesthesiologist reviewed and agrees with Preprocedure content              Adarsh Kathleen MD   2/23/2024

## 2024-03-11 ENCOUNTER — OFFICE VISIT (OUTPATIENT)
Dept: ENT CLINIC | Age: 69
End: 2024-03-11

## 2024-03-11 VITALS
OXYGEN SATURATION: 94 % | DIASTOLIC BLOOD PRESSURE: 81 MMHG | BODY MASS INDEX: 31.36 KG/M2 | HEIGHT: 63 IN | HEART RATE: 74 BPM | TEMPERATURE: 97.1 F | WEIGHT: 177 LBS | SYSTOLIC BLOOD PRESSURE: 131 MMHG

## 2024-03-11 DIAGNOSIS — G47.33 OSA (OBSTRUCTIVE SLEEP APNEA): Primary | ICD-10-CM

## 2024-03-11 PROCEDURE — 99024 POSTOP FOLLOW-UP VISIT: CPT | Performed by: STUDENT IN AN ORGANIZED HEALTH CARE EDUCATION/TRAINING PROGRAM

## 2024-03-11 NOTE — PROGRESS NOTES
[facility]  DIVISION OF OTOLARYNGOLOGY- HEAD & NECK SURGERY  CLINIC POST-OPERATIVE VISIT      Patient Name: Kasandra Cantor  Medical Record Number:  [id number]  Primary Care Physician:  Tio Gonzalez MD    ChiefComplaint     Post-op visit    History of Present Illness     Kasandra Cantor is an 68 y.o. female s/p right neck and chest inspire implantation on 2/23/2024.    Doing well, no surgical issues. Pain controlled.     Past Medical History     Past Medical History:   Diagnosis Date    A-fib ([facility])     Age-related osteoporosis without current pathological fracture 05/18/2022    Anxiety     Arthritis     COPD (chronic obstructive pulmonary disease) ([facility])     mild to moderate    Depression     History of blood transfusion     History of claustrophobia     Hypertension     Hypothyroidism     Pelvic fracture ([facility])     Sleep apnea     did not tolerate the CPAP       Past Surgical History     Past Surgical History:   Procedure Laterality Date    ABDOMINAL EXPLORATION SURGERY  02/12/2018    LAPAROTOMY EXPLORATORY, REPAIR PERFORATED ULCER    BLADDER REPAIR      BONY PELVIS SURGERY  04/23/2023    @     BREAST ENHANCEMENT SURGERY      1983 and 2011    ELBOW FRACTURE SURGERY Left     ORIF left elbow    EXAMINATION UNDER ANESTHESIA N/A 11/15/2023    DRUG INDUCED SLEEP ENDOSCOPY performed by Charan Gilmore DO at [facility] OR    HYSTERECTOMY (CERVIX STATUS UNKNOWN)      partial  age 36    JOINT REPLACEMENT Left 04/2023    thr    PARTIAL HYSTERECTOMY (CERVIX NOT REMOVED)      SHOULDER SURGERY      ligament moved up left shoulder    SHOULDER SURGERY Left 1989    STIMULATOR SURGERY Right 2/23/2024    INSPIRE IMPLANT PLACEMENT - RIGHT NECK performed by Charan Gilmore DO at [facility] OR       Family History     Family History   Problem Relation Age of Onset    Heart Attack Mother     Breast Cancer Sister     Breast Cancer Maternal Aunt     Ovarian Cancer Maternal Aunt     Breast Cancer Paternal Aunt

## 2024-03-27 ENCOUNTER — TELEPHONE (OUTPATIENT)
Dept: PULMONOLOGY | Age: 69
End: 2024-03-27

## 2024-03-27 NOTE — TELEPHONE ENCOUNTER
Spoke with patient.  Instructed patient to download the Inspire lauri, set-up acct and bring remote with her to the visit.

## 2024-04-09 ASSESSMENT — SLEEP AND FATIGUE QUESTIONNAIRES
HOW LIKELY ARE YOU TO NOD OFF OR FALL ASLEEP WHILE WATCHING TV: WOULD NEVER DOZE
HOW LIKELY ARE YOU TO NOD OFF OR FALL ASLEEP WHILE SITTING AND TALKING TO SOMEONE: WOULD NEVER DOZE
HOW LIKELY ARE YOU TO NOD OFF OR FALL ASLEEP WHILE SITTING AND READING: SLIGHT CHANCE OF DOZING
HOW LIKELY ARE YOU TO NOD OFF OR FALL ASLEEP WHILE LYING DOWN TO REST IN THE AFTERNOON WHEN CIRCUMSTANCES PERMIT: SLIGHT CHANCE OF DOZING
HOW LIKELY ARE YOU TO NOD OFF OR FALL ASLEEP WHEN YOU ARE A PASSENGER IN A CAR FOR AN HOUR WITHOUT A BREAK: WOULD NEVER DOZE
HOW LIKELY ARE YOU TO NOD OFF OR FALL ASLEEP WHILE SITTING QUIETLY AFTER LUNCH WITHOUT ALCOHOL: WOULD NEVER DOZE
HOW LIKELY ARE YOU TO NOD OFF OR FALL ASLEEP WHILE SITTING QUIETLY AFTER LUNCH WITHOUT ALCOHOL: WOULD NEVER DOZE
HOW LIKELY ARE YOU TO NOD OFF OR FALL ASLEEP WHILE SITTING AND READING: SLIGHT CHANCE OF DOZING
HOW LIKELY ARE YOU TO NOD OFF OR FALL ASLEEP IN A CAR, WHILE STOPPED FOR A FEW MINUTES IN TRAFFIC: WOULD NEVER DOZE
ESS TOTAL SCORE: 2
HOW LIKELY ARE YOU TO NOD OFF OR FALL ASLEEP WHILE WATCHING TV: WOULD NEVER DOZE
HOW LIKELY ARE YOU TO NOD OFF OR FALL ASLEEP WHEN YOU ARE A PASSENGER IN A CAR FOR AN HOUR WITHOUT A BREAK: WOULD NEVER DOZE
HOW LIKELY ARE YOU TO NOD OFF OR FALL ASLEEP WHILE LYING DOWN TO REST IN THE AFTERNOON WHEN CIRCUMSTANCES PERMIT: SLIGHT CHANCE OF DOZING
HOW LIKELY ARE YOU TO NOD OFF OR FALL ASLEEP WHILE SITTING INACTIVE IN A PUBLIC PLACE: WOULD NEVER DOZE
HOW LIKELY ARE YOU TO NOD OFF OR FALL ASLEEP WHILE SITTING INACTIVE IN A PUBLIC PLACE: WOULD NEVER DOZE
HOW LIKELY ARE YOU TO NOD OFF OR FALL ASLEEP IN A CAR, WHILE STOPPED FOR A FEW MINUTES IN TRAFFIC: WOULD NEVER DOZE
HOW LIKELY ARE YOU TO NOD OFF OR FALL ASLEEP WHILE SITTING AND TALKING TO SOMEONE: WOULD NEVER DOZE

## 2024-04-10 ENCOUNTER — OFFICE VISIT (OUTPATIENT)
Dept: PULMONOLOGY | Age: 69
End: 2024-04-10
Payer: MEDICARE

## 2024-04-10 VITALS
DIASTOLIC BLOOD PRESSURE: 80 MMHG | OXYGEN SATURATION: 97 % | BODY MASS INDEX: 31.36 KG/M2 | HEART RATE: 78 BPM | HEIGHT: 63 IN | WEIGHT: 177 LBS | SYSTOLIC BLOOD PRESSURE: 132 MMHG

## 2024-04-10 DIAGNOSIS — E66.09 CLASS 1 OBESITY DUE TO EXCESS CALORIES WITH SERIOUS COMORBIDITY AND BODY MASS INDEX (BMI) OF 31.0 TO 31.9 IN ADULT: ICD-10-CM

## 2024-04-10 DIAGNOSIS — G47.33 OSA (OBSTRUCTIVE SLEEP APNEA): Primary | ICD-10-CM

## 2024-04-10 DIAGNOSIS — I48.0 PAF (PAROXYSMAL ATRIAL FIBRILLATION) (HCC): ICD-10-CM

## 2024-04-10 PROCEDURE — 1036F TOBACCO NON-USER: CPT | Performed by: INTERNAL MEDICINE

## 2024-04-10 PROCEDURE — G8399 PT W/DXA RESULTS DOCUMENT: HCPCS | Performed by: INTERNAL MEDICINE

## 2024-04-10 PROCEDURE — G8417 CALC BMI ABV UP PARAM F/U: HCPCS | Performed by: INTERNAL MEDICINE

## 2024-04-10 PROCEDURE — 1123F ACP DISCUSS/DSCN MKR DOCD: CPT | Performed by: INTERNAL MEDICINE

## 2024-04-10 PROCEDURE — 1090F PRES/ABSN URINE INCON ASSESS: CPT | Performed by: INTERNAL MEDICINE

## 2024-04-10 PROCEDURE — 3075F SYST BP GE 130 - 139MM HG: CPT | Performed by: INTERNAL MEDICINE

## 2024-04-10 PROCEDURE — 99214 OFFICE O/P EST MOD 30 MIN: CPT | Performed by: INTERNAL MEDICINE

## 2024-04-10 PROCEDURE — G8427 DOCREV CUR MEDS BY ELIG CLIN: HCPCS | Performed by: INTERNAL MEDICINE

## 2024-04-10 PROCEDURE — 3017F COLORECTAL CA SCREEN DOC REV: CPT | Performed by: INTERNAL MEDICINE

## 2024-04-10 PROCEDURE — 3079F DIAST BP 80-89 MM HG: CPT | Performed by: INTERNAL MEDICINE

## 2024-04-10 NOTE — PROGRESS NOTES
Pulmonology Follow Up Visit    CONSULTING PHYSICIAN:  Tio Gonzalez MD ,     REASON FOR VISIT:   Chief Complaint   Patient presents with    Sleep Apnea     Inspire activation        DATE OF VISIT: 4/10/2024    HISTORY OF PRESENT ILLNESS: 68 y.o. year old female  who is being seen in the pulmonary/sleep for a follow-up after inspire [hypoglossal nerve stimulation] therapy implantation.  Patient reports that perioperative period was uneventful.  She denies any perioperative infection, lingering pain.  Excited to get her inspire [hypoglossal nerve stimulation] therapy activated.  Still has disturbed sleep.  Weight remains stable.     Previously: After a long time.  Patient was unable to tolerate the CPAP therapy because of claustrophobia.  She tried several masks including small nasal pillow mask but was unable to use it.  Eventually she returned the machine.  Continues to struggle with sleep symptoms at nighttime.  Also has been having on and off atrial fibrillation.  Weight remains overall stable.  She is trying to explore alternative therapies to treat her sleep apnea. Patient was recently prescribed with BiPAP therapy but is having difficulty using it.  She reports that the pressures are too high and that makes the mask leak.  She tried different size of the mask but ended up getting skin irritation and skin swelling.  Continues to have sleep-related problems.   No further episodes of atrial fibrillation or a flutter.  Now does have a loop recorder.  Patient reports that for last few years she has been having sleep issues including snoring, gasping, choking at nighttime.  She is a light sleeper and has to take trazodone, anxiolytics and a muscle relaxant to help her go to sleep.  Despite this she wakes up tired.  Denies any daytime sleepiness.  Has gained about 16 pounds of weight in last 1 year.  Does have a.m. dry mouth.  Denies a.m. headache.        4/9/2024     4:33 PM 1/14/2021     2:06 PM

## 2024-04-12 RX ORDER — METOPROLOL SUCCINATE 25 MG/1
25 TABLET, EXTENDED RELEASE ORAL DAILY
Qty: 90 TABLET | Refills: 3 | Status: SHIPPED | OUTPATIENT
Start: 2024-04-12

## 2024-04-12 NOTE — TELEPHONE ENCOUNTER
Requested Prescriptions     Pending Prescriptions Disp Refills    metoprolol succinate (TOPROL XL) 25 MG extended release tablet [Pharmacy Med Name: METOPROLOL ER SUCCINATE 25MG TABS] 90 tablet 2     Sig: TAKE 1 TABLET BY MOUTH DAILY        [facility]     Last OV: 2023 MXA    Next OV: 2024 MXA    Last EK2023     Last Filled: 2023 NPSR

## 2024-04-18 ENCOUNTER — OFFICE VISIT (OUTPATIENT)
Dept: ENT CLINIC | Age: 69
End: 2024-04-18

## 2024-04-18 VITALS
OXYGEN SATURATION: 96 % | SYSTOLIC BLOOD PRESSURE: 146 MMHG | DIASTOLIC BLOOD PRESSURE: 77 MMHG | TEMPERATURE: 97.3 F | BODY MASS INDEX: 31.71 KG/M2 | HEART RATE: 60 BPM | WEIGHT: 179 LBS | HEIGHT: 63 IN

## 2024-04-18 DIAGNOSIS — G47.33 OSA (OBSTRUCTIVE SLEEP APNEA): ICD-10-CM

## 2024-04-18 DIAGNOSIS — H61.21 IMPACTED CERUMEN OF RIGHT EAR: Primary | ICD-10-CM

## 2024-04-18 NOTE — PROGRESS NOTES
[facility]  DIVISION OF OTOLARYNGOLOGY- HEAD & NECK SURGERY  CLINIC FOLLOW-UP VISIT      Patient Name: Kasandra Cantor  Medical Record Number:  [id number]  Primary Care Physician:  Tio Gonzalez MD    ChiefComplaint     Chief Complaint   Patient presents with    Ear Problem     Rt ear feels clogged, sounds like talking in a tunnel,         History of Present Illness     Kasandra Cantor is an 68 y.o. female s/p right neck and chest inspire implantation on 2/23/2024    Interval History:   1 week ago her right ear was itching bad. She put Dermotic oil in her ear using a Q-tip. Ear plugged up after she put dermotic in her ear.     Had her Inspire turned on last week with Dr. Price.    Past Medical History     Past Medical History:   Diagnosis Date    A-fib (HCC)     Age-related osteoporosis without current pathological fracture 05/18/2022    Anxiety     Arthritis     COPD (chronic obstructive pulmonary disease) ([facility])     mild to moderate    Depression     History of blood transfusion     History of claustrophobia     Hypertension     Hypothyroidism     Pelvic fracture ([facility])     Sleep apnea     did not tolerate the CPAP       Past Surgical History     Past Surgical History:   Procedure Laterality Date    ABDOMINAL EXPLORATION SURGERY  02/12/2018    LAPAROTOMY EXPLORATORY, REPAIR PERFORATED ULCER    BLADDER REPAIR      BONY PELVIS SURGERY  04/23/2023    @     BREAST ENHANCEMENT SURGERY      1983 and 2011    ELBOW FRACTURE SURGERY Left     ORIF left elbow    EXAMINATION UNDER ANESTHESIA N/A 11/15/2023    DRUG INDUCED SLEEP ENDOSCOPY performed by Charan Gilmore DO at [facility] OR    HYSTERECTOMY (CERVIX STATUS UNKNOWN)      partial  age 36    JOINT REPLACEMENT Left 04/2023    thr    PARTIAL HYSTERECTOMY (CERVIX NOT REMOVED)      SHOULDER SURGERY      ligament moved up left shoulder    SHOULDER SURGERY Left 1989    STIMULATOR SURGERY Right 2/23/2024    INSPIRE IMPLANT PLACEMENT - RIGHT NECK performed

## 2024-05-10 ENCOUNTER — TELEPHONE (OUTPATIENT)
Dept: PULMONOLOGY | Age: 69
End: 2024-05-10

## 2024-06-17 ENCOUNTER — OFFICE VISIT (OUTPATIENT)
Dept: PULMONOLOGY | Age: 69
End: 2024-06-17
Payer: MEDICARE

## 2024-06-17 VITALS
OXYGEN SATURATION: 97 % | SYSTOLIC BLOOD PRESSURE: 176 MMHG | WEIGHT: 175.87 LBS | DIASTOLIC BLOOD PRESSURE: 84 MMHG | BODY MASS INDEX: 31.16 KG/M2 | HEIGHT: 63 IN | HEART RATE: 109 BPM

## 2024-06-17 DIAGNOSIS — E66.09 CLASS 1 OBESITY DUE TO EXCESS CALORIES WITH SERIOUS COMORBIDITY AND BODY MASS INDEX (BMI) OF 31.0 TO 31.9 IN ADULT: ICD-10-CM

## 2024-06-17 DIAGNOSIS — G47.33 OSA (OBSTRUCTIVE SLEEP APNEA): Primary | ICD-10-CM

## 2024-06-17 DIAGNOSIS — I48.0 PAF (PAROXYSMAL ATRIAL FIBRILLATION) (HCC): ICD-10-CM

## 2024-06-17 PROCEDURE — 1036F TOBACCO NON-USER: CPT | Performed by: INTERNAL MEDICINE

## 2024-06-17 PROCEDURE — 1090F PRES/ABSN URINE INCON ASSESS: CPT | Performed by: INTERNAL MEDICINE

## 2024-06-17 PROCEDURE — G8399 PT W/DXA RESULTS DOCUMENT: HCPCS | Performed by: INTERNAL MEDICINE

## 2024-06-17 PROCEDURE — 99214 OFFICE O/P EST MOD 30 MIN: CPT | Performed by: INTERNAL MEDICINE

## 2024-06-17 PROCEDURE — 1123F ACP DISCUSS/DSCN MKR DOCD: CPT | Performed by: INTERNAL MEDICINE

## 2024-06-17 PROCEDURE — G8428 CUR MEDS NOT DOCUMENT: HCPCS | Performed by: INTERNAL MEDICINE

## 2024-06-17 PROCEDURE — G8417 CALC BMI ABV UP PARAM F/U: HCPCS | Performed by: INTERNAL MEDICINE

## 2024-06-17 PROCEDURE — 3077F SYST BP >= 140 MM HG: CPT | Performed by: INTERNAL MEDICINE

## 2024-06-17 PROCEDURE — 3079F DIAST BP 80-89 MM HG: CPT | Performed by: INTERNAL MEDICINE

## 2024-06-17 PROCEDURE — 3017F COLORECTAL CA SCREEN DOC REV: CPT | Performed by: INTERNAL MEDICINE

## 2024-06-17 ASSESSMENT — SLEEP AND FATIGUE QUESTIONNAIRES
HOW LIKELY ARE YOU TO NOD OFF OR FALL ASLEEP WHILE SITTING AND TALKING TO SOMEONE: WOULD NEVER DOZE
HOW LIKELY ARE YOU TO NOD OFF OR FALL ASLEEP WHILE SITTING QUIETLY AFTER LUNCH WITHOUT ALCOHOL: WOULD NEVER DOZE
HOW LIKELY ARE YOU TO NOD OFF OR FALL ASLEEP WHILE WATCHING TV: WOULD NEVER DOZE
HOW LIKELY ARE YOU TO NOD OFF OR FALL ASLEEP WHILE LYING DOWN TO REST IN THE AFTERNOON WHEN CIRCUMSTANCES PERMIT: SLIGHT CHANCE OF DOZING
HOW LIKELY ARE YOU TO NOD OFF OR FALL ASLEEP IN A CAR, WHILE STOPPED FOR A FEW MINUTES IN TRAFFIC: WOULD NEVER DOZE
ESS TOTAL SCORE: 2
HOW LIKELY ARE YOU TO NOD OFF OR FALL ASLEEP WHILE SITTING INACTIVE IN A PUBLIC PLACE: WOULD NEVER DOZE
HOW LIKELY ARE YOU TO NOD OFF OR FALL ASLEEP WHILE SITTING AND READING: SLIGHT CHANCE OF DOZING
HOW LIKELY ARE YOU TO NOD OFF OR FALL ASLEEP WHEN YOU ARE A PASSENGER IN A CAR FOR AN HOUR WITHOUT A BREAK: WOULD NEVER DOZE

## 2024-06-17 NOTE — PROGRESS NOTES
Pulmonology Follow Up Visit    CONSULTING PHYSICIAN:  Tio Gonzalez MD ,     REASON FOR VISIT:   Chief Complaint   Patient presents with    Sleep Apnea     Inspire f/u, activation 04/10/24       DATE OF VISIT: 6/17/2024    HISTORY OF PRESENT ILLNESS: 68 y.o. year old female  who is being seen in the pulmonary/sleep for a follow-up.  Patient had inspire [hypoglossal nerve stimulation] therapy activation on 4/10/2024.  She has consistently gone up on the setting every week.  Finds therapy tolerable.  Reports that she is sleeping better.  Snoring less.  Wakes up unrefreshed.  Has been having some postnasal drip.  Weight remains stable.    Previously: After a long time.  Patient was unable to tolerate the CPAP therapy because of claustrophobia.  She tried several masks including small nasal pillow mask but was unable to use it.  Eventually she returned the machine.  Continues to struggle with sleep symptoms at nighttime.  Also has been having on and off atrial fibrillation.  Weight remains overall stable.  She is trying to explore alternative therapies to treat her sleep apnea. Patient was recently prescribed with BiPAP therapy but is having difficulty using it.  She reports that the pressures are too high and that makes the mask leak.  She tried different size of the mask but ended up getting skin irritation and skin swelling.  Continues to have sleep-related problems.   No further episodes of atrial fibrillation or a flutter.  Now does have a loop recorder.  Patient reports that for last few years she has been having sleep issues including snoring, gasping, choking at nighttime.  She is a light sleeper and has to take trazodone, anxiolytics and a muscle relaxant to help her go to sleep.  Despite this she wakes up tired.  Denies any daytime sleepiness.  Has gained about 16 pounds of weight in last 1 year.  Does have a.m. dry mouth.  Denies a.m. headache.        6/17/2024     1:31 PM 4/9/2024     4:33 PM

## 2024-06-18 ENCOUNTER — HOSPITAL ENCOUNTER (INPATIENT)
Age: 69
LOS: 2 days | Discharge: HOME | DRG: 309 | End: 2024-06-20
Attending: EMERGENCY MEDICINE | Admitting: PEDIATRICS
Payer: MEDICARE

## 2024-06-18 ENCOUNTER — APPOINTMENT (OUTPATIENT)
Dept: GENERAL RADIOLOGY | Age: 69
DRG: 309 | End: 2024-06-18
Payer: MEDICARE

## 2024-06-18 DIAGNOSIS — R79.89 ELEVATED TROPONIN: ICD-10-CM

## 2024-06-18 DIAGNOSIS — I07.1 SEVERE TRICUSPID REGURGITATION BY PRIOR ECHOCARDIOGRAM: ICD-10-CM

## 2024-06-18 DIAGNOSIS — I48.0 PAROXYSMAL ATRIAL FIBRILLATION (HCC): ICD-10-CM

## 2024-06-18 DIAGNOSIS — I48.91 ATRIAL FIBRILLATION WITH RAPID VENTRICULAR RESPONSE (HCC): ICD-10-CM

## 2024-06-18 DIAGNOSIS — I48.91 ATRIAL FIBRILLATION WITH RVR (HCC): Primary | ICD-10-CM

## 2024-06-18 LAB
ALBUMIN SERPL-MCNC: 4.5 G/DL (ref 3.4–5)
ALBUMIN/GLOB SERPL: 1.3 {RATIO} (ref 1.1–2.2)
ALP SERPL-CCNC: 153 U/L (ref 40–129)
ALT SERPL-CCNC: 39 U/L (ref 10–40)
ANION GAP SERPL CALCULATED.3IONS-SCNC: 18 MMOL/L (ref 3–16)
AST SERPL-CCNC: 43 U/L (ref 15–37)
BASOPHILS # BLD: 0 K/UL (ref 0–0.2)
BASOPHILS NFR BLD: 0.6 %
BILIRUB SERPL-MCNC: 0.3 MG/DL (ref 0–1)
BUN SERPL-MCNC: 12 MG/DL (ref 7–20)
CALCIUM SERPL-MCNC: 9.3 MG/DL (ref 8.3–10.6)
CHLORIDE SERPL-SCNC: 103 MMOL/L (ref 99–110)
CO2 SERPL-SCNC: 16 MMOL/L (ref 21–32)
CREAT SERPL-MCNC: 0.7 MG/DL (ref 0.6–1.2)
DEPRECATED RDW RBC AUTO: 14.7 % (ref 12.4–15.4)
EOSINOPHIL # BLD: 0.2 K/UL (ref 0–0.6)
EOSINOPHIL NFR BLD: 3.3 %
GFR SERPLBLD CREATININE-BSD FMLA CKD-EPI: >90 ML/MIN/{1.73_M2}
GLUCOSE SERPL-MCNC: 98 MG/DL (ref 70–99)
HCT VFR BLD AUTO: 37.5 % (ref 36–48)
HGB BLD-MCNC: 13.1 G/DL (ref 12–16)
LACTATE BLDV-SCNC: 1.1 MMOL/L (ref 0.4–1.9)
LYMPHOCYTES # BLD: 1.2 K/UL (ref 1–5.1)
LYMPHOCYTES NFR BLD: 16.5 %
MCH RBC QN AUTO: 36 PG (ref 26–34)
MCHC RBC AUTO-ENTMCNC: 34.8 G/DL (ref 31–36)
MCV RBC AUTO: 103.3 FL (ref 80–100)
MONOCYTES # BLD: 0.7 K/UL (ref 0–1.3)
MONOCYTES NFR BLD: 10.2 %
NEUTROPHILS # BLD: 5.1 K/UL (ref 1.7–7.7)
NEUTROPHILS NFR BLD: 69.4 %
NT-PROBNP SERPL-MCNC: 605 PG/ML (ref 0–124)
PLATELET # BLD AUTO: 268 K/UL (ref 135–450)
PMV BLD AUTO: 7.8 FL (ref 5–10.5)
POTASSIUM SERPL-SCNC: 4.5 MMOL/L (ref 3.5–5.1)
PROT SERPL-MCNC: 7.9 G/DL (ref 6.4–8.2)
RBC # BLD AUTO: 3.63 M/UL (ref 4–5.2)
REASON FOR REJECTION: NORMAL
REJECTED TEST: NORMAL
SODIUM SERPL-SCNC: 137 MMOL/L (ref 136–145)
TROPONIN, HIGH SENSITIVITY: 31 NG/L (ref 0–14)
TROPONIN, HIGH SENSITIVITY: 31 NG/L (ref 0–14)
WBC # BLD AUTO: 7.4 K/UL (ref 4–11)

## 2024-06-18 PROCEDURE — 80053 COMPREHEN METABOLIC PANEL: CPT

## 2024-06-18 PROCEDURE — 36415 COLL VENOUS BLD VENIPUNCTURE: CPT

## 2024-06-18 PROCEDURE — 83605 ASSAY OF LACTIC ACID: CPT

## 2024-06-18 PROCEDURE — 99285 EMERGENCY DEPT VISIT HI MDM: CPT

## 2024-06-18 PROCEDURE — 85025 COMPLETE CBC W/AUTO DIFF WBC: CPT

## 2024-06-18 PROCEDURE — 71045 X-RAY EXAM CHEST 1 VIEW: CPT

## 2024-06-18 PROCEDURE — 83880 ASSAY OF NATRIURETIC PEPTIDE: CPT

## 2024-06-18 PROCEDURE — 6370000000 HC RX 637 (ALT 250 FOR IP): Performed by: NURSE PRACTITIONER

## 2024-06-18 PROCEDURE — 93005 ELECTROCARDIOGRAM TRACING: CPT | Performed by: EMERGENCY MEDICINE

## 2024-06-18 PROCEDURE — 2060000000 HC ICU INTERMEDIATE R&B

## 2024-06-18 PROCEDURE — 96374 THER/PROPH/DIAG INJ IV PUSH: CPT

## 2024-06-18 PROCEDURE — 84484 ASSAY OF TROPONIN QUANT: CPT

## 2024-06-18 PROCEDURE — 2500000003 HC RX 250 WO HCPCS: Performed by: NURSE PRACTITIONER

## 2024-06-18 RX ORDER — DILTIAZEM HCL IN NACL,ISO-OSM 125 MG/125
2.5-15 PLASTIC BAG, INJECTION (ML) INTRAVENOUS CONTINUOUS
Status: DISCONTINUED | OUTPATIENT
Start: 2024-06-18 | End: 2024-06-18 | Stop reason: HOSPADM

## 2024-06-18 RX ORDER — SODIUM CHLORIDE 9 MG/ML
INJECTION, SOLUTION INTRAVENOUS PRN
Status: DISCONTINUED | OUTPATIENT
Start: 2024-06-18 | End: 2024-06-20 | Stop reason: HOSPADM

## 2024-06-18 RX ORDER — DILTIAZEM HYDROCHLORIDE 5 MG/ML
10 INJECTION INTRAVENOUS ONCE
Status: DISCONTINUED | OUTPATIENT
Start: 2024-06-18 | End: 2024-06-19

## 2024-06-18 RX ORDER — POTASSIUM CHLORIDE 20 MEQ/1
40 TABLET, EXTENDED RELEASE ORAL PRN
Status: DISCONTINUED | OUTPATIENT
Start: 2024-06-18 | End: 2024-06-20

## 2024-06-18 RX ORDER — ACETAMINOPHEN 650 MG/1
650 SUPPOSITORY RECTAL EVERY 6 HOURS PRN
Status: DISCONTINUED | OUTPATIENT
Start: 2024-06-18 | End: 2024-06-19

## 2024-06-18 RX ORDER — POLYETHYLENE GLYCOL 3350 17 G/17G
17 POWDER, FOR SOLUTION ORAL DAILY PRN
Status: DISCONTINUED | OUTPATIENT
Start: 2024-06-18 | End: 2024-06-20 | Stop reason: HOSPADM

## 2024-06-18 RX ORDER — POTASSIUM CHLORIDE 7.45 MG/ML
10 INJECTION INTRAVENOUS PRN
Status: DISCONTINUED | OUTPATIENT
Start: 2024-06-18 | End: 2024-06-20

## 2024-06-18 RX ORDER — ACETAMINOPHEN 325 MG/1
650 TABLET ORAL EVERY 6 HOURS PRN
Status: DISCONTINUED | OUTPATIENT
Start: 2024-06-18 | End: 2024-06-19

## 2024-06-18 RX ORDER — MAGNESIUM SULFATE IN WATER 40 MG/ML
2000 INJECTION, SOLUTION INTRAVENOUS PRN
Status: DISCONTINUED | OUTPATIENT
Start: 2024-06-18 | End: 2024-06-20 | Stop reason: HOSPADM

## 2024-06-18 RX ORDER — ONDANSETRON 4 MG/1
4 TABLET, ORALLY DISINTEGRATING ORAL EVERY 8 HOURS PRN
Status: DISCONTINUED | OUTPATIENT
Start: 2024-06-18 | End: 2024-06-20 | Stop reason: HOSPADM

## 2024-06-18 RX ORDER — ONDANSETRON 2 MG/ML
4 INJECTION INTRAMUSCULAR; INTRAVENOUS EVERY 6 HOURS PRN
Status: DISCONTINUED | OUTPATIENT
Start: 2024-06-18 | End: 2024-06-20 | Stop reason: HOSPADM

## 2024-06-18 RX ORDER — METHOCARBAMOL 500 MG/1
750 TABLET, FILM COATED ORAL ONCE
Status: COMPLETED | OUTPATIENT
Start: 2024-06-18 | End: 2024-06-18

## 2024-06-18 RX ORDER — SODIUM CHLORIDE 0.9 % (FLUSH) 0.9 %
5-40 SYRINGE (ML) INJECTION PRN
Status: DISCONTINUED | OUTPATIENT
Start: 2024-06-18 | End: 2024-06-20 | Stop reason: HOSPADM

## 2024-06-18 RX ORDER — SODIUM CHLORIDE 0.9 % (FLUSH) 0.9 %
5-40 SYRINGE (ML) INJECTION EVERY 12 HOURS SCHEDULED
Status: DISCONTINUED | OUTPATIENT
Start: 2024-06-19 | End: 2024-06-20 | Stop reason: HOSPADM

## 2024-06-18 RX ORDER — DILTIAZEM HYDROCHLORIDE 5 MG/ML
10 INJECTION INTRAVENOUS ONCE
Status: COMPLETED | OUTPATIENT
Start: 2024-06-18 | End: 2024-06-18

## 2024-06-18 RX ADMIN — Medication 5 MG/HR: at 22:45

## 2024-06-18 RX ADMIN — DILTIAZEM HYDROCHLORIDE 10 MG: 5 INJECTION, SOLUTION INTRAVENOUS at 22:42

## 2024-06-18 RX ADMIN — METHOCARBAMOL 750 MG: 500 TABLET ORAL at 23:05

## 2024-06-18 ASSESSMENT — ENCOUNTER SYMPTOMS
VOMITING: 0
ABDOMINAL PAIN: 0
SHORTNESS OF BREATH: 0
CHEST TIGHTNESS: 0
NAUSEA: 0
COUGH: 1
DIARRHEA: 0

## 2024-06-19 ENCOUNTER — APPOINTMENT (OUTPATIENT)
Age: 69
DRG: 309 | End: 2024-06-19
Payer: MEDICARE

## 2024-06-19 PROBLEM — I34.0 NONRHEUMATIC MITRAL VALVE REGURGITATION: Status: ACTIVE | Noted: 2024-06-19

## 2024-06-19 PROBLEM — I07.1 SEVERE TRICUSPID REGURGITATION: Status: ACTIVE | Noted: 2024-06-19

## 2024-06-19 PROBLEM — J44.9 CHRONIC OBSTRUCTIVE PULMONARY DISEASE (HCC): Status: ACTIVE | Noted: 2024-06-19

## 2024-06-19 LAB
ANION GAP SERPL CALCULATED.3IONS-SCNC: 13 MMOL/L (ref 3–16)
BACTERIA URNS QL MICRO: NORMAL /HPF
BASOPHILS # BLD: 0 K/UL (ref 0–0.2)
BASOPHILS NFR BLD: 0.7 %
BILIRUB UR QL STRIP.AUTO: NEGATIVE
BUN SERPL-MCNC: 14 MG/DL (ref 7–20)
CALCIUM SERPL-MCNC: 9 MG/DL (ref 8.3–10.6)
CHLORIDE SERPL-SCNC: 107 MMOL/L (ref 99–110)
CHOLEST SERPL-MCNC: 147 MG/DL (ref 0–199)
CLARITY UR: CLEAR
CO2 SERPL-SCNC: 19 MMOL/L (ref 21–32)
COLOR UR: YELLOW
CREAT SERPL-MCNC: 0.6 MG/DL (ref 0.6–1.2)
DEPRECATED RDW RBC AUTO: 14.4 % (ref 12.4–15.4)
EKG ATRIAL RATE: 72 BPM
EKG DIAGNOSIS: NORMAL
EKG DIAGNOSIS: NORMAL
EKG P AXIS: 66 DEGREES
EKG P-R INTERVAL: 140 MS
EKG Q-T INTERVAL: 364 MS
EKG Q-T INTERVAL: 450 MS
EKG QRS DURATION: 86 MS
EKG QRS DURATION: 88 MS
EKG QTC CALCULATION (BAZETT): 492 MS
EKG QTC CALCULATION (BAZETT): 507 MS
EKG R AXIS: 57 DEGREES
EKG R AXIS: 58 DEGREES
EKG T AXIS: -59 DEGREES
EKG T AXIS: 244 DEGREES
EKG VENTRICULAR RATE: 117 BPM
EKG VENTRICULAR RATE: 72 BPM
EOSINOPHIL # BLD: 0.2 K/UL (ref 0–0.6)
EOSINOPHIL NFR BLD: 3.4 %
EPI CELLS #/AREA URNS AUTO: 1 /HPF (ref 0–5)
FERRITIN SERPL IA-MCNC: 216.1 NG/ML (ref 15–150)
FOLATE SERPL-MCNC: >20 NG/ML (ref 4.78–24.2)
GFR SERPLBLD CREATININE-BSD FMLA CKD-EPI: >90 ML/MIN/{1.73_M2}
GLUCOSE SERPL-MCNC: 108 MG/DL (ref 70–99)
GLUCOSE UR STRIP.AUTO-MCNC: NEGATIVE MG/DL
HCT VFR BLD AUTO: 35.3 % (ref 36–48)
HDLC SERPL-MCNC: 57 MG/DL (ref 40–60)
HGB BLD-MCNC: 12.7 G/DL (ref 12–16)
HGB UR QL STRIP.AUTO: NEGATIVE
HYALINE CASTS #/AREA URNS AUTO: 0 /LPF (ref 0–8)
IRON SATN MFR SERPL: 25 % (ref 15–50)
IRON SERPL-MCNC: 77 UG/DL (ref 37–145)
KETONES UR STRIP.AUTO-MCNC: NEGATIVE MG/DL
LDLC SERPL CALC-MCNC: 64 MG/DL
LEUKOCYTE ESTERASE UR QL STRIP.AUTO: ABNORMAL
LYMPHOCYTES # BLD: 1 K/UL (ref 1–5.1)
LYMPHOCYTES NFR BLD: 17.9 %
MCH RBC QN AUTO: 36.3 PG (ref 26–34)
MCHC RBC AUTO-ENTMCNC: 35.9 G/DL (ref 31–36)
MCV RBC AUTO: 101.3 FL (ref 80–100)
MONOCYTES # BLD: 0.7 K/UL (ref 0–1.3)
MONOCYTES NFR BLD: 11.5 %
NEUTROPHILS # BLD: 3.8 K/UL (ref 1.7–7.7)
NEUTROPHILS NFR BLD: 66.5 %
NITRITE UR QL STRIP.AUTO: NEGATIVE
PH UR STRIP.AUTO: 6 [PH] (ref 5–8)
PLATELET # BLD AUTO: 254 K/UL (ref 135–450)
PMV BLD AUTO: 7.5 FL (ref 5–10.5)
POTASSIUM SERPL-SCNC: 3.9 MMOL/L (ref 3.5–5.1)
PROT UR STRIP.AUTO-MCNC: NEGATIVE MG/DL
RBC # BLD AUTO: 3.48 M/UL (ref 4–5.2)
RBC CLUMPS #/AREA URNS AUTO: 0 /HPF (ref 0–4)
SODIUM SERPL-SCNC: 139 MMOL/L (ref 136–145)
SP GR UR STRIP.AUTO: 1.01 (ref 1–1.03)
TIBC SERPL-MCNC: 306 UG/DL (ref 260–445)
TRIGL SERPL-MCNC: 132 MG/DL (ref 0–150)
TROPONIN, HIGH SENSITIVITY: 20 NG/L (ref 0–14)
TSH SERPL DL<=0.005 MIU/L-ACNC: 1.03 UIU/ML (ref 0.27–4.2)
UA DIPSTICK W REFLEX MICRO PNL UR: YES
URN SPEC COLLECT METH UR: ABNORMAL
UROBILINOGEN UR STRIP-ACNC: 1 E.U./DL
VIT B12 SERPL-MCNC: 1487 PG/ML (ref 211–911)
VLDLC SERPL CALC-MCNC: 26 MG/DL
WBC # BLD AUTO: 5.8 K/UL (ref 4–11)
WBC #/AREA URNS AUTO: 1 /HPF (ref 0–5)

## 2024-06-19 PROCEDURE — 2580000003 HC RX 258

## 2024-06-19 PROCEDURE — 83550 IRON BINDING TEST: CPT

## 2024-06-19 PROCEDURE — 2500000003 HC RX 250 WO HCPCS: Performed by: INTERNAL MEDICINE

## 2024-06-19 PROCEDURE — 6370000000 HC RX 637 (ALT 250 FOR IP): Performed by: PEDIATRICS

## 2024-06-19 PROCEDURE — 82607 VITAMIN B-12: CPT

## 2024-06-19 PROCEDURE — 7100000011 HC PHASE II RECOVERY - ADDTL 15 MIN: Performed by: INTERNAL MEDICINE

## 2024-06-19 PROCEDURE — 93325 DOPPLER ECHO COLOR FLOW MAPG: CPT | Performed by: INTERNAL MEDICINE

## 2024-06-19 PROCEDURE — 94150 VITAL CAPACITY TEST: CPT

## 2024-06-19 PROCEDURE — 93320 DOPPLER ECHO COMPLETE: CPT | Performed by: INTERNAL MEDICINE

## 2024-06-19 PROCEDURE — 99152 MOD SED SAME PHYS/QHP 5/>YRS: CPT | Performed by: INTERNAL MEDICINE

## 2024-06-19 PROCEDURE — 93312 ECHO TRANSESOPHAGEAL: CPT | Performed by: INTERNAL MEDICINE

## 2024-06-19 PROCEDURE — 82728 ASSAY OF FERRITIN: CPT

## 2024-06-19 PROCEDURE — 6370000000 HC RX 637 (ALT 250 FOR IP): Performed by: INTERNAL MEDICINE

## 2024-06-19 PROCEDURE — 80061 LIPID PANEL: CPT

## 2024-06-19 PROCEDURE — 80048 BASIC METABOLIC PNL TOTAL CA: CPT

## 2024-06-19 PROCEDURE — B24BZZ4 ULTRASONOGRAPHY OF HEART WITH AORTA, TRANSESOPHAGEAL: ICD-10-PCS | Performed by: INTERNAL MEDICINE

## 2024-06-19 PROCEDURE — 84484 ASSAY OF TROPONIN QUANT: CPT

## 2024-06-19 PROCEDURE — 93005 ELECTROCARDIOGRAM TRACING: CPT | Performed by: INTERNAL MEDICINE

## 2024-06-19 PROCEDURE — 7100000010 HC PHASE II RECOVERY - FIRST 15 MIN: Performed by: INTERNAL MEDICINE

## 2024-06-19 PROCEDURE — 93010 ELECTROCARDIOGRAM REPORT: CPT | Performed by: INTERNAL MEDICINE

## 2024-06-19 PROCEDURE — 92960 CARDIOVERSION ELECTRIC EXT: CPT

## 2024-06-19 PROCEDURE — 84443 ASSAY THYROID STIM HORMONE: CPT

## 2024-06-19 PROCEDURE — 99153 MOD SED SAME PHYS/QHP EA: CPT | Performed by: INTERNAL MEDICINE

## 2024-06-19 PROCEDURE — 85025 COMPLETE CBC W/AUTO DIFF WBC: CPT

## 2024-06-19 PROCEDURE — 93312 ECHO TRANSESOPHAGEAL: CPT

## 2024-06-19 PROCEDURE — 5A2204Z RESTORATION OF CARDIAC RHYTHM, SINGLE: ICD-10-PCS | Performed by: INTERNAL MEDICINE

## 2024-06-19 PROCEDURE — 6360000002 HC RX W HCPCS: Performed by: INTERNAL MEDICINE

## 2024-06-19 PROCEDURE — 82746 ASSAY OF FOLIC ACID SERUM: CPT

## 2024-06-19 PROCEDURE — 99223 1ST HOSP IP/OBS HIGH 75: CPT | Performed by: INTERNAL MEDICINE

## 2024-06-19 PROCEDURE — 83735 ASSAY OF MAGNESIUM: CPT

## 2024-06-19 PROCEDURE — 2580000003 HC RX 258: Performed by: PEDIATRICS

## 2024-06-19 PROCEDURE — 94761 N-INVAS EAR/PLS OXIMETRY MLT: CPT

## 2024-06-19 PROCEDURE — 83540 ASSAY OF IRON: CPT

## 2024-06-19 PROCEDURE — 81001 URINALYSIS AUTO W/SCOPE: CPT

## 2024-06-19 PROCEDURE — 36415 COLL VENOUS BLD VENIPUNCTURE: CPT

## 2024-06-19 PROCEDURE — 83036 HEMOGLOBIN GLYCOSYLATED A1C: CPT

## 2024-06-19 PROCEDURE — 2060000000 HC ICU INTERMEDIATE R&B

## 2024-06-19 RX ORDER — MIDAZOLAM HYDROCHLORIDE 1 MG/ML
INJECTION INTRAMUSCULAR; INTRAVENOUS PRN
Status: COMPLETED | OUTPATIENT
Start: 2024-06-19 | End: 2024-06-19

## 2024-06-19 RX ORDER — SODIUM CHLORIDE 9 MG/ML
INJECTION, SOLUTION INTRAVENOUS PRN
Status: DISCONTINUED | OUTPATIENT
Start: 2024-06-19 | End: 2024-06-20 | Stop reason: HOSPADM

## 2024-06-19 RX ORDER — TIZANIDINE 4 MG/1
4 TABLET ORAL 2 TIMES DAILY PRN
Status: DISCONTINUED | OUTPATIENT
Start: 2024-06-19 | End: 2024-06-20 | Stop reason: HOSPADM

## 2024-06-19 RX ORDER — FENTANYL CITRATE 50 UG/ML
INJECTION, SOLUTION INTRAMUSCULAR; INTRAVENOUS PRN
Status: COMPLETED | OUTPATIENT
Start: 2024-06-19 | End: 2024-06-19

## 2024-06-19 RX ORDER — PANTOPRAZOLE SODIUM 40 MG/1
40 TABLET, DELAYED RELEASE ORAL
Status: DISCONTINUED | OUTPATIENT
Start: 2024-06-19 | End: 2024-06-20 | Stop reason: HOSPADM

## 2024-06-19 RX ORDER — SODIUM CHLORIDE 0.9 % (FLUSH) 0.9 %
5-40 SYRINGE (ML) INJECTION EVERY 12 HOURS SCHEDULED
Status: DISCONTINUED | OUTPATIENT
Start: 2024-06-19 | End: 2024-06-20 | Stop reason: HOSPADM

## 2024-06-19 RX ORDER — SODIUM CHLORIDE 0.9 % (FLUSH) 0.9 %
5-40 SYRINGE (ML) INJECTION PRN
Status: DISCONTINUED | OUTPATIENT
Start: 2024-06-19 | End: 2024-06-20 | Stop reason: HOSPADM

## 2024-06-19 RX ORDER — LIDOCAINE HYDROCHLORIDE 20 MG/ML
SOLUTION OROPHARYNGEAL PRN
Status: COMPLETED | OUTPATIENT
Start: 2024-06-19 | End: 2024-06-19

## 2024-06-19 RX ORDER — HYDROXYCHLOROQUINE SULFATE 200 MG/1
200 TABLET, FILM COATED ORAL 2 TIMES DAILY
Status: DISCONTINUED | OUTPATIENT
Start: 2024-06-19 | End: 2024-06-19

## 2024-06-19 RX ORDER — METOPROLOL SUCCINATE 25 MG/1
25 TABLET, EXTENDED RELEASE ORAL DAILY
Status: DISCONTINUED | OUTPATIENT
Start: 2024-06-19 | End: 2024-06-20 | Stop reason: HOSPADM

## 2024-06-19 RX ORDER — METOPROLOL TARTRATE 1 MG/ML
5 INJECTION, SOLUTION INTRAVENOUS EVERY 5 MIN PRN
Status: DISCONTINUED | OUTPATIENT
Start: 2024-06-19 | End: 2024-06-20 | Stop reason: HOSPADM

## 2024-06-19 RX ORDER — GABAPENTIN 300 MG/1
600 CAPSULE ORAL 3 TIMES DAILY
Status: DISCONTINUED | OUTPATIENT
Start: 2024-06-19 | End: 2024-06-20 | Stop reason: HOSPADM

## 2024-06-19 RX ORDER — M-VIT,TX,IRON,MINS/CALC/FOLIC 27MG-0.4MG
1 TABLET ORAL DAILY
Status: DISCONTINUED | OUTPATIENT
Start: 2024-06-19 | End: 2024-06-20 | Stop reason: HOSPADM

## 2024-06-19 RX ORDER — ESCITALOPRAM OXALATE 10 MG/1
20 TABLET ORAL DAILY
Status: DISCONTINUED | OUTPATIENT
Start: 2024-06-19 | End: 2024-06-20 | Stop reason: HOSPADM

## 2024-06-19 RX ORDER — ACETAMINOPHEN 325 MG/1
650 TABLET ORAL EVERY 6 HOURS PRN
Status: DISCONTINUED | OUTPATIENT
Start: 2024-06-19 | End: 2024-06-20 | Stop reason: HOSPADM

## 2024-06-19 RX ORDER — THYROID 30 MG/1
90 TABLET ORAL DAILY
Status: DISCONTINUED | OUTPATIENT
Start: 2024-06-19 | End: 2024-06-20 | Stop reason: HOSPADM

## 2024-06-19 RX ORDER — ALPRAZOLAM 0.25 MG/1
0.25 TABLET ORAL NIGHTLY PRN
Status: DISCONTINUED | OUTPATIENT
Start: 2024-06-19 | End: 2024-06-20 | Stop reason: HOSPADM

## 2024-06-19 RX ORDER — DIGOXIN 0.25 MG/ML
500 INJECTION INTRAMUSCULAR; INTRAVENOUS ONCE
Status: COMPLETED | OUTPATIENT
Start: 2024-06-19 | End: 2024-06-19

## 2024-06-19 RX ORDER — LISINOPRIL 10 MG/1
10 TABLET ORAL DAILY
Status: DISCONTINUED | OUTPATIENT
Start: 2024-06-19 | End: 2024-06-20 | Stop reason: HOSPADM

## 2024-06-19 RX ORDER — LANOLIN ALCOHOL/MO/W.PET/CERES
1000 CREAM (GRAM) TOPICAL DAILY
Status: DISCONTINUED | OUTPATIENT
Start: 2024-06-19 | End: 2024-06-20 | Stop reason: HOSPADM

## 2024-06-19 RX ORDER — ALBUTEROL SULFATE 90 UG/1
2 AEROSOL, METERED RESPIRATORY (INHALATION) 4 TIMES DAILY PRN
Status: DISCONTINUED | OUTPATIENT
Start: 2024-06-19 | End: 2024-06-20 | Stop reason: HOSPADM

## 2024-06-19 RX ORDER — TRAZODONE HYDROCHLORIDE 50 MG/1
50 TABLET ORAL NIGHTLY
Status: DISCONTINUED | OUTPATIENT
Start: 2024-06-19 | End: 2024-06-20 | Stop reason: HOSPADM

## 2024-06-19 RX ORDER — AMOXICILLIN AND CLAVULANATE POTASSIUM 875; 125 MG/1; MG/1
1 TABLET, FILM COATED ORAL EVERY 12 HOURS SCHEDULED
Status: DISCONTINUED | OUTPATIENT
Start: 2024-06-19 | End: 2024-06-20 | Stop reason: HOSPADM

## 2024-06-19 RX ORDER — NITROGLYCERIN 0.4 MG/1
0.4 TABLET SUBLINGUAL EVERY 5 MIN PRN
Status: DISCONTINUED | OUTPATIENT
Start: 2024-06-19 | End: 2024-06-20

## 2024-06-19 RX ADMIN — TRAZODONE HYDROCHLORIDE 50 MG: 50 TABLET ORAL at 01:07

## 2024-06-19 RX ADMIN — ALPRAZOLAM 0.25 MG: 0.25 TABLET ORAL at 01:07

## 2024-06-19 RX ADMIN — FENTANYL CITRATE 50 MCG: 50 INJECTION, SOLUTION INTRAMUSCULAR; INTRAVENOUS at 11:43

## 2024-06-19 RX ADMIN — MIDAZOLAM 0.5 MG: 1 INJECTION INTRAMUSCULAR; INTRAVENOUS at 11:46

## 2024-06-19 RX ADMIN — MIDAZOLAM 2 MG: 1 INJECTION INTRAMUSCULAR; INTRAVENOUS at 11:43

## 2024-06-19 RX ADMIN — TRAZODONE HYDROCHLORIDE 50 MG: 50 TABLET ORAL at 22:28

## 2024-06-19 RX ADMIN — GABAPENTIN 600 MG: 300 CAPSULE ORAL at 21:09

## 2024-06-19 RX ADMIN — LIDOCAINE HYDROCHLORIDE 15 ML: 20 SOLUTION ORAL; TOPICAL at 11:33

## 2024-06-19 RX ADMIN — GABAPENTIN 600 MG: 300 CAPSULE ORAL at 09:26

## 2024-06-19 RX ADMIN — MIDAZOLAM 0.5 MG: 1 INJECTION INTRAMUSCULAR; INTRAVENOUS at 11:44

## 2024-06-19 RX ADMIN — ACETAMINOPHEN 650 MG: 325 TABLET ORAL at 00:25

## 2024-06-19 RX ADMIN — LISINOPRIL 10 MG: 10 TABLET ORAL at 09:26

## 2024-06-19 RX ADMIN — Medication 2 TABLET: at 09:26

## 2024-06-19 RX ADMIN — SODIUM CHLORIDE, PRESERVATIVE FREE 10 ML: 5 INJECTION INTRAVENOUS at 21:10

## 2024-06-19 RX ADMIN — RIVAROXABAN 20 MG: 20 TABLET, FILM COATED ORAL at 09:26

## 2024-06-19 RX ADMIN — METHOHEXITAL SODIUM 40 MG: 500 INJECTION, POWDER, LYOPHILIZED, FOR SOLUTION INTRAMUSCULAR; INTRAVENOUS; RECTAL at 11:58

## 2024-06-19 RX ADMIN — AMOXICILLIN AND CLAVULANATE POTASSIUM 1 TABLET: 875; 125 TABLET, FILM COATED ORAL at 09:26

## 2024-06-19 RX ADMIN — DIGOXIN 500 MCG: 0.25 INJECTION INTRAMUSCULAR; INTRAVENOUS at 16:08

## 2024-06-19 RX ADMIN — METOPROLOL SUCCINATE 25 MG: 25 TABLET, EXTENDED RELEASE ORAL at 09:26

## 2024-06-19 RX ADMIN — Medication 1 TABLET: at 09:26

## 2024-06-19 RX ADMIN — PANTOPRAZOLE SODIUM 40 MG: 40 TABLET, DELAYED RELEASE ORAL at 06:20

## 2024-06-19 RX ADMIN — GABAPENTIN 600 MG: 300 CAPSULE ORAL at 14:59

## 2024-06-19 RX ADMIN — GABAPENTIN 600 MG: 300 CAPSULE ORAL at 01:07

## 2024-06-19 RX ADMIN — Medication 1000 MCG: at 09:26

## 2024-06-19 RX ADMIN — SODIUM CHLORIDE, PRESERVATIVE FREE 10 ML: 5 INJECTION INTRAVENOUS at 16:11

## 2024-06-19 RX ADMIN — AMOXICILLIN AND CLAVULANATE POTASSIUM 1 TABLET: 875; 125 TABLET, FILM COATED ORAL at 21:10

## 2024-06-19 RX ADMIN — ESCITALOPRAM OXALATE 20 MG: 10 TABLET ORAL at 09:26

## 2024-06-19 RX ADMIN — THYROID 90 MG: 30 TABLET ORAL at 09:26

## 2024-06-19 RX ADMIN — ALPRAZOLAM 0.25 MG: 0.25 TABLET ORAL at 22:28

## 2024-06-19 ASSESSMENT — PAIN DESCRIPTION - LOCATION
LOCATION: HEAD
LOCATION: HEAD

## 2024-06-19 ASSESSMENT — PAIN SCALES - GENERAL
PAINLEVEL_OUTOF10: 6
PAINLEVEL_OUTOF10: 3
PAINLEVEL_OUTOF10: 2

## 2024-06-19 NOTE — CONSULTS
magnesium sulfate, ondansetron **OR** ondansetron, polyethylene glycol     Prior to Admission medications    Medication Sig Start Date End Date Taking? Authorizing Provider   metoprolol succinate (TOPROL XL) 25 MG extended release tablet TAKE 1 TABLET BY MOUTH DAILY 4/12/24   Hernandez Esquivel APRN - CNP   ondansetron (ZOFRAN) 8 MG tablet Take 1 tablet by mouth every 8 hours as needed for Nausea or Vomiting 2/23/24   Charan Gilmore DO   cephALEXin (KEFLEX) 500 MG capsule Take 1 capsule by mouth 3 times daily 2/23/24   Charan Gilmore DO   traZODone (DESYREL) 50 MG tablet Take 1 tablet by mouth nightly    Mio Roy MD   glycopyrrolate-formoterol (BEVESPI AEROSPHERE) 9-4.8 MCG/ACT AERO Inhale 2 puffs into the lungs 2 times daily 1/9/24   Ino Mccrary MD   gabapentin (NEURONTIN) 300 MG capsule Take 2 capsules by mouth 3 times daily.    ProviderMio MD   fluocinolone (DERMOTIC) 0.01 % OIL oil Place 3 drops in ear(s) 2 times daily as needed (ear itching) 10/23/23   Charan Gilmore DO   albuterol sulfate HFA (VENTOLIN HFA) 108 (90 Base) MCG/ACT inhaler Inhale 2 puffs into the lungs 4 times daily as needed for Wheezing 8/8/23   Ino Mccrary MD   escitalopram (LEXAPRO) 20 MG tablet Take 1 tablet by mouth daily    ProviderMio MD   ibuprofen (ADVIL;MOTRIN) 200 MG tablet Take 2 tablets by mouth every 6 hours as needed for Pain    Mio Roy MD   acetaminophen (TYLENOL) 325 MG tablet Take 2 tablets by mouth every 6 hours as needed for Pain    Mio Roy MD   hydroxychloroquine (PLAQUENIL) 200 MG tablet Take 1 tablet by mouth 2 times daily 2/15/23 2/23/24  Shanta Fox MD   ARMOUR THYROID 90 MG tablet TAKE 1 TABLET BY MOUTH ONCE A DAY 12/22/22   Mio Roy MD   lisinopril (PRINIVIL;ZESTRIL) 10 MG tablet Take 1 tablet by mouth daily    Mio Roy MD   Calcium Carb-Cholecalciferol (CALCIUM 1000 + D) 1000-800  MG-UNIT TABS Take 1 tablet by mouth daily    Mio Roy MD   Multiple Vitamins-Minerals (THERAPEUTIC MULTIVITAMIN-MINERALS) tablet Take 1 tablet by mouth daily    Mio Roy MD   vitamin B-12 (CYANOCOBALAMIN) 1000 MCG tablet Take 1 tablet by mouth daily    Mio Roy MD   rivaroxaban (XARELTO) 20 MG TABS tablet Take 1 tablet by mouth daily  Patient taking differently: Take 1 tablet by mouth daily No need for stopping for surgery 20   Tio Gonzalez MD   pantoprazole (PROTONIX) 40 MG tablet Take 1 tablet by mouth daily 18   Joss Huang MD   tiZANidine (ZANAFLEX) 4 MG tablet Take 1 tablet by mouth 2 times daily as needed Usually takes at night if needed    Mio Roy MD   ALPRAZolam (XANAX) 0.25 MG tablet Take 1 tablet by mouth nightly as needed for Sleep.    Mio Roy MD       Physical Examination:  Vitals:    24 0924   BP: (!) 132/95   Pulse: (!) 105   Resp: 19   Temp:    SpO2: 94%      No intake/output data recorded.   Wt Readings from Last 3 Encounters:   24 77.7 kg (171 lb 3.2 oz)   24 79.8 kg (175 lb 13.9 oz)   24 81.2 kg (179 lb)     Temp  Av.2 °F (36.8 °C)  Min: 97.6 °F (36.4 °C)  Max: 98.8 °F (37.1 °C)  Pulse  Av.7  Min: 84  Max: 122  BP  Min: 104/70  Max: 155/97  SpO2  Av.8 %  Min: 91 %  Max: 95 %  No intake or output data in the 24 hours ending 24 0937    Constitutional: Oriented. No distress.   Cardiovascular: Normal rate, irregular rhythm, S1&S2.    Pulmonary/Chest: Bilateral respiratory sounds. No rhonchi.    Abdominal: Soft. No tenderness   Musculoskeletal: No edema    Neurological: Alert and oriented. Follows command    Active Hospital Problems    Diagnosis Date Noted    Atrial fibrillation with rapid ventricular response (HCC) [I48.91] 2024       Past Medical History:   Diagnosis Date    A-fib ([facility])     Age-related osteoporosis without current pathological fracture

## 2024-06-19 NOTE — CARE COORDINATION
Discharge Planning:     (CM) reviewed the patient's chart to assess needs. Patient's Readmission Risk Score is 10%. Patient's medical insurance is [employer] and Medicare Part A and B. Patient's PCP is Tio Gonzalez. No needs anticipated, at this time. CM team to follow. Staff to inform CM if additional discharge needs arise.          Electronically signed by PIA Cortez on 6/19/2024 at 3:17 PM

## 2024-06-19 NOTE — PLAN OF CARE
EKG with markedly abnormal ST changes, not present on last sinus EKG in 2/2023  Prior echo with concerns of coronary fistula  Mild troponin leak    Patient w/o anginal complaints  Normal cath 2020    Given EKG findings, will need a coronary CTA to rule out significant CAD  Pt on blood thinner today thus can't do a LHC - also CTA is a better test to rule out significant coronary fistuale    If CTA is normal, patient can be discharged home with close f/u  CLAUDINE shows no indication for surgical intervention for her valves as the regurgitation is < moderate for both MR and TR

## 2024-06-19 NOTE — PROCEDURES
PROCEDURE NOTE  Date: 6/19/2024   Name: Kasandra Cantor  YOB: 1955    Cardioversion    Date/Time: 6/19/2024 12:02 PM    Performed by: Khang Lazaro MD  Authorized by: Khang Lazaro MD  Consent: Written consent obtained.  Risks and benefits: risks, benefits and alternatives were discussed  Consent given by: patient  Patient understanding: patient states understanding of the procedure being performed  Patient consent: the patient's understanding of the procedure matches consent given  Procedure consent: procedure consent matches procedure scheduled  Relevant documents: relevant documents present and verified  Test results: test results available and properly labeled  Site marked: the operative site was marked  Imaging studies: imaging studies available  Patient identity confirmed: verbally with patient, arm band and hospital-assigned identification number  Time out: Immediately prior to procedure a \"time out\" was called to verify the correct patient, procedure, equipment, support staff and site/side marked as required.  Local anesthesia used: yes    Anesthesia:  Local anesthesia used: yes  Local Anesthetic: topical anesthetic    Sedation:  Patient sedated: yes    Patient tolerance: patient tolerated the procedure well with no immediate complications      Cardioversion procedure note:       Informed consent explained to patient and consent form signed by patient (prior to sedating for CLAUDINE).  Conscious sedation performed for CLAUDINE:  3 mg IV versed, 50 mcg IV fentanyl    CLAUDINE performed without complication - no clot visualized   Normal LV size and function  Normal RV size and function  Mild TR  Mild-to-moderate MR       CONSCIOUS SEDATION (for CLAUDINE): Conscious sedation was given. The medication documented above was administered during the study.There was a trained observer present throughout the   entire time during which sedation was administered. Moderate sedation time   was 15

## 2024-06-19 NOTE — PROGRESS NOTES
CT tech was called for pt's CTA, they would like to wait until her HR gets near 60's. HR was in 70's on monitor.

## 2024-06-19 NOTE — CARE COORDINATION
SW/KATIANA attempted to complete DCPA.  Could not complete due to patient being out of the room.  Will attempt to complete when patient returns.    Electronically signed by PIA ALCANTAR on 6/19/2024 at 12:12 PM

## 2024-06-19 NOTE — SEDATION DOCUMENTATION
Brief Pre-Op Note/Sedation Assessment      Kasandra Cantor  1955  [id number]      [id number]  11:17 AM    Planned Procedure: Transesophageal Echocardiogram & Cardioversion    Post Procedure Plan: Return to same level of care    Consent: I have discussed with the patient and/or the patient representative the indication, alternatives, and the possible risks and/or complications of the planned procedure and the anesthesia methods. The patient and/or patient representative appear to understand and agree to proceed.    Chief Complaint: AF RVR, valve disease      Indications for Procedure:   AF RVR, plan to cardiovert, valve disease    Current blood thinners - Xarelto.  No missed doses > 3 weeks (confirmed with patient)    Vital Signs:  BP (!) 132/95   Pulse (!) 105   Temp 98.1 °F (36.7 °C) (Oral)   Resp 19   Ht 1.6 m (5' 3\")   Wt 77.7 kg (171 lb 3.2 oz)   SpO2 94%   BMI 30.33 kg/m²     Allergies:  Allergies   Allergen Reactions    Nirmatrelvir-Ritonavir Other (See Comments)     Paxlovid,  QT prolongation    Blood pressure drops    Nsaids      Note: PERFORATED PEPTIC ULCER       Past Medical History:  Past Medical History:   Diagnosis Date    A-fib ([facility])     Age-related osteoporosis without current pathological fracture 05/18/2022    Anxiety     Arthritis     COPD (chronic obstructive pulmonary disease) ([facility])     mild to moderate    Depression     History of blood transfusion     History of claustrophobia     Hypertension     Hypothyroidism     Pelvic fracture (HCC)     Sleep apnea     did not tolerate the CPAP     Surgical History:  Past Surgical History:   Procedure Laterality Date    ABDOMINAL EXPLORATION SURGERY  02/12/2018    LAPAROTOMY EXPLORATORY, REPAIR PERFORATED ULCER    BLADDER REPAIR      BONY PELVIS SURGERY  04/23/2023    @     BREAST ENHANCEMENT SURGERY      1983 and 2011    ELBOW FRACTURE SURGERY Left     ORIF left elbow    EXAMINATION UNDER ANESTHESIA N/A 11/15/2023    DRUG INDUCED  rivaroxaban (XARELTO) tablet 20 mg  20 mg Oral Daily with breakfast Ole Palmer MD   20 mg at 06/19/24 0926    tiZANidine (ZANAFLEX) tablet 4 mg  4 mg Oral BID PRN Ole Palmer MD        traZODone (DESYREL) tablet 50 mg  50 mg Oral Nightly Ole Palmer MD   50 mg at 06/19/24 0107    vitamin B-12 (CYANOCOBALAMIN) tablet 1,000 mcg  1,000 mcg Oral Daily Ole Palmer MD   1,000 mcg at 06/19/24 0926    amoxicillin-clavulanate (AUGMENTIN) 875-125 MG per tablet 1 tablet  1 tablet Oral 2 times per day Ole Palmer MD   1 tablet at 06/19/24 0926    sodium chloride flush 0.9 % injection 5-40 mL  5-40 mL IntraVENous 2 times per day Randall Zena G, APRN - CNP        sodium chloride flush 0.9 % injection 5-40 mL  5-40 mL IntraVENous PRN Cecilia Pereiraecca G, APRN - CNP        0.9 % sodium chloride infusion   IntraVENous PRN Randall Zena G, APRN - CNP        sodium chloride flush 0.9 % injection 5-40 mL  5-40 mL IntraVENous 2 times per day Ole Palmer MD        sodium chloride flush 0.9 % injection 5-40 mL  5-40 mL IntraVENous PRN Ole Palmer MD        0.9 % sodium chloride infusion   IntraVENous PRN Ole Palmer MD        potassium chloride (KLOR-CON M) extended release tablet 40 mEq  40 mEq Oral PRN Ole Palmer MD        Or    potassium bicarb-citric acid (EFFER-K) effervescent tablet 40 mEq  40 mEq Oral PRN Ole Palmer MD        Or    potassium chloride 10 mEq/100 mL IVPB (Peripheral Line)  10 mEq IntraVENous PRN Ole Palmer MD        magnesium sulfate 2000 mg in 50 mL IVPB premix  2,000 mg IntraVENous PRN Ole Palmer MD        ondansetron (ZOFRAN-ODT) disintegrating tablet 4 mg  4 mg Oral Q8H PRN Ole Palmer MD        Or    ondansetron (ZOFRAN) injection 4 mg  4 mg IntraVENous Q6H PRN Ole Palmer MD        polyethylene glycol (GLYCOLAX) packet 17 g  17 g Oral Daily PRN Ole Palmer MD

## 2024-06-19 NOTE — ED PROVIDER NOTES
I have personally performed a face to face diagnostic evaluation on this patient. I have fully participated in the care of this patient I personally saw the patient and performed a substantive portion of the visit including all aspects of the medical decision making.  I have reviewed and agree with all pertinent clinical information including history, physical exam, diagnostic tests, and the plan.      HPI: Kasandra Cantro presented with palpitations.  Patient's heart rate is between 120 and 150s.  Patient has a history of A-fib had an ablation 2 years ago.  Patient was recently on Augmentin for upper respiratory infection.  Patient has no chest pain at this time.  No dizziness no lightheadedness no shortness of breath.  Chief Complaint   Patient presents with    Palpitations     Pt via  from home for high heart rate, 150s when walking and 120s when sitting, hx of afib, had ablation two years ago, wondering if her cold medication caused it       Review of Systems: See SKIP note  Vital Signs: BP (!) 155/97   Pulse (!) 117   Temp 98.8 °F (37.1 °C) (Oral)   Resp 19   Wt 79.2 kg (174 lb 9.6 oz)   SpO2 93%   BMI 30.93 kg/m²     Alert 68 y.o. female who does not appear toxic or acutely ill  HENT: Atraumatic, oral mucosa moist  Neck: Grossly normal ROM  Chest/Lungs: respiratory effort normal, irregularly irregular tachycardia  Musculoskeletal: Grossly normal ROM  Skin: No palor or diaphoresis    Medical Decision Making and Plan:  Pertinent Labs & Imaging studies reviewed. (See SKIP chart for details)  I agree with SKIP assessment and plan.  68-year-old female presents for atrial fibrillation with RVR with palpitations arrived hypertensive, tachycardic, afebrile.  Saturating well on room air.  Patient has mildly elevated troponin.  Will trend.  BNP is elevated above 600 but is less than her previous numbers.  No significant signs of severe sepsis or renal dysfunction.  Will start patient on diltiazem bolus and

## 2024-06-19 NOTE — H&P
socially/2 beers daily    Drug use: No     Social Determinants of Health     Food Insecurity: No Food Insecurity (6/19/2024)    Hunger Vital Sign     Worried About Running Out of Food in the Last Year: Never true     Ran Out of Food in the Last Year: Never true   Transportation Needs: No Transportation Needs (6/19/2024)    PRAPARE - Transportation     Lack of Transportation (Medical): No     Lack of Transportation (Non-Medical): No   Housing Stability: Low Risk  (6/19/2024)    Housing Stability Vital Sign     Unable to Pay for Housing in the Last Year: No     Number of Places Lived in the Last Year: 1     Unstable Housing in the Last Year: No       Medications:   Medications:    hydroxychloroquine  200 mg Oral BID    thyroid  90 mg Oral Daily    Calcium Carb-Cholecalciferol  1 tablet Oral Daily    escitalopram  20 mg Oral Daily    gabapentin  600 mg Oral TID    tiotropium-olodaterol  2 puff Inhalation Daily    lisinopril  10 mg Oral Daily    metoprolol succinate  25 mg Oral Daily    therapeutic multivitamin-minerals  1 tablet Oral Daily    pantoprazole  40 mg Oral Daily    rivaroxaban  20 mg Oral Daily    traZODone  50 mg Oral Nightly    vitamin B-12  1,000 mcg Oral Daily    sodium chloride flush  5-40 mL IntraVENous 2 times per day    dilTIAZem  10 mg IntraVENous Once      Infusions:    sodium chloride      dilTIAZem       PRN Meds: acetaminophen, 650 mg, Q6H PRN  albuterol sulfate HFA, 2 puff, 4x Daily PRN  ALPRAZolam, 0.25 mg, Nightly PRN  tiZANidine, 4 mg, BID PRN  sodium chloride flush, 5-40 mL, PRN  sodium chloride, , PRN  potassium chloride, 40 mEq, PRN   Or  potassium alternative oral replacement, 40 mEq, PRN   Or  potassium chloride, 10 mEq, PRN  magnesium sulfate, 2,000 mg, PRN  ondansetron, 4 mg, Q8H PRN   Or  ondansetron, 4 mg, Q6H PRN  polyethylene glycol, 17 g, Daily PRN  acetaminophen, 650 mg, Q6H PRN   Or  acetaminophen, 650 mg, Q6H PRN        Labs      CBC:   Recent Labs     06/18/24  2146   WBC 7.4  PROVIDED HISTORY: SOB TECHNOLOGIST PROVIDED HISTORY: Reason for exam:->SOB Reason for Exam: SOB FINDINGS: Medical devices: Right nerve stimulator.  Bilateral calcified breast implants. Mediastinum/Heart: The mediastinal contours are normal. The heart appears normal in size. Lungs: Bibasilar parenchymal opacities are present, likely atelectasis. Pleura: No pleural effusion. No pneumothorax.     Bibasilar parenchymal opacities are present, likely atelectasis.         Electronically signed by Ole Palmer MD on 6/19/2024 at 12:51 AM

## 2024-06-19 NOTE — CONSULTS
[facility]      CONSULTATION  [phone]      Chief Complaint   Patient presents with    Palpitations     Pt via  from home for high heart rate, 150s when walking and 120s when sitting, hx of afib, had ablation two years ago, wondering if her cold medication caused it       Reason for consult:  valve disease (consult requested by Dr. Paul from EP service)    ASSESSMENT / PLAN:     PAF, with RVR  Severe tricuspid valve regurgitation  Moderate, possibly severe MR  Mild-to-moderate AR  Elevated BNP  Elevated troponin, likely demand ischemia from AF RVR    Plan  -I will perform a CLAUDINE / DCCV today  -Pending CLAUDINE findings may need surgical consult if MR/TR is indeed severe  -Continue full anticoagulation given AF and risk of stroke with high CHADS-VASC score      [id number] Score for Atrial Fibrillation Stroke Risk   Risk   Factors  Component Value   C CHF No 0   H HTN Yes 1   A2 Age >= 75 No,  (68 y.o.) 0   D DM No 0   S2 Prior Stroke/TIA No 0   V Vascular Disease No 0   A Age 65-74 Yes,  (68 y.o.) 1   Sc Sex female 1    [id number]  Score  3   Score last updated 6/19/24 11:11 AM EDT    Click here for a link to the UpToDate guideline \"Atrial Fibrillation: Anticoagulation therapy to prevent embolization    Disclaimer: Risk Score calculation is dependent on accuracy of patient problem list and past encounter diagnosis.    HISTORY OF PRESENT ILLNESS:     Kasandra Cantor is a 68 y.o. patient with known PAF, followed by our EP group.  She is s/p prior AF ablation.  She is generally symptomatic when she is in AF and felt herself go into it with fast palpitations last night so she went to the ER.  HR was in 150s while walking to bathroom at home.  She took an extra metoprolol without relief.    No chest pain.  Normal coronaries by cath in 2020.  Started on dilt drip overnight with improved HR.  She has been compliant with her anticoagulation.  No bleeding or bruising issues.    Since going into AF  XL) extended release tablet 25 mg  25 mg Oral Daily Ole Palmer MD   25 mg at 06/19/24 0926    therapeutic multivitamin-minerals 1 tablet  1 tablet Oral Daily Ole Palmer MD   1 tablet at 06/19/24 0926    pantoprazole (PROTONIX) tablet 40 mg  40 mg Oral QAM AC Ole Palmer MD   40 mg at 06/19/24 0620    rivaroxaban (XARELTO) tablet 20 mg  20 mg Oral Daily with breakfast Ole Palmer MD   20 mg at 06/19/24 0926    tiZANidine (ZANAFLEX) tablet 4 mg  4 mg Oral BID PRN Ole Palmer MD        traZODone (DESYREL) tablet 50 mg  50 mg Oral Nightly Ole Palmer MD   50 mg at 06/19/24 0107    vitamin B-12 (CYANOCOBALAMIN) tablet 1,000 mcg  1,000 mcg Oral Daily Ole Palmer MD   1,000 mcg at 06/19/24 0926    amoxicillin-clavulanate (AUGMENTIN) 875-125 MG per tablet 1 tablet  1 tablet Oral 2 times per day Ole Palmer MD   1 tablet at 06/19/24 0926    sodium chloride flush 0.9 % injection 5-40 mL  5-40 mL IntraVENous 2 times per day Zena Pereira, APRN - CNP        sodium chloride flush 0.9 % injection 5-40 mL  5-40 mL IntraVENous PRN Zena Pereira G, APRN - CNP        0.9 % sodium chloride infusion   IntraVENous PRN Zena Pereira G, APRN - CNP        sodium chloride flush 0.9 % injection 5-40 mL  5-40 mL IntraVENous 2 times per day Ole Palmer MD        sodium chloride flush 0.9 % injection 5-40 mL  5-40 mL IntraVENous PRN Ole Palmer MD        0.9 % sodium chloride infusion   IntraVENous PRN Ole Palmer MD        potassium chloride (KLOR-CON M) extended release tablet 40 mEq  40 mEq Oral PRN Ole Palmer MD        Or    potassium bicarb-citric acid (EFFER-K) effervescent tablet 40 mEq  40 mEq Oral PRN Ole Palmer MD        Or    potassium chloride 10 mEq/100 mL IVPB (Peripheral Line)  10 mEq IntraVENous PRN Ole Palmer MD        magnesium sulfate 2000 mg in 50 mL IVPB premix  2,000 mg IntraVENous PRN

## 2024-06-19 NOTE — PROGRESS NOTES
Hospitalist Progress Note    Patient seen and examined.  H&P reviewed.  No changes in physical exam. Patient reports improvement in her symptoms since DCCV.  Labs and imaging reviewed.      Updates in H&P as follows:       Problem List  Principal Problem:    Atrial fibrillation with rapid ventricular response (HCC)  Active Problems:    Severe tricuspid regurgitation    Nonrheumatic mitral valve regurgitation  Resolved Problems:    * No resolved hospital problems. *     Assessment and Plan:    Afib RVR   - telemetry  - diltiazem drip  - metoprolol succ 25 mg po daily   - rivaroxaban 20 mg po daily   - TSH pending  - NPO  - Cardiology consulted     Elevated troponin   - denies chest pain   - trend      Atelectasis:   - IS      Elevated AST, Macrocytic RBCs, alcohol consumption:   - check b12/folate    - encouraged to drink less alcohol      Erosive OA, generalized OA, DDD of C and L spine, clinical osteoporosis, hx of pelvic fracture s/p fall April 2023:   - sees Dr Fox (rhematologist)   -  mg po BID   - gabapentin 600 mg po TID   - voltaren gel prn   - treated with evenity (osteoprosis)   - vit D and calcium supplements   - avoiding nsaids due to hx of perforated ulcer   - tizanidine 4 mg po BID PRN      Cough  - No reports of cough today, can be due to asthma   - outpatient working dx of either pneumonia or pneumonia prevention given her treatment with hcq  - augmentin BID x 10 days (6/18- ~6/27/24)   - Add procal      HTN:   - lisinopril 10 mg po daily      EILEEN:   - hx of intolerance to cpap   - has inspire implant      Hypothyroidism:   - armour thyroid 90 mg po daily      GERD:   - pantoprazole 40 mg po daily      Anxiety:    - alprazolam 0.25 mg po qhs prn   - escitalopram 20 mg po daily      Asthma  - albuterol q6 hrs prn   - bevespi BID --> formulary stiolto BID      Plan:   CLAUDINE/DCCV today for hx of valvular disease and AF  Discussed with Dr. Lazaro cardiology recommends coronary CTA due to

## 2024-06-19 NOTE — PLAN OF CARE
Problem: Discharge Planning  Goal: Discharge to home or other facility with appropriate resources  Outcome: Progressing  Flowsheets (Taken 6/19/2024 1296)  Discharge to home or other facility with appropriate resources: Identify barriers to discharge with patient and caregiver     Problem: Safety - Adult  Goal: Free from fall injury  Outcome: Progressing     Problem: ABCDS Injury Assessment  Goal: Absence of physical injury  Outcome: Progressing     Problem: Pain  Goal: Verbalizes/displays adequate comfort level or baseline comfort level  Outcome: Progressing

## 2024-06-19 NOTE — ED PROVIDER NOTES
[facility] EMERGENCY DEPARTMENT  EMERGENCY DEPARTMENT ENCOUNTER        Pt Name: Kasandra Cantor  MRN: [id number]  Birthdate 1955  Date of evaluation: 6/18/2024  Provider: EDDA He - CNP  PCP: Tio Gonzalez MD  Note Started: 11:17 PM EDT 6/18/24       I have seen and evaluated this patient with my supervising physician jose g      CHIEF COMPLAINT       Chief Complaint   Patient presents with    Palpitations     Pt via  from home for high heart rate, 150s when walking and 120s when sitting, hx of afib, had ablation two years ago, wondering if her cold medication caused it        HISTORY OF PRESENT ILLNESS: 1 or more Elements     History from : Patient    Limitations to history : None    Kasandra Cantor is a 68 y.o. female who presents to the emergency department with complaint of irregularly high heart rate that is irregular with history of A-fib.  The patient is anticoagulated on Xarelto and has had history of cardiac ablation 2 years ago.  She has been sick with upper respiratory infection on Augmentin that was started yesterday, curious if the medication could have caused the arrhythmia.  States that she is generally in sinus rhythm    Denies any headache, fever, lightheadedness, dizziness, visual disturbances.  No neck or back pain.  No abdominal pain, nausea, vomiting, diarrhea, constipation, or dysuria.  No rash.    Nursing Notes were all reviewed and agreed with or any disagreements were addressed in the HPI.    REVIEW OF SYSTEMS :      Review of Systems   Constitutional:  Negative for activity change, chills and fever.   Respiratory:  Positive for cough. Negative for chest tightness and shortness of breath.    Cardiovascular:  Positive for palpitations. Negative for chest pain.   Gastrointestinal:  Negative for abdominal pain, diarrhea, nausea and vomiting.   Genitourinary:  Negative for dysuria.   All other systems reviewed and are  reviewed.   Constitutional:       Appearance: She is well-developed. She is not diaphoretic.   HENT:      Head: Normocephalic and atraumatic.      Right Ear: External ear normal.      Left Ear: External ear normal.   Eyes:      General:         Right eye: No discharge.         Left eye: No discharge.   Neck:      Vascular: No JVD.   Cardiovascular:      Rate and Rhythm: Tachycardia present. Rhythm irregular.      Pulses: Normal pulses.   Pulmonary:      Effort: Pulmonary effort is normal. No respiratory distress.      Breath sounds: Normal breath sounds.   Abdominal:      Palpations: Abdomen is soft.      Tenderness: There is no abdominal tenderness.   Musculoskeletal:         General: Normal range of motion.   Skin:     General: Skin is warm and dry.      Coloration: Skin is not pale.   Neurological:      Mental Status: She is alert.   Psychiatric:         Behavior: Behavior normal.             DIAGNOSTIC RESULTS   LABS:    Labs Reviewed   CBC WITH AUTO DIFFERENTIAL - Abnormal; Notable for the following components:       Result Value    RBC 3.63 (*)     .3 (*)     MCH 36.0 (*)     All other components within normal limits   COMPREHENSIVE METABOLIC PANEL - Abnormal; Notable for the following components:    CO2 16 (*)     Anion Gap 18 (*)     Alkaline Phosphatase 153 (*)     AST 43 (*)     All other components within normal limits   TROPONIN - Abnormal; Notable for the following components:    Troponin, High Sensitivity 31 (*)     All other components within normal limits   BRAIN NATRIURETIC PEPTIDE - Abnormal; Notable for the following components:    Pro- (*)     All other components within normal limits   LACTATE, SEPSIS   SPECIMEN REJECTION    Narrative:     CALL  [facility] tel. [phone],  Rejected Test Name/Called to: rachid, 06/18/2024 22:08, by COLCL   TROPONIN   LACTATE, SEPSIS       When ordered only abnormal lab results are displayed. All other labs were within normal range or not returned

## 2024-06-20 ENCOUNTER — APPOINTMENT (OUTPATIENT)
Dept: CT IMAGING | Age: 69
DRG: 309 | End: 2024-06-20
Payer: MEDICARE

## 2024-06-20 ENCOUNTER — TELEPHONE (OUTPATIENT)
Dept: CARDIOLOGY CLINIC | Age: 69
End: 2024-06-20

## 2024-06-20 VITALS
HEART RATE: 73 BPM | WEIGHT: 179.7 LBS | OXYGEN SATURATION: 94 % | HEIGHT: 63 IN | RESPIRATION RATE: 20 BRPM | DIASTOLIC BLOOD PRESSURE: 73 MMHG | TEMPERATURE: 97.7 F | SYSTOLIC BLOOD PRESSURE: 126 MMHG | BODY MASS INDEX: 31.84 KG/M2

## 2024-06-20 LAB
ALBUMIN SERPL-MCNC: 4.3 G/DL (ref 3.4–5)
ALP SERPL-CCNC: 130 U/L (ref 40–129)
ALT SERPL-CCNC: 24 U/L (ref 10–40)
ANION GAP SERPL CALCULATED.3IONS-SCNC: 11 MMOL/L (ref 3–16)
AST SERPL-CCNC: 21 U/L (ref 15–37)
BASOPHILS # BLD: 0 K/UL (ref 0–0.2)
BASOPHILS NFR BLD: 0.5 %
BILIRUB DIRECT SERPL-MCNC: <0.2 MG/DL (ref 0–0.3)
BILIRUB INDIRECT SERPL-MCNC: ABNORMAL MG/DL (ref 0–1)
BILIRUB SERPL-MCNC: 0.6 MG/DL (ref 0–1)
BUN SERPL-MCNC: 13 MG/DL (ref 7–20)
CALCIUM SERPL-MCNC: 9.3 MG/DL (ref 8.3–10.6)
CHLORIDE SERPL-SCNC: 106 MMOL/L (ref 99–110)
CO2 SERPL-SCNC: 22 MMOL/L (ref 21–32)
CREAT SERPL-MCNC: 0.6 MG/DL (ref 0.6–1.2)
DEPRECATED RDW RBC AUTO: 14.4 % (ref 12.4–15.4)
EOSINOPHIL # BLD: 0.2 K/UL (ref 0–0.6)
EOSINOPHIL NFR BLD: 3 %
EST. AVERAGE GLUCOSE BLD GHB EST-MCNC: 96.8 MG/DL
GFR SERPLBLD CREATININE-BSD FMLA CKD-EPI: >90 ML/MIN/{1.73_M2}
GLUCOSE SERPL-MCNC: 108 MG/DL (ref 70–99)
HBA1C MFR BLD: 5 %
HCT VFR BLD AUTO: 36.8 % (ref 36–48)
HGB BLD-MCNC: 12.8 G/DL (ref 12–16)
LYMPHOCYTES # BLD: 0.9 K/UL (ref 1–5.1)
LYMPHOCYTES NFR BLD: 15.7 %
MAGNESIUM SERPL-MCNC: 2.4 MG/DL (ref 1.8–2.4)
MCH RBC QN AUTO: 35.4 PG (ref 26–34)
MCHC RBC AUTO-ENTMCNC: 34.8 G/DL (ref 31–36)
MCV RBC AUTO: 102 FL (ref 80–100)
MONOCYTES # BLD: 0.6 K/UL (ref 0–1.3)
MONOCYTES NFR BLD: 10.7 %
NEUTROPHILS # BLD: 4 K/UL (ref 1.7–7.7)
NEUTROPHILS NFR BLD: 70.1 %
PLATELET # BLD AUTO: 256 K/UL (ref 135–450)
PMV BLD AUTO: 7.9 FL (ref 5–10.5)
POTASSIUM SERPL-SCNC: 4 MMOL/L (ref 3.5–5.1)
PROCALCITONIN SERPL IA-MCNC: 0.06 NG/ML (ref 0–0.15)
PROT SERPL-MCNC: 6.7 G/DL (ref 6.4–8.2)
RBC # BLD AUTO: 3.61 M/UL (ref 4–5.2)
SODIUM SERPL-SCNC: 139 MMOL/L (ref 136–145)
WBC # BLD AUTO: 5.7 K/UL (ref 4–11)

## 2024-06-20 PROCEDURE — 2500000003 HC RX 250 WO HCPCS

## 2024-06-20 PROCEDURE — 2580000003 HC RX 258: Performed by: PEDIATRICS

## 2024-06-20 PROCEDURE — 80048 BASIC METABOLIC PNL TOTAL CA: CPT

## 2024-06-20 PROCEDURE — 6370000000 HC RX 637 (ALT 250 FOR IP): Performed by: INTERNAL MEDICINE

## 2024-06-20 PROCEDURE — 85025 COMPLETE CBC W/AUTO DIFF WBC: CPT

## 2024-06-20 PROCEDURE — 84145 PROCALCITONIN (PCT): CPT

## 2024-06-20 PROCEDURE — 6360000004 HC RX CONTRAST MEDICATION: Performed by: INTERNAL MEDICINE

## 2024-06-20 PROCEDURE — 6370000000 HC RX 637 (ALT 250 FOR IP): Performed by: PEDIATRICS

## 2024-06-20 PROCEDURE — 75574 CT ANGIO HRT W/3D IMAGE: CPT

## 2024-06-20 PROCEDURE — 94760 N-INVAS EAR/PLS OXIMETRY 1: CPT

## 2024-06-20 PROCEDURE — 36415 COLL VENOUS BLD VENIPUNCTURE: CPT

## 2024-06-20 PROCEDURE — 2580000003 HC RX 258

## 2024-06-20 PROCEDURE — 2500000003 HC RX 250 WO HCPCS: Performed by: NURSE PRACTITIONER

## 2024-06-20 PROCEDURE — 99233 SBSQ HOSP IP/OBS HIGH 50: CPT | Performed by: INTERNAL MEDICINE

## 2024-06-20 PROCEDURE — 80076 HEPATIC FUNCTION PANEL: CPT

## 2024-06-20 PROCEDURE — 2580000003 HC RX 258: Performed by: NURSE PRACTITIONER

## 2024-06-20 PROCEDURE — 6360000002 HC RX W HCPCS: Performed by: INTERNAL MEDICINE

## 2024-06-20 PROCEDURE — 94640 AIRWAY INHALATION TREATMENT: CPT

## 2024-06-20 RX ORDER — DIGOXIN 0.25 MG/ML
250 INJECTION INTRAMUSCULAR; INTRAVENOUS ONCE
Status: DISCONTINUED | OUTPATIENT
Start: 2024-06-20 | End: 2024-06-20

## 2024-06-20 RX ORDER — DIGOXIN 0.25 MG/ML
500 INJECTION INTRAMUSCULAR; INTRAVENOUS ONCE
Status: COMPLETED | OUTPATIENT
Start: 2024-06-20 | End: 2024-06-20

## 2024-06-20 RX ORDER — 0.9 % SODIUM CHLORIDE 0.9 %
1000 INTRAVENOUS SOLUTION INTRAVENOUS ONCE
Status: DISCONTINUED | OUTPATIENT
Start: 2024-06-20 | End: 2024-06-20 | Stop reason: HOSPADM

## 2024-06-20 RX ORDER — SPIRONOLACTONE 25 MG/1
25 TABLET ORAL DAILY
Status: DISCONTINUED | OUTPATIENT
Start: 2024-06-20 | End: 2024-06-20 | Stop reason: HOSPADM

## 2024-06-20 RX ORDER — AMOXICILLIN AND CLAVULANATE POTASSIUM 875; 125 MG/1; MG/1
1 TABLET, FILM COATED ORAL 2 TIMES DAILY
Qty: 20 TABLET | Refills: 0 | Status: SHIPPED
Start: 2024-06-20

## 2024-06-20 RX ORDER — DILTIAZEM HCL IN NACL,ISO-OSM 125 MG/125
2.5-15 PLASTIC BAG, INJECTION (ML) INTRAVENOUS CONTINUOUS
Status: DISCONTINUED | OUTPATIENT
Start: 2024-06-20 | End: 2024-06-20

## 2024-06-20 RX ORDER — SPIRONOLACTONE 25 MG/1
25 TABLET ORAL DAILY
Qty: 30 TABLET | Refills: 0 | Status: SHIPPED | OUTPATIENT
Start: 2024-06-21 | End: 2024-06-24 | Stop reason: SDUPTHER

## 2024-06-20 RX ORDER — ESCITALOPRAM OXALATE 20 MG/1
10 TABLET ORAL DAILY
Qty: 30 TABLET | Refills: 3 | Status: SHIPPED
Start: 2024-06-20

## 2024-06-20 RX ORDER — NITROGLYCERIN 0.4 MG/1
0.4 TABLET SUBLINGUAL EVERY 5 MIN PRN
Status: DISCONTINUED | OUTPATIENT
Start: 2024-06-20 | End: 2024-06-20 | Stop reason: HOSPADM

## 2024-06-20 RX ORDER — DILTIAZEM HYDROCHLORIDE 5 MG/ML
5 INJECTION INTRAVENOUS ONCE
Status: COMPLETED | OUTPATIENT
Start: 2024-06-20 | End: 2024-06-20

## 2024-06-20 RX ORDER — FUROSEMIDE 20 MG/1
20 TABLET ORAL DAILY
Status: DISCONTINUED | OUTPATIENT
Start: 2024-06-20 | End: 2024-06-20 | Stop reason: HOSPADM

## 2024-06-20 RX ORDER — CETIRIZINE HYDROCHLORIDE 10 MG/1
10 TABLET ORAL DAILY
Status: DISCONTINUED | OUTPATIENT
Start: 2024-06-20 | End: 2024-06-20 | Stop reason: HOSPADM

## 2024-06-20 RX ORDER — CETIRIZINE HYDROCHLORIDE 10 MG/1
10 TABLET ORAL DAILY
Qty: 30 TABLET | Refills: 0 | COMMUNITY
Start: 2024-06-21

## 2024-06-20 RX ORDER — FUROSEMIDE 20 MG/1
20 TABLET ORAL DAILY
Qty: 30 TABLET | Refills: 0 | Status: SHIPPED | OUTPATIENT
Start: 2024-06-21

## 2024-06-20 RX ADMIN — GABAPENTIN 600 MG: 300 CAPSULE ORAL at 09:03

## 2024-06-20 RX ADMIN — METOPROLOL SUCCINATE 25 MG: 25 TABLET, EXTENDED RELEASE ORAL at 09:04

## 2024-06-20 RX ADMIN — SODIUM CHLORIDE, PRESERVATIVE FREE 10 ML: 5 INJECTION INTRAVENOUS at 09:06

## 2024-06-20 RX ADMIN — DILTIAZEM HYDROCHLORIDE 5 MG: 5 INJECTION, SOLUTION INTRAVENOUS at 10:31

## 2024-06-20 RX ADMIN — Medication 1000 MCG: at 09:04

## 2024-06-20 RX ADMIN — Medication 2 TABLET: at 09:03

## 2024-06-20 RX ADMIN — EMPAGLIFLOZIN 10 MG: 10 TABLET, FILM COATED ORAL at 14:21

## 2024-06-20 RX ADMIN — DIGOXIN 500 MCG: 0.25 INJECTION INTRAMUSCULAR; INTRAVENOUS at 11:32

## 2024-06-20 RX ADMIN — ESCITALOPRAM OXALATE 20 MG: 10 TABLET ORAL at 09:04

## 2024-06-20 RX ADMIN — THYROID 90 MG: 30 TABLET ORAL at 09:03

## 2024-06-20 RX ADMIN — PANTOPRAZOLE SODIUM 40 MG: 40 TABLET, DELAYED RELEASE ORAL at 09:04

## 2024-06-20 RX ADMIN — RIVAROXABAN 20 MG: 20 TABLET, FILM COATED ORAL at 09:03

## 2024-06-20 RX ADMIN — IOPAMIDOL 85 ML: 755 INJECTION, SOLUTION INTRAVENOUS at 12:38

## 2024-06-20 RX ADMIN — FUROSEMIDE 20 MG: 20 TABLET ORAL at 14:21

## 2024-06-20 RX ADMIN — TIOTROPIUM BROMIDE AND OLODATEROL 2 PUFF: 3.124; 2.736 SPRAY, METERED RESPIRATORY (INHALATION) at 08:38

## 2024-06-20 RX ADMIN — ACETAMINOPHEN 650 MG: 325 TABLET ORAL at 04:30

## 2024-06-20 RX ADMIN — NITROGLYCERIN 0.4 MG: 0.4 TABLET SUBLINGUAL at 12:46

## 2024-06-20 RX ADMIN — GABAPENTIN 600 MG: 300 CAPSULE ORAL at 14:21

## 2024-06-20 RX ADMIN — DILTIAZEM HYDROCHLORIDE 2.5 MG/HR: 5 INJECTION, SOLUTION INTRAVENOUS at 10:36

## 2024-06-20 RX ADMIN — Medication 1 TABLET: at 09:04

## 2024-06-20 RX ADMIN — AMOXICILLIN AND CLAVULANATE POTASSIUM 1 TABLET: 875; 125 TABLET, FILM COATED ORAL at 09:04

## 2024-06-20 RX ADMIN — METOPROLOL TARTRATE 25 MG: 25 TABLET, FILM COATED ORAL at 09:04

## 2024-06-20 RX ADMIN — LISINOPRIL 10 MG: 10 TABLET ORAL at 09:03

## 2024-06-20 RX ADMIN — CETIRIZINE HYDROCHLORIDE 10 MG: 10 TABLET, FILM COATED ORAL at 14:21

## 2024-06-20 RX ADMIN — SPIRONOLACTONE 25 MG: 25 TABLET ORAL at 14:21

## 2024-06-20 ASSESSMENT — PAIN SCALES - GENERAL
PAINLEVEL_OUTOF10: 2
PAINLEVEL_OUTOF10: 0
PAINLEVEL_OUTOF10: 5

## 2024-06-20 NOTE — TELEPHONE ENCOUNTER
Pt scheduled for f/u in ox on 7/18 at 10am per [facility] message.    all other ROS negative except as per HPI

## 2024-06-20 NOTE — PROGRESS NOTES
On-call cardiology called regarding Coronary CTA and the potential need for oral metoprolol for the night per CTA protocol. On-call cardiology stated they would look into it, and the Pt might get IV metoprolol tomorrow instead.

## 2024-06-20 NOTE — PROGRESS NOTES
[facility]     Daily Progress Note      Admit Date:  6/18/2024    ASSESSMENT AND PLAN:  AF RVR  COPD  Elevated troponin - likely due to demand ischemia from AF RVR  Abnormal EKG - concerning for interval development of CAD  TR / MR - not severe  Chronic diastolic heart failure - elevated BNP, slight pulmonary edema on CXR on my review of images.  NYHA II  PAF - continue NOAC and beta blocker.  S/P prior ablation.  Follows with EP.  Additional management per EP service.  S/P bilateral breast implants that are calcified and make it difficult to visualize her chest organs by echo and CXR    Plan  Coronary CTA   Will need HR < 60  Continue oral metoprolol  Add IV metoprolol as needed  Since rate still not controlled, dilt drip started  Since rate still not controlled, I am giving a fluid bolus 1000 ml x 1 and 1 dose of IV digoxin 500 mcg x 1    Will start medical therapy for diastolic dysfunction with po lasix, aldactone, and Jardiance    Consider chest CT as inpatient or outpatient for f/u of COPD / lung parenchymal disease    If coronary CTA shows no CAD, no further plans for inpatient cardiology evaluation    I will see her in the office for long-term mgmt of her diastolic heart failure    Subjective:  Ms. Cantor feels much better now that she is back in SR.  She notes that lately she has had a lot of cough and  has been more winded than usual.  No chest pain.   Never smoker but intense 2nd hand smoke exposure.  She has followed in pulm clinic with Dr. Ino Mccrary.  Last PFTs 2022 showing FEV1 ~70% with minimal reversibility, last chest CT 2022    Objective:   /69   Pulse 63   Temp 97.8 °F (36.6 °C) (Oral)   Resp 18   Ht 1.6 m (5' 3\")   Wt 81.5 kg (179 lb 11.2 oz)   SpO2 93%   BMI 31.83 kg/m²   No intake or output data in the 24 hours ending 06/20/24 1104    TELEMETRY: Sinus     Physical Exam:  General:  Awake, alert, oriented x 3, NAD  Skin:  Warm and dry  Neck:  JVD

## 2024-06-20 NOTE — CARE COORDINATION
06/20/24 1648   IMM Letter   IMM Letter given to Patient/Family/Significant other/Guardian/POA/by: CM attempted to give IMM, CM went to room and Pt was already gone from room.     Electronically signed by Charlee Saldana on 6/20/2024 at 4:49 PM

## 2024-06-20 NOTE — PLAN OF CARE
Problem: Discharge Planning  Goal: Discharge to home or other facility with appropriate resources  6/19/2024 2229 by Kelsi Moraes RN  Outcome: Progressing     Problem: ABCDS Injury Assessment  Goal: Absence of physical injury  6/19/2024 2229 by Kelsi Moraes RN  Outcome: Progressing     Problem: Pain  Goal: Verbalizes/displays adequate comfort level or baseline comfort level  6/19/2024 2229 by Kelsi Moraes RN  Outcome: Progressing     Problem: Safety - Adult  Goal: Free from fall injury  6/19/2024 2229 by Kelsi Moraes RN  Outcome: Progressing

## 2024-06-20 NOTE — PLAN OF CARE
Problem: Discharge Planning  Goal: Discharge to home or other facility with appropriate resources  Outcome: Completed     Problem: Safety - Adult  Goal: Free from fall injury  Outcome: Completed     Problem: ABCDS Injury Assessment  Goal: Absence of physical injury  Outcome: Completed     Problem: Pain  Goal: Verbalizes/displays adequate comfort level or baseline comfort level  Outcome: Completed

## 2024-06-20 NOTE — PROGRESS NOTES
[facility], [facility] [place]   Electrophysiology   Date: 6/20/2024  Reason for Follow up: Atrial Fibrillation with RVR   Chief Complaint   Patient presents with    Palpitations     Pt via  from home for high heart rate, 150s when walking and 120s when sitting, hx of afib, had ablation two years ago, wondering if her cold medication caused it        HPI: Kasandra Cantor is a 68 y.o. female with past medical history of Atrial Fibrillation, COPD, hypertension, hypothyroidism and sleep apnea     She presented to the hospital with complaints of palpitations and shortness of breath that started a couple of hours before she came to the hospital.  She took her pulse rate at home and it was in the 160's. She took an extra dose of her Metoprolol and waited a couple of hours. It had no effect on her heart rate so she came to the hospital. She was in Atrial Fibrillation upon arrival to emergency room. She has atrial fibrillation ablation 2 years ago 3/2022. History of multiple cardioversion's as well (2/2021, 5/2023)     She has been on antibiotics recently for upper respiratory infection.     She is s/p CLAUDINE with cardioversion yesterday. Now in sinus rhythm and feels much better. Plan for coronary CT today per general cardiology to r/o CAD.          [id number] Score for Atrial Fibrillation Stroke Risk   Risk   Factors  Component Value   C CHF No 0   H HTN Yes 1   A2 Age >= 75 No,  (68 y.o.) 0   D DM No 0   S2 Prior Stroke/TIA No 0   V Vascular Disease No 0   A Age 65-74 Yes,  (68 y.o.) 1   Sc Sex female 1    [id number]  Score  3   Score last updated 6/20/24 3:31 PM EDT      Assessment and Plan:     Paroxysmal Atrial Fibrillation   Now in SR  S/p CLAUDINE with DCCV yesterday   ChadVasc Score: 3, reference table above   ChadVasc Score and anticoagulation discussed. Risk of bleeding was discussed, patient verbalizes understanding   Anticoagulant: Xarelto- Continue   Rate/Rhythm control: Metoprolol 25 mg daily-  daily    Mio Roy MD   ibuprofen (ADVIL;MOTRIN) 200 MG tablet Take 2 tablets by mouth every 6 hours as needed for Pain    Mio Roy MD   acetaminophen (TYLENOL) 325 MG tablet Take 2 tablets by mouth every 6 hours as needed for Pain    Mio Roy MD   hydroxychloroquine (PLAQUENIL) 200 MG tablet Take 1 tablet by mouth 2 times daily 2/15/23 2/23/24  Shanta Fox MD   ARMOUR THYROID 90 MG tablet TAKE 1 TABLET BY MOUTH ONCE A DAY 22   Mio Roy MD   lisinopril (PRINIVIL;ZESTRIL) 10 MG tablet Take 1 tablet by mouth daily    Mio Roy MD   Calcium Carb-Cholecalciferol (CALCIUM 1000 + D) 1000-800 MG-UNIT TABS Take 1 tablet by mouth daily    Mio Roy MD   Multiple Vitamins-Minerals (THERAPEUTIC MULTIVITAMIN-MINERALS) tablet Take 1 tablet by mouth daily    Mio Roy MD   vitamin B-12 (CYANOCOBALAMIN) 1000 MCG tablet Take 1 tablet by mouth daily    Mio Roy MD   rivaroxaban (XARELTO) 20 MG TABS tablet Take 1 tablet by mouth daily  Patient taking differently: Take 1 tablet by mouth daily No need for stopping for surgery 20   Tio Gonzalez MD   pantoprazole (PROTONIX) 40 MG tablet Take 1 tablet by mouth daily 18   Joss Huang MD   tiZANidine (ZANAFLEX) 4 MG tablet Take 1 tablet by mouth 2 times daily as needed Usually takes at night if needed    Mio Roy MD   ALPRAZolam (XANAX) 0.25 MG tablet Take 1 tablet by mouth nightly as needed for Sleep.    ProviderMio MD       Physical Examination:  Vitals:    24 1420   BP: (!) 161/83   Pulse: 65   Resp: 20   Temp:    SpO2: 94%      In: 240 [P.O.:240]  Out: -    Wt Readings from Last 3 Encounters:   24 81.5 kg (179 lb 11.2 oz)   24 79.8 kg (175 lb 13.9 oz)   24 81.2 kg (179 lb)     Temp  Av.8 °F (36.6 °C)  Min: 97.7 °F (36.5 °C)  Max: 97.8 °F (36.6 °C)  Pulse  Av.3  Min: 63  Max: 75  BP  Min:

## 2024-06-20 NOTE — TELEPHONE ENCOUNTER
----- Message from Khang Lazaro MD sent at 6/20/2024 11:15 AM EDT -----  Regarding: needs follow up with me  She actually loves driving and is willing to go to [place], so let's put her there next time we're in town    F/u diastolic heart failure and AF s/p cardioversion    Thanks    Hopefully going home today if CTA is normal

## 2024-06-20 NOTE — DISCHARGE SUMMARY
[facility]    Patient Demographics    Patient. Kasandra Cantor  Date of Birth. 1955  MRN. [id number]     Primary care provider. Tio Gonzalez MD  (Tel: [phone])    Admit date: 6/18/2024    Discharge date (blank if same as Note Date):   Note Date: 6/20/2024     Reason for Hospitalization.   Chief Complaint   Patient presents with    Palpitations     Pt via  from home for high heart rate, 150s when walking and 120s when sitting, hx of afib, had ablation two years ago, wondering if her cold medication caused it        Significant Findings.   Principal Problem:    Atrial fibrillation with RVR (HCC)  Active Problems:    Elevated troponin    Severe tricuspid regurgitation by prior echocardiogram    Nonrheumatic mitral valve regurgitation    Chronic obstructive pulmonary disease (HCC)  Resolved Problems:    * No resolved hospital problems. *     Problem-based Hospital Course.   Kasandra Cantor is a 68 y.o. female with a past medical history of hypertension, hyperlipidemia, COPD, PAF, EILEEN, pelvis fracture who presented with palpitations.   Admitted for AF with RVR s/p DCCV 6/19/2024.  Cardiology consulted and fel     Assessment and Plan:  Afib RVR   - S/p DCCV 6/19/2024 Dr. Lazaro   - metoprolol succ 25 mg po daily   - Continue Xarelto 20 mg po daily   - TSH  1.03  -   - Cardiology consulted     MR/TR   - Moderate to severe on TTE   - CLAUDINE with mild-moderate disease, plans for surveillance and medical management, she is compensated on exam    Elevated troponin   - denies chest pain   - CTA coronary showed, mild mid LAD and mid cx calcifications, no flow limiting stenosis  - Cardiology has signed off for discharge     Elevated AST, Macrocytic RBCs, alcohol consumption:   -  b12/folate  OK, iron normal   - Complete ETOH cessation  - Repeat LFT as OP      Erosive OA, generalized OA, DDD of C and L spine, clinical osteoporosis, hx of pelvic fracture  CONTRAST    (Results Pending)     Invasive procedures and treatments.   CLAUDINE    Consults.  IP CONSULT TO CARDIOLOGY    Physical examination on discharge day.   BP (!) 156/83   Pulse 69   Temp 97.8 °F (36.6 °C) (Oral)   Resp 18   Ht 1.6 m (5' 3\")   Wt 81.5 kg (179 lb 11.2 oz)   SpO2 93%   BMI 31.83 kg/m²     General appearance.  Alert. Looks comfortable. Well nourished.   HEENT. Sclera clear. Moist mucus membranes.   Cardiovascular. Regular rate and rhythm, normal S1, S2. No murmur. No significant peripheral edema  Respiratory. Breathing unlabored, not using accessory muscles. Clear to auscultation bilaterally, no wheeze, crackles or rhonchi.  Gastrointestinal. Abdomen soft, non-tender, not distended, normal bowel sounds.  Neurology. Facial symmetry. No speech deficits. Moving all extremities equally.  Extremities. No focal weakness. Pulses palpable.   Skin. Warm, dry, normal turgor    Condition at time of discharge: Good    Medication instructions provided to patient at discharge.     Medication List        START taking these medications      amoxicillin-clavulanate 875-125 MG per tablet  Commonly known as: AUGMENTIN  Take 1 tablet by mouth 2 times daily  Notes to patient: Use: to treat infection  Side effects: stomach upset, Notify physician of following: rash, itching, skin redness     cetirizine 10 MG tablet  Commonly known as: ZYRTEC  Take 1 tablet by mouth daily  Start taking on: June 21, 2024  Notes to patient: Use: antihistamine for allergies/rash  Side effects: dry mouth, fatigue, sore throat, drowsiness     empagliflozin 10 MG tablet  Commonly known as: JARDIANCE  Take 1 tablet by mouth daily  Start taking on: June 21, 2024  Notes to patient: Use: treatment for diabetes  Side effects: bladder pain, pain with urination, cloudy or blood-tinged urine     furosemide 20 MG tablet  Commonly known as: LASIX  Take 1 tablet by mouth daily  Start taking on: June 21, 2024  Notes to patient: USE: \"water pill\"

## 2024-06-20 NOTE — PLAN OF CARE
Coronary CTA, images personally reviewed by me    Lung fields show some emphysema  Mild calcification in mid-LAD and mid-Cx  No flow-limiting lesions noted    Ok to eat  No need for cath    Ok to d/c home from a cardiac standpoint    F/u in office with me and with Dr Beverly Lazaro MD  [facility]  Noninvasive and Adult Congenital Cardiologist  [phone]  [email or web address]

## 2024-06-21 ENCOUNTER — CARE COORDINATION (OUTPATIENT)
Dept: CASE MANAGEMENT | Age: 69
End: 2024-06-21

## 2024-06-21 NOTE — CARE COORDINATION
Care Transitions Note    Initial Call - Call within 2 business days of discharge: Yes    Patient Current Location:  Home: [facility] [address]  [place] [address]    Care Transition Nurse contacted the patient by telephone to perform post hospital discharge assessment, verified name and  as identifiers. Provided introduction to self, and explanation of the Care Transition Nurse role.     Patient: Kasandra Cantor    Patient : 1955   MRN: [id number]    Reason for Admission:   Discharge Date: 24  RURS: Readmission Risk Score: 11      Last Discharge Facility       Date Complaint Diagnosis Description Type Department Provider    24 Palpitations Atrial fibrillation with RVR (HCC) ... ED to Hosp-Admission (Discharged) (ADMITTED) [facility] Darek Puri MD; [facility]            Was this an external facility discharge? No    Additional needs identified to be addressed with provider   No needs identified             Method of communication with provider: none.    Patients top risk factors for readmission: medical condition-Afib, COPD    Interventions to address risk factors:   Review of patient management of conditions/medications: as above    Care Summary Note: Pt states doing well, no issues or concerns. Denies being in afib. /80. Encouraged pt to make f/u appt with PCP, voiced understanding. No further f/u CTC calls, non [facility] PCP.    Care Transition Nurse reviewed discharge instructions with patient. The patient was given an opportunity to ask questions; all questions answered at this time.. The patient verbalized understanding.   Were discharge instructions available to patient? Yes.   Reviewed appropriate site of care based on symptoms and resources available to patient including: PCP  Specialist  When to call 911. The patient agrees to contact the primary care provider and/or specialist office for questions related to their healthcare.      Advance Care Planning:   Does patient

## 2024-06-24 RX ORDER — GLYCOPYRROLATE AND FORMOTEROL FUMARATE 9; 4.8 UG/1; UG/1
2 AEROSOL, METERED RESPIRATORY (INHALATION) 2 TIMES DAILY
Qty: 10.7 G | Refills: 5 | Status: SHIPPED | OUTPATIENT
Start: 2024-06-24

## 2024-06-24 RX ORDER — SPIRONOLACTONE 25 MG/1
25 TABLET ORAL DAILY
Qty: 90 TABLET | Refills: 3 | Status: SHIPPED | OUTPATIENT
Start: 2024-06-24

## 2024-06-24 NOTE — TELEPHONE ENCOUNTER
Received refill request for spironolactone 25mg tab from [facility] pharmacy.     Last OV: 12/20/23 MXA    Next OV: 07/18/24 WAK    Last Labs: 06/20/24 bmp    Last Filled: 06/21/24 by AILYN

## 2024-07-15 NOTE — PROGRESS NOTES
[facility]      CONSULTATION  [phone]  7/18/24  Referring: Dr. Gonzalez, Tio Cortez MD (PCP)    REASON FOR CONSULT/CHIEF COMPLAINT/HPI     Reason for visit/ Chief complaint  Atrial fibrillation  Hospital f/u     HPI Kasandra Cantor is a 68 y.o. female with PAF who I recently took care of in the hospital.    She recently had a surface echo that overestimated how much MR/TR she had (refuted by CLAUDINE that I just did).    Kasandra has a history of mitral regurgitation, hypertension, atrial fibrillation with a history of DCCV on 12/17/2020, S/p RFCA atrial fibrillation with PVI 3/2/22.    LHC at that time showed normal coronaries.    Repeat DCCV on 2/25/24.      In April 2023 she tripped over her dog, fell and had multiple pelvic fractures.  She was a level 1 surgical trauma and had multiple surgeries including a hip replacement.  She went into Afib post op.  S    She follows with Dr. Price for obstructive sleep apnea and was previously unable to tolerate treatment with CPAP due to claustrophobia.  She had an inspire (hypoglossal nerve stimulation) therapy implantation on 2/23/24 and activation on 4/10/24.  Kasandra also has bilateral breast implants that are calcified which make it difficult to visualize her chest organs by echo and chest xray.      6/18/24 - She had walked to the bathroom that night and felt herself go into Afib with palpitations.  HR was in the 150's.  Severe SOB => IV dilt => CLAUDINE/cardioversion was successful.    EKG on 6/19/24 with markedly abnormal ST changes, not present on the last sinus EKG in 2/2023.  Coronary CTA was done and showed moderate plaque with no flow-limiting lesions.    She was also consulted by EP, Dr. Paul with notation to maybe consider another ablation if she has a recurrence of Afib in the future as amiodarone may not be a good option due to her severe lung disease and class lll medications also not good options as her QT is prolonged and likely due to her

## 2024-07-18 ENCOUNTER — OFFICE VISIT (OUTPATIENT)
Dept: CARDIOLOGY CLINIC | Age: 69
End: 2024-07-18
Payer: MEDICARE

## 2024-07-18 VITALS
HEART RATE: 71 BPM | HEIGHT: 63 IN | OXYGEN SATURATION: 98 % | SYSTOLIC BLOOD PRESSURE: 116 MMHG | WEIGHT: 175.5 LBS | DIASTOLIC BLOOD PRESSURE: 62 MMHG | BODY MASS INDEX: 31.1 KG/M2

## 2024-07-18 DIAGNOSIS — I10 HYPERTENSION, UNSPECIFIED TYPE: ICD-10-CM

## 2024-07-18 DIAGNOSIS — I48.0 PAF (PAROXYSMAL ATRIAL FIBRILLATION) (HCC): Primary | ICD-10-CM

## 2024-07-18 DIAGNOSIS — Z09 HOSPITAL DISCHARGE FOLLOW-UP: ICD-10-CM

## 2024-07-18 PROCEDURE — 3078F DIAST BP <80 MM HG: CPT | Performed by: INTERNAL MEDICINE

## 2024-07-18 PROCEDURE — G8399 PT W/DXA RESULTS DOCUMENT: HCPCS | Performed by: INTERNAL MEDICINE

## 2024-07-18 PROCEDURE — 1111F DSCHRG MED/CURRENT MED MERGE: CPT | Performed by: INTERNAL MEDICINE

## 2024-07-18 PROCEDURE — 93000 ELECTROCARDIOGRAM COMPLETE: CPT | Performed by: INTERNAL MEDICINE

## 2024-07-18 PROCEDURE — 1123F ACP DISCUSS/DSCN MKR DOCD: CPT | Performed by: INTERNAL MEDICINE

## 2024-07-18 PROCEDURE — 1090F PRES/ABSN URINE INCON ASSESS: CPT | Performed by: INTERNAL MEDICINE

## 2024-07-18 PROCEDURE — 3017F COLORECTAL CA SCREEN DOC REV: CPT | Performed by: INTERNAL MEDICINE

## 2024-07-18 PROCEDURE — 1036F TOBACCO NON-USER: CPT | Performed by: INTERNAL MEDICINE

## 2024-07-18 PROCEDURE — 99214 OFFICE O/P EST MOD 30 MIN: CPT | Performed by: INTERNAL MEDICINE

## 2024-07-18 PROCEDURE — 3074F SYST BP LT 130 MM HG: CPT | Performed by: INTERNAL MEDICINE

## 2024-07-18 PROCEDURE — G8417 CALC BMI ABV UP PARAM F/U: HCPCS | Performed by: INTERNAL MEDICINE

## 2024-07-18 PROCEDURE — G8427 DOCREV CUR MEDS BY ELIG CLIN: HCPCS | Performed by: INTERNAL MEDICINE

## 2024-07-18 RX ORDER — SPIRONOLACTONE 25 MG/1
25 TABLET ORAL DAILY
Qty: 90 TABLET | Refills: 3 | Status: SHIPPED | OUTPATIENT
Start: 2024-07-18

## 2024-07-18 RX ORDER — FUROSEMIDE 20 MG/1
20 TABLET ORAL DAILY
Qty: 90 TABLET | Refills: 3 | Status: SHIPPED | OUTPATIENT
Start: 2024-07-18

## 2024-07-18 RX ORDER — FUROSEMIDE 20 MG/1
20 TABLET ORAL DAILY
Qty: 30 TABLET | Refills: 0 | Status: SHIPPED | OUTPATIENT
Start: 2024-07-18 | End: 2024-07-18 | Stop reason: SDUPTHER

## 2024-07-18 NOTE — PATIENT INSTRUCTIONS
No medication changes    Monitor for signs and symptoms of bleeding.      Follow up with Dr. Lazaro in 1 year

## 2024-07-19 PROBLEM — R79.89 ELEVATED TROPONIN: Status: RESOLVED | Noted: 2021-10-29 | Resolved: 2024-07-19

## 2024-07-30 DIAGNOSIS — G47.33 OSA (OBSTRUCTIVE SLEEP APNEA): Primary | ICD-10-CM

## 2024-08-15 RX ORDER — FUROSEMIDE 20 MG/1
20 TABLET ORAL DAILY
Qty: 90 TABLET | Refills: 3 | Status: CANCELLED | OUTPATIENT
Start: 2024-08-15

## 2024-08-15 NOTE — TELEPHONE ENCOUNTER
New prescriptions for both Lasix and Jardiance for a quantity of 90 with 3 refills sent to this Pharmacy on 7/18/24.  I called the Pharmacy and verified that they had updated prescriptions and they do.      Refills denied.

## 2024-09-10 ENCOUNTER — HOSPITAL ENCOUNTER (OUTPATIENT)
Dept: CT IMAGING | Age: 69
Discharge: HOME | End: 2024-09-10
Attending: INTERNAL MEDICINE
Payer: MEDICARE

## 2024-09-10 DIAGNOSIS — R91.1 LUNG NODULE: ICD-10-CM

## 2024-09-10 PROCEDURE — 71250 CT THORAX DX C-: CPT

## 2024-09-13 ENCOUNTER — OFFICE VISIT (OUTPATIENT)
Dept: PULMONOLOGY | Age: 69
End: 2024-09-13
Payer: MEDICARE

## 2024-09-13 VITALS
BODY MASS INDEX: 31.71 KG/M2 | HEIGHT: 63 IN | DIASTOLIC BLOOD PRESSURE: 64 MMHG | OXYGEN SATURATION: 95 % | WEIGHT: 179 LBS | HEART RATE: 56 BPM | SYSTOLIC BLOOD PRESSURE: 112 MMHG

## 2024-09-13 DIAGNOSIS — J44.9 COPD, MODERATE (HCC): Primary | ICD-10-CM

## 2024-09-13 PROCEDURE — 3023F SPIROM DOC REV: CPT | Performed by: INTERNAL MEDICINE

## 2024-09-13 PROCEDURE — 3074F SYST BP LT 130 MM HG: CPT | Performed by: INTERNAL MEDICINE

## 2024-09-13 PROCEDURE — G8427 DOCREV CUR MEDS BY ELIG CLIN: HCPCS | Performed by: INTERNAL MEDICINE

## 2024-09-13 PROCEDURE — 3078F DIAST BP <80 MM HG: CPT | Performed by: INTERNAL MEDICINE

## 2024-09-13 PROCEDURE — 1036F TOBACCO NON-USER: CPT | Performed by: INTERNAL MEDICINE

## 2024-09-13 PROCEDURE — 1090F PRES/ABSN URINE INCON ASSESS: CPT | Performed by: INTERNAL MEDICINE

## 2024-09-13 PROCEDURE — 99213 OFFICE O/P EST LOW 20 MIN: CPT | Performed by: INTERNAL MEDICINE

## 2024-09-13 PROCEDURE — G8399 PT W/DXA RESULTS DOCUMENT: HCPCS | Performed by: INTERNAL MEDICINE

## 2024-09-13 PROCEDURE — 3017F COLORECTAL CA SCREEN DOC REV: CPT | Performed by: INTERNAL MEDICINE

## 2024-09-13 PROCEDURE — G8417 CALC BMI ABV UP PARAM F/U: HCPCS | Performed by: INTERNAL MEDICINE

## 2024-09-13 PROCEDURE — 1123F ACP DISCUSS/DSCN MKR DOCD: CPT | Performed by: INTERNAL MEDICINE

## 2024-09-13 ASSESSMENT — ENCOUNTER SYMPTOMS
SORE THROAT: 0
BLOOD IN STOOL: 0
BACK PAIN: 0
SINUS PRESSURE: 0
CHOKING: 0
VOICE CHANGE: 0
STRIDOR: 0
DIARRHEA: 0
ABDOMINAL DISTENTION: 0
COUGH: 0
APNEA: 0
CONSTIPATION: 0
COLOR CHANGE: 0
SHORTNESS OF BREATH: 1
VOMITING: 0
WHEEZING: 0
ABDOMINAL PAIN: 0
RHINORRHEA: 0
CHEST TIGHTNESS: 0

## 2024-09-15 ENCOUNTER — HOSPITAL ENCOUNTER (OUTPATIENT)
Dept: SLEEP CENTER | Age: 69
Discharge: HOME | End: 2024-09-15
Payer: MEDICARE

## 2024-09-15 DIAGNOSIS — G47.33 OSA (OBSTRUCTIVE SLEEP APNEA): ICD-10-CM

## 2024-09-15 PROCEDURE — 95810 POLYSOM 6/> YRS 4/> PARAM: CPT

## 2024-09-16 ENCOUNTER — TELEPHONE (OUTPATIENT)
Dept: PULMONOLOGY | Age: 69
End: 2024-09-16

## 2024-09-16 PROCEDURE — 95811 POLYSOM 6/>YRS CPAP 4/> PARM: CPT | Performed by: INTERNAL MEDICINE

## 2024-10-11 ENCOUNTER — HOSPITAL ENCOUNTER (INPATIENT)
Age: 69
LOS: 9 days | Discharge: HOME | DRG: 853 | End: 2024-10-20
Attending: EMERGENCY MEDICINE | Admitting: INTERNAL MEDICINE
Payer: MEDICARE

## 2024-10-11 ENCOUNTER — ANESTHESIA EVENT (OUTPATIENT)
Dept: OPERATING ROOM | Age: 69
End: 2024-10-11
Payer: MEDICARE

## 2024-10-11 ENCOUNTER — ANESTHESIA (OUTPATIENT)
Dept: OPERATING ROOM | Age: 69
End: 2024-10-11
Payer: MEDICARE

## 2024-10-11 ENCOUNTER — APPOINTMENT (OUTPATIENT)
Dept: CT IMAGING | Age: 69
DRG: 853 | End: 2024-10-11
Payer: MEDICARE

## 2024-10-11 DIAGNOSIS — K35.891 ACUTE APPENDICITIS WITH GENERALIZED PERITONITIS AND GANGRENE, UNSPECIFIED WHETHER ABSCESS PRESENT, UNSPECIFIED WHETHER PERFORATION PRESENT: Primary | ICD-10-CM

## 2024-10-11 DIAGNOSIS — K35.209 ACUTE APPENDICITIS WITH GENERALIZED PERITONITIS AND GANGRENE, UNSPECIFIED WHETHER ABSCESS PRESENT, UNSPECIFIED WHETHER PERFORATION PRESENT: Primary | ICD-10-CM

## 2024-10-11 DIAGNOSIS — K37 APPENDICITIS, UNSPECIFIED APPENDICITIS TYPE: ICD-10-CM

## 2024-10-11 PROBLEM — N17.9 AKI (ACUTE KIDNEY INJURY) (HCC): Status: ACTIVE | Noted: 2024-10-11

## 2024-10-11 PROBLEM — A41.9 SEPSIS (HCC): Status: ACTIVE | Noted: 2024-10-11

## 2024-10-11 LAB
ABO + RH BLD: NORMAL
ALBUMIN SERPL-MCNC: 4.1 G/DL (ref 3.4–5)
ALBUMIN/GLOB SERPL: 1.3 {RATIO} (ref 1.1–2.2)
ALP SERPL-CCNC: 108 U/L (ref 40–129)
ALT SERPL-CCNC: 46 U/L (ref 10–40)
ANION GAP SERPL CALCULATED.3IONS-SCNC: 15 MMOL/L (ref 3–16)
AST SERPL-CCNC: 52 U/L (ref 15–37)
BASOPHILS # BLD: 0.1 K/UL (ref 0–0.2)
BASOPHILS NFR BLD: 0.5 %
BILIRUB SERPL-MCNC: 1.1 MG/DL (ref 0–1)
BLD GP AB SCN SERPL QL: NORMAL
BUN SERPL-MCNC: 25 MG/DL (ref 7–20)
CALCIUM SERPL-MCNC: 9 MG/DL (ref 8.3–10.6)
CHLORIDE SERPL-SCNC: 97 MMOL/L (ref 99–110)
CO2 SERPL-SCNC: 19 MMOL/L (ref 21–32)
CREAT SERPL-MCNC: 1.7 MG/DL (ref 0.6–1.2)
DEPRECATED RDW RBC AUTO: 13.7 % (ref 12.4–15.4)
EOSINOPHIL # BLD: 0 K/UL (ref 0–0.6)
EOSINOPHIL NFR BLD: 0.3 %
GFR SERPLBLD CREATININE-BSD FMLA CKD-EPI: 32 ML/MIN/{1.73_M2}
GLUCOSE SERPL-MCNC: 147 MG/DL (ref 70–99)
HCT VFR BLD AUTO: 34 % (ref 36–48)
HGB BLD-MCNC: 11.6 G/DL (ref 12–16)
LACTATE BLDV-SCNC: 0.9 MMOL/L (ref 0.4–1.9)
LACTATE BLDV-SCNC: 1.8 MMOL/L (ref 0.4–2)
LIPASE SERPL-CCNC: 23 U/L (ref 13–60)
LYMPHOCYTES # BLD: 0.6 K/UL (ref 1–5.1)
LYMPHOCYTES NFR BLD: 4.7 %
MCH RBC QN AUTO: 35.3 PG (ref 26–34)
MCHC RBC AUTO-ENTMCNC: 34.3 G/DL (ref 31–36)
MCV RBC AUTO: 103.1 FL (ref 80–100)
MONOCYTES # BLD: 0.7 K/UL (ref 0–1.3)
MONOCYTES NFR BLD: 5.5 %
NEUTROPHILS # BLD: 11 K/UL (ref 1.7–7.7)
NEUTROPHILS NFR BLD: 89 %
NT-PROBNP SERPL-MCNC: 1378 PG/ML (ref 0–124)
PLATELET # BLD AUTO: 241 K/UL (ref 135–450)
PMV BLD AUTO: 8.3 FL (ref 5–10.5)
POTASSIUM SERPL-SCNC: 4 MMOL/L (ref 3.5–5.1)
PROT SERPL-MCNC: 7.2 G/DL (ref 6.4–8.2)
RBC # BLD AUTO: 3.29 M/UL (ref 4–5.2)
SODIUM SERPL-SCNC: 131 MMOL/L (ref 136–145)
TROPONIN, HIGH SENSITIVITY: 59 NG/L (ref 0–14)
WBC # BLD AUTO: 12.3 K/UL (ref 4–11)

## 2024-10-11 PROCEDURE — 88342 IMHCHEM/IMCYTCHM 1ST ANTB: CPT

## 2024-10-11 PROCEDURE — 2500000003 HC RX 250 WO HCPCS: Performed by: NURSE ANESTHETIST, CERTIFIED REGISTERED

## 2024-10-11 PROCEDURE — 96374 THER/PROPH/DIAG INJ IV PUSH: CPT

## 2024-10-11 PROCEDURE — 2500000003 HC RX 250 WO HCPCS: Performed by: SURGERY

## 2024-10-11 PROCEDURE — 86923 COMPATIBILITY TEST ELECTRIC: CPT

## 2024-10-11 PROCEDURE — 7100000000 HC PACU RECOVERY - FIRST 15 MIN: Performed by: SURGERY

## 2024-10-11 PROCEDURE — 2580000003 HC RX 258: Performed by: SURGERY

## 2024-10-11 PROCEDURE — 36556 INSERT NON-TUNNEL CV CATH: CPT

## 2024-10-11 PROCEDURE — 86850 RBC ANTIBODY SCREEN: CPT

## 2024-10-11 PROCEDURE — 0DTJ4ZZ RESECTION OF APPENDIX, PERCUTANEOUS ENDOSCOPIC APPROACH: ICD-10-PCS | Performed by: SURGERY

## 2024-10-11 PROCEDURE — 36415 COLL VENOUS BLD VENIPUNCTURE: CPT

## 2024-10-11 PROCEDURE — 2060000000 HC ICU INTERMEDIATE R&B

## 2024-10-11 PROCEDURE — 6360000002 HC RX W HCPCS: Performed by: ANESTHESIOLOGY

## 2024-10-11 PROCEDURE — 74176 CT ABD & PELVIS W/O CONTRAST: CPT

## 2024-10-11 PROCEDURE — 83605 ASSAY OF LACTIC ACID: CPT

## 2024-10-11 PROCEDURE — 93005 ELECTROCARDIOGRAM TRACING: CPT | Performed by: EMERGENCY MEDICINE

## 2024-10-11 PROCEDURE — 6360000002 HC RX W HCPCS: Performed by: NURSE ANESTHETIST, CERTIFIED REGISTERED

## 2024-10-11 PROCEDURE — 88341 IMHCHEM/IMCYTCHM EA ADD ANTB: CPT

## 2024-10-11 PROCEDURE — 2709999900 HC NON-CHARGEABLE SUPPLY: Performed by: SURGERY

## 2024-10-11 PROCEDURE — 30233N1 TRANSFUSION OF NONAUTOLOGOUS RED BLOOD CELLS INTO PERIPHERAL VEIN, PERCUTANEOUS APPROACH: ICD-10-PCS | Performed by: INTERNAL MEDICINE

## 2024-10-11 PROCEDURE — 83690 ASSAY OF LIPASE: CPT

## 2024-10-11 PROCEDURE — A4217 STERILE WATER/SALINE, 500 ML: HCPCS | Performed by: SURGERY

## 2024-10-11 PROCEDURE — 3600000004 HC SURGERY LEVEL 4 BASE: Performed by: SURGERY

## 2024-10-11 PROCEDURE — 99222 1ST HOSP IP/OBS MODERATE 55: CPT | Performed by: SURGERY

## 2024-10-11 PROCEDURE — 6360000002 HC RX W HCPCS: Performed by: EMERGENCY MEDICINE

## 2024-10-11 PROCEDURE — 3700000001 HC ADD 15 MINUTES (ANESTHESIA): Performed by: SURGERY

## 2024-10-11 PROCEDURE — P9016 RBC LEUKOCYTES REDUCED: HCPCS

## 2024-10-11 PROCEDURE — 2580000003 HC RX 258: Performed by: EMERGENCY MEDICINE

## 2024-10-11 PROCEDURE — 7100000001 HC PACU RECOVERY - ADDTL 15 MIN: Performed by: SURGERY

## 2024-10-11 PROCEDURE — 88304 TISSUE EXAM BY PATHOLOGIST: CPT

## 2024-10-11 PROCEDURE — 06HY33Z INSERTION OF INFUSION DEVICE INTO LOWER VEIN, PERCUTANEOUS APPROACH: ICD-10-PCS | Performed by: INTERNAL MEDICINE

## 2024-10-11 PROCEDURE — 2580000003 HC RX 258: Performed by: NURSE ANESTHETIST, CERTIFIED REGISTERED

## 2024-10-11 PROCEDURE — 3600000014 HC SURGERY LEVEL 4 ADDTL 15MIN: Performed by: SURGERY

## 2024-10-11 PROCEDURE — 96375 TX/PRO/DX INJ NEW DRUG ADDON: CPT

## 2024-10-11 PROCEDURE — 83880 ASSAY OF NATRIURETIC PEPTIDE: CPT

## 2024-10-11 PROCEDURE — 6360000002 HC RX W HCPCS: Performed by: SURGERY

## 2024-10-11 PROCEDURE — 2720000010 HC SURG SUPPLY STERILE: Performed by: SURGERY

## 2024-10-11 PROCEDURE — 85025 COMPLETE CBC W/AUTO DIFF WBC: CPT

## 2024-10-11 PROCEDURE — 87150 DNA/RNA AMPLIFIED PROBE: CPT

## 2024-10-11 PROCEDURE — 87040 BLOOD CULTURE FOR BACTERIA: CPT

## 2024-10-11 PROCEDURE — 80053 COMPREHEN METABOLIC PANEL: CPT

## 2024-10-11 PROCEDURE — 86900 BLOOD TYPING SEROLOGIC ABO: CPT

## 2024-10-11 PROCEDURE — 99285 EMERGENCY DEPT VISIT HI MDM: CPT

## 2024-10-11 PROCEDURE — 44970 LAPAROSCOPY APPENDECTOMY: CPT | Performed by: SURGERY

## 2024-10-11 PROCEDURE — 86901 BLOOD TYPING SEROLOGIC RH(D): CPT

## 2024-10-11 PROCEDURE — 84484 ASSAY OF TROPONIN QUANT: CPT

## 2024-10-11 PROCEDURE — 3700000000 HC ANESTHESIA ATTENDED CARE: Performed by: SURGERY

## 2024-10-11 RX ORDER — SODIUM CHLORIDE, SODIUM LACTATE, POTASSIUM CHLORIDE, AND CALCIUM CHLORIDE .6; .31; .03; .02 G/100ML; G/100ML; G/100ML; G/100ML
1000 INJECTION, SOLUTION INTRAVENOUS ONCE
Status: COMPLETED | OUTPATIENT
Start: 2024-10-11 | End: 2024-10-11

## 2024-10-11 RX ORDER — ONDANSETRON 2 MG/ML
4 INJECTION INTRAMUSCULAR; INTRAVENOUS EVERY 6 HOURS PRN
Status: DISCONTINUED | OUTPATIENT
Start: 2024-10-11 | End: 2024-10-11

## 2024-10-11 RX ORDER — SODIUM CHLORIDE 0.9 % (FLUSH) 0.9 %
5-40 SYRINGE (ML) INJECTION EVERY 12 HOURS SCHEDULED
Status: DISCONTINUED | OUTPATIENT
Start: 2024-10-11 | End: 2024-10-20 | Stop reason: HOSPADM

## 2024-10-11 RX ORDER — NALOXONE HYDROCHLORIDE 0.4 MG/ML
INJECTION, SOLUTION INTRAMUSCULAR; INTRAVENOUS; SUBCUTANEOUS PRN
Status: DISCONTINUED | OUTPATIENT
Start: 2024-10-11 | End: 2024-10-11 | Stop reason: HOSPADM

## 2024-10-11 RX ORDER — ENOXAPARIN SODIUM 100 MG/ML
40 INJECTION SUBCUTANEOUS DAILY
Status: DISCONTINUED | OUTPATIENT
Start: 2024-10-11 | End: 2024-10-13

## 2024-10-11 RX ORDER — SODIUM CHLORIDE 9 MG/ML
INJECTION, SOLUTION INTRAVENOUS PRN
Status: DISCONTINUED | OUTPATIENT
Start: 2024-10-11 | End: 2024-10-11

## 2024-10-11 RX ORDER — ROCURONIUM BROMIDE 10 MG/ML
INJECTION, SOLUTION INTRAVENOUS
Status: DISCONTINUED | OUTPATIENT
Start: 2024-10-11 | End: 2024-10-11 | Stop reason: SDUPTHER

## 2024-10-11 RX ORDER — PROPOFOL 10 MG/ML
INJECTION, EMULSION INTRAVENOUS
Status: DISCONTINUED | OUTPATIENT
Start: 2024-10-11 | End: 2024-10-11 | Stop reason: SDUPTHER

## 2024-10-11 RX ORDER — SODIUM CHLORIDE 0.9 % (FLUSH) 0.9 %
5-40 SYRINGE (ML) INJECTION PRN
Status: DISCONTINUED | OUTPATIENT
Start: 2024-10-11 | End: 2024-10-20 | Stop reason: HOSPADM

## 2024-10-11 RX ORDER — SODIUM CHLORIDE 0.9 % (FLUSH) 0.9 %
5-40 SYRINGE (ML) INJECTION EVERY 12 HOURS SCHEDULED
Status: DISCONTINUED | OUTPATIENT
Start: 2024-10-11 | End: 2024-10-11 | Stop reason: HOSPADM

## 2024-10-11 RX ORDER — HYDROMORPHONE HYDROCHLORIDE 2 MG/ML
0.5 INJECTION, SOLUTION INTRAMUSCULAR; INTRAVENOUS; SUBCUTANEOUS EVERY 5 MIN PRN
Status: DISCONTINUED | OUTPATIENT
Start: 2024-10-11 | End: 2024-10-11 | Stop reason: HOSPADM

## 2024-10-11 RX ORDER — ONDANSETRON 2 MG/ML
INJECTION INTRAMUSCULAR; INTRAVENOUS
Status: DISCONTINUED | OUTPATIENT
Start: 2024-10-11 | End: 2024-10-11 | Stop reason: SDUPTHER

## 2024-10-11 RX ORDER — SUCCINYLCHOLINE/SOD CL,ISO/PF 200MG/10ML
SYRINGE (ML) INTRAVENOUS
Status: DISCONTINUED | OUTPATIENT
Start: 2024-10-11 | End: 2024-10-11 | Stop reason: SDUPTHER

## 2024-10-11 RX ORDER — FENTANYL CITRATE 50 UG/ML
50 INJECTION, SOLUTION INTRAMUSCULAR; INTRAVENOUS
Status: COMPLETED | OUTPATIENT
Start: 2024-10-11 | End: 2024-10-12

## 2024-10-11 RX ORDER — METOPROLOL TARTRATE 1 MG/ML
INJECTION, SOLUTION INTRAVENOUS
Status: DISCONTINUED | OUTPATIENT
Start: 2024-10-11 | End: 2024-10-11 | Stop reason: SDUPTHER

## 2024-10-11 RX ORDER — ONDANSETRON 2 MG/ML
4 INJECTION INTRAMUSCULAR; INTRAVENOUS
Status: DISCONTINUED | OUTPATIENT
Start: 2024-10-11 | End: 2024-10-11 | Stop reason: HOSPADM

## 2024-10-11 RX ORDER — DIPHENHYDRAMINE HYDROCHLORIDE 50 MG/ML
12.5 INJECTION INTRAMUSCULAR; INTRAVENOUS
Status: DISCONTINUED | OUTPATIENT
Start: 2024-10-11 | End: 2024-10-11 | Stop reason: HOSPADM

## 2024-10-11 RX ORDER — SODIUM CHLORIDE, SODIUM LACTATE, POTASSIUM CHLORIDE, CALCIUM CHLORIDE 600; 310; 30; 20 MG/100ML; MG/100ML; MG/100ML; MG/100ML
INJECTION, SOLUTION INTRAVENOUS
Status: DISCONTINUED | OUTPATIENT
Start: 2024-10-11 | End: 2024-10-11 | Stop reason: SDUPTHER

## 2024-10-11 RX ORDER — SODIUM CHLORIDE 0.9 % (FLUSH) 0.9 %
5-40 SYRINGE (ML) INJECTION PRN
Status: DISCONTINUED | OUTPATIENT
Start: 2024-10-11 | End: 2024-10-11 | Stop reason: HOSPADM

## 2024-10-11 RX ORDER — FENTANYL CITRATE 50 UG/ML
INJECTION, SOLUTION INTRAMUSCULAR; INTRAVENOUS
Status: DISCONTINUED | OUTPATIENT
Start: 2024-10-11 | End: 2024-10-11 | Stop reason: SDUPTHER

## 2024-10-11 RX ORDER — ONDANSETRON 4 MG/1
4 TABLET, ORALLY DISINTEGRATING ORAL EVERY 8 HOURS PRN
Status: DISCONTINUED | OUTPATIENT
Start: 2024-10-11 | End: 2024-10-12

## 2024-10-11 RX ORDER — HYDROMORPHONE HYDROCHLORIDE 2 MG/ML
0.25 INJECTION, SOLUTION INTRAMUSCULAR; INTRAVENOUS; SUBCUTANEOUS EVERY 5 MIN PRN
Status: DISCONTINUED | OUTPATIENT
Start: 2024-10-11 | End: 2024-10-11 | Stop reason: HOSPADM

## 2024-10-11 RX ORDER — BUPIVACAINE HYDROCHLORIDE AND EPINEPHRINE 5; 5 MG/ML; UG/ML
INJECTION, SOLUTION EPIDURAL; INTRACAUDAL; PERINEURAL
Status: COMPLETED | OUTPATIENT
Start: 2024-10-11 | End: 2024-10-11

## 2024-10-11 RX ORDER — MAGNESIUM HYDROXIDE 1200 MG/15ML
LIQUID ORAL CONTINUOUS PRN
Status: COMPLETED | OUTPATIENT
Start: 2024-10-11 | End: 2024-10-11

## 2024-10-11 RX ORDER — SODIUM CHLORIDE 9 MG/ML
INJECTION, SOLUTION INTRAVENOUS CONTINUOUS
Status: DISCONTINUED | OUTPATIENT
Start: 2024-10-11 | End: 2024-10-11

## 2024-10-11 RX ORDER — LIDOCAINE HYDROCHLORIDE 20 MG/ML
INJECTION, SOLUTION EPIDURAL; INFILTRATION; INTRACAUDAL; PERINEURAL
Status: DISCONTINUED | OUTPATIENT
Start: 2024-10-11 | End: 2024-10-11 | Stop reason: SDUPTHER

## 2024-10-11 RX ORDER — 0.9 % SODIUM CHLORIDE 0.9 %
2000 INTRAVENOUS SOLUTION INTRAVENOUS ONCE
Status: COMPLETED | OUTPATIENT
Start: 2024-10-11 | End: 2024-10-11

## 2024-10-11 RX ORDER — ONDANSETRON 2 MG/ML
4 INJECTION INTRAMUSCULAR; INTRAVENOUS
Status: DISCONTINUED | OUTPATIENT
Start: 2024-10-11 | End: 2024-10-12 | Stop reason: ALTCHOICE

## 2024-10-11 RX ADMIN — PIPERACILLIN AND TAZOBACTAM 3375 MG: 3; .375 INJECTION, POWDER, LYOPHILIZED, FOR SOLUTION INTRAVENOUS at 15:12

## 2024-10-11 RX ADMIN — SUGAMMADEX 200 MG: 100 INJECTION, SOLUTION INTRAVENOUS at 17:28

## 2024-10-11 RX ADMIN — SODIUM CHLORIDE, POTASSIUM CHLORIDE, SODIUM LACTATE AND CALCIUM CHLORIDE: 600; 310; 30; 20 INJECTION, SOLUTION INTRAVENOUS at 16:53

## 2024-10-11 RX ADMIN — HYDROMORPHONE HYDROCHLORIDE 0.25 MG: 2 INJECTION, SOLUTION INTRAMUSCULAR; INTRAVENOUS; SUBCUTANEOUS at 18:17

## 2024-10-11 RX ADMIN — Medication 10 ML: at 20:02

## 2024-10-11 RX ADMIN — METOPROLOL TARTRATE 2.5 MG: 1 INJECTION, SOLUTION INTRAVENOUS at 17:27

## 2024-10-11 RX ADMIN — FENTANYL CITRATE 100 MCG: 50 INJECTION, SOLUTION INTRAMUSCULAR; INTRAVENOUS at 17:28

## 2024-10-11 RX ADMIN — HYDROMORPHONE HYDROCHLORIDE 0.25 MG: 2 INJECTION, SOLUTION INTRAMUSCULAR; INTRAVENOUS; SUBCUTANEOUS at 18:05

## 2024-10-11 RX ADMIN — ENOXAPARIN SODIUM 40 MG: 100 INJECTION SUBCUTANEOUS at 22:14

## 2024-10-11 RX ADMIN — FENTANYL CITRATE 50 MCG: 50 INJECTION INTRAMUSCULAR; INTRAVENOUS at 13:19

## 2024-10-11 RX ADMIN — PHENYLEPHRINE HYDROCHLORIDE 100 MCG: 10 INJECTION INTRAVENOUS at 17:02

## 2024-10-11 RX ADMIN — FENTANYL CITRATE 100 MCG: 50 INJECTION, SOLUTION INTRAMUSCULAR; INTRAVENOUS at 16:59

## 2024-10-11 RX ADMIN — PROPOFOL 150 MG: 10 INJECTION, EMULSION INTRAVENOUS at 16:59

## 2024-10-11 RX ADMIN — FENTANYL CITRATE 50 MCG: 50 INJECTION INTRAMUSCULAR; INTRAVENOUS at 20:01

## 2024-10-11 RX ADMIN — LIDOCAINE HYDROCHLORIDE 50 MG: 20 INJECTION, SOLUTION EPIDURAL; INFILTRATION; INTRACAUDAL; PERINEURAL at 16:59

## 2024-10-11 RX ADMIN — ONDANSETRON 4 MG: 2 INJECTION, SOLUTION INTRAMUSCULAR; INTRAVENOUS at 13:19

## 2024-10-11 RX ADMIN — METOPROLOL TARTRATE 2.5 MG: 1 INJECTION, SOLUTION INTRAVENOUS at 17:02

## 2024-10-11 RX ADMIN — SODIUM CHLORIDE 2000 ML: 9 INJECTION, SOLUTION INTRAVENOUS at 13:18

## 2024-10-11 RX ADMIN — ROCURONIUM BROMIDE 10 MG: 10 INJECTION, SOLUTION INTRAVENOUS at 16:59

## 2024-10-11 RX ADMIN — ONDANSETRON 4 MG: 2 INJECTION INTRAMUSCULAR; INTRAVENOUS at 17:28

## 2024-10-11 RX ADMIN — PHENYLEPHRINE HYDROCHLORIDE 200 MCG: 10 INJECTION INTRAVENOUS at 17:07

## 2024-10-11 RX ADMIN — SODIUM CHLORIDE, POTASSIUM CHLORIDE, SODIUM LACTATE AND CALCIUM CHLORIDE 1000 ML: 600; 310; 30; 20 INJECTION, SOLUTION INTRAVENOUS at 15:11

## 2024-10-11 RX ADMIN — HYDROMORPHONE HYDROCHLORIDE 0.25 MG: 2 INJECTION, SOLUTION INTRAMUSCULAR; INTRAVENOUS; SUBCUTANEOUS at 17:55

## 2024-10-11 RX ADMIN — Medication 120 MG: at 16:59

## 2024-10-11 RX ADMIN — ROCURONIUM BROMIDE 20 MG: 10 INJECTION, SOLUTION INTRAVENOUS at 17:03

## 2024-10-11 RX ADMIN — PHENYLEPHRINE HYDROCHLORIDE 200 MCG: 10 INJECTION INTRAVENOUS at 17:05

## 2024-10-11 ASSESSMENT — PAIN DESCRIPTION - LOCATION
LOCATION: ABDOMEN

## 2024-10-11 ASSESSMENT — PAIN DESCRIPTION - DESCRIPTORS
DESCRIPTORS: DISCOMFORT;ACHING
DESCRIPTORS: DISCOMFORT
DESCRIPTORS: ACHING
DESCRIPTORS: DISCOMFORT;TENDER
DESCRIPTORS: DISCOMFORT
DESCRIPTORS: ACHING;DISCOMFORT
DESCRIPTORS: DISCOMFORT
DESCRIPTORS: ACHING

## 2024-10-11 ASSESSMENT — PAIN DESCRIPTION - ONSET
ONSET: ON-GOING
ONSET: ON-GOING

## 2024-10-11 ASSESSMENT — PAIN SCALES - GENERAL
PAINLEVEL_OUTOF10: 7
PAINLEVEL_OUTOF10: 6
PAINLEVEL_OUTOF10: 8
PAINLEVEL_OUTOF10: 9
PAINLEVEL_OUTOF10: 6
PAINLEVEL_OUTOF10: 7

## 2024-10-11 ASSESSMENT — PAIN DESCRIPTION - PAIN TYPE
TYPE: SURGICAL PAIN
TYPE: SURGICAL PAIN

## 2024-10-11 ASSESSMENT — PAIN DESCRIPTION - FREQUENCY
FREQUENCY: CONTINUOUS
FREQUENCY: CONTINUOUS

## 2024-10-11 ASSESSMENT — PAIN - FUNCTIONAL ASSESSMENT
PAIN_FUNCTIONAL_ASSESSMENT: PREVENTS OR INTERFERES SOME ACTIVE ACTIVITIES AND ADLS
PAIN_FUNCTIONAL_ASSESSMENT: PREVENTS OR INTERFERES SOME ACTIVE ACTIVITIES AND ADLS
PAIN_FUNCTIONAL_ASSESSMENT: 0-10
PAIN_FUNCTIONAL_ASSESSMENT: PREVENTS OR INTERFERES SOME ACTIVE ACTIVITIES AND ADLS
PAIN_FUNCTIONAL_ASSESSMENT: 0-10
PAIN_FUNCTIONAL_ASSESSMENT: PREVENTS OR INTERFERES SOME ACTIVE ACTIVITIES AND ADLS

## 2024-10-11 ASSESSMENT — ENCOUNTER SYMPTOMS: SHORTNESS OF BREATH: 1

## 2024-10-11 ASSESSMENT — PAIN DESCRIPTION - ORIENTATION
ORIENTATION: INNER
ORIENTATION: INNER
ORIENTATION: UPPER
ORIENTATION: INNER
ORIENTATION: UPPER;MID;RIGHT;LEFT
ORIENTATION: INNER

## 2024-10-11 ASSESSMENT — COPD QUESTIONNAIRES: CAT_SEVERITY: MODERATE

## 2024-10-11 NOTE — ED NOTES
How does patient ambulate?   [x]Low Fall Risk (ambulates by themselves without support)  []Stand by assist   []Contact Guard   []Front wheel walker  []Wheelchair   []Steady  []Bed bound  []History of Lower Extremity Amputation  []Unknown, did not assess in the emergency department   How does patient take pills?  [x]Whole with Water  []Crushed in applesauce  []Crushed in pudding  []Other  []Unknown no oral medications were given in the ED  Is patient alert?   [x]Alert  []Drowsy but responds to voice  []Doesn't respond to voice but responds to painful stimuli  []Unresponsive  Is patient oriented?   [x]To person  [x]To place  [x]To time  [x]To situation  []Confused  []Agitated  []Follows commands  If patient is disoriented or from a Skill Nursing Facility has family been notified of admission?   [x]Yes   []No  Patient belongings?   [x]Cell phone  []Wallet   []Dentures  [x]Clothing  Any specific patient or family belongings/needs/dynamics?   none  Miscellaneous comments/pending orders?  None, going to surgery      If there are any additional questions please reach out to the Emergency Department.

## 2024-10-11 NOTE — H&P
HOSPITALISTS HISTORY AND PHYSICAL    10/11/2024 2:22 PM    Patient Information:  KASANDRA CARRILLO is a 68 y.o. female [id number]  PCP:  Tio Gonzalez MD (Tel: [phone] )    Chief complaint:    Chief Complaint   Patient presents with    Abdominal Pain     Abdominal pain x 5 days and is getting worse in the last 2 days.         History of Present Illness:  Kasandra Carrillo is a 68 y.o. female with history of Afib on Xarelto, COPD, HTN came to ER with complaints of abdominal pain.  States she has had worsening abdominal pain for past 5 days.  Unable to eat for 2 days.  Stopped having flatus and BM in past 2 days.  She was concerned she might have a recurrent peptic ulcer.  Denies CP, SOB, HA, fevers, chills, NS, diarrhea, melena or hematochezia.  Otherwise complete ROS is negative unless listed above.      REVIEW OF SYSTEMS:   Pertinent positives as noted in HPI.  All other systems were reviewed and are negative.      Past Medical History:   has a past medical history of A-fib (HCC), Age-related osteoporosis without current pathological fracture, Anxiety, Arthritis, COPD (chronic obstructive pulmonary disease) (HCC), Depression, History of blood transfusion, History of claustrophobia, Hypertension, Hypothyroidism, Pelvic fracture (HCC), and Sleep apnea.     Past Surgical History:   has a past surgical history that includes Hysterectomy; bladder repair; Abdominal exploration surgery (02/12/2018); Partial hysterectomy; Breast enhancement surgery; shoulder surgery; joint replacement (Left, 04/2023); Elbow fracture surgery (Left); Examination under anesthesia (N/A, 11/15/2023); Bony pelvis surgery (04/23/2023); shoulder surgery (Left, 1989); and Stimulator Surgery (Right, 2/23/2024).     Medications:  No current facility-administered medications on file prior to encounter.     Current Outpatient

## 2024-10-11 NOTE — BRIEF OP NOTE
[facility]  Brief Operative Note    Pt Name: Kasandra Cantor  CSN: [id number]  YOB: 1955    Date of Procedure: 10/11/2024    Pre-operative Diagnosis: Acute appendicitis    Post-operative Diagnosis:  acute gangrenous appendicitis      Procedure: Laparoscopic appendectomy    Surgeon(s):  Chacho Iraheta MD     Surgical Assistant: Daren Hadley    Anesthesia:  General anesthesia    Findings: Full note dictated, Dictation Job Number: [id number]  Infection Present At Time Of Surgery (PATOS) (choose all levels that have infection present):  - Organ Space infection (below fascia) present as evidenced by fluid consistent with infection  Other Findings: gangrenous appendicitis    Estimated Blood Loss: less than 50  ml    Complications: None    Specimens: appendix  ID Type Source Tests Collected by Time Destination   A : A) APPENDIX Tissue Tissue SURGICAL PATHOLOGY Chacho Iraheta MD 10/11/2024 1700       Implants:  * No implants in log *    Drains: none   Urinary Catheter 10/11/24 (Active)   $ Urethral catheter insertion Inserted for procedure 10/11/24 1519   Catheter Indications Perioperative use for selected surgical procedures 10/11/24 1519       Condition: stable     Disposition and Post-operative plan: PACU, med/surg ceja     Chacho Iraheta MD, FACS  10/11/2024  5:25 PM

## 2024-10-11 NOTE — ANESTHESIA PRE PROCEDURE
Department of Anesthesiology  Preprocedure Note       Name:  Kasandra Cantor   Age:  68 y.o.  :  1955                                          MRN:  [id number]         Date:  10/11/2024      Surgeon: Surgeon(s):  Chacho Iraheta MD    Procedure: Procedure(s):  LAPAROSCOPIC  APPENDECTOMY    Medications prior to admission:   Prior to Admission medications    Medication Sig Start Date End Date Taking? Authorizing Provider   glycopyrrolate-formoterol (BEVESPI AEROSPHERE) 9-4.8 MCG/ACT AERO Inhale 2 puffs into the lungs 2 times daily 24   Ino Mccrary MD   spironolactone (ALDACTONE) 25 MG tablet Take 1 tablet by mouth daily 24   Khang Lazaro MD   empagliflozin (JARDIANCE) 10 MG tablet Take 1 tablet by mouth daily 24   Khang Lazaro MD   furosemide (LASIX) 20 MG tablet Take 1 tablet by mouth daily 24   Khang Lazaro MD   cetirizine (ZYRTEC) 10 MG tablet Take 1 tablet by mouth daily 24   Nati Holguin APRN - CNP   escitalopram (LEXAPRO) 20 MG tablet Take 0.5 tablets by mouth daily 24   Nati Holguin APRN - CNP   metoprolol succinate (TOPROL XL) 25 MG extended release tablet TAKE 1 TABLET BY MOUTH DAILY 24   Hernandez Esquivel APRN - CNP   traZODone (DESYREL) 50 MG tablet Take 1 tablet by mouth nightly    ProviderMio MD   gabapentin (NEURONTIN) 300 MG capsule Take 2 capsules by mouth 3 times daily.    ProviderMio MD   fluocinolone (DERMOTIC) 0.01 % OIL oil Place 3 drops in ear(s) 2 times daily as needed (ear itching) 10/23/23   Charan Gilmore DO   albuterol sulfate HFA (VENTOLIN HFA) 108 (90 Base) MCG/ACT inhaler Inhale 2 puffs into the lungs 4 times daily as needed for Wheezing 23   Ino Mccrary MD   acetaminophen (TYLENOL) 325 MG tablet Take 2 tablets by mouth every 6 hours as needed for Pain    ProviderMio MD   hydroxychloroquine (PLAQUENIL) 200 MG tablet Take 1 tablet by mouth 2 times

## 2024-10-11 NOTE — ANESTHESIA POSTPROCEDURE EVALUATION
Department of Anesthesiology  Postprocedure Note    Patient: Kasandra Cantor  MRN: [id number]  YOB: 1955  Date of evaluation: 10/11/2024    Procedure Summary       Date: 10/11/24 Room / Location: [address]    Anesthesia Start: 1653 Anesthesia Stop: 1741    Procedure: LAPAROSCOPIC  APPENDECTOMY (Abdomen) Diagnosis:       Appendicitis, unspecified appendicitis type      (Appendicitis, unspecified appendicitis type [K37])    Surgeons: Chacho Iraheta MD Responsible Provider: Tonny Rangel MD    Anesthesia Type: general ASA Status: 3            Anesthesia Type: No value filed.    Erin Phase I: Erin Score: 9    Erin Phase II:      Anesthesia Post Evaluation    Patient location during evaluation: PACU  Patient participation: complete - patient participated  Level of consciousness: awake and alert  Pain score: 2  Airway patency: patent  Nausea & Vomiting: no vomiting  Cardiovascular status: blood pressure returned to baseline  Respiratory status: acceptable  Hydration status: euvolemic  Multimodal analgesia pain management approach  Pain management: adequate    No notable events documented.

## 2024-10-11 NOTE — ED PROVIDER NOTES
Central Line Placement Procedure Note    Indication: long term access and centrally administered medications    Consent: The patient provided verbal consent for this procedure.    Procedure: The patient was positioned appropriately and the skin over the right femoral vein was prepped with Chloraprep and draped in a sterile fashion. Local anesthesia was obtained by infiltration using 1% Lidocaine without epinephrine.  A large bore needle was used to identify the vein.  A guide wire was then inserted into the vein through the needle. A triple lumen catheter was then inserted into the vessel over the guide wire using the Seldinger technique.  All ports showed good, free flowing blood return and were flushed with saline solution.  The catheter was then securely fastened to the skin with sutures and with an adhesive dressing and covered with a sterile dressing.  A post procedure X-ray was not indicated.    The patient tolerated the procedure well.    Complications: None      I did not otherwise participate in the care of this patient.     Heaven Kelly PA-C  10/11/24 8045

## 2024-10-11 NOTE — ED PROVIDER NOTES
EMERGENCY DEPARTMENT ENCOUNTER     [facility] [place] [facility]     Pt Name: Kasandra Cantor   MRN: [id number]   Birthdate 1955   Date of evaluation: 10/11/2024   Provider: Osiel Chang MD   PCP: Tio Gonzalez MD   Note Started: 12:49 PM EDT 10/11/24     CHIEF COMPLAINT     Chief Complaint   Patient presents with    Abdominal Pain     Abdominal pain x 5 days and is getting worse in the last 2 days.         HISTORY OF PRESENT ILLNESS:  History from : Patient   Limitations to history : None     Kasandra Cantor is a 68 y.o. female who presents for abdominal pain.  Patient reports she has had abdominal pain over the entirety of her abdomen for several days.  Over the last 2 days however she has had complete loss of appetite.  Yesterday she only drank some chicken broth and today a few crackers for her only oral intake.  She has not had a bowel movement in the last 2 days either.  She did pass some gas yesterday but none today.  She has a history of a prior ruptured ulcer but states this pain is different than that pain.  She denies any chest pain or shortness of breath.    Nursing Notes were all reviewed and agreed with or any disagreements were addressed in the HPI.     ROS: Positives and Pertinent negatives as per HPI.    PAST MEDICAL HISTORY     Past medical history:  has a past medical history of A-fib ([facility]), Age-related osteoporosis without current pathological fracture (05/18/2022), Anxiety, Arthritis, COPD (chronic obstructive pulmonary disease) ([facility]), Depression, History of blood transfusion, History of claustrophobia, Hypertension, Hypothyroidism, Pelvic fracture ([facility]), and Sleep apnea.    Past surgical history:  has a past surgical history that includes Hysterectomy; bladder repair; Abdominal exploration surgery (02/12/2018); Partial hysterectomy; Breast enhancement surgery; shoulder surgery; joint replacement (Left, 04/2023); Elbow fracture surgery (Left); Examination under anesthesia

## 2024-10-12 ENCOUNTER — APPOINTMENT (OUTPATIENT)
Dept: CT IMAGING | Age: 69
DRG: 853 | End: 2024-10-12
Payer: MEDICARE

## 2024-10-12 LAB
ALBUMIN SERPL-MCNC: 3.3 G/DL (ref 3.4–5)
ALBUMIN/GLOB SERPL: 1.2 {RATIO} (ref 1.1–2.2)
ALP SERPL-CCNC: 125 U/L (ref 40–129)
ALT SERPL-CCNC: 65 U/L (ref 10–40)
ANION GAP SERPL CALCULATED.3IONS-SCNC: 16 MMOL/L (ref 3–16)
APTT BLD: 40.4 SEC (ref 22.1–36.4)
AST SERPL-CCNC: 52 U/L (ref 15–37)
BASOPHILS # BLD: 0 K/UL (ref 0–0.2)
BASOPHILS # BLD: 0 K/UL (ref 0–0.2)
BASOPHILS NFR BLD: 0.3 %
BASOPHILS NFR BLD: 0.5 %
BILIRUB SERPL-MCNC: 0.8 MG/DL (ref 0–1)
BLOOD BANK DISPENSE STATUS: NORMAL
BLOOD BANK PRODUCT CODE: NORMAL
BPU ID: NORMAL
BUN SERPL-MCNC: 14 MG/DL (ref 7–20)
CALCIUM SERPL-MCNC: 8 MG/DL (ref 8.3–10.6)
CHLORIDE SERPL-SCNC: 103 MMOL/L (ref 99–110)
CO2 SERPL-SCNC: 15 MMOL/L (ref 21–32)
CREAT SERPL-MCNC: 1 MG/DL (ref 0.6–1.2)
DEPRECATED RDW RBC AUTO: 13.8 % (ref 12.4–15.4)
DEPRECATED RDW RBC AUTO: 13.8 % (ref 12.4–15.4)
DESCRIPTION BLOOD BANK: NORMAL
EKG ATRIAL RATE: 79 BPM
EKG DIAGNOSIS: NORMAL
EKG P AXIS: 6 DEGREES
EKG P-R INTERVAL: 140 MS
EKG Q-T INTERVAL: 392 MS
EKG QRS DURATION: 88 MS
EKG QTC CALCULATION (BAZETT): 449 MS
EKG R AXIS: 16 DEGREES
EKG T AXIS: 266 DEGREES
EKG VENTRICULAR RATE: 79 BPM
EOSINOPHIL # BLD: 0 K/UL (ref 0–0.6)
EOSINOPHIL # BLD: 0 K/UL (ref 0–0.6)
EOSINOPHIL NFR BLD: 0.1 %
EOSINOPHIL NFR BLD: 0.3 %
GFR SERPLBLD CREATININE-BSD FMLA CKD-EPI: 60 ML/MIN/{1.73_M2}
GLUCOSE BLD-MCNC: 122 MG/DL (ref 70–99)
GLUCOSE SERPL-MCNC: 76 MG/DL (ref 70–99)
HCT VFR BLD AUTO: 15.4 % (ref 36–48)
HCT VFR BLD AUTO: 27.2 % (ref 36–48)
HGB BLD-MCNC: 5.3 G/DL (ref 12–16)
HGB BLD-MCNC: 9.2 G/DL (ref 12–16)
INR PPP: 1.55 (ref 0.85–1.15)
LACTATE BLDV-SCNC: 0.7 MMOL/L (ref 0.4–2)
LYMPHOCYTES # BLD: 0.5 K/UL (ref 1–5.1)
LYMPHOCYTES # BLD: 0.7 K/UL (ref 1–5.1)
LYMPHOCYTES NFR BLD: 5.5 %
LYMPHOCYTES NFR BLD: 5.8 %
MAGNESIUM SERPL-MCNC: 2.22 MG/DL (ref 1.8–2.4)
MCH RBC QN AUTO: 35.8 PG (ref 26–34)
MCH RBC QN AUTO: 36.1 PG (ref 26–34)
MCHC RBC AUTO-ENTMCNC: 33.9 G/DL (ref 31–36)
MCHC RBC AUTO-ENTMCNC: 34.1 G/DL (ref 31–36)
MCV RBC AUTO: 105.5 FL (ref 80–100)
MCV RBC AUTO: 105.8 FL (ref 80–100)
MONOCYTES # BLD: 0.8 K/UL (ref 0–1.3)
MONOCYTES # BLD: 1.1 K/UL (ref 0–1.3)
MONOCYTES NFR BLD: 9.1 %
MONOCYTES NFR BLD: 9.3 %
NEUTROPHILS # BLD: 10.4 K/UL (ref 1.7–7.7)
NEUTROPHILS # BLD: 7.6 K/UL (ref 1.7–7.7)
NEUTROPHILS NFR BLD: 84.3 %
NEUTROPHILS NFR BLD: 84.8 %
PERFORMED ON: ABNORMAL
PHOSPHATE SERPL-MCNC: 2.8 MG/DL (ref 2.5–4.9)
PLATELET # BLD AUTO: 180 K/UL (ref 135–450)
PLATELET # BLD AUTO: 204 K/UL (ref 135–450)
PMV BLD AUTO: 7.9 FL (ref 5–10.5)
PMV BLD AUTO: 8 FL (ref 5–10.5)
POTASSIUM SERPL-SCNC: 4.2 MMOL/L (ref 3.5–5.1)
PREALB SERPL-MCNC: 12.2 MG/DL (ref 20–40)
PROT SERPL-MCNC: 6 G/DL (ref 6.4–8.2)
PROTHROMBIN TIME: 18.8 SEC (ref 11.9–14.9)
RBC # BLD AUTO: 1.46 M/UL (ref 4–5.2)
RBC # BLD AUTO: 2.57 M/UL (ref 4–5.2)
SODIUM SERPL-SCNC: 134 MMOL/L (ref 136–145)
WBC # BLD AUTO: 12.2 K/UL (ref 4–11)
WBC # BLD AUTO: 9 K/UL (ref 4–11)

## 2024-10-12 PROCEDURE — 36430 TRANSFUSION BLD/BLD COMPNT: CPT

## 2024-10-12 PROCEDURE — 85610 PROTHROMBIN TIME: CPT

## 2024-10-12 PROCEDURE — 84466 ASSAY OF TRANSFERRIN: CPT

## 2024-10-12 PROCEDURE — 2580000003 HC RX 258: Performed by: INTERNAL MEDICINE

## 2024-10-12 PROCEDURE — 83735 ASSAY OF MAGNESIUM: CPT

## 2024-10-12 PROCEDURE — 84100 ASSAY OF PHOSPHORUS: CPT

## 2024-10-12 PROCEDURE — 93010 ELECTROCARDIOGRAM REPORT: CPT | Performed by: INTERNAL MEDICINE

## 2024-10-12 PROCEDURE — 6370000000 HC RX 637 (ALT 250 FOR IP): Performed by: INTERNAL MEDICINE

## 2024-10-12 PROCEDURE — 2500000003 HC RX 250 WO HCPCS: Performed by: NURSE PRACTITIONER

## 2024-10-12 PROCEDURE — 6360000002 HC RX W HCPCS: Performed by: SURGERY

## 2024-10-12 PROCEDURE — 80053 COMPREHEN METABOLIC PANEL: CPT

## 2024-10-12 PROCEDURE — 6360000004 HC RX CONTRAST MEDICATION: Performed by: STUDENT IN AN ORGANIZED HEALTH CARE EDUCATION/TRAINING PROGRAM

## 2024-10-12 PROCEDURE — 2580000003 HC RX 258: Performed by: SURGERY

## 2024-10-12 PROCEDURE — 84134 ASSAY OF PREALBUMIN: CPT

## 2024-10-12 PROCEDURE — 74174 CTA ABD&PLVS W/CONTRAST: CPT

## 2024-10-12 PROCEDURE — 85730 THROMBOPLASTIN TIME PARTIAL: CPT

## 2024-10-12 PROCEDURE — 36415 COLL VENOUS BLD VENIPUNCTURE: CPT

## 2024-10-12 PROCEDURE — 85025 COMPLETE CBC W/AUTO DIFF WBC: CPT

## 2024-10-12 PROCEDURE — 6370000000 HC RX 637 (ALT 250 FOR IP): Performed by: SURGERY

## 2024-10-12 PROCEDURE — 99024 POSTOP FOLLOW-UP VISIT: CPT | Performed by: SURGERY

## 2024-10-12 PROCEDURE — 2000000000 HC ICU R&B

## 2024-10-12 PROCEDURE — 6360000002 HC RX W HCPCS: Performed by: EMERGENCY MEDICINE

## 2024-10-12 PROCEDURE — 6360000002 HC RX W HCPCS: Performed by: INTERNAL MEDICINE

## 2024-10-12 PROCEDURE — 83605 ASSAY OF LACTIC ACID: CPT

## 2024-10-12 RX ORDER — OXYCODONE HYDROCHLORIDE 5 MG/1
10 TABLET ORAL EVERY 4 HOURS PRN
Status: DISCONTINUED | OUTPATIENT
Start: 2024-10-12 | End: 2024-10-13 | Stop reason: SDUPTHER

## 2024-10-12 RX ORDER — MORPHINE SULFATE 4 MG/ML
4 INJECTION, SOLUTION INTRAMUSCULAR; INTRAVENOUS
Status: DISCONTINUED | OUTPATIENT
Start: 2024-10-12 | End: 2024-10-13 | Stop reason: ALTCHOICE

## 2024-10-12 RX ORDER — MAGNESIUM SULFATE IN WATER 40 MG/ML
2000 INJECTION, SOLUTION INTRAVENOUS PRN
Status: DISCONTINUED | OUTPATIENT
Start: 2024-10-12 | End: 2024-10-20 | Stop reason: HOSPADM

## 2024-10-12 RX ORDER — ONDANSETRON 2 MG/ML
4 INJECTION INTRAMUSCULAR; INTRAVENOUS EVERY 6 HOURS PRN
Status: DISCONTINUED | OUTPATIENT
Start: 2024-10-12 | End: 2024-10-13 | Stop reason: SDUPTHER

## 2024-10-12 RX ORDER — CETIRIZINE HYDROCHLORIDE 10 MG/1
10 TABLET ORAL DAILY
Status: DISCONTINUED | OUTPATIENT
Start: 2024-10-12 | End: 2024-10-20 | Stop reason: HOSPADM

## 2024-10-12 RX ORDER — THYROID 30 MG/1
90 TABLET ORAL DAILY
Status: DISCONTINUED | OUTPATIENT
Start: 2024-10-12 | End: 2024-10-20 | Stop reason: HOSPADM

## 2024-10-12 RX ORDER — MORPHINE SULFATE 2 MG/ML
2 INJECTION, SOLUTION INTRAMUSCULAR; INTRAVENOUS
Status: DISCONTINUED | OUTPATIENT
Start: 2024-10-12 | End: 2024-10-13 | Stop reason: ALTCHOICE

## 2024-10-12 RX ORDER — TRAZODONE HYDROCHLORIDE 50 MG/1
50 TABLET, FILM COATED ORAL NIGHTLY
Status: DISCONTINUED | OUTPATIENT
Start: 2024-10-12 | End: 2024-10-20 | Stop reason: HOSPADM

## 2024-10-12 RX ORDER — ACETAMINOPHEN 325 MG/1
650 TABLET ORAL EVERY 6 HOURS
Status: DISCONTINUED | OUTPATIENT
Start: 2024-10-12 | End: 2024-10-13 | Stop reason: SDUPTHER

## 2024-10-12 RX ORDER — DOCUSATE SODIUM 100 MG/1
100 CAPSULE, LIQUID FILLED ORAL 2 TIMES DAILY
Status: DISCONTINUED | OUTPATIENT
Start: 2024-10-12 | End: 2024-10-20 | Stop reason: HOSPADM

## 2024-10-12 RX ORDER — IOPAMIDOL 755 MG/ML
75 INJECTION, SOLUTION INTRAVASCULAR
Status: COMPLETED | OUTPATIENT
Start: 2024-10-12 | End: 2024-10-12

## 2024-10-12 RX ORDER — ALPRAZOLAM 0.25 MG/1
0.25 TABLET ORAL NIGHTLY PRN
Status: DISCONTINUED | OUTPATIENT
Start: 2024-10-12 | End: 2024-10-20 | Stop reason: HOSPADM

## 2024-10-12 RX ORDER — SODIUM CHLORIDE 0.9 % (FLUSH) 0.9 %
5-40 SYRINGE (ML) INJECTION PRN
Status: DISCONTINUED | OUTPATIENT
Start: 2024-10-12 | End: 2024-10-20 | Stop reason: HOSPADM

## 2024-10-12 RX ORDER — LANOLIN ALCOHOL/MO/W.PET/CERES
1000 CREAM (GRAM) TOPICAL DAILY
Status: DISCONTINUED | OUTPATIENT
Start: 2024-10-12 | End: 2024-10-20 | Stop reason: HOSPADM

## 2024-10-12 RX ORDER — FLUCONAZOLE 2 MG/ML
400 INJECTION, SOLUTION INTRAVENOUS EVERY 24 HOURS
Status: DISCONTINUED | OUTPATIENT
Start: 2024-10-12 | End: 2024-10-13

## 2024-10-12 RX ORDER — POLYETHYLENE GLYCOL 3350 17 G/17G
17 POWDER, FOR SOLUTION ORAL DAILY
Status: DISCONTINUED | OUTPATIENT
Start: 2024-10-12 | End: 2024-10-17

## 2024-10-12 RX ORDER — NOREPINEPHRINE BITARTRATE 0.06 MG/ML
1-100 INJECTION, SOLUTION INTRAVENOUS CONTINUOUS
Status: DISCONTINUED | OUTPATIENT
Start: 2024-10-12 | End: 2024-10-14

## 2024-10-12 RX ORDER — ESCITALOPRAM OXALATE 10 MG/1
10 TABLET ORAL DAILY
Status: DISCONTINUED | OUTPATIENT
Start: 2024-10-12 | End: 2024-10-20 | Stop reason: HOSPADM

## 2024-10-12 RX ORDER — SODIUM CHLORIDE 9 MG/ML
INJECTION, SOLUTION INTRAVENOUS PRN
Status: DISCONTINUED | OUTPATIENT
Start: 2024-10-12 | End: 2024-10-13

## 2024-10-12 RX ORDER — ACETAMINOPHEN 325 MG/1
650 TABLET ORAL EVERY 6 HOURS PRN
Status: DISCONTINUED | OUTPATIENT
Start: 2024-10-12 | End: 2024-10-20 | Stop reason: HOSPADM

## 2024-10-12 RX ORDER — POLYETHYLENE GLYCOL 3350 17 G/17G
17 POWDER, FOR SOLUTION ORAL DAILY PRN
Status: DISCONTINUED | OUTPATIENT
Start: 2024-10-12 | End: 2024-10-20 | Stop reason: HOSPADM

## 2024-10-12 RX ORDER — POTASSIUM CHLORIDE 1500 MG/1
40 TABLET, EXTENDED RELEASE ORAL PRN
Status: DISCONTINUED | OUTPATIENT
Start: 2024-10-12 | End: 2024-10-20 | Stop reason: HOSPADM

## 2024-10-12 RX ORDER — ALBUTEROL SULFATE 90 UG/1
2 INHALANT RESPIRATORY (INHALATION)
Status: DISCONTINUED | OUTPATIENT
Start: 2024-10-12 | End: 2024-10-17

## 2024-10-12 RX ORDER — HYDROMORPHONE HYDROCHLORIDE 1 MG/ML
1 INJECTION, SOLUTION INTRAMUSCULAR; INTRAVENOUS; SUBCUTANEOUS
Status: DISCONTINUED | OUTPATIENT
Start: 2024-10-12 | End: 2024-10-13 | Stop reason: ALTCHOICE

## 2024-10-12 RX ORDER — SODIUM CHLORIDE 9 MG/ML
INJECTION, SOLUTION INTRAVENOUS PRN
Status: DISCONTINUED | OUTPATIENT
Start: 2024-10-12 | End: 2024-10-12

## 2024-10-12 RX ORDER — ONDANSETRON 4 MG/1
4 TABLET, ORALLY DISINTEGRATING ORAL EVERY 8 HOURS PRN
Status: DISCONTINUED | OUTPATIENT
Start: 2024-10-12 | End: 2024-10-13 | Stop reason: SDUPTHER

## 2024-10-12 RX ORDER — ACETAMINOPHEN 650 MG/1
650 SUPPOSITORY RECTAL EVERY 6 HOURS PRN
Status: DISCONTINUED | OUTPATIENT
Start: 2024-10-12 | End: 2024-10-20 | Stop reason: HOSPADM

## 2024-10-12 RX ORDER — GABAPENTIN 300 MG/1
600 CAPSULE ORAL 3 TIMES DAILY
Status: DISCONTINUED | OUTPATIENT
Start: 2024-10-12 | End: 2024-10-20 | Stop reason: HOSPADM

## 2024-10-12 RX ORDER — 0.9 % SODIUM CHLORIDE 0.9 %
500 INTRAVENOUS SOLUTION INTRAVENOUS ONCE
Status: COMPLETED | OUTPATIENT
Start: 2024-10-12 | End: 2024-10-12

## 2024-10-12 RX ORDER — OXYCODONE HYDROCHLORIDE 5 MG/1
5 TABLET ORAL EVERY 4 HOURS PRN
Status: DISCONTINUED | OUTPATIENT
Start: 2024-10-12 | End: 2024-10-13 | Stop reason: SDUPTHER

## 2024-10-12 RX ORDER — SODIUM CHLORIDE 0.9 % (FLUSH) 0.9 %
5-40 SYRINGE (ML) INJECTION EVERY 12 HOURS SCHEDULED
Status: DISCONTINUED | OUTPATIENT
Start: 2024-10-12 | End: 2024-10-20 | Stop reason: HOSPADM

## 2024-10-12 RX ORDER — ENOXAPARIN SODIUM 100 MG/ML
40 INJECTION SUBCUTANEOUS DAILY
Status: DISCONTINUED | OUTPATIENT
Start: 2024-10-12 | End: 2024-10-12 | Stop reason: SDUPTHER

## 2024-10-12 RX ORDER — ALBUTEROL SULFATE 90 UG/1
2 INHALANT RESPIRATORY (INHALATION) 4 TIMES DAILY PRN
Status: DISCONTINUED | OUTPATIENT
Start: 2024-10-12 | End: 2024-10-20 | Stop reason: HOSPADM

## 2024-10-12 RX ORDER — SODIUM CHLORIDE 9 MG/ML
INJECTION, SOLUTION INTRAVENOUS
Status: DISCONTINUED
Start: 2024-10-12 | End: 2024-10-13

## 2024-10-12 RX ORDER — POTASSIUM CHLORIDE 7.45 MG/ML
10 INJECTION INTRAVENOUS PRN
Status: DISCONTINUED | OUTPATIENT
Start: 2024-10-12 | End: 2024-10-20 | Stop reason: HOSPADM

## 2024-10-12 RX ORDER — SODIUM CHLORIDE 9 MG/ML
INJECTION, SOLUTION INTRAVENOUS CONTINUOUS
Status: DISCONTINUED | OUTPATIENT
Start: 2024-10-12 | End: 2024-10-13

## 2024-10-12 RX ADMIN — SODIUM CHLORIDE: 9 INJECTION, SOLUTION INTRAVENOUS at 04:14

## 2024-10-12 RX ADMIN — ACETAMINOPHEN 650 MG: 325 TABLET ORAL at 10:08

## 2024-10-12 RX ADMIN — IOPAMIDOL 75 ML: 755 INJECTION, SOLUTION INTRAVENOUS at 23:48

## 2024-10-12 RX ADMIN — GABAPENTIN 600 MG: 300 CAPSULE ORAL at 13:34

## 2024-10-12 RX ADMIN — Medication 5 MCG/MIN: at 22:18

## 2024-10-12 RX ADMIN — Medication 10 ML: at 22:36

## 2024-10-12 RX ADMIN — PIPERACILLIN AND TAZOBACTAM 3375 MG: 3; .375 INJECTION, POWDER, LYOPHILIZED, FOR SOLUTION INTRAVENOUS at 04:25

## 2024-10-12 RX ADMIN — FLUCONAZOLE 400 MG: 400 INJECTION, SOLUTION INTRAVENOUS at 10:10

## 2024-10-12 RX ADMIN — SODIUM CHLORIDE, PRESERVATIVE FREE 10 ML: 5 INJECTION INTRAVENOUS at 00:40

## 2024-10-12 RX ADMIN — SODIUM CHLORIDE, PRESERVATIVE FREE 10 ML: 5 INJECTION INTRAVENOUS at 05:30

## 2024-10-12 RX ADMIN — TIZANIDINE 4 MG: 4 TABLET ORAL at 13:34

## 2024-10-12 RX ADMIN — Medication 10 ML: at 10:09

## 2024-10-12 RX ADMIN — SODIUM CHLORIDE, PRESERVATIVE FREE 10 ML: 5 INJECTION INTRAVENOUS at 20:55

## 2024-10-12 RX ADMIN — DOCUSATE SODIUM 100 MG: 100 CAPSULE, LIQUID FILLED ORAL at 20:55

## 2024-10-12 RX ADMIN — OXYCODONE 5 MG: 5 TABLET ORAL at 12:41

## 2024-10-12 RX ADMIN — ALPRAZOLAM 0.25 MG: 0.25 TABLET ORAL at 13:34

## 2024-10-12 RX ADMIN — ESCITALOPRAM OXALATE 10 MG: 10 TABLET ORAL at 13:34

## 2024-10-12 RX ADMIN — ACETAMINOPHEN 650 MG: 325 TABLET ORAL at 04:17

## 2024-10-12 RX ADMIN — PIPERACILLIN AND TAZOBACTAM 3375 MG: 3; .375 INJECTION, POWDER, LYOPHILIZED, FOR SOLUTION INTRAVENOUS at 21:05

## 2024-10-12 RX ADMIN — CALCIUM CARBONATE-VITAMIN D TAB 500 MG-200 UNIT 1 TABLET: 500-200 TAB at 13:34

## 2024-10-12 RX ADMIN — SODIUM CHLORIDE: 9 INJECTION, SOLUTION INTRAVENOUS at 11:25

## 2024-10-12 RX ADMIN — SODIUM CHLORIDE 500 ML: 9 INJECTION, SOLUTION INTRAVENOUS at 19:20

## 2024-10-12 RX ADMIN — ACETAMINOPHEN 650 MG: 325 TABLET ORAL at 19:02

## 2024-10-12 RX ADMIN — HYDROMORPHONE HYDROCHLORIDE 1 MG: 1 INJECTION, SOLUTION INTRAMUSCULAR; INTRAVENOUS; SUBCUTANEOUS at 05:29

## 2024-10-12 RX ADMIN — CETIRIZINE HYDROCHLORIDE 10 MG: 10 TABLET, FILM COATED ORAL at 13:34

## 2024-10-12 RX ADMIN — OXYCODONE 10 MG: 5 TABLET ORAL at 04:17

## 2024-10-12 RX ADMIN — Medication 1000 MCG: at 13:34

## 2024-10-12 RX ADMIN — ONDANSETRON 4 MG: 2 INJECTION, SOLUTION INTRAMUSCULAR; INTRAVENOUS at 11:27

## 2024-10-12 RX ADMIN — POLYETHYLENE GLYCOL 3350 17 G: 17 POWDER, FOR SOLUTION ORAL at 10:07

## 2024-10-12 RX ADMIN — FENTANYL CITRATE 50 MCG: 50 INJECTION INTRAMUSCULAR; INTRAVENOUS at 00:39

## 2024-10-12 RX ADMIN — ENOXAPARIN SODIUM 40 MG: 100 INJECTION SUBCUTANEOUS at 10:08

## 2024-10-12 RX ADMIN — PIPERACILLIN AND TAZOBACTAM 3375 MG: 3; .375 INJECTION, POWDER, LYOPHILIZED, FOR SOLUTION INTRAVENOUS at 13:37

## 2024-10-12 RX ADMIN — DOCUSATE SODIUM 100 MG: 100 CAPSULE, LIQUID FILLED ORAL at 10:08

## 2024-10-12 ASSESSMENT — PAIN SCALES - GENERAL
PAINLEVEL_OUTOF10: 7
PAINLEVEL_OUTOF10: 8
PAINLEVEL_OUTOF10: 5
PAINLEVEL_OUTOF10: 7
PAINLEVEL_OUTOF10: 5
PAINLEVEL_OUTOF10: 7
PAINLEVEL_OUTOF10: 3
PAINLEVEL_OUTOF10: 10

## 2024-10-12 ASSESSMENT — PAIN DESCRIPTION - ORIENTATION
ORIENTATION: UPPER

## 2024-10-12 ASSESSMENT — PAIN DESCRIPTION - PAIN TYPE
TYPE: SURGICAL PAIN

## 2024-10-12 ASSESSMENT — PAIN DESCRIPTION - LOCATION
LOCATION: ABDOMEN

## 2024-10-12 ASSESSMENT — PAIN DESCRIPTION - ONSET
ONSET: ON-GOING

## 2024-10-12 ASSESSMENT — PAIN DESCRIPTION - FREQUENCY
FREQUENCY: CONTINUOUS

## 2024-10-12 ASSESSMENT — PAIN DESCRIPTION - DESCRIPTORS
DESCRIPTORS: ACHING

## 2024-10-12 NOTE — PLAN OF CARE
Problem: Discharge Planning  Goal: Discharge to home or other facility with appropriate resources  Outcome: Progressing     Problem: Pain  Goal: Verbalizes/displays adequate comfort level or baseline comfort level  10/12/2024 1506 by Indira Hardy RN  Outcome: Progressing  10/12/2024 0922 by eDmetria Saleh RN  Outcome: Progressing     Problem: Safety - Adult  Goal: Free from fall injury  Outcome: Progressing     Problem: Hematologic - Adult  Goal: Maintains hematologic stability  10/12/2024 1506 by Indira Hardy RN  Outcome: Progressing  10/12/2024 0922 by Demetria Saleh RN  Outcome: Progressing     Problem: ABCDS Injury Assessment  Goal: Absence of physical injury  Outcome: Progressing

## 2024-10-12 NOTE — PLAN OF CARE
Problem: Pain  Goal: Verbalizes/displays adequate comfort level or baseline comfort level  Outcome: Progressing     Problem: Hematologic - Adult  Goal: Maintains hematologic stability  Outcome: Progressing     Change of shift now;gave report to oncoming shift.      10/12/24 0800   Vitals   Temp 97.5 °F (36.4 °C)   Temp Source Oral   Pulse 90   Heart Rate Source Monitor   Respirations 18   /64   MAP (Calculated) 76   BP Location Right upper arm   BP Method Automatic   Patient Position Semi fowlers   Cardiac Rhythm Sinus rhythm   Pain Assessment   Pain Assessment 0-10   Pain Level 3   Pain Location Abdomen   Pain Orientation Upper   Pain Descriptors Aching   Pain Type Surgical pain   Pain Radiating Towards n/a   Pain Frequency Continuous   Pain Onset On-going   Non-Pharmaceutical Pain Intervention(s) Declines   Oxygen Therapy   SpO2 98 %   Pulse Oximetry Type Intermittent   Pulse Oximeter Device Mode Intermittent   Pulse Oximeter Device Location Finger   O2 Device Nasal cannula   O2 Flow Rate (L/min) 2 L/min

## 2024-10-12 NOTE — PLAN OF CARE
Problem: Pain  Goal: Verbalizes/displays adequate comfort level or baseline comfort level  Outcome: Progressing     Problem: Hematologic - Adult  Goal: Maintains hematologic stability  Outcome: Progressing      10/11/24 2000   Vitals   Pulse 88   Heart Rate Source Brachial;Radial   Respirations 18   /67   MAP (Calculated) 83   BP Location Right upper arm   BP Upper/Lower Upper   BP Method Automatic   Patient Position Semi fowlers   Cardiac Rhythm Sinus rhythm

## 2024-10-12 NOTE — ADDENDUM NOTE
Addendum  created 10/12/24 1029 by Tonny Rangel MD    Flowsheet accepted, Intraprocedure Event edited, Intraprocedure Flowsheets edited, Intraprocedure Staff edited

## 2024-10-12 NOTE — PLAN OF CARE
Problem: Pain  Goal: Verbalizes/displays adequate comfort level or baseline comfort level  Outcome: Progressing      10/12/24 0039   Pain Assessment   Pain Assessment 0-10   Pain Level 8   Pain Location Abdomen   Pain Orientation Upper   Pain Descriptors Aching   Pain Type Surgical pain   Pain Radiating Towards n/a   Pain Frequency Continuous   Pain Onset On-going   Non-Pharmaceutical Pain Intervention(s) Emotional support  (and gave fentanyl IV;see MAR)

## 2024-10-12 NOTE — PLAN OF CARE
Problem: Hematologic - Adult  Goal: Maintains hematologic stability  Outcome: Progressing      10/12/24 0422   Vitals   Temp 99 °F (37.2 °C)   Temp Source Oral   Pulse 85   Heart Rate Source Monitor;Brachial   Respirations 18   /66   MAP (Calculated) 80   BP Location Right upper arm   BP Upper/Lower Upper   BP Method Automatic   Patient Position Semi fowlers   Cardiac Rhythm Sinus rhythm   Oxygen Therapy   SpO2 96 %   Pulse Oximetry Type Intermittent   Pulse Oximeter Device Mode Intermittent   Pulse Oximeter Device Location Finger   O2 Device Nasal cannula   O2 Flow Rate (L/min) 2 L/min

## 2024-10-12 NOTE — PLAN OF CARE
Problem: Pain  Goal: Verbalizes/displays adequate comfort level or baseline comfort level  Outcome: Progressing      10/11/24 2001   Pain Assessment   Pain Assessment 0-10   Pain Level 8   Pain Location Abdomen   Pain Orientation Upper;Mid;Right;Left   Pain Descriptors Aching   Non-Pharmaceutical Pain Intervention(s) Emotional support  (and giving fentanyl now per pt request)

## 2024-10-12 NOTE — PLAN OF CARE
Problem: Pain  Goal: Verbalizes/displays adequate comfort level or baseline comfort level  Outcome: Progressing      10/11/24 6835   Pain Assessment   Pain Assessment 0-10   Pain Level 6   Pain Location Abdomen   Pain Orientation Upper   Pain Descriptors Aching   Pain Type Surgical pain   Pain Radiating Towards n/a   Pain Frequency Continuous   Pain Onset On-going   Non-Pharmaceutical Pain Intervention(s) Declines

## 2024-10-12 NOTE — PLAN OF CARE
Problem: Pain  Goal: Verbalizes/displays adequate comfort level or baseline comfort level  Outcome: Progressing      10/12/24 0529   Pain Assessment   Pain Assessment 0-10   Pain Level 7   Pain Location Abdomen   Pain Orientation Upper   Pain Descriptors Aching   Pain Type Surgical pain   Pain Radiating Towards n/a   Pain Frequency Continuous   Pain Onset On-going   Non-Pharmaceutical Pain Intervention(s) Emotional support  (and giving dilaudid now per pt request;see MAR)

## 2024-10-12 NOTE — PLAN OF CARE
Problem: Pain  Goal: Verbalizes/displays adequate comfort level or baseline comfort level  Outcome: Progressing      10/12/24 0417   Pain Assessment   Pain Assessment 0-10   Pain Level 10   Pain Location Abdomen   Pain Orientation Upper   Pain Descriptors Aching   Pain Type Surgical pain   Pain Radiating Towards n/a   Pain Frequency Continuous   Pain Onset On-going   Non-Pharmaceutical Pain Intervention(s) Emotional support  (and giving oxyIR now per pt request;see MAR)

## 2024-10-12 NOTE — PLAN OF CARE
Problem: Pain  Goal: Verbalizes/displays adequate comfort level or baseline comfort level  Outcome: Progressing     Problem: Hematologic - Adult  Goal: Maintains hematologic stability  Outcome: Progressing      10/12/24 0028   Vitals   Temp 99 °F (37.2 °C)   Temp Source Oral   Pulse 88   Heart Rate Source Brachial;Radial   Respirations 18   /68   MAP (Calculated) 87   BP Location Right upper arm   BP Upper/Lower Upper   BP Method Automatic   Patient Position Semi fowlers   Cardiac Rhythm Sinus rhythm   Oxygen Therapy   SpO2 96 %   Pulse Oximetry Type Intermittent   Pulse Oximeter Device Mode Intermittent   Pulse Oximeter Device Location Finger   O2 Device Nasal cannula   O2 Flow Rate (L/min) 2 L/min

## 2024-10-13 ENCOUNTER — ANESTHESIA EVENT (OUTPATIENT)
Dept: OPERATING ROOM | Age: 69
End: 2024-10-13
Payer: MEDICARE

## 2024-10-13 ENCOUNTER — ANESTHESIA (OUTPATIENT)
Dept: OPERATING ROOM | Age: 69
End: 2024-10-13
Payer: MEDICARE

## 2024-10-13 PROBLEM — K66.1 HEMOPERITONEUM: Status: ACTIVE | Noted: 2024-10-13

## 2024-10-13 PROBLEM — K37 APPENDICITIS: Status: ACTIVE | Noted: 2024-10-13

## 2024-10-13 PROBLEM — D64.9 SYMPTOMATIC ANEMIA: Status: ACTIVE | Noted: 2024-10-13

## 2024-10-13 PROBLEM — K35.209 ACUTE APPENDICITIS WITH GENERALIZED PERITONITIS AND GANGRENE: Status: ACTIVE | Noted: 2024-10-13

## 2024-10-13 PROBLEM — K35.891 ACUTE APPENDICITIS WITH GENERALIZED PERITONITIS AND GANGRENE: Status: ACTIVE | Noted: 2024-10-13

## 2024-10-13 PROBLEM — R57.8 HEMORRHAGIC SHOCK (HCC): Status: ACTIVE | Noted: 2024-10-13

## 2024-10-13 LAB
ABO + RH BLD: NORMAL
ANION GAP SERPL CALCULATED.3IONS-SCNC: 12 MMOL/L (ref 3–16)
ANION GAP SERPL CALCULATED.3IONS-SCNC: 15 MMOL/L (ref 3–16)
APTT BLD: 25.5 SEC (ref 22.1–36.4)
APTT BLD: 33 SEC (ref 22.1–36.4)
BASOPHILS # BLD: 0 K/UL (ref 0–0.2)
BASOPHILS # BLD: 0 K/UL (ref 0–0.2)
BASOPHILS NFR BLD: 0.2 %
BASOPHILS NFR BLD: 0.4 %
BLD GP AB SCN SERPL QL: NORMAL
BLOOD BANK DISPENSE STATUS: NORMAL
BLOOD BANK PRODUCT CODE: NORMAL
BPU ID: NORMAL
BUN SERPL-MCNC: 14 MG/DL (ref 7–20)
BUN SERPL-MCNC: 22 MG/DL (ref 7–20)
CALCIUM SERPL-MCNC: 7.5 MG/DL (ref 8.3–10.6)
CALCIUM SERPL-MCNC: 7.6 MG/DL (ref 8.3–10.6)
CHLORIDE SERPL-SCNC: 105 MMOL/L (ref 99–110)
CHLORIDE SERPL-SCNC: 107 MMOL/L (ref 99–110)
CO2 SERPL-SCNC: 12 MMOL/L (ref 21–32)
CO2 SERPL-SCNC: 16 MMOL/L (ref 21–32)
CREAT SERPL-MCNC: 1 MG/DL (ref 0.6–1.2)
CREAT SERPL-MCNC: 1.5 MG/DL (ref 0.6–1.2)
DEPRECATED RDW RBC AUTO: 19.4 % (ref 12.4–15.4)
DEPRECATED RDW RBC AUTO: 22.5 % (ref 12.4–15.4)
DESCRIPTION BLOOD BANK: NORMAL
EOSINOPHIL # BLD: 0 K/UL (ref 0–0.6)
EOSINOPHIL # BLD: 0 K/UL (ref 0–0.6)
EOSINOPHIL NFR BLD: 0.2 %
EOSINOPHIL NFR BLD: 0.3 %
GFR SERPLBLD CREATININE-BSD FMLA CKD-EPI: 37 ML/MIN/{1.73_M2}
GFR SERPLBLD CREATININE-BSD FMLA CKD-EPI: 60 ML/MIN/{1.73_M2}
GLUCOSE SERPL-MCNC: 123 MG/DL (ref 70–99)
GLUCOSE SERPL-MCNC: 180 MG/DL (ref 70–99)
HCT VFR BLD AUTO: 19.9 % (ref 36–48)
HCT VFR BLD AUTO: 20 % (ref 36–48)
HCT VFR BLD AUTO: 22.6 % (ref 36–48)
HCT VFR BLD AUTO: 23.7 % (ref 36–48)
HCT VFR BLD AUTO: 32.6 % (ref 36–48)
HGB BLD-MCNC: 10.5 G/DL (ref 12–16)
HGB BLD-MCNC: 6.6 G/DL (ref 12–16)
HGB BLD-MCNC: 6.7 G/DL (ref 12–16)
HGB BLD-MCNC: 7.6 G/DL (ref 12–16)
HGB BLD-MCNC: 7.8 G/DL (ref 12–16)
INR PPP: 1.41 (ref 0.85–1.15)
INR PPP: 1.46 (ref 0.85–1.15)
LACTATE BLDV-SCNC: 0.9 MMOL/L (ref 0.4–2)
LACTATE BLDV-SCNC: 3.5 MMOL/L (ref 0.4–2)
LYMPHOCYTES # BLD: 0.6 K/UL (ref 1–5.1)
LYMPHOCYTES # BLD: 0.8 K/UL (ref 1–5.1)
LYMPHOCYTES NFR BLD: 4.5 %
LYMPHOCYTES NFR BLD: 6.5 %
MAGNESIUM SERPL-MCNC: 2.12 MG/DL (ref 1.8–2.4)
MAGNESIUM SERPL-MCNC: 2.14 MG/DL (ref 1.8–2.4)
MCH RBC QN AUTO: 30.3 PG (ref 26–34)
MCH RBC QN AUTO: 30.5 PG (ref 26–34)
MCHC RBC AUTO-ENTMCNC: 32.3 G/DL (ref 31–36)
MCHC RBC AUTO-ENTMCNC: 32.7 G/DL (ref 31–36)
MCV RBC AUTO: 93.2 FL (ref 80–100)
MCV RBC AUTO: 94 FL (ref 80–100)
MONOCYTES # BLD: 0.3 K/UL (ref 0–1.3)
MONOCYTES # BLD: 1.4 K/UL (ref 0–1.3)
MONOCYTES NFR BLD: 11.8 %
MONOCYTES NFR BLD: 2.5 %
NEUTROPHILS # BLD: 12 K/UL (ref 1.7–7.7)
NEUTROPHILS # BLD: 9.6 K/UL (ref 1.7–7.7)
NEUTROPHILS NFR BLD: 81.1 %
NEUTROPHILS NFR BLD: 92.5 %
PHOSPHATE SERPL-MCNC: 4 MG/DL (ref 2.5–4.9)
PLATELET # BLD AUTO: 180 K/UL (ref 135–450)
PLATELET # BLD AUTO: 227 K/UL (ref 135–450)
PMV BLD AUTO: 7.7 FL (ref 5–10.5)
PMV BLD AUTO: 7.7 FL (ref 5–10.5)
POTASSIUM SERPL-SCNC: 4.1 MMOL/L (ref 3.5–5.1)
POTASSIUM SERPL-SCNC: 4.1 MMOL/L (ref 3.5–5.1)
PROTHROMBIN TIME: 17.4 SEC (ref 11.9–14.9)
PROTHROMBIN TIME: 17.9 SEC (ref 11.9–14.9)
RBC # BLD AUTO: 2.55 M/UL (ref 4–5.2)
RBC # BLD AUTO: 3.47 M/UL (ref 4–5.2)
SLIDE REVIEW: ABNORMAL
SODIUM SERPL-SCNC: 133 MMOL/L (ref 136–145)
SODIUM SERPL-SCNC: 134 MMOL/L (ref 136–145)
TRANSFERRIN SERPL-MCNC: 160 MG/DL (ref 200–360)
WBC # BLD AUTO: 11.8 K/UL (ref 4–11)
WBC # BLD AUTO: 12.9 K/UL (ref 4–11)

## 2024-10-13 PROCEDURE — 2580000003 HC RX 258: Performed by: INTERNAL MEDICINE

## 2024-10-13 PROCEDURE — 6360000002 HC RX W HCPCS: Performed by: SURGERY

## 2024-10-13 PROCEDURE — 36415 COLL VENOUS BLD VENIPUNCTURE: CPT

## 2024-10-13 PROCEDURE — 6360000002 HC RX W HCPCS: Performed by: INTERNAL MEDICINE

## 2024-10-13 PROCEDURE — 82435 ASSAY OF BLOOD CHLORIDE: CPT

## 2024-10-13 PROCEDURE — 2580000003 HC RX 258: Performed by: SURGERY

## 2024-10-13 PROCEDURE — 6360000002 HC RX W HCPCS: Performed by: ANESTHESIOLOGY

## 2024-10-13 PROCEDURE — A4217 STERILE WATER/SALINE, 500 ML: HCPCS | Performed by: SURGERY

## 2024-10-13 PROCEDURE — 2580000003 HC RX 258: Performed by: ANESTHESIOLOGY

## 2024-10-13 PROCEDURE — 6360000002 HC RX W HCPCS

## 2024-10-13 PROCEDURE — 84295 ASSAY OF SERUM SODIUM: CPT

## 2024-10-13 PROCEDURE — 7100000000 HC PACU RECOVERY - FIRST 15 MIN: Performed by: SURGERY

## 2024-10-13 PROCEDURE — 6370000000 HC RX 637 (ALT 250 FOR IP): Performed by: SURGERY

## 2024-10-13 PROCEDURE — 3600000004 HC SURGERY LEVEL 4 BASE: Performed by: SURGERY

## 2024-10-13 PROCEDURE — 2709999900 HC NON-CHARGEABLE SUPPLY: Performed by: SURGERY

## 2024-10-13 PROCEDURE — C1729 CATH, DRAINAGE: HCPCS | Performed by: SURGERY

## 2024-10-13 PROCEDURE — 84132 ASSAY OF SERUM POTASSIUM: CPT

## 2024-10-13 PROCEDURE — 82947 ASSAY GLUCOSE BLOOD QUANT: CPT

## 2024-10-13 PROCEDURE — 2500000003 HC RX 250 WO HCPCS: Performed by: ANESTHESIOLOGY

## 2024-10-13 PROCEDURE — P9045 ALBUMIN (HUMAN), 5%, 250 ML: HCPCS | Performed by: ANESTHESIOLOGY

## 2024-10-13 PROCEDURE — 7100000001 HC PACU RECOVERY - ADDTL 15 MIN: Performed by: SURGERY

## 2024-10-13 PROCEDURE — 82803 BLOOD GASES ANY COMBINATION: CPT

## 2024-10-13 PROCEDURE — 6370000000 HC RX 637 (ALT 250 FOR IP): Performed by: INTERNAL MEDICINE

## 2024-10-13 PROCEDURE — 82330 ASSAY OF CALCIUM: CPT

## 2024-10-13 PROCEDURE — 86900 BLOOD TYPING SEROLOGIC ABO: CPT

## 2024-10-13 PROCEDURE — 85018 HEMOGLOBIN: CPT

## 2024-10-13 PROCEDURE — 83735 ASSAY OF MAGNESIUM: CPT

## 2024-10-13 PROCEDURE — 86901 BLOOD TYPING SEROLOGIC RH(D): CPT

## 2024-10-13 PROCEDURE — 3E1M38Z IRRIGATION OF PERITONEAL CAVITY USING IRRIGATING SUBSTANCE, PERCUTANEOUS APPROACH: ICD-10-PCS | Performed by: SURGERY

## 2024-10-13 PROCEDURE — 99291 CRITICAL CARE FIRST HOUR: CPT | Performed by: INTERNAL MEDICINE

## 2024-10-13 PROCEDURE — 83605 ASSAY OF LACTIC ACID: CPT

## 2024-10-13 PROCEDURE — 3700000001 HC ADD 15 MINUTES (ANESTHESIA): Performed by: SURGERY

## 2024-10-13 PROCEDURE — 94640 AIRWAY INHALATION TREATMENT: CPT

## 2024-10-13 PROCEDURE — 85014 HEMATOCRIT: CPT

## 2024-10-13 PROCEDURE — 2720000010 HC SURG SUPPLY STERILE: Performed by: SURGERY

## 2024-10-13 PROCEDURE — 2500000003 HC RX 250 WO HCPCS: Performed by: SURGERY

## 2024-10-13 PROCEDURE — 3600000014 HC SURGERY LEVEL 4 ADDTL 15MIN: Performed by: SURGERY

## 2024-10-13 PROCEDURE — 6370000000 HC RX 637 (ALT 250 FOR IP): Performed by: ANESTHESIOLOGY

## 2024-10-13 PROCEDURE — 86850 RBC ANTIBODY SCREEN: CPT

## 2024-10-13 PROCEDURE — 3700000000 HC ANESTHESIA ATTENDED CARE: Performed by: SURGERY

## 2024-10-13 PROCEDURE — 85730 THROMBOPLASTIN TIME PARTIAL: CPT

## 2024-10-13 PROCEDURE — 94761 N-INVAS EAR/PLS OXIMETRY MLT: CPT

## 2024-10-13 PROCEDURE — 80048 BASIC METABOLIC PNL TOTAL CA: CPT

## 2024-10-13 PROCEDURE — 6370000000 HC RX 637 (ALT 250 FOR IP)

## 2024-10-13 PROCEDURE — 49020 DRAINAGE ABDOM ABSCESS OPEN: CPT | Performed by: SURGERY

## 2024-10-13 PROCEDURE — 2000000000 HC ICU R&B

## 2024-10-13 PROCEDURE — 0W9G30Z DRAINAGE OF PERITONEAL CAVITY WITH DRAINAGE DEVICE, PERCUTANEOUS APPROACH: ICD-10-PCS | Performed by: SURGERY

## 2024-10-13 PROCEDURE — 85025 COMPLETE CBC W/AUTO DIFF WBC: CPT

## 2024-10-13 PROCEDURE — 49329 UNLSTD LAPS PX ABD PERTM&OMN: CPT | Performed by: SURGERY

## 2024-10-13 PROCEDURE — 85610 PROTHROMBIN TIME: CPT

## 2024-10-13 PROCEDURE — 84100 ASSAY OF PHOSPHORUS: CPT

## 2024-10-13 PROCEDURE — 36430 TRANSFUSION BLD/BLD COMPNT: CPT

## 2024-10-13 RX ORDER — DIPHENHYDRAMINE HYDROCHLORIDE 50 MG/ML
12.5 INJECTION INTRAMUSCULAR; INTRAVENOUS
Status: DISCONTINUED | OUTPATIENT
Start: 2024-10-13 | End: 2024-10-13 | Stop reason: HOSPADM

## 2024-10-13 RX ORDER — BUPIVACAINE HYDROCHLORIDE AND EPINEPHRINE 5; 5 MG/ML; UG/ML
INJECTION, SOLUTION EPIDURAL; INTRACAUDAL; PERINEURAL
Status: COMPLETED | OUTPATIENT
Start: 2024-10-13 | End: 2024-10-13

## 2024-10-13 RX ORDER — SODIUM CHLORIDE 0.9 % (FLUSH) 0.9 %
5-40 SYRINGE (ML) INJECTION PRN
Status: DISCONTINUED | OUTPATIENT
Start: 2024-10-13 | End: 2024-10-20 | Stop reason: HOSPADM

## 2024-10-13 RX ORDER — LIDOCAINE HYDROCHLORIDE 20 MG/ML
INJECTION, SOLUTION EPIDURAL; INFILTRATION; INTRACAUDAL; PERINEURAL
Status: DISCONTINUED | OUTPATIENT
Start: 2024-10-13 | End: 2024-10-13 | Stop reason: SDUPTHER

## 2024-10-13 RX ORDER — DEXMEDETOMIDINE HYDROCHLORIDE 100 UG/ML
INJECTION, SOLUTION INTRAVENOUS
Status: DISCONTINUED | OUTPATIENT
Start: 2024-10-13 | End: 2024-10-13 | Stop reason: SDUPTHER

## 2024-10-13 RX ORDER — SODIUM CHLORIDE 0.9 % (FLUSH) 0.9 %
5-40 SYRINGE (ML) INJECTION EVERY 12 HOURS SCHEDULED
Status: DISCONTINUED | OUTPATIENT
Start: 2024-10-13 | End: 2024-10-20 | Stop reason: HOSPADM

## 2024-10-13 RX ORDER — NALOXONE HYDROCHLORIDE 0.4 MG/ML
INJECTION, SOLUTION INTRAMUSCULAR; INTRAVENOUS; SUBCUTANEOUS PRN
Status: CANCELLED | OUTPATIENT
Start: 2024-10-13

## 2024-10-13 RX ORDER — SENNOSIDES A AND B 8.6 MG/1
1 TABLET, FILM COATED ORAL 2 TIMES DAILY
Status: DISCONTINUED | OUTPATIENT
Start: 2024-10-13 | End: 2024-10-20 | Stop reason: HOSPADM

## 2024-10-13 RX ORDER — FLUCONAZOLE 2 MG/ML
200 INJECTION, SOLUTION INTRAVENOUS EVERY 24 HOURS
Status: DISCONTINUED | OUTPATIENT
Start: 2024-10-14 | End: 2024-10-20 | Stop reason: HOSPADM

## 2024-10-13 RX ORDER — NALOXONE HYDROCHLORIDE 0.4 MG/ML
INJECTION, SOLUTION INTRAMUSCULAR; INTRAVENOUS; SUBCUTANEOUS PRN
Status: DISCONTINUED | OUTPATIENT
Start: 2024-10-13 | End: 2024-10-13 | Stop reason: HOSPADM

## 2024-10-13 RX ORDER — MORPHINE SULFATE 2 MG/ML
2 INJECTION, SOLUTION INTRAMUSCULAR; INTRAVENOUS
Status: DISCONTINUED | OUTPATIENT
Start: 2024-10-13 | End: 2024-10-18

## 2024-10-13 RX ORDER — HYDROMORPHONE HYDROCHLORIDE 2 MG/ML
0.25 INJECTION, SOLUTION INTRAMUSCULAR; INTRAVENOUS; SUBCUTANEOUS EVERY 5 MIN PRN
Status: CANCELLED | OUTPATIENT
Start: 2024-10-13

## 2024-10-13 RX ORDER — SODIUM CHLORIDE 0.9 % (FLUSH) 0.9 %
5-40 SYRINGE (ML) INJECTION EVERY 12 HOURS SCHEDULED
Status: DISCONTINUED | OUTPATIENT
Start: 2024-10-13 | End: 2024-10-13 | Stop reason: HOSPADM

## 2024-10-13 RX ORDER — ALBUMIN, HUMAN INJ 5% 5 %
SOLUTION INTRAVENOUS
Status: DISCONTINUED | OUTPATIENT
Start: 2024-10-13 | End: 2024-10-13 | Stop reason: SDUPTHER

## 2024-10-13 RX ORDER — MORPHINE SULFATE 4 MG/ML
4 INJECTION, SOLUTION INTRAMUSCULAR; INTRAVENOUS
Status: DISCONTINUED | OUTPATIENT
Start: 2024-10-13 | End: 2024-10-18

## 2024-10-13 RX ORDER — SODIUM CHLORIDE 9 MG/ML
INJECTION, SOLUTION INTRAVENOUS PRN
Status: CANCELLED | OUTPATIENT
Start: 2024-10-13

## 2024-10-13 RX ORDER — MIDAZOLAM HYDROCHLORIDE 1 MG/ML
INJECTION INTRAMUSCULAR; INTRAVENOUS
Status: DISCONTINUED | OUTPATIENT
Start: 2024-10-13 | End: 2024-10-13 | Stop reason: SDUPTHER

## 2024-10-13 RX ORDER — ROCURONIUM BROMIDE 10 MG/ML
INJECTION, SOLUTION INTRAVENOUS
Status: DISCONTINUED | OUTPATIENT
Start: 2024-10-13 | End: 2024-10-13 | Stop reason: SDUPTHER

## 2024-10-13 RX ORDER — FUROSEMIDE 10 MG/ML
10 INJECTION INTRAMUSCULAR; INTRAVENOUS ONCE
Status: COMPLETED | OUTPATIENT
Start: 2024-10-13 | End: 2024-10-13

## 2024-10-13 RX ORDER — SODIUM CHLORIDE 9 MG/ML
INJECTION, SOLUTION INTRAVENOUS PRN
Status: DISCONTINUED | OUTPATIENT
Start: 2024-10-13 | End: 2024-10-20 | Stop reason: HOSPADM

## 2024-10-13 RX ORDER — DEXAMETHASONE SODIUM PHOSPHATE 4 MG/ML
INJECTION, SOLUTION INTRA-ARTICULAR; INTRALESIONAL; INTRAMUSCULAR; INTRAVENOUS; SOFT TISSUE
Status: DISCONTINUED | OUTPATIENT
Start: 2024-10-13 | End: 2024-10-13 | Stop reason: SDUPTHER

## 2024-10-13 RX ORDER — MAGNESIUM HYDROXIDE 1200 MG/15ML
LIQUID ORAL CONTINUOUS PRN
Status: COMPLETED | OUTPATIENT
Start: 2024-10-13 | End: 2024-10-13

## 2024-10-13 RX ORDER — FIBRINOGEN HUMAN AND THROMBIN HUMAN 1 ML
KIT TOPICAL
Status: COMPLETED | OUTPATIENT
Start: 2024-10-13 | End: 2024-10-13

## 2024-10-13 RX ORDER — PROPOFOL 10 MG/ML
INJECTION, EMULSION INTRAVENOUS
Status: DISCONTINUED | OUTPATIENT
Start: 2024-10-13 | End: 2024-10-13 | Stop reason: SDUPTHER

## 2024-10-13 RX ORDER — ONDANSETRON 2 MG/ML
4 INJECTION INTRAMUSCULAR; INTRAVENOUS
Status: DISCONTINUED | OUTPATIENT
Start: 2024-10-13 | End: 2024-10-13 | Stop reason: HOSPADM

## 2024-10-13 RX ORDER — ONDANSETRON 2 MG/ML
4 INJECTION INTRAMUSCULAR; INTRAVENOUS
Status: CANCELLED | OUTPATIENT
Start: 2024-10-13 | End: 2024-10-14

## 2024-10-13 RX ORDER — ONDANSETRON 2 MG/ML
INJECTION INTRAMUSCULAR; INTRAVENOUS
Status: DISCONTINUED | OUTPATIENT
Start: 2024-10-13 | End: 2024-10-13 | Stop reason: SDUPTHER

## 2024-10-13 RX ORDER — ACETAMINOPHEN 325 MG/1
650 TABLET ORAL EVERY 6 HOURS
Status: DISCONTINUED | OUTPATIENT
Start: 2024-10-13 | End: 2024-10-20 | Stop reason: HOSPADM

## 2024-10-13 RX ORDER — SODIUM CHLORIDE 0.9 % (FLUSH) 0.9 %
5-40 SYRINGE (ML) INJECTION EVERY 12 HOURS SCHEDULED
Status: CANCELLED | OUTPATIENT
Start: 2024-10-13

## 2024-10-13 RX ORDER — SODIUM CHLORIDE, SODIUM LACTATE, POTASSIUM CHLORIDE, CALCIUM CHLORIDE 600; 310; 30; 20 MG/100ML; MG/100ML; MG/100ML; MG/100ML
INJECTION, SOLUTION INTRAVENOUS
Status: DISCONTINUED | OUTPATIENT
Start: 2024-10-13 | End: 2024-10-13 | Stop reason: SDUPTHER

## 2024-10-13 RX ORDER — SODIUM CHLORIDE 9 MG/ML
INJECTION, SOLUTION INTRAVENOUS CONTINUOUS
Status: DISCONTINUED | OUTPATIENT
Start: 2024-10-13 | End: 2024-10-14

## 2024-10-13 RX ORDER — METHOCARBAMOL 100 MG/ML
500 INJECTION, SOLUTION INTRAMUSCULAR; INTRAVENOUS ONCE
Status: COMPLETED | OUTPATIENT
Start: 2024-10-13 | End: 2024-10-13

## 2024-10-13 RX ORDER — DIPHENHYDRAMINE HYDROCHLORIDE 50 MG/ML
12.5 INJECTION INTRAMUSCULAR; INTRAVENOUS
Status: CANCELLED | OUTPATIENT
Start: 2024-10-13 | End: 2024-10-14

## 2024-10-13 RX ORDER — FUROSEMIDE 10 MG/ML
INJECTION INTRAMUSCULAR; INTRAVENOUS
Status: COMPLETED
Start: 2024-10-13 | End: 2024-10-13

## 2024-10-13 RX ORDER — HYDROMORPHONE HYDROCHLORIDE 2 MG/ML
0.25 INJECTION, SOLUTION INTRAMUSCULAR; INTRAVENOUS; SUBCUTANEOUS EVERY 5 MIN PRN
Status: DISCONTINUED | OUTPATIENT
Start: 2024-10-13 | End: 2024-10-13 | Stop reason: HOSPADM

## 2024-10-13 RX ORDER — BUPIVACAINE HYDROCHLORIDE 2.5 MG/ML
INJECTION, SOLUTION EPIDURAL; INFILTRATION; INTRACAUDAL
Status: COMPLETED | OUTPATIENT
Start: 2024-10-13 | End: 2024-10-13

## 2024-10-13 RX ORDER — SODIUM CHLORIDE 0.9 % (FLUSH) 0.9 %
5-40 SYRINGE (ML) INJECTION PRN
Status: DISCONTINUED | OUTPATIENT
Start: 2024-10-13 | End: 2024-10-13 | Stop reason: HOSPADM

## 2024-10-13 RX ORDER — SODIUM CHLORIDE 9 MG/ML
INJECTION, SOLUTION INTRAVENOUS PRN
Status: DISCONTINUED | OUTPATIENT
Start: 2024-10-13 | End: 2024-10-13 | Stop reason: HOSPADM

## 2024-10-13 RX ORDER — FENTANYL CITRATE 50 UG/ML
INJECTION, SOLUTION INTRAMUSCULAR; INTRAVENOUS
Status: DISCONTINUED | OUTPATIENT
Start: 2024-10-13 | End: 2024-10-13 | Stop reason: SDUPTHER

## 2024-10-13 RX ORDER — SODIUM CHLORIDE 0.9 % (FLUSH) 0.9 %
5-40 SYRINGE (ML) INJECTION PRN
Status: CANCELLED | OUTPATIENT
Start: 2024-10-13

## 2024-10-13 RX ORDER — HYDROMORPHONE HYDROCHLORIDE 2 MG/ML
0.5 INJECTION, SOLUTION INTRAMUSCULAR; INTRAVENOUS; SUBCUTANEOUS EVERY 5 MIN PRN
Status: CANCELLED | OUTPATIENT
Start: 2024-10-13

## 2024-10-13 RX ORDER — OXYCODONE HYDROCHLORIDE 5 MG/1
10 TABLET ORAL EVERY 4 HOURS PRN
Status: DISCONTINUED | OUTPATIENT
Start: 2024-10-13 | End: 2024-10-20 | Stop reason: HOSPADM

## 2024-10-13 RX ORDER — ONDANSETRON 4 MG/1
4 TABLET, ORALLY DISINTEGRATING ORAL EVERY 8 HOURS PRN
Status: DISCONTINUED | OUTPATIENT
Start: 2024-10-13 | End: 2024-10-20 | Stop reason: HOSPADM

## 2024-10-13 RX ORDER — METHOCARBAMOL 100 MG/ML
INJECTION, SOLUTION INTRAMUSCULAR; INTRAVENOUS
Status: COMPLETED
Start: 2024-10-13 | End: 2024-10-13

## 2024-10-13 RX ORDER — SUCCINYLCHOLINE/SOD CL,ISO/PF 200MG/10ML
SYRINGE (ML) INTRAVENOUS
Status: DISCONTINUED | OUTPATIENT
Start: 2024-10-13 | End: 2024-10-13 | Stop reason: SDUPTHER

## 2024-10-13 RX ORDER — METOPROLOL TARTRATE 1 MG/ML
INJECTION, SOLUTION INTRAVENOUS
Status: DISPENSED
Start: 2024-10-13 | End: 2024-10-14

## 2024-10-13 RX ORDER — IPRATROPIUM BROMIDE AND ALBUTEROL SULFATE 2.5; .5 MG/3ML; MG/3ML
SOLUTION RESPIRATORY (INHALATION)
Status: COMPLETED
Start: 2024-10-13 | End: 2024-10-13

## 2024-10-13 RX ORDER — SODIUM CHLORIDE 9 MG/ML
INJECTION, SOLUTION INTRAVENOUS PRN
Status: DISCONTINUED | OUTPATIENT
Start: 2024-10-13 | End: 2024-10-13

## 2024-10-13 RX ORDER — HYDROMORPHONE HYDROCHLORIDE 2 MG/ML
0.5 INJECTION, SOLUTION INTRAMUSCULAR; INTRAVENOUS; SUBCUTANEOUS EVERY 5 MIN PRN
Status: DISCONTINUED | OUTPATIENT
Start: 2024-10-13 | End: 2024-10-13 | Stop reason: HOSPADM

## 2024-10-13 RX ORDER — OXYCODONE HYDROCHLORIDE 5 MG/1
5 TABLET ORAL EVERY 4 HOURS PRN
Status: DISCONTINUED | OUTPATIENT
Start: 2024-10-13 | End: 2024-10-20 | Stop reason: HOSPADM

## 2024-10-13 RX ORDER — PHENYLEPHRINE HCL IN 0.9% NACL 1 MG/10 ML
SYRINGE (ML) INTRAVENOUS
Status: DISCONTINUED | OUTPATIENT
Start: 2024-10-13 | End: 2024-10-13 | Stop reason: SDUPTHER

## 2024-10-13 RX ORDER — ALBUTEROL SULFATE 90 UG/1
INHALANT RESPIRATORY (INHALATION)
Status: DISCONTINUED | OUTPATIENT
Start: 2024-10-13 | End: 2024-10-13 | Stop reason: SDUPTHER

## 2024-10-13 RX ORDER — ONDANSETRON 2 MG/ML
4 INJECTION INTRAMUSCULAR; INTRAVENOUS EVERY 6 HOURS PRN
Status: DISCONTINUED | OUTPATIENT
Start: 2024-10-13 | End: 2024-10-20 | Stop reason: HOSPADM

## 2024-10-13 RX ORDER — IPRATROPIUM BROMIDE AND ALBUTEROL SULFATE 2.5; .5 MG/3ML; MG/3ML
1 SOLUTION RESPIRATORY (INHALATION) ONCE
Status: COMPLETED | OUTPATIENT
Start: 2024-10-13 | End: 2024-10-13

## 2024-10-13 RX ORDER — SODIUM CHLORIDE 9 MG/ML
INJECTION, SOLUTION INTRAVENOUS PRN
Status: DISCONTINUED | OUTPATIENT
Start: 2024-10-13 | End: 2024-10-16

## 2024-10-13 RX ADMIN — ROCURONIUM BROMIDE 30 MG: 10 INJECTION, SOLUTION INTRAVENOUS at 16:13

## 2024-10-13 RX ADMIN — HYDROMORPHONE HYDROCHLORIDE 1 MG: 1 INJECTION, SOLUTION INTRAMUSCULAR; INTRAVENOUS; SUBCUTANEOUS at 20:47

## 2024-10-13 RX ADMIN — SODIUM CHLORIDE: 9 INJECTION, SOLUTION INTRAVENOUS at 04:16

## 2024-10-13 RX ADMIN — SODIUM CHLORIDE, POTASSIUM CHLORIDE, SODIUM LACTATE AND CALCIUM CHLORIDE: 600; 310; 30; 20 INJECTION, SOLUTION INTRAVENOUS at 16:01

## 2024-10-13 RX ADMIN — HYDROMORPHONE HYDROCHLORIDE 0.5 MG: 2 INJECTION, SOLUTION INTRAMUSCULAR; INTRAVENOUS; SUBCUTANEOUS at 20:00

## 2024-10-13 RX ADMIN — FUROSEMIDE 10 MG: 10 INJECTION INTRAMUSCULAR; INTRAVENOUS at 19:41

## 2024-10-13 RX ADMIN — SODIUM CHLORIDE, POTASSIUM CHLORIDE, SODIUM LACTATE AND CALCIUM CHLORIDE: 600; 310; 30; 20 INJECTION, SOLUTION INTRAVENOUS at 18:14

## 2024-10-13 RX ADMIN — GABAPENTIN 600 MG: 300 CAPSULE ORAL at 21:49

## 2024-10-13 RX ADMIN — MORPHINE SULFATE 4 MG: 4 INJECTION, SOLUTION INTRAMUSCULAR; INTRAVENOUS at 23:14

## 2024-10-13 RX ADMIN — PIPERACILLIN AND TAZOBACTAM 3375 MG: 3; .375 INJECTION, POWDER, LYOPHILIZED, FOR SOLUTION INTRAVENOUS at 12:02

## 2024-10-13 RX ADMIN — DOCUSATE SODIUM 100 MG: 100 CAPSULE, LIQUID FILLED ORAL at 21:50

## 2024-10-13 RX ADMIN — PROPOFOL 100 MG: 10 INJECTION, EMULSION INTRAVENOUS at 18:19

## 2024-10-13 RX ADMIN — FENTANYL CITRATE 50 MCG: 50 INJECTION, SOLUTION INTRAMUSCULAR; INTRAVENOUS at 16:09

## 2024-10-13 RX ADMIN — Medication 2 PUFF: at 08:56

## 2024-10-13 RX ADMIN — Medication 100 MG: at 16:09

## 2024-10-13 RX ADMIN — PHENYLEPHRINE HYDROCHLORIDE 100 MCG: 10 INJECTION INTRAVENOUS at 18:23

## 2024-10-13 RX ADMIN — ACETAMINOPHEN 650 MG: 325 TABLET ORAL at 21:49

## 2024-10-13 RX ADMIN — SUGAMMADEX 200 MG: 100 INJECTION, SOLUTION INTRAVENOUS at 19:19

## 2024-10-13 RX ADMIN — ALBUTEROL SULFATE 2 PUFF: 90 AEROSOL, METERED RESPIRATORY (INHALATION) at 18:32

## 2024-10-13 RX ADMIN — PROPOFOL 100 MG: 10 INJECTION, EMULSION INTRAVENOUS at 16:09

## 2024-10-13 RX ADMIN — METHOCARBAMOL 500 MG: 100 INJECTION INTRAMUSCULAR; INTRAVENOUS at 20:17

## 2024-10-13 RX ADMIN — MIDAZOLAM 1 MG: 1 INJECTION INTRAMUSCULAR; INTRAVENOUS at 18:26

## 2024-10-13 RX ADMIN — CETIRIZINE HYDROCHLORIDE 10 MG: 10 TABLET, FILM COATED ORAL at 08:09

## 2024-10-13 RX ADMIN — Medication 10 ML: at 08:08

## 2024-10-13 RX ADMIN — PIPERACILLIN AND TAZOBACTAM 3375 MG: 3; .375 INJECTION, POWDER, LYOPHILIZED, FOR SOLUTION INTRAVENOUS at 22:06

## 2024-10-13 RX ADMIN — DEXAMETHASONE SODIUM PHOSPHATE 4 MG: 4 INJECTION, SOLUTION INTRAMUSCULAR; INTRAVENOUS at 16:21

## 2024-10-13 RX ADMIN — ONDANSETRON 4 MG: 2 INJECTION INTRAMUSCULAR; INTRAVENOUS at 18:31

## 2024-10-13 RX ADMIN — OXYCODONE 10 MG: 5 TABLET ORAL at 08:16

## 2024-10-13 RX ADMIN — HYDROMORPHONE HYDROCHLORIDE 1 MG: 1 INJECTION, SOLUTION INTRAMUSCULAR; INTRAVENOUS; SUBCUTANEOUS at 00:08

## 2024-10-13 RX ADMIN — ALBUTEROL SULFATE 2 PUFF: 90 AEROSOL, METERED RESPIRATORY (INHALATION) at 18:47

## 2024-10-13 RX ADMIN — PHENYLEPHRINE HYDROCHLORIDE 100 MCG: 10 INJECTION INTRAVENOUS at 18:33

## 2024-10-13 RX ADMIN — Medication 100 MCG: at 16:14

## 2024-10-13 RX ADMIN — METHOCARBAMOL 500 MG: 100 INJECTION, SOLUTION INTRAMUSCULAR; INTRAVENOUS at 20:17

## 2024-10-13 RX ADMIN — ALBUMIN (HUMAN) 12.5 G: 12.5 INJECTION, SOLUTION INTRAVENOUS at 16:43

## 2024-10-13 RX ADMIN — FENTANYL CITRATE 50 MCG: 50 INJECTION, SOLUTION INTRAMUSCULAR; INTRAVENOUS at 17:13

## 2024-10-13 RX ADMIN — ONDANSETRON 4 MG: 2 INJECTION INTRAMUSCULAR; INTRAVENOUS at 16:21

## 2024-10-13 RX ADMIN — MIDAZOLAM 1 MG: 1 INJECTION INTRAMUSCULAR; INTRAVENOUS at 18:13

## 2024-10-13 RX ADMIN — ACETAMINOPHEN 650 MG: 325 TABLET ORAL at 10:42

## 2024-10-13 RX ADMIN — GABAPENTIN 600 MG: 300 CAPSULE ORAL at 08:10

## 2024-10-13 RX ADMIN — DEXAMETHASONE SODIUM PHOSPHATE 4 MG: 4 INJECTION, SOLUTION INTRAMUSCULAR; INTRAVENOUS at 18:31

## 2024-10-13 RX ADMIN — ROCURONIUM BROMIDE 50 MG: 10 INJECTION, SOLUTION INTRAVENOUS at 18:22

## 2024-10-13 RX ADMIN — TIOTROPIUM BROMIDE AND OLODATEROL 2 PUFF: 3.124; 2.736 SPRAY, METERED RESPIRATORY (INHALATION) at 08:56

## 2024-10-13 RX ADMIN — FLUCONAZOLE 400 MG: 400 INJECTION, SOLUTION INTRAVENOUS at 05:51

## 2024-10-13 RX ADMIN — PIPERACILLIN AND TAZOBACTAM 3375 MG: 3; .375 INJECTION, POWDER, LYOPHILIZED, FOR SOLUTION INTRAVENOUS at 04:15

## 2024-10-13 RX ADMIN — Medication 1000 MCG: at 08:09

## 2024-10-13 RX ADMIN — HYDROMORPHONE HYDROCHLORIDE 1 MG: 1 INJECTION, SOLUTION INTRAMUSCULAR; INTRAVENOUS; SUBCUTANEOUS at 10:44

## 2024-10-13 RX ADMIN — DOCUSATE SODIUM 100 MG: 100 CAPSULE, LIQUID FILLED ORAL at 08:08

## 2024-10-13 RX ADMIN — FENTANYL CITRATE 50 MCG: 50 INJECTION, SOLUTION INTRAMUSCULAR; INTRAVENOUS at 18:33

## 2024-10-13 RX ADMIN — FUROSEMIDE 10 MG: 10 INJECTION, SOLUTION INTRAMUSCULAR; INTRAVENOUS at 19:41

## 2024-10-13 RX ADMIN — FENTANYL CITRATE 50 MCG: 50 INJECTION, SOLUTION INTRAMUSCULAR; INTRAVENOUS at 18:18

## 2024-10-13 RX ADMIN — Medication 10 ML: at 08:09

## 2024-10-13 RX ADMIN — CALCIUM CARBONATE-VITAMIN D TAB 500 MG-200 UNIT 1 TABLET: 500-200 TAB at 08:08

## 2024-10-13 RX ADMIN — Medication 100 MG: at 18:19

## 2024-10-13 RX ADMIN — ESCITALOPRAM OXALATE 10 MG: 10 TABLET ORAL at 09:19

## 2024-10-13 RX ADMIN — SENNOSIDES 8.6 MG: 8.6 TABLET, FILM COATED ORAL at 11:55

## 2024-10-13 RX ADMIN — PHENYLEPHRINE HYDROCHLORIDE 200 MCG: 10 INJECTION INTRAVENOUS at 18:49

## 2024-10-13 RX ADMIN — THYROID 90 MG: 30 TABLET ORAL at 10:43

## 2024-10-13 RX ADMIN — LIDOCAINE HYDROCHLORIDE 100 MG: 20 INJECTION, SOLUTION EPIDURAL; INFILTRATION; INTRACAUDAL; PERINEURAL at 18:14

## 2024-10-13 RX ADMIN — OXYCODONE 10 MG: 5 TABLET ORAL at 21:49

## 2024-10-13 RX ADMIN — POLYETHYLENE GLYCOL 3350 17 G: 17 POWDER, FOR SOLUTION ORAL at 08:08

## 2024-10-13 RX ADMIN — LIDOCAINE HYDROCHLORIDE 100 MG: 20 INJECTION, SOLUTION EPIDURAL; INFILTRATION; INTRACAUDAL; PERINEURAL at 16:02

## 2024-10-13 RX ADMIN — SODIUM CHLORIDE: 9 INJECTION, SOLUTION INTRAVENOUS at 15:28

## 2024-10-13 RX ADMIN — SUGAMMADEX 200 MG: 100 INJECTION, SOLUTION INTRAVENOUS at 17:24

## 2024-10-13 RX ADMIN — SODIUM CHLORIDE: 9 INJECTION, SOLUTION INTRAVENOUS at 22:05

## 2024-10-13 RX ADMIN — IPRATROPIUM BROMIDE AND ALBUTEROL SULFATE 1 DOSE: 2.5; .5 SOLUTION RESPIRATORY (INHALATION) at 19:40

## 2024-10-13 RX ADMIN — ALBUMIN (HUMAN) 12.5 G: 12.5 INJECTION, SOLUTION INTRAVENOUS at 16:17

## 2024-10-13 RX ADMIN — IPRATROPIUM BROMIDE AND ALBUTEROL SULFATE 1 DOSE: .5; 3 SOLUTION RESPIRATORY (INHALATION) at 19:40

## 2024-10-13 RX ADMIN — HYDROMORPHONE HYDROCHLORIDE 0.5 MG: 2 INJECTION, SOLUTION INTRAMUSCULAR; INTRAVENOUS; SUBCUTANEOUS at 19:52

## 2024-10-13 RX ADMIN — ACETAMINOPHEN 650 MG: 325 TABLET ORAL at 04:13

## 2024-10-13 RX ADMIN — DEXMEDETOMIDINE HYDROCHLORIDE 10 MCG: 100 INJECTION, SOLUTION INTRAVENOUS at 17:09

## 2024-10-13 ASSESSMENT — PAIN DESCRIPTION - ORIENTATION
ORIENTATION: MID
ORIENTATION: LEFT
ORIENTATION: MID

## 2024-10-13 ASSESSMENT — PAIN SCALES - GENERAL
PAINLEVEL_OUTOF10: 10
PAINLEVEL_OUTOF10: 2
PAINLEVEL_OUTOF10: 0
PAINLEVEL_OUTOF10: 9
PAINLEVEL_OUTOF10: 10
PAINLEVEL_OUTOF10: 8
PAINLEVEL_OUTOF10: 8
PAINLEVEL_OUTOF10: 0
PAINLEVEL_OUTOF10: 10
PAINLEVEL_OUTOF10: 10
PAINLEVEL_OUTOF10: 3
PAINLEVEL_OUTOF10: 0
PAINLEVEL_OUTOF10: 0
PAINLEVEL_OUTOF10: 10
PAINLEVEL_OUTOF10: 7

## 2024-10-13 ASSESSMENT — PAIN DESCRIPTION - DESCRIPTORS
DESCRIPTORS: ACHING;TENDER
DESCRIPTORS: SHARP
DESCRIPTORS: ACHING
DESCRIPTORS: SHARP
DESCRIPTORS: SHARP
DESCRIPTORS: ACHING

## 2024-10-13 ASSESSMENT — PAIN DESCRIPTION - LOCATION
LOCATION: ABDOMEN
LOCATION: ABDOMEN;BACK
LOCATION: ABDOMEN
LOCATION: ABDOMEN;BACK

## 2024-10-13 ASSESSMENT — PAIN - FUNCTIONAL ASSESSMENT: PAIN_FUNCTIONAL_ASSESSMENT: 0-10

## 2024-10-13 ASSESSMENT — PAIN SCALES - WONG BAKER
WONGBAKER_NUMERICALRESPONSE: NO HURT
WONGBAKER_NUMERICALRESPONSE: NO HURT

## 2024-10-13 NOTE — BRIEF OP NOTE
[facility]  Brief Operative Note    Pt Name: Kasandra Cantor  CSN: [id number]  YOB: 1955    Date of Procedure: 10/13/2024    Pre-operative Diagnosis:  Abdominal hemorrhage    Post-operative Diagnosis: same     Procedure:  Exploratory laparotomy, abdominal washout    Surgeon(s):  Chacho Iraheta MD     Surgical Assistant: Daren Hadley    Anesthesia:  General anesthesia    Findings: Full note dictated, Dictation Job Number: [id number]  Infection Present At Time Of Surgery (PATOS) (choose all levels that have infection present):  No infection present  Other Findings: large pelvic clot evacuated (possible etiology of high ESTRELLA drainage as this clot had been irrigated and was liquefying), small splenic laceration but at splenic flexure and not anterior surface likely related to expedited packing during initial exploration    Estimated Blood Loss: less than 100  ml    Complications: None    Specimens: none  * No specimens in log *     Implants:  * No implants in log *    Drains: ESTRELLA in left and right abdomen   Closed/Suction Drain Right RLQ Bulb (Active)       Closed/Suction Drain Left LUQ Bulb (Active)       Urinary Catheter 10/11/24 (Active)   $ Urethral catheter insertion Inserted for procedure 10/11/24 1519   Catheter Indications Need for fluid volume management of the critically ill patient in a critical care setting 10/13/24 1200   Site Assessment No urethral drainage 10/13/24 1200   Urine Color Yellow 10/13/24 1200   Urine Appearance Clear 10/13/24 1200   Urine Odor Other (Comment) 10/11/24 1822   Collection Container Standard 10/13/24 1200   Securement Method Securing device (Describe) 10/13/24 1200   Catheter Care  Soap and water 10/13/24 1032   Catheter Best Practices  Drainage tube clipped to bed;Catheter secured to thigh;Tamper seal intact;Bag below bladder;Bag not on floor;Lack of dependent loop in tubing;Drainage bag less than half full 10/13/24 1200

## 2024-10-13 NOTE — BRIEF OP NOTE
[facility]  Brief Operative Note    Pt Name: Kasandra Cantor  CSN: [id number]  YOB: 1955    Date of Procedure: 10/13/2024    Pre-operative Diagnosis:  Intra-abdominal bleeding    Post-operative Diagnosis:  No active bleeding      Procedure:  Diagnostic laparoscopy, abdominal washout, placement of intra-abdominal drain    Surgeon(s):  Chacho Iraheta MD     Surgical Assistant: Yeimi Richardson; Daren Hadley    Anesthesia:  General anesthesia    Findings: Full note dictated, Dictation Job Number: [id number]  Infection Present At Time Of Surgery (PATOS) (choose all levels that have infection present):  No infection present  Other Findings: No active bleeding encountered, large pelvic and right lower quadrant clot evacuated, Surgicel and Vistaseal sprayed onto raw surgical surface in pelvis and right lower quadrant, ESTRELLA placed directly over this as well    Estimated Blood Loss: less than 50  ml    Complications: None    Specimens: none  * No specimens in log *     Implants:  * No implants in log *    Drains: ESTRELLA in right abdomen   Closed/Suction Drain Anterior RLQ Bulb (Active)       Urinary Catheter 10/11/24 (Active)   $ Urethral catheter insertion Inserted for procedure 10/11/24 1519   Catheter Indications Need for fluid volume management of the critically ill patient in a critical care setting 10/13/24 1200   Site Assessment No urethral drainage 10/13/24 1200   Urine Color Yellow 10/13/24 1200   Urine Appearance Clear 10/13/24 1200   Urine Odor Other (Comment) 10/11/24 1822   Collection Container Standard 10/13/24 1200   Securement Method Securing device (Describe) 10/13/24 1200   Catheter Care  Soap and water 10/13/24 1032   Catheter Best Practices  Drainage tube clipped to bed;Catheter secured to thigh;Tamper seal intact;Bag below bladder;Bag not on floor;Lack of dependent loop in tubing;Drainage bag less than half full 10/13/24 1200   Status Draining;Patent

## 2024-10-13 NOTE — FLOWSHEET NOTE
10/12/24 4237   Family/Significant Other Communication   Reason Pt transferred to  ICU; updates given   Name Joss   Relationship Spouse/Significant Other   Method of Communication Phone

## 2024-10-13 NOTE — SIGNIFICANT EVENT
Name:  Kasandra Cantor /Age/Sex: 1955  (68 y.o. female)   MRN & CSN:  [id number] & [id number] Encounter Date/Time: 10/12/2024 10:05 PM EDT    Location:  ICU-[id number]- PCP: Tio Gonzalez MD       Call addressed around 10/12/2024 10:05 PM EDT     Vitals:   Vitals:    10/12/24 2148   BP: (!) 76/38   Pulse:    Resp:    Temp:    SpO2:          APRN was called for bleeding to surgery site, hemoglobin 5 with hypotension    Patient seen and examined in patient room 3381.    OBJECTIVE:      General: AxOx3  HEENT: PERRLA. Vision grossly intact. Hearing grossly intact. Oropharynx clear.   Neck: Supple. No JVD.   CV: RRR. NL S1/S2.   Pulm: CTA, no audible wheezing, stridor  GI: NEG N/V, lap sites without active bleeding.  Abdomen is distended, tender with some discoloration to the left lower quadrant  : No CVA or suprapubic tenderness.   Skin: Warm to touch distally.  MSK: No gross joint deformities.  Neuro: CNs grossly intact. Normal speech. No focal deficit.   Psych: Good judgement and reason.      ASSESSMENT/PLAN:    Hypotension  Abdominal distention  Postop lap appendectomy  Anemia  -Surgery notified of findings  -Will get abdominal CT  -2 units PRBC  -Repeat H&H posttransfusion  -Trend hemoglobin  -Levophed for hypotension.  I suspect this will be able to be weaned off once blood replacement.  Patient has previously received IV fluid bolus with continued pressures in the 70s over 30s  -Transfer to ICU  -Further pending results of above        Electronically signed by EDDA Westbrook - NP on 10/12/2024 at 10:05 PM

## 2024-10-14 LAB
ANION GAP SERPL CALCULATED.3IONS-SCNC: 12 MMOL/L (ref 3–16)
APTT BLD: 26.4 SEC (ref 22.1–36.4)
BASE EXCESS BLDV CALC-SCNC: -14 MMOL/L (ref -3–3)
BASOPHILS # BLD: 0 K/UL (ref 0–0.2)
BASOPHILS NFR BLD: 0.2 %
BUN SERPL-MCNC: 12 MG/DL (ref 7–20)
CA-I BLD-SCNC: 1.09 MMOL/L (ref 1.12–1.32)
CALCIUM SERPL-MCNC: 7.6 MG/DL (ref 8.3–10.6)
CHLORIDE BLD-SCNC: 113 MMOL/L (ref 99–110)
CHLORIDE SERPL-SCNC: 107 MMOL/L (ref 99–110)
CO2 BLDV-SCNC: 16 MMOL/L
CO2 SERPL-SCNC: 19 MMOL/L (ref 21–32)
CREAT SERPL-MCNC: 1 MG/DL (ref 0.6–1.2)
DEPRECATED RDW RBC AUTO: 19.6 % (ref 12.4–15.4)
EOSINOPHIL # BLD: 0 K/UL (ref 0–0.6)
EOSINOPHIL NFR BLD: 0 %
GFR SERPLBLD CREATININE-BSD FMLA CKD-EPI: 60 ML/MIN/{1.73_M2}
GLUCOSE BLD-MCNC: 118 MG/DL (ref 70–99)
GLUCOSE SERPL-MCNC: 166 MG/DL (ref 70–99)
HCO3 BLDV-SCNC: 14.6 MMOL/L (ref 23–29)
HCT VFR BLD AUTO: 20 % (ref 36–48)
HCT VFR BLD AUTO: 28.5 % (ref 36–48)
HCT VFR BLD AUTO: 28.9 % (ref 36–48)
HCT VFR BLD AUTO: 30 % (ref 36–48)
HGB BLD CALC-MCNC: 6.8 GM/DL (ref 12–16)
HGB BLD-MCNC: 9.4 G/DL (ref 12–16)
HGB BLD-MCNC: 9.8 G/DL (ref 12–16)
HGB BLD-MCNC: 9.9 G/DL (ref 12–16)
INR PPP: 1.23 (ref 0.85–1.15)
LACTATE BLD-SCNC: 1.15 MMOL/L (ref 0.4–2)
LACTATE BLDV-SCNC: 1.8 MMOL/L (ref 0.4–2)
LYMPHOCYTES # BLD: 0.2 K/UL (ref 1–5.1)
LYMPHOCYTES NFR BLD: 2.3 %
MAGNESIUM SERPL-MCNC: 2.03 MG/DL (ref 1.8–2.4)
MCH RBC QN AUTO: 31 PG (ref 26–34)
MCHC RBC AUTO-ENTMCNC: 33.8 G/DL (ref 31–36)
MCV RBC AUTO: 91.6 FL (ref 80–100)
MONOCYTES # BLD: 0.4 K/UL (ref 0–1.3)
MONOCYTES NFR BLD: 4.9 %
NEUTROPHILS # BLD: 8.4 K/UL (ref 1.7–7.7)
NEUTROPHILS NFR BLD: 92.6 %
PCO2 BLDV: 39.4 MM HG (ref 40–50)
PERFORMED ON: ABNORMAL
PH BLDV: 7.18 [PH] (ref 7.35–7.45)
PHOSPHATE SERPL-MCNC: 3 MG/DL (ref 2.5–4.9)
PLATELET # BLD AUTO: 175 K/UL (ref 135–450)
PMV BLD AUTO: 7.6 FL (ref 5–10.5)
PO2 BLDV: 43 MM HG
POC SAMPLE TYPE: ABNORMAL
POTASSIUM BLD-SCNC: 4.3 MMOL/L (ref 3.5–5.1)
POTASSIUM SERPL-SCNC: 4.1 MMOL/L (ref 3.5–5.1)
PROTHROMBIN TIME: 15.7 SEC (ref 11.9–14.9)
RBC # BLD AUTO: 3.15 M/UL (ref 4–5.2)
REPORT: NORMAL
SAO2 % BLDV: 66 %
SODIUM BLD-SCNC: 134 MMOL/L (ref 136–145)
SODIUM SERPL-SCNC: 138 MMOL/L (ref 136–145)
WBC # BLD AUTO: 9 K/UL (ref 4–11)

## 2024-10-14 PROCEDURE — 94640 AIRWAY INHALATION TREATMENT: CPT

## 2024-10-14 PROCEDURE — 85025 COMPLETE CBC W/AUTO DIFF WBC: CPT

## 2024-10-14 PROCEDURE — 97166 OT EVAL MOD COMPLEX 45 MIN: CPT

## 2024-10-14 PROCEDURE — 02HV33Z INSERTION OF INFUSION DEVICE INTO SUPERIOR VENA CAVA, PERCUTANEOUS APPROACH: ICD-10-PCS | Performed by: INTERNAL MEDICINE

## 2024-10-14 PROCEDURE — 97530 THERAPEUTIC ACTIVITIES: CPT

## 2024-10-14 PROCEDURE — 6360000002 HC RX W HCPCS: Performed by: SURGERY

## 2024-10-14 PROCEDURE — 94150 VITAL CAPACITY TEST: CPT

## 2024-10-14 PROCEDURE — 2000000000 HC ICU R&B

## 2024-10-14 PROCEDURE — 80048 BASIC METABOLIC PNL TOTAL CA: CPT

## 2024-10-14 PROCEDURE — 36569 INSJ PICC 5 YR+ W/O IMAGING: CPT

## 2024-10-14 PROCEDURE — 84100 ASSAY OF PHOSPHORUS: CPT

## 2024-10-14 PROCEDURE — 83605 ASSAY OF LACTIC ACID: CPT

## 2024-10-14 PROCEDURE — 94761 N-INVAS EAR/PLS OXIMETRY MLT: CPT

## 2024-10-14 PROCEDURE — APPSS15 APP SPLIT SHARED TIME 0-15 MINUTES: Performed by: NURSE PRACTITIONER

## 2024-10-14 PROCEDURE — 6370000000 HC RX 637 (ALT 250 FOR IP): Performed by: SURGERY

## 2024-10-14 PROCEDURE — 97162 PT EVAL MOD COMPLEX 30 MIN: CPT

## 2024-10-14 PROCEDURE — 99024 POSTOP FOLLOW-UP VISIT: CPT | Performed by: SURGERY

## 2024-10-14 PROCEDURE — 97116 GAIT TRAINING THERAPY: CPT

## 2024-10-14 PROCEDURE — 85014 HEMATOCRIT: CPT

## 2024-10-14 PROCEDURE — 97535 SELF CARE MNGMENT TRAINING: CPT

## 2024-10-14 PROCEDURE — 2580000003 HC RX 258: Performed by: SURGERY

## 2024-10-14 PROCEDURE — C1751 CATH, INF, PER/CENT/MIDLINE: HCPCS

## 2024-10-14 PROCEDURE — 85018 HEMOGLOBIN: CPT

## 2024-10-14 PROCEDURE — 2500000003 HC RX 250 WO HCPCS: Performed by: NURSE PRACTITIONER

## 2024-10-14 PROCEDURE — 99232 SBSQ HOSP IP/OBS MODERATE 35: CPT | Performed by: STUDENT IN AN ORGANIZED HEALTH CARE EDUCATION/TRAINING PROGRAM

## 2024-10-14 PROCEDURE — 2700000000 HC OXYGEN THERAPY PER DAY

## 2024-10-14 PROCEDURE — APPNB30 APP NON BILLABLE TIME 0-30 MINS: Performed by: NURSE PRACTITIONER

## 2024-10-14 PROCEDURE — 85730 THROMBOPLASTIN TIME PARTIAL: CPT

## 2024-10-14 PROCEDURE — 85610 PROTHROMBIN TIME: CPT

## 2024-10-14 PROCEDURE — 83735 ASSAY OF MAGNESIUM: CPT

## 2024-10-14 RX ORDER — DEXTROSE MONOHYDRATE, SODIUM CHLORIDE, AND POTASSIUM CHLORIDE 50; 1.49; 4.5 G/1000ML; G/1000ML; G/1000ML
INJECTION, SOLUTION INTRAVENOUS CONTINUOUS
Status: DISCONTINUED | OUTPATIENT
Start: 2024-10-14 | End: 2024-10-15

## 2024-10-14 RX ORDER — SODIUM CHLORIDE 0.9 % (FLUSH) 0.9 %
5-40 SYRINGE (ML) INJECTION PRN
Status: DISCONTINUED | OUTPATIENT
Start: 2024-10-14 | End: 2024-10-20 | Stop reason: HOSPADM

## 2024-10-14 RX ORDER — SODIUM CHLORIDE 0.9 % (FLUSH) 0.9 %
5-40 SYRINGE (ML) INJECTION EVERY 12 HOURS SCHEDULED
Status: DISCONTINUED | OUTPATIENT
Start: 2024-10-14 | End: 2024-10-20 | Stop reason: HOSPADM

## 2024-10-14 RX ORDER — SODIUM CHLORIDE 9 MG/ML
INJECTION, SOLUTION INTRAVENOUS PRN
Status: DISCONTINUED | OUTPATIENT
Start: 2024-10-14 | End: 2024-10-20 | Stop reason: HOSPADM

## 2024-10-14 RX ORDER — LIDOCAINE HYDROCHLORIDE 10 MG/ML
50 INJECTION, SOLUTION EPIDURAL; INFILTRATION; INTRACAUDAL; PERINEURAL ONCE
Status: DISCONTINUED | OUTPATIENT
Start: 2024-10-14 | End: 2024-10-20 | Stop reason: HOSPADM

## 2024-10-14 RX ADMIN — Medication 2 PUFF: at 21:07

## 2024-10-14 RX ADMIN — Medication 10 ML: at 09:33

## 2024-10-14 RX ADMIN — TIOTROPIUM BROMIDE AND OLODATEROL 2 PUFF: 3.124; 2.736 SPRAY, METERED RESPIRATORY (INHALATION) at 09:13

## 2024-10-14 RX ADMIN — OXYCODONE 10 MG: 5 TABLET ORAL at 01:54

## 2024-10-14 RX ADMIN — OXYCODONE 10 MG: 5 TABLET ORAL at 17:10

## 2024-10-14 RX ADMIN — ESCITALOPRAM OXALATE 10 MG: 10 TABLET ORAL at 09:24

## 2024-10-14 RX ADMIN — SENNOSIDES 8.6 MG: 8.6 TABLET, FILM COATED ORAL at 20:10

## 2024-10-14 RX ADMIN — FLUCONAZOLE IN SODIUM CHLORIDE 200 MG: 2 INJECTION, SOLUTION INTRAVENOUS at 06:32

## 2024-10-14 RX ADMIN — ONDANSETRON 4 MG: 2 INJECTION INTRAMUSCULAR; INTRAVENOUS at 09:31

## 2024-10-14 RX ADMIN — SENNOSIDES 8.6 MG: 8.6 TABLET, FILM COATED ORAL at 09:23

## 2024-10-14 RX ADMIN — GABAPENTIN 600 MG: 300 CAPSULE ORAL at 20:45

## 2024-10-14 RX ADMIN — OXYCODONE 10 MG: 5 TABLET ORAL at 06:18

## 2024-10-14 RX ADMIN — THYROID 90 MG: 30 TABLET ORAL at 13:49

## 2024-10-14 RX ADMIN — DOCUSATE SODIUM 100 MG: 100 CAPSULE, LIQUID FILLED ORAL at 09:23

## 2024-10-14 RX ADMIN — SODIUM CHLORIDE, PRESERVATIVE FREE 10 ML: 5 INJECTION INTRAVENOUS at 09:32

## 2024-10-14 RX ADMIN — GABAPENTIN 600 MG: 300 CAPSULE ORAL at 09:23

## 2024-10-14 RX ADMIN — Medication 10 ML: at 20:13

## 2024-10-14 RX ADMIN — SODIUM CHLORIDE, PRESERVATIVE FREE 10 ML: 5 INJECTION INTRAVENOUS at 20:13

## 2024-10-14 RX ADMIN — PIPERACILLIN AND TAZOBACTAM 3375 MG: 3; .375 INJECTION, POWDER, LYOPHILIZED, FOR SOLUTION INTRAVENOUS at 04:27

## 2024-10-14 RX ADMIN — Medication 10 ML: at 09:32

## 2024-10-14 RX ADMIN — Medication 1000 MCG: at 09:30

## 2024-10-14 RX ADMIN — Medication 2 PUFF: at 09:13

## 2024-10-14 RX ADMIN — CALCIUM CARBONATE-VITAMIN D TAB 500 MG-200 UNIT 1 TABLET: 500-200 TAB at 09:24

## 2024-10-14 RX ADMIN — GABAPENTIN 600 MG: 300 CAPSULE ORAL at 13:49

## 2024-10-14 RX ADMIN — DOCUSATE SODIUM 100 MG: 100 CAPSULE, LIQUID FILLED ORAL at 20:09

## 2024-10-14 RX ADMIN — ACETAMINOPHEN 650 MG: 325 TABLET ORAL at 04:27

## 2024-10-14 RX ADMIN — ACETAMINOPHEN 650 MG: 325 TABLET ORAL at 15:21

## 2024-10-14 RX ADMIN — PIPERACILLIN AND TAZOBACTAM 3375 MG: 3; .375 INJECTION, POWDER, LYOPHILIZED, FOR SOLUTION INTRAVENOUS at 12:57

## 2024-10-14 RX ADMIN — TRAZODONE HYDROCHLORIDE 50 MG: 50 TABLET ORAL at 20:09

## 2024-10-14 RX ADMIN — SODIUM CHLORIDE: 9 INJECTION, SOLUTION INTRAVENOUS at 08:39

## 2024-10-14 RX ADMIN — MORPHINE SULFATE 4 MG: 4 INJECTION, SOLUTION INTRAMUSCULAR; INTRAVENOUS at 09:30

## 2024-10-14 RX ADMIN — Medication 10 ML: at 20:14

## 2024-10-14 RX ADMIN — POTASSIUM CHLORIDE, DEXTROSE MONOHYDRATE AND SODIUM CHLORIDE: 150; 5; 450 INJECTION, SOLUTION INTRAVENOUS at 12:52

## 2024-10-14 RX ADMIN — POLYETHYLENE GLYCOL 3350 17 G: 17 POWDER, FOR SOLUTION ORAL at 09:23

## 2024-10-14 RX ADMIN — PIPERACILLIN AND TAZOBACTAM 3375 MG: 3; .375 INJECTION, POWDER, LYOPHILIZED, FOR SOLUTION INTRAVENOUS at 20:46

## 2024-10-14 RX ADMIN — OXYCODONE 10 MG: 5 TABLET ORAL at 21:09

## 2024-10-14 RX ADMIN — ACETAMINOPHEN 650 MG: 325 TABLET ORAL at 20:09

## 2024-10-14 RX ADMIN — OXYCODONE 10 MG: 5 TABLET ORAL at 12:48

## 2024-10-14 RX ADMIN — CETIRIZINE HYDROCHLORIDE 10 MG: 10 TABLET, FILM COATED ORAL at 09:23

## 2024-10-14 RX ADMIN — ACETAMINOPHEN 650 MG: 325 TABLET ORAL at 09:24

## 2024-10-14 ASSESSMENT — PAIN DESCRIPTION - ORIENTATION
ORIENTATION: RIGHT;LEFT;LOWER
ORIENTATION: RIGHT;LEFT;LOWER
ORIENTATION: RIGHT;LEFT;MID;LOWER
ORIENTATION: RIGHT;LEFT;LOWER
ORIENTATION: RIGHT
ORIENTATION: LOWER;RIGHT;LEFT
ORIENTATION: RIGHT;LEFT;MID;LOWER

## 2024-10-14 ASSESSMENT — PAIN DESCRIPTION - DESCRIPTORS
DESCRIPTORS: THROBBING

## 2024-10-14 ASSESSMENT — PAIN DESCRIPTION - PAIN TYPE
TYPE: SURGICAL PAIN
TYPE: SURGICAL PAIN

## 2024-10-14 ASSESSMENT — PAIN SCALES - GENERAL
PAINLEVEL_OUTOF10: 4
PAINLEVEL_OUTOF10: 8
PAINLEVEL_OUTOF10: 8
PAINLEVEL_OUTOF10: 7
PAINLEVEL_OUTOF10: 4
PAINLEVEL_OUTOF10: 7
PAINLEVEL_OUTOF10: 5
PAINLEVEL_OUTOF10: 4
PAINLEVEL_OUTOF10: 8
PAINLEVEL_OUTOF10: 8
PAINLEVEL_OUTOF10: 4
PAINLEVEL_OUTOF10: 7
PAINLEVEL_OUTOF10: 9

## 2024-10-14 ASSESSMENT — PAIN DESCRIPTION - LOCATION
LOCATION: ABDOMEN

## 2024-10-14 ASSESSMENT — PAIN - FUNCTIONAL ASSESSMENT
PAIN_FUNCTIONAL_ASSESSMENT: ACTIVITIES ARE NOT PREVENTED

## 2024-10-14 ASSESSMENT — PAIN DESCRIPTION - ONSET
ONSET: ON-GOING
ONSET: ON-GOING

## 2024-10-14 ASSESSMENT — PAIN SCALES - WONG BAKER
WONGBAKER_NUMERICALRESPONSE: HURTS A LITTLE BIT
WONGBAKER_NUMERICALRESPONSE: HURTS LITTLE MORE
WONGBAKER_NUMERICALRESPONSE: HURTS A LITTLE BIT

## 2024-10-14 ASSESSMENT — PAIN DESCRIPTION - FREQUENCY
FREQUENCY: CONTINUOUS
FREQUENCY: CONTINUOUS

## 2024-10-14 NOTE — ACP (ADVANCE CARE PLANNING)
Advanced Care Planning Note.    Purpose of Encounter: Advanced care planning in light of acute gangrenous cholecystitis  Parties In Attendance: Patient,   Decisional Capacity: Yes  Subjective: Patient with abdominal pain  Objective: Cr 1.0  Goals of Care Determination: Patient wants full support (CPR, vent, surgery, HD, trach, PEG)  Plan:  IVF, IV Abx, OR, Surgery consult  Code Status: Full code   Time spent on Advanced care Plannin minutes  Advanced Care Planning Documents: Completed advanced directives on chart,  is the POA.    Hernando Rogers MD  10/13/2024 9:17 PM

## 2024-10-14 NOTE — CARE COORDINATION
Chart reviewed for discharge planning    CM/SW has continued to follow patient progress to anticipate potential discharge needs. At this time, patient is not ready for discharge.     Barrier(s) to discharge-patient-   Oxygen - continue weaning 02    Other - Continue with IV fluid resuscitation.  Monitoring bleeding and H&H       Tentative discharge plan- PT/OT pending    Tentative discharge date- TBD    *Case management will continue to follow progress and update discharge plan as needed.     Electronically signed by Charlee Saldana on 10/14/2024 at 8:54 AM

## 2024-10-14 NOTE — OP NOTE
[address] [address]                            OPERATIVE REPORT      PATIENT NAME: MARIA C CARRILLO           : 1955  MED REC NO: [id number]                      ROOM: [facility]  ACCOUNT NO: [id number]                       ADMIT DATE: 10/11/2024  PROVIDER: Chacho Iraheta MD      DATE OF PROCEDURE:  10/13/2024    SURGEON:  Chacho Iraheta MD    PREOPERATIVE DIAGNOSIS:  Intraabdominal bleeding.    POSTOPERATIVE DIAGNOSIS:  No active bleeding.    PROCEDURES:  Diagnostic laparoscopy, abdominal washout, placement of intraabdominal drain.    ANESTHESIA:  General.    INDICATIONS:  This is a 68-year-old female who presents to [facility] with acute appendicitis several days ago.  The patient had been on Xarelto but with acute abdomen, the patient would need to proceed to the operating room.  The procedure itself was uneventful with minimal blood loss and she did well until last evening, which be late postoperative day 1, when she had some bloody drainage from her left lateral incision.  CTA was performed, showing clot in the pelvis but no active bleeding.  She was transferred to the ICU on pressors, but resuscitated with blood and crystalloid and responded appropriately.  This morning, during exam, the patient was tender but appropriate and with hemoglobin that was stable and had responded appropriately to transfusion.  However, later in the day with serial hemoglobins, she was found to have another drop under 7 and some increased pain, swelling, and hypoxia.  With concern for active bleeding, the patient would need to return to the operating room.  The risks, benefits, and alternative treatments of this were discussed.  Details of the procedure were discussed.  All questions were answered.  The patient understood, agreed, and wished to proceed.    DESCRIPTION OF PROCEDURE:  The patient was brought to the operative suite, laid 
                [address] [address]                            OPERATIVE REPORT      PATIENT NAME: MARIA C CARRILLO           : 1955  MED REC NO: [id number]                      ROOM: [address] [place] [address]  ACCOUNT NO: [id number]                       ADMIT DATE: 10/11/2024  PROVIDER: Chacho Iraheta MD      DATE OF PROCEDURE:  10/11/2024    SURGEON:  Chacho Iraheta MD    PREOPERATIVE DIAGNOSIS:  Acute appendicitis.    POSTOPERATIVE DIAGNOSIS:  Acute gangrenous appendicitis.    PROCEDURE:  Laparoscopic appendectomy.    ANESTHESIA:  General.    INDICATIONS:  This is a 68-year-old female who presents to [facility] with acute appendicitis that appeared gangrenous on imaging.  The risks, benefits, and alternative treatments of a laparoscopic appendectomy were discussed.  Details of procedure were discussed.  All questions were answered.  The patient understood, agreed, and wished to proceed.    DESCRIPTION OF PROCEDURE:  The patient was brought to the operative suite and laid in supine position.  General anesthesia was induced and well tolerated.  The patient's abdomen was prepped and draped in usual sterile fashion after a proper time-out was performed verifying the operative site, procedure, and patient.  The abdomen was surveyed.  The patient has a history of a perforated duodenal ulcer with an upper midline incision.  As such, some adhesions were anticipated.  The incision did not extend below the umbilicus, and as such, I felt this would be a safe entry point for the Veress needle.  Incision was made under the umbilicus.  The umbilicus was elevated with a penetrating towel clip and a Veress needle placed in the peritoneal cavity.  Using a saline drop test, it was confirmed that we were in the peritoneal cavity which was then inflated with carbon dioxide pressure of 15 mmHg which was well tolerated.  A 5 mm Optiview technique was used to traverse a 
sprayed.  A 19 Shashank drain was brought through the left abdomen, laid down the right paracolic gutter and down into the pelvis and similarly on the right side, and the ESTRELLA drain that had just been placed was repositioned.  Hemostasis had been verified.  Lap counts were correct and the fascia was closed with 2 looped 0 PDS sutures.  The wound was irrigated and the skin was then closed with staples.  The wound was then cleaned and dressed with gauze and tape.  The patient tolerated procedure well and was transported to PACU in stable condition.  ESTRELLA drains were inspected in the PACU and there was good suction and minimal drainage at this point, which is appropriate.    SPECIMENS:  None.    DRAINS:  ESTRELLA drain as above.    COMPLICATIONS:  None.    BLOOD LOSS:  Less than 100 mL.    DISPOSITION:  The patient will be brought back from the PACU to the ICU for further resuscitation and close monitoring. Should the patient show signs of rebleeding, she would need a CTA to look for other etiologies for bleeding as reopening of laparotomy could cause further bleeding.  Should CTA be negative and still with signs of intermittent, we would have to entertain a splenectomy, although I did not feel it was necessary in this case as the splenic laceration was very small and hemostatic at the conclusion of the case.          JANNY DUMONT MD      D:  10/13/2024 19:43:19     T:  10/14/2024 01:36:09     MONICA/DIOMEDES  Job #:  [id number]     Doc#:  [id number]

## 2024-10-14 NOTE — CARE COORDINATION
Case Management Assessment  Initial Evaluation    Date/Time of Evaluation: 10/14/2024 4:19 PM  Assessment Completed by: Charlee Saldana    If patient is discharged prior to next notation, then this note serves as note for discharge by case management.    Patient Name: Kasandra Cantor                   YOB: 1955  Diagnosis: Acute gangrenous appendicitis [K35.891]  Acute appendicitis with generalized peritonitis and gangrene, unspecified whether abscess present, unspecified whether perforation present [K35.209, K35.891]                   Date / Time: 10/11/2024 12:14 PM    Patient Admission Status: Inpatient   Readmission Risk (Low < 19, Mod (19-27), High > 27): Readmission Risk Score: 18.7    Current PCP: Tio Gonzalez MD  PCP verified by CM? (P) Yes    Chart Reviewed: Yes      History Provided by: (P) Patient  Patient Orientation: (P) Alert and Oriented, Person, Place, Situation, Self    Patient Cognition: (P) Alert    Hospitalization in the last 30 days (Readmission):  No    If yes, Readmission Assessment in  Navigator will be completed.    Advance Directives:      Code Status: Full Code   Patient's Primary Decision Maker is: (P) Legal Next of Kin      Discharge Planning:    Patient lives with: (P) Spouse/Significant Other Type of Home: (P) House  Primary Care Giver: (P) Self  Patient Support Systems include: (P) Spouse/Significant Other   Current Financial resources: (P) Medicare  Current community resources: (P) Other (Comment)  Current services prior to admission: (P) Durable Medical Equipment            Current DME: (P) Wheelchair, Bedside Table, Walker, Cane            Type of Home Care services:  (P) None    ADLS  Prior functional level: (P) Independent in ADLs/IADLs  Current functional level: (P) Other (see comment) (TBD)    PT AM-PAC: 18 /24  OT AM-PAC: 17 /24    Family can provide assistance at DC:    Would you like Case Management to discuss the discharge plan with any

## 2024-10-15 ENCOUNTER — APPOINTMENT (OUTPATIENT)
Dept: GENERAL RADIOLOGY | Age: 69
DRG: 853 | End: 2024-10-15
Payer: MEDICARE

## 2024-10-15 LAB
ANION GAP SERPL CALCULATED.3IONS-SCNC: 9 MMOL/L (ref 3–16)
BACTERIA BLD CULT ORG #2: NORMAL
BASOPHILS # BLD: 0 K/UL (ref 0–0.2)
BASOPHILS NFR BLD: 0.3 %
BUN SERPL-MCNC: 8 MG/DL (ref 7–20)
CALCIUM SERPL-MCNC: 7.9 MG/DL (ref 8.3–10.6)
CHLORIDE SERPL-SCNC: 110 MMOL/L (ref 99–110)
CO2 SERPL-SCNC: 20 MMOL/L (ref 21–32)
CREAT SERPL-MCNC: 0.8 MG/DL (ref 0.6–1.2)
DEPRECATED RDW RBC AUTO: 19.8 % (ref 12.4–15.4)
EOSINOPHIL # BLD: 0 K/UL (ref 0–0.6)
EOSINOPHIL NFR BLD: 0 %
GFR SERPLBLD CREATININE-BSD FMLA CKD-EPI: 79 ML/MIN/{1.73_M2}
GLUCOSE SERPL-MCNC: 134 MG/DL (ref 70–99)
HCT VFR BLD AUTO: 26 % (ref 36–48)
HCT VFR BLD AUTO: 26.5 % (ref 36–48)
HCT VFR BLD AUTO: 28 % (ref 36–48)
HCT VFR BLD AUTO: 28.1 % (ref 36–48)
HGB BLD-MCNC: 8.6 G/DL (ref 12–16)
HGB BLD-MCNC: 8.9 G/DL (ref 12–16)
HGB BLD-MCNC: 9.2 G/DL (ref 12–16)
HGB BLD-MCNC: 9.3 G/DL (ref 12–16)
LYMPHOCYTES # BLD: 0.3 K/UL (ref 1–5.1)
LYMPHOCYTES NFR BLD: 3.1 %
MCH RBC QN AUTO: 30.5 PG (ref 26–34)
MCHC RBC AUTO-ENTMCNC: 33.2 G/DL (ref 31–36)
MCV RBC AUTO: 91.8 FL (ref 80–100)
MONOCYTES # BLD: 0.9 K/UL (ref 0–1.3)
MONOCYTES NFR BLD: 8.4 %
NEUTROPHILS # BLD: 9.7 K/UL (ref 1.7–7.7)
NEUTROPHILS NFR BLD: 88.2 %
NT-PROBNP SERPL-MCNC: 6878 PG/ML (ref 0–124)
PLATELET # BLD AUTO: 183 K/UL (ref 135–450)
PMV BLD AUTO: 7 FL (ref 5–10.5)
POTASSIUM SERPL-SCNC: 4.1 MMOL/L (ref 3.5–5.1)
RBC # BLD AUTO: 2.83 M/UL (ref 4–5.2)
SODIUM SERPL-SCNC: 139 MMOL/L (ref 136–145)
WBC # BLD AUTO: 11.1 K/UL (ref 4–11)

## 2024-10-15 PROCEDURE — 6360000002 HC RX W HCPCS: Performed by: SURGERY

## 2024-10-15 PROCEDURE — 2060000000 HC ICU INTERMEDIATE R&B

## 2024-10-15 PROCEDURE — 2580000003 HC RX 258: Performed by: SURGERY

## 2024-10-15 PROCEDURE — 2580000003 HC RX 258: Performed by: INTERNAL MEDICINE

## 2024-10-15 PROCEDURE — 6370000000 HC RX 637 (ALT 250 FOR IP): Performed by: SURGERY

## 2024-10-15 PROCEDURE — 99024 POSTOP FOLLOW-UP VISIT: CPT | Performed by: SURGERY

## 2024-10-15 PROCEDURE — 85014 HEMATOCRIT: CPT

## 2024-10-15 PROCEDURE — APPSS15 APP SPLIT SHARED TIME 0-15 MINUTES: Performed by: NURSE PRACTITIONER

## 2024-10-15 PROCEDURE — 94761 N-INVAS EAR/PLS OXIMETRY MLT: CPT

## 2024-10-15 PROCEDURE — 97530 THERAPEUTIC ACTIVITIES: CPT

## 2024-10-15 PROCEDURE — 80048 BASIC METABOLIC PNL TOTAL CA: CPT

## 2024-10-15 PROCEDURE — 36415 COLL VENOUS BLD VENIPUNCTURE: CPT

## 2024-10-15 PROCEDURE — 85025 COMPLETE CBC W/AUTO DIFF WBC: CPT

## 2024-10-15 PROCEDURE — 6360000002 HC RX W HCPCS: Performed by: INTERNAL MEDICINE

## 2024-10-15 PROCEDURE — 71045 X-RAY EXAM CHEST 1 VIEW: CPT

## 2024-10-15 PROCEDURE — APPNB30 APP NON BILLABLE TIME 0-30 MINS: Performed by: NURSE PRACTITIONER

## 2024-10-15 PROCEDURE — 2500000003 HC RX 250 WO HCPCS: Performed by: NURSE PRACTITIONER

## 2024-10-15 PROCEDURE — 97535 SELF CARE MNGMENT TRAINING: CPT

## 2024-10-15 PROCEDURE — 83880 ASSAY OF NATRIURETIC PEPTIDE: CPT

## 2024-10-15 PROCEDURE — 74018 RADEX ABDOMEN 1 VIEW: CPT

## 2024-10-15 PROCEDURE — 2700000000 HC OXYGEN THERAPY PER DAY

## 2024-10-15 PROCEDURE — 85018 HEMOGLOBIN: CPT

## 2024-10-15 PROCEDURE — 94640 AIRWAY INHALATION TREATMENT: CPT

## 2024-10-15 RX ORDER — CALCIUM CARBONATE 500 MG/1
500 TABLET, CHEWABLE ORAL 3 TIMES DAILY PRN
Status: DISCONTINUED | OUTPATIENT
Start: 2024-10-15 | End: 2024-10-20 | Stop reason: HOSPADM

## 2024-10-15 RX ORDER — PANTOPRAZOLE SODIUM 40 MG/10ML
40 INJECTION, POWDER, LYOPHILIZED, FOR SOLUTION INTRAVENOUS DAILY
Status: DISCONTINUED | OUTPATIENT
Start: 2024-10-15 | End: 2024-10-15

## 2024-10-15 RX ORDER — PANTOPRAZOLE SODIUM 40 MG/1
40 TABLET, DELAYED RELEASE ORAL
Status: DISCONTINUED | OUTPATIENT
Start: 2024-10-16 | End: 2024-10-20 | Stop reason: HOSPADM

## 2024-10-15 RX ORDER — FUROSEMIDE 10 MG/ML
40 INJECTION INTRAMUSCULAR; INTRAVENOUS 2 TIMES DAILY
Status: DISCONTINUED | OUTPATIENT
Start: 2024-10-15 | End: 2024-10-17

## 2024-10-15 RX ADMIN — FLUCONAZOLE IN SODIUM CHLORIDE 200 MG: 2 INJECTION, SOLUTION INTRAVENOUS at 06:18

## 2024-10-15 RX ADMIN — DOCUSATE SODIUM 100 MG: 100 CAPSULE, LIQUID FILLED ORAL at 20:15

## 2024-10-15 RX ADMIN — Medication 2 PUFF: at 20:18

## 2024-10-15 RX ADMIN — SENNOSIDES 8.6 MG: 8.6 TABLET, FILM COATED ORAL at 09:26

## 2024-10-15 RX ADMIN — OXYCODONE 10 MG: 5 TABLET ORAL at 16:13

## 2024-10-15 RX ADMIN — Medication 1000 MCG: at 09:26

## 2024-10-15 RX ADMIN — SODIUM CHLORIDE, PRESERVATIVE FREE 10 ML: 5 INJECTION INTRAVENOUS at 09:36

## 2024-10-15 RX ADMIN — TIZANIDINE 4 MG: 4 TABLET ORAL at 20:14

## 2024-10-15 RX ADMIN — OXYCODONE 10 MG: 5 TABLET ORAL at 06:18

## 2024-10-15 RX ADMIN — SODIUM CHLORIDE, PRESERVATIVE FREE 10 ML: 5 INJECTION INTRAVENOUS at 09:37

## 2024-10-15 RX ADMIN — ACETAMINOPHEN 650 MG: 325 TABLET ORAL at 09:24

## 2024-10-15 RX ADMIN — Medication 10 ML: at 23:51

## 2024-10-15 RX ADMIN — GABAPENTIN 600 MG: 300 CAPSULE ORAL at 16:13

## 2024-10-15 RX ADMIN — POLYETHYLENE GLYCOL 3350 17 G: 17 POWDER, FOR SOLUTION ORAL at 09:26

## 2024-10-15 RX ADMIN — OXYCODONE 10 MG: 5 TABLET ORAL at 01:53

## 2024-10-15 RX ADMIN — Medication 10 ML: at 09:37

## 2024-10-15 RX ADMIN — PIPERACILLIN AND TAZOBACTAM 3375 MG: 3; .375 INJECTION, POWDER, LYOPHILIZED, FOR SOLUTION INTRAVENOUS at 04:10

## 2024-10-15 RX ADMIN — PIPERACILLIN AND TAZOBACTAM 3375 MG: 3; .375 INJECTION, POWDER, LYOPHILIZED, FOR SOLUTION INTRAVENOUS at 20:24

## 2024-10-15 RX ADMIN — Medication 10 ML: at 09:36

## 2024-10-15 RX ADMIN — OXYCODONE 10 MG: 5 TABLET ORAL at 20:15

## 2024-10-15 RX ADMIN — GABAPENTIN 600 MG: 300 CAPSULE ORAL at 20:15

## 2024-10-15 RX ADMIN — ACETAMINOPHEN 650 MG: 325 TABLET ORAL at 20:15

## 2024-10-15 RX ADMIN — FUROSEMIDE 40 MG: 10 INJECTION, SOLUTION INTRAMUSCULAR; INTRAVENOUS at 18:02

## 2024-10-15 RX ADMIN — GABAPENTIN 600 MG: 300 CAPSULE ORAL at 09:26

## 2024-10-15 RX ADMIN — TIOTROPIUM BROMIDE AND OLODATEROL 2 PUFF: 3.124; 2.736 SPRAY, METERED RESPIRATORY (INHALATION) at 08:46

## 2024-10-15 RX ADMIN — ACETAMINOPHEN 650 MG: 325 TABLET ORAL at 04:09

## 2024-10-15 RX ADMIN — FUROSEMIDE 40 MG: 10 INJECTION, SOLUTION INTRAMUSCULAR; INTRAVENOUS at 09:35

## 2024-10-15 RX ADMIN — ACETAMINOPHEN 650 MG: 325 TABLET ORAL at 16:13

## 2024-10-15 RX ADMIN — SODIUM CHLORIDE, PRESERVATIVE FREE 10 ML: 5 INJECTION INTRAVENOUS at 20:16

## 2024-10-15 RX ADMIN — THYROID 90 MG: 30 TABLET ORAL at 09:27

## 2024-10-15 RX ADMIN — DOCUSATE SODIUM 100 MG: 100 CAPSULE, LIQUID FILLED ORAL at 09:26

## 2024-10-15 RX ADMIN — CETIRIZINE HYDROCHLORIDE 10 MG: 10 TABLET, FILM COATED ORAL at 09:26

## 2024-10-15 RX ADMIN — SODIUM CHLORIDE, PRESERVATIVE FREE 10 ML: 5 INJECTION INTRAVENOUS at 23:51

## 2024-10-15 RX ADMIN — ESCITALOPRAM OXALATE 10 MG: 10 TABLET ORAL at 09:26

## 2024-10-15 RX ADMIN — Medication 10 ML: at 23:50

## 2024-10-15 RX ADMIN — SENNOSIDES 8.6 MG: 8.6 TABLET, FILM COATED ORAL at 20:15

## 2024-10-15 RX ADMIN — TRAZODONE HYDROCHLORIDE 50 MG: 50 TABLET ORAL at 20:15

## 2024-10-15 RX ADMIN — POTASSIUM CHLORIDE, DEXTROSE MONOHYDRATE AND SODIUM CHLORIDE: 150; 5; 450 INJECTION, SOLUTION INTRAVENOUS at 01:04

## 2024-10-15 RX ADMIN — PIPERACILLIN AND TAZOBACTAM 3375 MG: 3; .375 INJECTION, POWDER, LYOPHILIZED, FOR SOLUTION INTRAVENOUS at 13:29

## 2024-10-15 RX ADMIN — CALCIUM CARBONATE-VITAMIN D TAB 500 MG-200 UNIT 1 TABLET: 500-200 TAB at 09:26

## 2024-10-15 RX ADMIN — MORPHINE SULFATE 2 MG: 2 INJECTION, SOLUTION INTRAMUSCULAR; INTRAVENOUS at 09:13

## 2024-10-15 RX ADMIN — Medication 2 PUFF: at 08:46

## 2024-10-15 RX ADMIN — OXYCODONE 10 MG: 5 TABLET ORAL at 11:25

## 2024-10-15 ASSESSMENT — PAIN DESCRIPTION - DESCRIPTORS
DESCRIPTORS: TENDER
DESCRIPTORS: THROBBING
DESCRIPTORS: CRAMPING;THROBBING
DESCRIPTORS: THROBBING;CRAMPING

## 2024-10-15 ASSESSMENT — PAIN DESCRIPTION - ORIENTATION
ORIENTATION: RIGHT;LEFT;LOWER
ORIENTATION: RIGHT
ORIENTATION: RIGHT;LEFT;LOWER;MID
ORIENTATION: RIGHT;LEFT;LOWER
ORIENTATION: RIGHT;LEFT;LOWER

## 2024-10-15 ASSESSMENT — PAIN - FUNCTIONAL ASSESSMENT
PAIN_FUNCTIONAL_ASSESSMENT: ACTIVITIES ARE NOT PREVENTED

## 2024-10-15 ASSESSMENT — PAIN DESCRIPTION - LOCATION
LOCATION: ABDOMEN

## 2024-10-15 ASSESSMENT — PAIN SCALES - GENERAL
PAINLEVEL_OUTOF10: 5
PAINLEVEL_OUTOF10: 4
PAINLEVEL_OUTOF10: 4
PAINLEVEL_OUTOF10: 8
PAINLEVEL_OUTOF10: 6
PAINLEVEL_OUTOF10: 5
PAINLEVEL_OUTOF10: 7
PAINLEVEL_OUTOF10: 8
PAINLEVEL_OUTOF10: 2
PAINLEVEL_OUTOF10: 6
PAINLEVEL_OUTOF10: 8
PAINLEVEL_OUTOF10: 5
PAINLEVEL_OUTOF10: 7

## 2024-10-15 ASSESSMENT — PAIN SCALES - WONG BAKER
WONGBAKER_NUMERICALRESPONSE: HURTS A LITTLE BIT
WONGBAKER_NUMERICALRESPONSE: NO HURT
WONGBAKER_NUMERICALRESPONSE: HURTS A LITTLE BIT

## 2024-10-15 NOTE — PLAN OF CARE
Problem: Discharge Planning  Goal: Discharge to home or other facility with appropriate resources  Outcome: Progressing     Problem: Pain  Goal: Verbalizes/displays adequate comfort level or baseline comfort level  Outcome: Progressing     Problem: Safety - Adult  Goal: Free from fall injury  Outcome: Progressing     Problem: Hematologic - Adult  Goal: Maintains hematologic stability  Outcome: Progressing     Problem: ABCDS Injury Assessment  Goal: Absence of physical injury  Outcome: Progressing

## 2024-10-16 LAB
ANION GAP SERPL CALCULATED.3IONS-SCNC: 9 MMOL/L (ref 3–16)
BACTERIA BLD CULT: ABNORMAL
BACTERIA BLD CULT: ABNORMAL
BASOPHILS # BLD: 0 K/UL (ref 0–0.2)
BASOPHILS NFR BLD: 0.4 %
BUN SERPL-MCNC: 11 MG/DL (ref 7–20)
CALCIUM SERPL-MCNC: 8 MG/DL (ref 8.3–10.6)
CHLORIDE SERPL-SCNC: 105 MMOL/L (ref 99–110)
CO2 SERPL-SCNC: 23 MMOL/L (ref 21–32)
CREAT SERPL-MCNC: 0.8 MG/DL (ref 0.6–1.2)
DEPRECATED RDW RBC AUTO: 19 % (ref 12.4–15.4)
EOSINOPHIL # BLD: 0.3 K/UL (ref 0–0.6)
EOSINOPHIL NFR BLD: 2.5 %
GFR SERPLBLD CREATININE-BSD FMLA CKD-EPI: 79 ML/MIN/{1.73_M2}
GLUCOSE SERPL-MCNC: 93 MG/DL (ref 70–99)
HCT VFR BLD AUTO: 25.8 % (ref 36–48)
HCT VFR BLD AUTO: 26.2 % (ref 36–48)
HCT VFR BLD AUTO: 30.2 % (ref 36–48)
HGB BLD-MCNC: 10 G/DL (ref 12–16)
HGB BLD-MCNC: 8.6 G/DL (ref 12–16)
HGB BLD-MCNC: 8.9 G/DL (ref 12–16)
LYMPHOCYTES # BLD: 1.1 K/UL (ref 1–5.1)
LYMPHOCYTES NFR BLD: 10.8 %
MAGNESIUM SERPL-MCNC: 1.9 MG/DL (ref 1.8–2.4)
MCH RBC QN AUTO: 31.3 PG (ref 26–34)
MCHC RBC AUTO-ENTMCNC: 34 G/DL (ref 31–36)
MCV RBC AUTO: 92.2 FL (ref 80–100)
MONOCYTES # BLD: 1 K/UL (ref 0–1.3)
MONOCYTES NFR BLD: 9.6 %
NEUTROPHILS # BLD: 7.6 K/UL (ref 1.7–7.7)
NEUTROPHILS NFR BLD: 76.7 %
ORGANISM: ABNORMAL
ORGANISM: ABNORMAL
PLATELET # BLD AUTO: 202 K/UL (ref 135–450)
PMV BLD AUTO: 7.1 FL (ref 5–10.5)
POTASSIUM SERPL-SCNC: 3.5 MMOL/L (ref 3.5–5.1)
POTASSIUM SERPL-SCNC: 3.5 MMOL/L (ref 3.5–5.1)
RBC # BLD AUTO: 2.84 M/UL (ref 4–5.2)
SODIUM SERPL-SCNC: 137 MMOL/L (ref 136–145)
WBC # BLD AUTO: 9.9 K/UL (ref 4–11)

## 2024-10-16 PROCEDURE — 6370000000 HC RX 637 (ALT 250 FOR IP): Performed by: SURGERY

## 2024-10-16 PROCEDURE — 94640 AIRWAY INHALATION TREATMENT: CPT

## 2024-10-16 PROCEDURE — 2580000003 HC RX 258: Performed by: SURGERY

## 2024-10-16 PROCEDURE — 6360000002 HC RX W HCPCS: Performed by: NURSE PRACTITIONER

## 2024-10-16 PROCEDURE — 6360000002 HC RX W HCPCS: Performed by: SURGERY

## 2024-10-16 PROCEDURE — 80048 BASIC METABOLIC PNL TOTAL CA: CPT

## 2024-10-16 PROCEDURE — APPSS15 APP SPLIT SHARED TIME 0-15 MINUTES: Performed by: NURSE PRACTITIONER

## 2024-10-16 PROCEDURE — 83735 ASSAY OF MAGNESIUM: CPT

## 2024-10-16 PROCEDURE — 94761 N-INVAS EAR/PLS OXIMETRY MLT: CPT

## 2024-10-16 PROCEDURE — 6360000002 HC RX W HCPCS: Performed by: INTERNAL MEDICINE

## 2024-10-16 PROCEDURE — 85025 COMPLETE CBC W/AUTO DIFF WBC: CPT

## 2024-10-16 PROCEDURE — 99024 POSTOP FOLLOW-UP VISIT: CPT | Performed by: SURGERY

## 2024-10-16 PROCEDURE — 2700000000 HC OXYGEN THERAPY PER DAY

## 2024-10-16 PROCEDURE — APPNB30 APP NON BILLABLE TIME 0-30 MINS: Performed by: NURSE PRACTITIONER

## 2024-10-16 PROCEDURE — 36415 COLL VENOUS BLD VENIPUNCTURE: CPT

## 2024-10-16 PROCEDURE — 6370000000 HC RX 637 (ALT 250 FOR IP): Performed by: NURSE PRACTITIONER

## 2024-10-16 PROCEDURE — 2060000000 HC ICU INTERMEDIATE R&B

## 2024-10-16 PROCEDURE — 85018 HEMOGLOBIN: CPT

## 2024-10-16 PROCEDURE — 85014 HEMATOCRIT: CPT

## 2024-10-16 PROCEDURE — 2580000003 HC RX 258: Performed by: INTERNAL MEDICINE

## 2024-10-16 RX ORDER — ENOXAPARIN SODIUM 100 MG/ML
40 INJECTION SUBCUTANEOUS DAILY
Status: DISCONTINUED | OUTPATIENT
Start: 2024-10-16 | End: 2024-10-18

## 2024-10-16 RX ORDER — METOCLOPRAMIDE HYDROCHLORIDE 5 MG/ML
10 INJECTION INTRAMUSCULAR; INTRAVENOUS EVERY 6 HOURS
Status: COMPLETED | OUTPATIENT
Start: 2024-10-16 | End: 2024-10-18

## 2024-10-16 RX ADMIN — THYROID 90 MG: 30 TABLET ORAL at 08:44

## 2024-10-16 RX ADMIN — METOCLOPRAMIDE HYDROCHLORIDE 10 MG: 5 INJECTION INTRAMUSCULAR; INTRAVENOUS at 21:00

## 2024-10-16 RX ADMIN — Medication 10 ML: at 08:46

## 2024-10-16 RX ADMIN — SODIUM CHLORIDE: 9 INJECTION, SOLUTION INTRAVENOUS at 20:51

## 2024-10-16 RX ADMIN — TRAZODONE HYDROCHLORIDE 50 MG: 50 TABLET ORAL at 21:28

## 2024-10-16 RX ADMIN — SODIUM CHLORIDE, PRESERVATIVE FREE 10 ML: 5 INJECTION INTRAVENOUS at 08:46

## 2024-10-16 RX ADMIN — GABAPENTIN 600 MG: 300 CAPSULE ORAL at 21:28

## 2024-10-16 RX ADMIN — SODIUM CHLORIDE, PRESERVATIVE FREE 10 ML: 5 INJECTION INTRAVENOUS at 21:29

## 2024-10-16 RX ADMIN — TIOTROPIUM BROMIDE AND OLODATEROL 2 PUFF: 3.124; 2.736 SPRAY, METERED RESPIRATORY (INHALATION) at 08:19

## 2024-10-16 RX ADMIN — CALCIUM CARBONATE-VITAMIN D TAB 500 MG-200 UNIT 1 TABLET: 500-200 TAB at 08:44

## 2024-10-16 RX ADMIN — PIPERACILLIN AND TAZOBACTAM 3375 MG: 3; .375 INJECTION, POWDER, LYOPHILIZED, FOR SOLUTION INTRAVENOUS at 20:51

## 2024-10-16 RX ADMIN — OXYCODONE 5 MG: 5 TABLET ORAL at 21:28

## 2024-10-16 RX ADMIN — PIPERACILLIN AND TAZOBACTAM 3375 MG: 3; .375 INJECTION, POWDER, LYOPHILIZED, FOR SOLUTION INTRAVENOUS at 12:00

## 2024-10-16 RX ADMIN — Medication 2 PUFF: at 20:33

## 2024-10-16 RX ADMIN — POLYETHYLENE GLYCOL 3350 17 G: 17 POWDER, FOR SOLUTION ORAL at 08:45

## 2024-10-16 RX ADMIN — Medication 10 ML: at 21:30

## 2024-10-16 RX ADMIN — PIPERACILLIN AND TAZOBACTAM 3375 MG: 3; .375 INJECTION, POWDER, LYOPHILIZED, FOR SOLUTION INTRAVENOUS at 04:55

## 2024-10-16 RX ADMIN — SENNOSIDES 8.6 MG: 8.6 TABLET, FILM COATED ORAL at 08:44

## 2024-10-16 RX ADMIN — PANTOPRAZOLE SODIUM 40 MG: 40 TABLET, DELAYED RELEASE ORAL at 06:26

## 2024-10-16 RX ADMIN — Medication 1000 MCG: at 08:43

## 2024-10-16 RX ADMIN — Medication 10 ML: at 21:29

## 2024-10-16 RX ADMIN — ACETAMINOPHEN 650 MG: 325 TABLET ORAL at 04:56

## 2024-10-16 RX ADMIN — CETIRIZINE HYDROCHLORIDE 10 MG: 10 TABLET, FILM COATED ORAL at 08:44

## 2024-10-16 RX ADMIN — METOCLOPRAMIDE HYDROCHLORIDE 10 MG: 5 INJECTION INTRAMUSCULAR; INTRAVENOUS at 14:26

## 2024-10-16 RX ADMIN — SENNOSIDES 8.6 MG: 8.6 TABLET, FILM COATED ORAL at 21:29

## 2024-10-16 RX ADMIN — FUROSEMIDE 40 MG: 10 INJECTION, SOLUTION INTRAMUSCULAR; INTRAVENOUS at 16:53

## 2024-10-16 RX ADMIN — FUROSEMIDE 40 MG: 10 INJECTION, SOLUTION INTRAMUSCULAR; INTRAVENOUS at 08:44

## 2024-10-16 RX ADMIN — ACETAMINOPHEN 650 MG: 325 TABLET ORAL at 08:44

## 2024-10-16 RX ADMIN — FLUCONAZOLE IN SODIUM CHLORIDE 200 MG: 2 INJECTION, SOLUTION INTRAVENOUS at 06:25

## 2024-10-16 RX ADMIN — DOCUSATE SODIUM 100 MG: 100 CAPSULE, LIQUID FILLED ORAL at 21:28

## 2024-10-16 RX ADMIN — ACETAMINOPHEN 650 MG: 325 TABLET ORAL at 23:51

## 2024-10-16 RX ADMIN — ACETAMINOPHEN 650 MG: 325 TABLET ORAL at 14:25

## 2024-10-16 RX ADMIN — GABAPENTIN 600 MG: 300 CAPSULE ORAL at 08:44

## 2024-10-16 RX ADMIN — ENOXAPARIN SODIUM 40 MG: 100 INJECTION SUBCUTANEOUS at 12:00

## 2024-10-16 RX ADMIN — OXYCODONE 10 MG: 5 TABLET ORAL at 08:43

## 2024-10-16 RX ADMIN — SODIUM CHLORIDE, PRESERVATIVE FREE 10 ML: 5 INJECTION INTRAVENOUS at 21:30

## 2024-10-16 RX ADMIN — GABAPENTIN 600 MG: 300 CAPSULE ORAL at 14:25

## 2024-10-16 RX ADMIN — DOCUSATE SODIUM 100 MG: 100 CAPSULE, LIQUID FILLED ORAL at 08:44

## 2024-10-16 RX ADMIN — OXYCODONE 10 MG: 5 TABLET ORAL at 16:52

## 2024-10-16 RX ADMIN — Medication 2 PUFF: at 08:19

## 2024-10-16 RX ADMIN — ESCITALOPRAM OXALATE 10 MG: 10 TABLET ORAL at 08:44

## 2024-10-16 ASSESSMENT — PAIN SCALES - GENERAL
PAINLEVEL_OUTOF10: 5
PAINLEVEL_OUTOF10: 8
PAINLEVEL_OUTOF10: 5
PAINLEVEL_OUTOF10: 7
PAINLEVEL_OUTOF10: 5
PAINLEVEL_OUTOF10: 8
PAINLEVEL_OUTOF10: 4
PAINLEVEL_OUTOF10: 7
PAINLEVEL_OUTOF10: 8
PAINLEVEL_OUTOF10: 5
PAINLEVEL_OUTOF10: 5

## 2024-10-16 ASSESSMENT — PAIN DESCRIPTION - ORIENTATION
ORIENTATION: RIGHT;LEFT;MID;LOWER
ORIENTATION: RIGHT;LEFT;MID;LOWER
ORIENTATION: RIGHT;LEFT;LOWER;MID

## 2024-10-16 ASSESSMENT — PAIN DESCRIPTION - LOCATION
LOCATION: ABDOMEN

## 2024-10-16 ASSESSMENT — PAIN DESCRIPTION - DESCRIPTORS
DESCRIPTORS: ACHING;CRAMPING
DESCRIPTORS: ACHING;SORE
DESCRIPTORS: ACHING;CRAMPING

## 2024-10-16 NOTE — PLAN OF CARE
Problem: Discharge Planning  Goal: Discharge to home or other facility with appropriate resources  10/16/2024 1452 by Jemima Rogers RN  Outcome: Progressing     Problem: Pain  Goal: Verbalizes/displays adequate comfort level or baseline comfort level  10/16/2024 1452 by Jemima Rogers RN  Outcome: Progressing     Problem: Safety - Adult  Goal: Free from fall injury  10/16/2024 1452 by Jemima Rogers RN  Outcome: Progressing     Problem: Hematologic - Adult  Goal: Maintains hematologic stability  10/16/2024 1452 by Jemima Rogers RN  Outcome: Progressing     Problem: ABCDS Injury Assessment  Goal: Absence of physical injury  10/16/2024 1452 by Jemima Rogers, RN  Outcome: Progressing

## 2024-10-17 PROBLEM — I50.32 CHRONIC HEART FAILURE WITH PRESERVED EJECTION FRACTION (HCC): Status: ACTIVE | Noted: 2024-10-17

## 2024-10-17 LAB
ANION GAP SERPL CALCULATED.3IONS-SCNC: 12 MMOL/L (ref 3–16)
BASOPHILS # BLD: 0 K/UL (ref 0–0.2)
BASOPHILS NFR BLD: 0.6 %
BUN SERPL-MCNC: 11 MG/DL (ref 7–20)
CALCIUM SERPL-MCNC: 8.2 MG/DL (ref 8.3–10.6)
CHLORIDE SERPL-SCNC: 103 MMOL/L (ref 99–110)
CO2 SERPL-SCNC: 27 MMOL/L (ref 21–32)
CREAT SERPL-MCNC: 0.7 MG/DL (ref 0.6–1.2)
DEPRECATED RDW RBC AUTO: 18.8 % (ref 12.4–15.4)
EOSINOPHIL # BLD: 0.3 K/UL (ref 0–0.6)
EOSINOPHIL NFR BLD: 3.9 %
GFR SERPLBLD CREATININE-BSD FMLA CKD-EPI: >90 ML/MIN/{1.73_M2}
GLUCOSE SERPL-MCNC: 90 MG/DL (ref 70–99)
HCT VFR BLD AUTO: 27.9 % (ref 36–48)
HGB BLD-MCNC: 9.7 G/DL (ref 12–16)
LYMPHOCYTES # BLD: 1.1 K/UL (ref 1–5.1)
LYMPHOCYTES NFR BLD: 14.2 %
MAGNESIUM SERPL-MCNC: 1.8 MG/DL (ref 1.8–2.4)
MCH RBC QN AUTO: 31.4 PG (ref 26–34)
MCHC RBC AUTO-ENTMCNC: 34.6 G/DL (ref 31–36)
MCV RBC AUTO: 91 FL (ref 80–100)
MONOCYTES # BLD: 0.7 K/UL (ref 0–1.3)
MONOCYTES NFR BLD: 9.5 %
NEUTROPHILS # BLD: 5.6 K/UL (ref 1.7–7.7)
NEUTROPHILS NFR BLD: 71.8 %
NT-PROBNP SERPL-MCNC: 5147 PG/ML (ref 0–124)
PLATELET # BLD AUTO: 219 K/UL (ref 135–450)
PMV BLD AUTO: 6.9 FL (ref 5–10.5)
POTASSIUM SERPL-SCNC: 2.8 MMOL/L (ref 3.5–5.1)
POTASSIUM SERPL-SCNC: 3.3 MMOL/L (ref 3.5–5.1)
RBC # BLD AUTO: 3.07 M/UL (ref 4–5.2)
SODIUM SERPL-SCNC: 142 MMOL/L (ref 136–145)
WBC # BLD AUTO: 7.8 K/UL (ref 4–11)

## 2024-10-17 PROCEDURE — 2700000000 HC OXYGEN THERAPY PER DAY

## 2024-10-17 PROCEDURE — 80048 BASIC METABOLIC PNL TOTAL CA: CPT

## 2024-10-17 PROCEDURE — 6360000002 HC RX W HCPCS: Performed by: NURSE PRACTITIONER

## 2024-10-17 PROCEDURE — 84132 ASSAY OF SERUM POTASSIUM: CPT

## 2024-10-17 PROCEDURE — 94761 N-INVAS EAR/PLS OXIMETRY MLT: CPT

## 2024-10-17 PROCEDURE — 6360000002 HC RX W HCPCS: Performed by: INTERNAL MEDICINE

## 2024-10-17 PROCEDURE — 6370000000 HC RX 637 (ALT 250 FOR IP): Performed by: SURGERY

## 2024-10-17 PROCEDURE — 85025 COMPLETE CBC W/AUTO DIFF WBC: CPT

## 2024-10-17 PROCEDURE — APPNB30 APP NON BILLABLE TIME 0-30 MINS: Performed by: NURSE PRACTITIONER

## 2024-10-17 PROCEDURE — 94640 AIRWAY INHALATION TREATMENT: CPT

## 2024-10-17 PROCEDURE — 2580000003 HC RX 258: Performed by: INTERNAL MEDICINE

## 2024-10-17 PROCEDURE — 83880 ASSAY OF NATRIURETIC PEPTIDE: CPT

## 2024-10-17 PROCEDURE — APPSS15 APP SPLIT SHARED TIME 0-15 MINUTES: Performed by: NURSE PRACTITIONER

## 2024-10-17 PROCEDURE — 6370000000 HC RX 637 (ALT 250 FOR IP)

## 2024-10-17 PROCEDURE — 99223 1ST HOSP IP/OBS HIGH 75: CPT | Performed by: INTERNAL MEDICINE

## 2024-10-17 PROCEDURE — 97530 THERAPEUTIC ACTIVITIES: CPT

## 2024-10-17 PROCEDURE — 6360000002 HC RX W HCPCS: Performed by: SURGERY

## 2024-10-17 PROCEDURE — 2060000000 HC ICU INTERMEDIATE R&B

## 2024-10-17 PROCEDURE — 83735 ASSAY OF MAGNESIUM: CPT

## 2024-10-17 PROCEDURE — 36415 COLL VENOUS BLD VENIPUNCTURE: CPT

## 2024-10-17 PROCEDURE — 99024 POSTOP FOLLOW-UP VISIT: CPT | Performed by: SURGERY

## 2024-10-17 PROCEDURE — 2580000003 HC RX 258: Performed by: SURGERY

## 2024-10-17 PROCEDURE — 97535 SELF CARE MNGMENT TRAINING: CPT

## 2024-10-17 PROCEDURE — 6370000000 HC RX 637 (ALT 250 FOR IP): Performed by: NURSE PRACTITIONER

## 2024-10-17 RX ORDER — MAGNESIUM SULFATE IN WATER 40 MG/ML
2000 INJECTION, SOLUTION INTRAVENOUS ONCE
Status: COMPLETED | OUTPATIENT
Start: 2024-10-17 | End: 2024-10-17

## 2024-10-17 RX ORDER — POTASSIUM CHLORIDE 1500 MG/1
40 TABLET, EXTENDED RELEASE ORAL
Status: ACTIVE | OUTPATIENT
Start: 2024-10-17 | End: 2024-10-17

## 2024-10-17 RX ORDER — METOPROLOL SUCCINATE 25 MG/1
25 TABLET, EXTENDED RELEASE ORAL DAILY
Status: DISCONTINUED | OUTPATIENT
Start: 2024-10-17 | End: 2024-10-20 | Stop reason: HOSPADM

## 2024-10-17 RX ORDER — POLYETHYLENE GLYCOL 3350 17 G/17G
17 POWDER, FOR SOLUTION ORAL 2 TIMES DAILY
Status: DISCONTINUED | OUTPATIENT
Start: 2024-10-17 | End: 2024-10-20 | Stop reason: HOSPADM

## 2024-10-17 RX ADMIN — POTASSIUM CHLORIDE 40 MEQ: 1500 TABLET, EXTENDED RELEASE ORAL at 16:28

## 2024-10-17 RX ADMIN — POLYETHYLENE GLYCOL 3350 17 G: 17 POWDER, FOR SOLUTION ORAL at 08:12

## 2024-10-17 RX ADMIN — GABAPENTIN 600 MG: 300 CAPSULE ORAL at 21:04

## 2024-10-17 RX ADMIN — OXYCODONE 10 MG: 5 TABLET ORAL at 09:40

## 2024-10-17 RX ADMIN — DOCUSATE SODIUM 100 MG: 100 CAPSULE, LIQUID FILLED ORAL at 19:52

## 2024-10-17 RX ADMIN — GABAPENTIN 600 MG: 300 CAPSULE ORAL at 08:12

## 2024-10-17 RX ADMIN — PANTOPRAZOLE SODIUM 40 MG: 40 TABLET, DELAYED RELEASE ORAL at 06:34

## 2024-10-17 RX ADMIN — FUROSEMIDE 40 MG: 10 INJECTION, SOLUTION INTRAMUSCULAR; INTRAVENOUS at 08:12

## 2024-10-17 RX ADMIN — TIOTROPIUM BROMIDE AND OLODATEROL 2 PUFF: 3.124; 2.736 SPRAY, METERED RESPIRATORY (INHALATION) at 09:54

## 2024-10-17 RX ADMIN — PIPERACILLIN AND TAZOBACTAM 3375 MG: 3; .375 INJECTION, POWDER, LYOPHILIZED, FOR SOLUTION INTRAVENOUS at 12:22

## 2024-10-17 RX ADMIN — OXYCODONE 10 MG: 5 TABLET ORAL at 17:42

## 2024-10-17 RX ADMIN — CETIRIZINE HYDROCHLORIDE 10 MG: 10 TABLET, FILM COATED ORAL at 08:14

## 2024-10-17 RX ADMIN — PIPERACILLIN AND TAZOBACTAM 3375 MG: 3; .375 INJECTION, POWDER, LYOPHILIZED, FOR SOLUTION INTRAVENOUS at 04:14

## 2024-10-17 RX ADMIN — CALCIUM CARBONATE-VITAMIN D TAB 500 MG-200 UNIT 1 TABLET: 500-200 TAB at 08:12

## 2024-10-17 RX ADMIN — TIZANIDINE 4 MG: 4 TABLET ORAL at 21:06

## 2024-10-17 RX ADMIN — MORPHINE SULFATE 2 MG: 2 INJECTION, SOLUTION INTRAMUSCULAR; INTRAVENOUS at 19:49

## 2024-10-17 RX ADMIN — METOPROLOL SUCCINATE 25 MG: 25 TABLET, FILM COATED, EXTENDED RELEASE ORAL at 16:28

## 2024-10-17 RX ADMIN — METOCLOPRAMIDE HYDROCHLORIDE 10 MG: 5 INJECTION INTRAMUSCULAR; INTRAVENOUS at 00:57

## 2024-10-17 RX ADMIN — SENNOSIDES 8.6 MG: 8.6 TABLET, FILM COATED ORAL at 08:12

## 2024-10-17 RX ADMIN — POTASSIUM CHLORIDE 10 MEQ: 10 INJECTION, SOLUTION INTRAVENOUS at 12:25

## 2024-10-17 RX ADMIN — DOCUSATE SODIUM 100 MG: 100 CAPSULE, LIQUID FILLED ORAL at 08:13

## 2024-10-17 RX ADMIN — POTASSIUM CHLORIDE 10 MEQ: 10 INJECTION, SOLUTION INTRAVENOUS at 11:20

## 2024-10-17 RX ADMIN — METOCLOPRAMIDE HYDROCHLORIDE 10 MG: 5 INJECTION INTRAMUSCULAR; INTRAVENOUS at 19:52

## 2024-10-17 RX ADMIN — TRAZODONE HYDROCHLORIDE 50 MG: 50 TABLET ORAL at 21:05

## 2024-10-17 RX ADMIN — POTASSIUM CHLORIDE 10 MEQ: 10 INJECTION, SOLUTION INTRAVENOUS at 09:11

## 2024-10-17 RX ADMIN — POTASSIUM CHLORIDE 10 MEQ: 10 INJECTION, SOLUTION INTRAVENOUS at 06:46

## 2024-10-17 RX ADMIN — FLUCONAZOLE IN SODIUM CHLORIDE 200 MG: 2 INJECTION, SOLUTION INTRAVENOUS at 05:41

## 2024-10-17 RX ADMIN — SENNOSIDES 8.6 MG: 8.6 TABLET, FILM COATED ORAL at 19:52

## 2024-10-17 RX ADMIN — POTASSIUM CHLORIDE 10 MEQ: 10 INJECTION, SOLUTION INTRAVENOUS at 08:12

## 2024-10-17 RX ADMIN — METOCLOPRAMIDE HYDROCHLORIDE 10 MG: 5 INJECTION INTRAMUSCULAR; INTRAVENOUS at 12:20

## 2024-10-17 RX ADMIN — ENOXAPARIN SODIUM 40 MG: 100 INJECTION SUBCUTANEOUS at 08:13

## 2024-10-17 RX ADMIN — SODIUM CHLORIDE, PRESERVATIVE FREE 10 ML: 5 INJECTION INTRAVENOUS at 19:50

## 2024-10-17 RX ADMIN — MAGNESIUM SULFATE HEPTAHYDRATE 2000 MG: 40 INJECTION, SOLUTION INTRAVENOUS at 09:10

## 2024-10-17 RX ADMIN — ACETAMINOPHEN 650 MG: 325 TABLET ORAL at 16:28

## 2024-10-17 RX ADMIN — PIPERACILLIN AND TAZOBACTAM 3375 MG: 3; .375 INJECTION, POWDER, LYOPHILIZED, FOR SOLUTION INTRAVENOUS at 20:10

## 2024-10-17 RX ADMIN — OXYCODONE 10 MG: 5 TABLET ORAL at 04:20

## 2024-10-17 RX ADMIN — THYROID 90 MG: 30 TABLET ORAL at 08:18

## 2024-10-17 RX ADMIN — Medication 1000 MCG: at 08:12

## 2024-10-17 RX ADMIN — ACETAMINOPHEN 650 MG: 325 TABLET ORAL at 21:04

## 2024-10-17 RX ADMIN — SODIUM CHLORIDE, PRESERVATIVE FREE 10 ML: 5 INJECTION INTRAVENOUS at 08:19

## 2024-10-17 RX ADMIN — GABAPENTIN 600 MG: 300 CAPSULE ORAL at 16:28

## 2024-10-17 RX ADMIN — ACETAMINOPHEN 650 MG: 325 TABLET ORAL at 08:12

## 2024-10-17 RX ADMIN — SODIUM CHLORIDE 25 ML: 9 INJECTION, SOLUTION INTRAVENOUS at 12:22

## 2024-10-17 RX ADMIN — Medication 10 ML: at 19:53

## 2024-10-17 RX ADMIN — POLYETHYLENE GLYCOL 3350 17 G: 17 POWDER, FOR SOLUTION ORAL at 19:53

## 2024-10-17 RX ADMIN — METOCLOPRAMIDE HYDROCHLORIDE 10 MG: 5 INJECTION INTRAMUSCULAR; INTRAVENOUS at 06:34

## 2024-10-17 RX ADMIN — POTASSIUM CHLORIDE 10 MEQ: 10 INJECTION, SOLUTION INTRAVENOUS at 10:00

## 2024-10-17 RX ADMIN — SODIUM CHLORIDE: 9 INJECTION, SOLUTION INTRAVENOUS at 05:40

## 2024-10-17 RX ADMIN — ESCITALOPRAM OXALATE 10 MG: 10 TABLET ORAL at 08:12

## 2024-10-17 ASSESSMENT — PAIN SCALES - GENERAL
PAINLEVEL_OUTOF10: 7
PAINLEVEL_OUTOF10: 5
PAINLEVEL_OUTOF10: 8
PAINLEVEL_OUTOF10: 3
PAINLEVEL_OUTOF10: 9
PAINLEVEL_OUTOF10: 7
PAINLEVEL_OUTOF10: 4
PAINLEVEL_OUTOF10: 7
PAINLEVEL_OUTOF10: 8
PAINLEVEL_OUTOF10: 7

## 2024-10-17 ASSESSMENT — PAIN DESCRIPTION - LOCATION
LOCATION: ABDOMEN
LOCATION: ABDOMEN;BACK;NECK
LOCATION: ABDOMEN
LOCATION: ABDOMEN;BACK

## 2024-10-17 ASSESSMENT — PAIN DESCRIPTION - PAIN TYPE: TYPE: SURGICAL PAIN;ACUTE PAIN

## 2024-10-17 NOTE — PLAN OF CARE
Problem: Discharge Planning  Goal: Discharge to home or other facility with appropriate resources  Outcome: Progressing     Problem: Pain  Goal: Verbalizes/displays adequate comfort level or baseline comfort level  Outcome: Progressing     Problem: Safety - Adult  Goal: Free from fall injury  Outcome: Progressing     Problem: Hematologic - Adult  Goal: Maintains hematologic stability  Outcome: Progressing     Problem: Respiratory - Adult  Goal: Achieves optimal ventilation and oxygenation  Outcome: Progressing  Note: Weaned off supplemental oxygen. Room air now.     Problem: Gastrointestinal - Adult  Goal: Maintains or returns to baseline bowel function  Outcome: Progressing  Goal: Maintains adequate nutritional intake  Outcome: Progressing  Note: Diet advanced to full liquid     Problem: Cardiovascular - Adult  Goal: Maintains optimal cardiac output and hemodynamic stability  Outcome: Progressing  Goal: Absence of cardiac dysrhythmias or at baseline  Outcome: Progressing     Problem: Skin/Tissue Integrity - Adult  Goal: Incisions, wounds, or drain sites healing without S/S of infection  Outcome: Progressing     Problem: Musculoskeletal - Adult  Goal: Return mobility to safest level of function  Outcome: Progressing  Goal: Return ADL status to a safe level of function  Outcome: Progressing     Problem: Infection - Adult  Goal: Absence of infection during hospitalization  Outcome: Progressing     Problem: Metabolic/Fluid and Electrolytes - Adult  Goal: Electrolytes maintained within normal limits  Outcome: Progressing     Problem: ABCDS Injury Assessment  Goal: Absence of physical injury  Outcome: Progressing     Problem: Nutrition Deficit:  Goal: Optimize nutritional status  Outcome: Progressing

## 2024-10-17 NOTE — CONSULTS
[facility], [facility] [place]   Electrophysiology   Date: 10/17/2024  Reason for Consultation: Atrial Fibrillation    Consult Requesting Physician: Hernando Rogers MD     Chief Complaint   Patient presents with    Abdominal Pain     Abdominal pain x 5 days and is getting worse in the last 2 days.        CC: abdominal pain   HPI: Kasandra Cantor is a 68 y.o. female female with past medical history of Atrial Fibrillation, COPD, hypertension, hypothyroidism and sleep apnea      States she has had worsening abdominal pain for past 5 days.  Unable to eat for 2 days.  Stopped having flatus and BM in past 2 days.  She was concerned she might have a recurrent peptic ulcer.  Admitted as inpatient for acute gangrenous appendicitis, sepsis, BRIDGETT and hypotension.  Started on IVF and IV Abx.  Followed by Surgery.    Underwent laparoscopic appendectomy on 10/11/24.    Had acute post operative acute blood loss anemia.  Transferred to ICU.  Transfused 5 U PRBC on 10/13.  Taken back to OR for ex lap on 10/13.  Suspect bleeding from trochar laceration of spleen from retraction.      Ep was consulted for reports of atrial fibrillation overnight. Telemetry reviewed. No clear atrial fibrillation seen, sinus rhythm with PVCs noted, causing irregular rhythm at times. One short episode, 6.2 seconds, of atrial tachycardia overnight.  She is aware when she has episodes, symptomatic with palpitations. She states she did not have any symptoms last night. Xarelto currently on hold d/t surgery and postop bleeding.         Assessment and Plan:     Paroxysmal Atrial Fibrillation   -ECG Telemetry today shows sinus rhythm, PVCs  - History of multiple DCCV and ablation   -[id number] Score is 3( Age > 65,HTN,Sex), She is high risk for thromboembolic event. Anticoagulation is recommended unless otherwise contraindicated. On Xarelto 20 mg daily PTA- AC on hold d/t acute bleeding/anemia. Surgery plans to restart AC prior to discharge to ensure she 
PICC line education:    -Risks  -Benefits  -Alternatives  -Procedure    Discussed the above with patient, verbalized understanding, answered all questions. Provided with information on PICC care to review. PICC tip verified via . Reported off to patient's  Nurse Bhumi  
hours as needed for Pain    Mio Roy MD   hydroxychloroquine (PLAQUENIL) 200 MG tablet Take 1 tablet by mouth 2 times daily 2/15/23 7/18/24  Shanta Fox MD   lisinopril (PRINIVIL;ZESTRIL) 10 MG tablet Take 1 tablet by mouth daily    Mio Roy MD   Calcium Carb-Cholecalciferol (CALCIUM 1000 + D) 1000-800 MG-UNIT TABS Take 1 tablet by mouth daily    Mio Roy MD   Multiple Vitamins-Minerals (THERAPEUTIC MULTIVITAMIN-MINERALS) tablet Take 1 tablet by mouth daily    Mio Roy MD   vitamin B-12 (CYANOCOBALAMIN) 1000 MCG tablet Take 1 tablet by mouth daily    Mio Roy MD   pantoprazole (PROTONIX) 40 MG tablet Take 1 tablet by mouth daily 2/18/18   Joss Huang MD   tiZANidine (ZANAFLEX) 4 MG tablet Take 1 tablet by mouth 2 times daily as needed Usually takes at night if needed    Mio Roy MD   ALPRAZolam (XANAX) 0.25 MG tablet Take 1 tablet by mouth nightly as needed for Sleep.    Mio Roy MD        Allergies:  Nirmatrelvir-ritonavir and Nsaids    Social History:   Social History     Socioeconomic History    Marital status:      Spouse name: None    Number of children: None    Years of education: None    Highest education level: None   Tobacco Use    Smoking status: Never     Passive exposure: Past    Smokeless tobacco: Never   Vaping Use    Vaping status: Never Used   Substance and Sexual Activity    Alcohol use: Yes     Alcohol/week: 2.0 standard drinks of alcohol     Types: 2 Cans of beer per week     Comment: socially/2 beers daily    Drug use: No     Social Determinants of Health     Food Insecurity: No Food Insecurity (6/19/2024)    Hunger Vital Sign     Worried About Running Out of Food in the Last Year: Never true     Ran Out of Food in the Last Year: Never true   Transportation Needs: No Transportation Needs (6/19/2024)    PRAPARE - Transportation     Lack of Transportation (Medical): No     Lack of 
this patient with life threatening illness, including direct patient contact, management of life support systems, review of data including imaging and labs, discussions with other team members and physicians is at least 32 minutes so far today, excluding procedures.      Ricky Price MD, [facility]  [address], [facility] [address], [place] [address]  10/11/2024, 1:39 PM        This note was completed using dragon medical speech recognition software. Grammatical errors, random word insertions, pronoun errors and incomplete sentences are occasional consequences of this technology due to software limitations. If there are questions or concerns about the content of this note of information contained within the body of this dictation they should be addressed with the provider for clarification.

## 2024-10-17 NOTE — PLAN OF CARE
Problem: Discharge Planning  Goal: Discharge to home or other facility with appropriate resources  10/16/2024 2215 by Kelsi Moraes RN  Outcome: Progressing     Problem: Pain  Goal: Verbalizes/displays adequate comfort level or baseline comfort level  10/16/2024 2215 by Kelsi Moraes RN  Outcome: Progressing     Problem: Safety - Adult  Goal: Free from fall injury  10/16/2024 2215 by Kelsi Moraes RN  Outcome: Progressing     Problem: Hematologic - Adult  Goal: Maintains hematologic stability  10/16/2024 2215 by Kelsi Moraes RN  Outcome: Progressing     Problem: ABCDS Injury Assessment  Goal: Absence of physical injury  10/16/2024 2215 by Kelsi Moraes RN  Outcome: Progressing

## 2024-10-18 LAB
ANION GAP SERPL CALCULATED.3IONS-SCNC: 9 MMOL/L (ref 3–16)
BASOPHILS # BLD: 0 K/UL (ref 0–0.2)
BASOPHILS NFR BLD: 0.5 %
BUN SERPL-MCNC: 10 MG/DL (ref 7–20)
CALCIUM SERPL-MCNC: 8.2 MG/DL (ref 8.3–10.6)
CHLORIDE SERPL-SCNC: 103 MMOL/L (ref 99–110)
CO2 SERPL-SCNC: 27 MMOL/L (ref 21–32)
CREAT SERPL-MCNC: 0.7 MG/DL (ref 0.6–1.2)
DEPRECATED RDW RBC AUTO: 18.7 % (ref 12.4–15.4)
EOSINOPHIL # BLD: 0.3 K/UL (ref 0–0.6)
EOSINOPHIL NFR BLD: 3.9 %
GFR SERPLBLD CREATININE-BSD FMLA CKD-EPI: >90 ML/MIN/{1.73_M2}
GLUCOSE SERPL-MCNC: 104 MG/DL (ref 70–99)
HCT VFR BLD AUTO: 27.3 % (ref 36–48)
HGB BLD-MCNC: 9.3 G/DL (ref 12–16)
LYMPHOCYTES # BLD: 0.9 K/UL (ref 1–5.1)
LYMPHOCYTES NFR BLD: 12.9 %
MAGNESIUM SERPL-MCNC: 2.19 MG/DL (ref 1.8–2.4)
MCH RBC QN AUTO: 31.3 PG (ref 26–34)
MCHC RBC AUTO-ENTMCNC: 34.2 G/DL (ref 31–36)
MCV RBC AUTO: 91.5 FL (ref 80–100)
MONOCYTES # BLD: 0.7 K/UL (ref 0–1.3)
MONOCYTES NFR BLD: 10.4 %
NEUTROPHILS # BLD: 5 K/UL (ref 1.7–7.7)
NEUTROPHILS NFR BLD: 72.3 %
PLATELET # BLD AUTO: 247 K/UL (ref 135–450)
PMV BLD AUTO: 7 FL (ref 5–10.5)
POTASSIUM SERPL-SCNC: 3.4 MMOL/L (ref 3.5–5.1)
RBC # BLD AUTO: 2.98 M/UL (ref 4–5.2)
SODIUM SERPL-SCNC: 139 MMOL/L (ref 136–145)
WBC # BLD AUTO: 7 K/UL (ref 4–11)

## 2024-10-18 PROCEDURE — 99024 POSTOP FOLLOW-UP VISIT: CPT | Performed by: SURGERY

## 2024-10-18 PROCEDURE — 6370000000 HC RX 637 (ALT 250 FOR IP): Performed by: SURGERY

## 2024-10-18 PROCEDURE — 6360000002 HC RX W HCPCS: Performed by: SURGERY

## 2024-10-18 PROCEDURE — 2580000003 HC RX 258: Performed by: INTERNAL MEDICINE

## 2024-10-18 PROCEDURE — 6370000000 HC RX 637 (ALT 250 FOR IP)

## 2024-10-18 PROCEDURE — 2580000003 HC RX 258: Performed by: SURGERY

## 2024-10-18 PROCEDURE — APPSS15 APP SPLIT SHARED TIME 0-15 MINUTES: Performed by: NURSE PRACTITIONER

## 2024-10-18 PROCEDURE — 6360000002 HC RX W HCPCS: Performed by: NURSE PRACTITIONER

## 2024-10-18 PROCEDURE — 97530 THERAPEUTIC ACTIVITIES: CPT

## 2024-10-18 PROCEDURE — 2060000000 HC ICU INTERMEDIATE R&B

## 2024-10-18 PROCEDURE — 83735 ASSAY OF MAGNESIUM: CPT

## 2024-10-18 PROCEDURE — 85025 COMPLETE CBC W/AUTO DIFF WBC: CPT

## 2024-10-18 PROCEDURE — APPNB30 APP NON BILLABLE TIME 0-30 MINS: Performed by: NURSE PRACTITIONER

## 2024-10-18 PROCEDURE — 94761 N-INVAS EAR/PLS OXIMETRY MLT: CPT

## 2024-10-18 PROCEDURE — 6370000000 HC RX 637 (ALT 250 FOR IP): Performed by: NURSE PRACTITIONER

## 2024-10-18 PROCEDURE — 80048 BASIC METABOLIC PNL TOTAL CA: CPT

## 2024-10-18 PROCEDURE — 6370000000 HC RX 637 (ALT 250 FOR IP): Performed by: INTERNAL MEDICINE

## 2024-10-18 PROCEDURE — 94640 AIRWAY INHALATION TREATMENT: CPT

## 2024-10-18 RX ORDER — POTASSIUM CHLORIDE 1500 MG/1
20 TABLET, EXTENDED RELEASE ORAL ONCE
Status: DISCONTINUED | OUTPATIENT
Start: 2024-10-18 | End: 2024-10-20 | Stop reason: HOSPADM

## 2024-10-18 RX ORDER — POTASSIUM CHLORIDE 1500 MG/1
40 TABLET, EXTENDED RELEASE ORAL
Status: COMPLETED | OUTPATIENT
Start: 2024-10-18 | End: 2024-10-18

## 2024-10-18 RX ORDER — OXYCODONE AND ACETAMINOPHEN 5; 325 MG/1; MG/1
1 TABLET ORAL EVERY 6 HOURS PRN
Qty: 15 TABLET | Refills: 0 | Status: SHIPPED | OUTPATIENT
Start: 2024-10-18 | End: 2024-10-25

## 2024-10-18 RX ORDER — POTASSIUM CHLORIDE 1500 MG/1
40 TABLET, EXTENDED RELEASE ORAL
Status: DISCONTINUED | OUTPATIENT
Start: 2024-10-18 | End: 2024-10-18

## 2024-10-18 RX ADMIN — OXYCODONE 5 MG: 5 TABLET ORAL at 06:57

## 2024-10-18 RX ADMIN — GABAPENTIN 600 MG: 300 CAPSULE ORAL at 09:35

## 2024-10-18 RX ADMIN — Medication 10 ML: at 11:42

## 2024-10-18 RX ADMIN — POTASSIUM CHLORIDE 40 MEQ: 1500 TABLET, EXTENDED RELEASE ORAL at 11:39

## 2024-10-18 RX ADMIN — SODIUM CHLORIDE, PRESERVATIVE FREE 10 ML: 5 INJECTION INTRAVENOUS at 09:36

## 2024-10-18 RX ADMIN — PANTOPRAZOLE SODIUM 40 MG: 40 TABLET, DELAYED RELEASE ORAL at 06:54

## 2024-10-18 RX ADMIN — PIPERACILLIN AND TAZOBACTAM 3375 MG: 3; .375 INJECTION, POWDER, LYOPHILIZED, FOR SOLUTION INTRAVENOUS at 04:09

## 2024-10-18 RX ADMIN — PIPERACILLIN AND TAZOBACTAM 3375 MG: 3; .375 INJECTION, POWDER, LYOPHILIZED, FOR SOLUTION INTRAVENOUS at 20:46

## 2024-10-18 RX ADMIN — METOCLOPRAMIDE HYDROCHLORIDE 10 MG: 5 INJECTION INTRAMUSCULAR; INTRAVENOUS at 06:54

## 2024-10-18 RX ADMIN — SENNOSIDES 8.6 MG: 8.6 TABLET, FILM COATED ORAL at 09:35

## 2024-10-18 RX ADMIN — RIVAROXABAN 20 MG: 20 TABLET, FILM COATED ORAL at 20:51

## 2024-10-18 RX ADMIN — THYROID 90 MG: 30 TABLET ORAL at 09:39

## 2024-10-18 RX ADMIN — CALCIUM CARBONATE-VITAMIN D TAB 500 MG-200 UNIT 1 TABLET: 500-200 TAB at 09:35

## 2024-10-18 RX ADMIN — TIOTROPIUM BROMIDE AND OLODATEROL 2 PUFF: 3.124; 2.736 SPRAY, METERED RESPIRATORY (INHALATION) at 11:44

## 2024-10-18 RX ADMIN — TIZANIDINE 4 MG: 4 TABLET ORAL at 20:40

## 2024-10-18 RX ADMIN — SODIUM CHLORIDE, PRESERVATIVE FREE 10 ML: 5 INJECTION INTRAVENOUS at 20:43

## 2024-10-18 RX ADMIN — SODIUM CHLORIDE, PRESERVATIVE FREE 10 ML: 5 INJECTION INTRAVENOUS at 11:41

## 2024-10-18 RX ADMIN — OXYCODONE 10 MG: 5 TABLET ORAL at 11:39

## 2024-10-18 RX ADMIN — METOPROLOL SUCCINATE 25 MG: 25 TABLET, FILM COATED, EXTENDED RELEASE ORAL at 09:35

## 2024-10-18 RX ADMIN — POTASSIUM CHLORIDE 40 MEQ: 1500 TABLET, EXTENDED RELEASE ORAL at 09:35

## 2024-10-18 RX ADMIN — ACETAMINOPHEN 650 MG: 325 TABLET ORAL at 20:40

## 2024-10-18 RX ADMIN — Medication 10 ML: at 20:43

## 2024-10-18 RX ADMIN — TRAZODONE HYDROCHLORIDE 50 MG: 50 TABLET ORAL at 20:40

## 2024-10-18 RX ADMIN — SODIUM CHLORIDE, PRESERVATIVE FREE 10 ML: 5 INJECTION INTRAVENOUS at 20:44

## 2024-10-18 RX ADMIN — OXYCODONE 5 MG: 5 TABLET ORAL at 00:10

## 2024-10-18 RX ADMIN — TIZANIDINE 4 MG: 4 TABLET ORAL at 11:38

## 2024-10-18 RX ADMIN — ESCITALOPRAM OXALATE 10 MG: 10 TABLET ORAL at 09:35

## 2024-10-18 RX ADMIN — ACETAMINOPHEN 650 MG: 325 TABLET ORAL at 15:27

## 2024-10-18 RX ADMIN — ACETAMINOPHEN 650 MG: 325 TABLET ORAL at 09:35

## 2024-10-18 RX ADMIN — Medication 10 ML: at 20:44

## 2024-10-18 RX ADMIN — ACETAMINOPHEN 650 MG: 325 TABLET ORAL at 04:05

## 2024-10-18 RX ADMIN — ENOXAPARIN SODIUM 40 MG: 100 INJECTION SUBCUTANEOUS at 09:36

## 2024-10-18 RX ADMIN — GABAPENTIN 600 MG: 300 CAPSULE ORAL at 20:39

## 2024-10-18 RX ADMIN — OXYCODONE 10 MG: 5 TABLET ORAL at 18:42

## 2024-10-18 RX ADMIN — PIPERACILLIN AND TAZOBACTAM 3375 MG: 3; .375 INJECTION, POWDER, LYOPHILIZED, FOR SOLUTION INTRAVENOUS at 12:55

## 2024-10-18 RX ADMIN — FLUCONAZOLE IN SODIUM CHLORIDE 200 MG: 2 INJECTION, SOLUTION INTRAVENOUS at 05:50

## 2024-10-18 RX ADMIN — CETIRIZINE HYDROCHLORIDE 10 MG: 10 TABLET, FILM COATED ORAL at 09:35

## 2024-10-18 RX ADMIN — SENNOSIDES 8.6 MG: 8.6 TABLET, FILM COATED ORAL at 20:39

## 2024-10-18 RX ADMIN — METOCLOPRAMIDE HYDROCHLORIDE 10 MG: 5 INJECTION INTRAMUSCULAR; INTRAVENOUS at 00:11

## 2024-10-18 RX ADMIN — GABAPENTIN 600 MG: 300 CAPSULE ORAL at 15:27

## 2024-10-18 RX ADMIN — Medication 1000 MCG: at 09:35

## 2024-10-18 ASSESSMENT — PAIN DESCRIPTION - PAIN TYPE: TYPE: ACUTE PAIN;SURGICAL PAIN

## 2024-10-18 ASSESSMENT — PAIN SCALES - GENERAL
PAINLEVEL_OUTOF10: 8
PAINLEVEL_OUTOF10: 8
PAINLEVEL_OUTOF10: 5
PAINLEVEL_OUTOF10: 5
PAINLEVEL_OUTOF10: 7
PAINLEVEL_OUTOF10: 8
PAINLEVEL_OUTOF10: 6
PAINLEVEL_OUTOF10: 7

## 2024-10-18 ASSESSMENT — PAIN DESCRIPTION - LOCATION
LOCATION: BACK;ABDOMEN
LOCATION: ABDOMEN;BACK
LOCATION: ABDOMEN;BACK

## 2024-10-18 ASSESSMENT — PAIN DESCRIPTION - DESCRIPTORS: DESCRIPTORS: STABBING

## 2024-10-18 NOTE — PLAN OF CARE
Problem: Discharge Planning  Goal: Discharge to home or other facility with appropriate resources  Outcome: Progressing     Problem: Pain  Goal: Verbalizes/displays adequate comfort level or baseline comfort level  Outcome: Progressing     Problem: Safety - Adult  Goal: Free from fall injury  Outcome: Progressing     Problem: Hematologic - Adult  Goal: Maintains hematologic stability  Outcome: Progressing     Problem: Respiratory - Adult  Goal: Achieves optimal ventilation and oxygenation  Outcome: Progressing     Problem: Gastrointestinal - Adult  Goal: Maintains or returns to baseline bowel function  Outcome: Progressing  Goal: Maintains adequate nutritional intake  Outcome: Progressing     Problem: Cardiovascular - Adult  Goal: Maintains optimal cardiac output and hemodynamic stability  Outcome: Progressing  Goal: Absence of cardiac dysrhythmias or at baseline  Outcome: Progressing     Problem: Skin/Tissue Integrity - Adult  Goal: Incisions, wounds, or drain sites healing without S/S of infection  Outcome: Progressing     Problem: Musculoskeletal - Adult  Goal: Return mobility to safest level of function  Outcome: Progressing  Goal: Return ADL status to a safe level of function  Outcome: Progressing     Problem: Infection - Adult  Goal: Absence of infection during hospitalization  Outcome: Progressing     Problem: Metabolic/Fluid and Electrolytes - Adult  Goal: Electrolytes maintained within normal limits  Outcome: Progressing     Problem: ABCDS Injury Assessment  Goal: Absence of physical injury  Outcome: Progressing     Problem: Nutrition Deficit:  Goal: Optimize nutritional status  Outcome: Progressing

## 2024-10-18 NOTE — DISCHARGE INSTRUCTIONS
[facility]    Discharge Instructions      Activity  - activity as tolerated, no driving while on analgesics, and no heavy lifting for 6 weeks; it is OK to be up walking around--walking up and down stairs is also OK. Do what is comfortable: stop and rest if you feel tired.    Diet  - regular diet  - Drink plenty of fluids     Pain  - You may use narcotic pain medication as prescribed for breakthrough pain  - You can use OTC Tylenol or Ibuprofen for 1-2 weeks (may need to adjust the dose as directed on the bottle if enteric coated ibuprofen chosen)  - Avoid taking narcotic pain medication on an empty stomach which may result in nausea, if pain medication is causing nausea try decreasing the dose  - Narcotic pain medication is not necessary, please contact the office if pain is not controlled  - Narcotic pain medication will not be refilled on weekends and after hours  - Please contact the office Monday-Friday 8a-4p if a refill is requested    Nausea  - Avoid taking narcotic pain medication on an empty stomach which may result in nausea  - If pain medication is causing nausea you may try decreasing the dose  - Nausea can be common for 24 hours after surgery--if nausea uncontrolled or worsening, contact the office or go to the ED    Constipation  - Pain medication can be constipating  - Often, it helps to take a stool softener with the goal of at least one soft bowel movement daily with minimal straining  - You may also need to increase water and fiber intake--may supplement with Benefiber and increasing your activity will also be helpful  - If a stool softener is needed, the following OTC medications are recommend: Colace 100 mg by mouth twice daily, Miralax 17 gm by mouth 1-3 times daily with glass of water, dulcolax suppositories, and Fleets enemas    Wound Care  - keep wound clean and dry  - If incisional bleeding is noted, hold firm pressure for 15-20 minutes. If remains

## 2024-10-18 NOTE — PLAN OF CARE
Problem: Discharge Planning  Goal: Discharge to home or other facility with appropriate resources  10/17/2024 2238 by Kelsi Moraes RN  Outcome: Progressing     Problem: Pain  Goal: Verbalizes/displays adequate comfort level or baseline comfort level  10/17/2024 2238 by Kelsi Moraes RN  Outcome: Progressing     Problem: Safety - Adult  Goal: Free from fall injury  10/17/2024 2238 by Kelsi Moraes RN  Outcome: Progressing     Problem: Hematologic - Adult  Goal: Maintains hematologic stability  10/17/2024 2238 by Kelsi Moraes RN  Outcome: Progressing     Problem: ABCDS Injury Assessment  Goal: Absence of physical injury  10/17/2024 2238 by Kelsi Moraes RN  Outcome: Progressing     Problem: Respiratory - Adult  Goal: Achieves optimal ventilation and oxygenation  10/17/2024 2238 by Kelsi Moraes RN  Outcome: Progressing     Problem: Gastrointestinal - Adult  Goal: Maintains or returns to baseline bowel function  10/17/2024 2238 by Kelsi Moraes RN  Outcome: Progressing     Problem: Skin/Tissue Integrity - Adult  Goal: Incisions, wounds, or drain sites healing without S/S of infection  10/17/2024 2238 by Kelsi Moraes RN  Outcome: Progressing     Problem: Musculoskeletal - Adult  Goal: Return mobility to safest level of function  10/17/2024 2238 by Kelsi Moraes RN  Outcome: Progressing     Problem: Musculoskeletal - Adult  Goal: Return ADL status to a safe level of function  10/17/2024 2238 by Kelsi Moraes RN  Outcome: Progressing

## 2024-10-19 LAB
ANION GAP SERPL CALCULATED.3IONS-SCNC: 9 MMOL/L (ref 3–16)
BASOPHILS # BLD: 0 K/UL (ref 0–0.2)
BASOPHILS NFR BLD: 0.6 %
BUN SERPL-MCNC: 10 MG/DL (ref 7–20)
CALCIUM SERPL-MCNC: 8.4 MG/DL (ref 8.3–10.6)
CHLORIDE SERPL-SCNC: 104 MMOL/L (ref 99–110)
CO2 SERPL-SCNC: 25 MMOL/L (ref 21–32)
CREAT SERPL-MCNC: 0.7 MG/DL (ref 0.6–1.2)
DEPRECATED RDW RBC AUTO: 19.1 % (ref 12.4–15.4)
EOSINOPHIL # BLD: 0.3 K/UL (ref 0–0.6)
EOSINOPHIL NFR BLD: 4.2 %
GFR SERPLBLD CREATININE-BSD FMLA CKD-EPI: >90 ML/MIN/{1.73_M2}
GLUCOSE SERPL-MCNC: 96 MG/DL (ref 70–99)
HCT VFR BLD AUTO: 27.2 % (ref 36–48)
HGB BLD-MCNC: 9.3 G/DL (ref 12–16)
LYMPHOCYTES # BLD: 0.8 K/UL (ref 1–5.1)
LYMPHOCYTES NFR BLD: 12.6 %
MCH RBC QN AUTO: 31.4 PG (ref 26–34)
MCHC RBC AUTO-ENTMCNC: 34.1 G/DL (ref 31–36)
MCV RBC AUTO: 92.1 FL (ref 80–100)
MONOCYTES # BLD: 0.5 K/UL (ref 0–1.3)
MONOCYTES NFR BLD: 8.5 %
NEUTROPHILS # BLD: 4.7 K/UL (ref 1.7–7.7)
NEUTROPHILS NFR BLD: 74.1 %
PLATELET # BLD AUTO: 262 K/UL (ref 135–450)
PMV BLD AUTO: 7.2 FL (ref 5–10.5)
POTASSIUM SERPL-SCNC: 3.9 MMOL/L (ref 3.5–5.1)
RBC # BLD AUTO: 2.95 M/UL (ref 4–5.2)
SODIUM SERPL-SCNC: 138 MMOL/L (ref 136–145)
WBC # BLD AUTO: 6.4 K/UL (ref 4–11)

## 2024-10-19 PROCEDURE — 6360000002 HC RX W HCPCS: Performed by: SURGERY

## 2024-10-19 PROCEDURE — 2580000003 HC RX 258: Performed by: INTERNAL MEDICINE

## 2024-10-19 PROCEDURE — 2060000000 HC ICU INTERMEDIATE R&B

## 2024-10-19 PROCEDURE — 94761 N-INVAS EAR/PLS OXIMETRY MLT: CPT

## 2024-10-19 PROCEDURE — 6370000000 HC RX 637 (ALT 250 FOR IP): Performed by: SURGERY

## 2024-10-19 PROCEDURE — 6370000000 HC RX 637 (ALT 250 FOR IP)

## 2024-10-19 PROCEDURE — 6370000000 HC RX 637 (ALT 250 FOR IP): Performed by: NURSE PRACTITIONER

## 2024-10-19 PROCEDURE — 6370000000 HC RX 637 (ALT 250 FOR IP): Performed by: INTERNAL MEDICINE

## 2024-10-19 PROCEDURE — 99024 POSTOP FOLLOW-UP VISIT: CPT | Performed by: SURGERY

## 2024-10-19 PROCEDURE — 80048 BASIC METABOLIC PNL TOTAL CA: CPT

## 2024-10-19 PROCEDURE — 2580000003 HC RX 258: Performed by: SURGERY

## 2024-10-19 PROCEDURE — 85025 COMPLETE CBC W/AUTO DIFF WBC: CPT

## 2024-10-19 PROCEDURE — 94640 AIRWAY INHALATION TREATMENT: CPT

## 2024-10-19 RX ADMIN — ACETAMINOPHEN 650 MG: 325 TABLET ORAL at 13:19

## 2024-10-19 RX ADMIN — ALPRAZOLAM 0.25 MG: 0.25 TABLET ORAL at 22:51

## 2024-10-19 RX ADMIN — TIZANIDINE 4 MG: 4 TABLET ORAL at 16:25

## 2024-10-19 RX ADMIN — OXYCODONE 10 MG: 5 TABLET ORAL at 13:19

## 2024-10-19 RX ADMIN — TIOTROPIUM BROMIDE AND OLODATEROL 2 PUFF: 3.124; 2.736 SPRAY, METERED RESPIRATORY (INHALATION) at 10:24

## 2024-10-19 RX ADMIN — ESCITALOPRAM OXALATE 10 MG: 10 TABLET ORAL at 09:00

## 2024-10-19 RX ADMIN — ACETAMINOPHEN 650 MG: 325 TABLET ORAL at 08:59

## 2024-10-19 RX ADMIN — METOPROLOL SUCCINATE 25 MG: 25 TABLET, FILM COATED, EXTENDED RELEASE ORAL at 09:00

## 2024-10-19 RX ADMIN — ACETAMINOPHEN 650 MG: 325 TABLET ORAL at 04:18

## 2024-10-19 RX ADMIN — GABAPENTIN 600 MG: 300 CAPSULE ORAL at 22:36

## 2024-10-19 RX ADMIN — SENNOSIDES 8.6 MG: 8.6 TABLET, FILM COATED ORAL at 08:59

## 2024-10-19 RX ADMIN — OXYCODONE 5 MG: 5 TABLET ORAL at 06:05

## 2024-10-19 RX ADMIN — TRAZODONE HYDROCHLORIDE 50 MG: 50 TABLET ORAL at 22:36

## 2024-10-19 RX ADMIN — PANTOPRAZOLE SODIUM 40 MG: 40 TABLET, DELAYED RELEASE ORAL at 06:01

## 2024-10-19 RX ADMIN — PIPERACILLIN AND TAZOBACTAM 3375 MG: 3; .375 INJECTION, POWDER, LYOPHILIZED, FOR SOLUTION INTRAVENOUS at 13:17

## 2024-10-19 RX ADMIN — ACETAMINOPHEN 650 MG: 325 TABLET ORAL at 22:36

## 2024-10-19 RX ADMIN — OXYCODONE 10 MG: 5 TABLET ORAL at 22:51

## 2024-10-19 RX ADMIN — Medication 10 ML: at 22:35

## 2024-10-19 RX ADMIN — PIPERACILLIN AND TAZOBACTAM 3375 MG: 3; .375 INJECTION, POWDER, LYOPHILIZED, FOR SOLUTION INTRAVENOUS at 04:18

## 2024-10-19 RX ADMIN — THYROID 90 MG: 30 TABLET ORAL at 09:00

## 2024-10-19 RX ADMIN — SODIUM CHLORIDE, PRESERVATIVE FREE 10 ML: 5 INJECTION INTRAVENOUS at 22:35

## 2024-10-19 RX ADMIN — OXYCODONE 10 MG: 5 TABLET ORAL at 17:29

## 2024-10-19 RX ADMIN — GABAPENTIN 600 MG: 300 CAPSULE ORAL at 08:59

## 2024-10-19 RX ADMIN — ALPRAZOLAM 0.25 MG: 0.25 TABLET ORAL at 04:21

## 2024-10-19 RX ADMIN — DOCUSATE SODIUM 100 MG: 100 CAPSULE, LIQUID FILLED ORAL at 08:59

## 2024-10-19 RX ADMIN — SENNOSIDES 8.6 MG: 8.6 TABLET, FILM COATED ORAL at 22:36

## 2024-10-19 RX ADMIN — CALCIUM CARBONATE-VITAMIN D TAB 500 MG-200 UNIT 1 TABLET: 500-200 TAB at 08:59

## 2024-10-19 RX ADMIN — FLUCONAZOLE IN SODIUM CHLORIDE 200 MG: 2 INJECTION, SOLUTION INTRAVENOUS at 06:02

## 2024-10-19 RX ADMIN — GABAPENTIN 600 MG: 300 CAPSULE ORAL at 13:36

## 2024-10-19 RX ADMIN — PIPERACILLIN AND TAZOBACTAM 3375 MG: 3; .375 INJECTION, POWDER, LYOPHILIZED, FOR SOLUTION INTRAVENOUS at 22:41

## 2024-10-19 RX ADMIN — RIVAROXABAN 20 MG: 20 TABLET, FILM COATED ORAL at 17:29

## 2024-10-19 RX ADMIN — CETIRIZINE HYDROCHLORIDE 10 MG: 10 TABLET, FILM COATED ORAL at 08:59

## 2024-10-19 RX ADMIN — Medication 1000 MCG: at 08:59

## 2024-10-19 RX ADMIN — OXYCODONE 10 MG: 5 TABLET ORAL at 00:47

## 2024-10-19 ASSESSMENT — PAIN DESCRIPTION - LOCATION
LOCATION: ABDOMEN
LOCATION: ABDOMEN;BACK

## 2024-10-19 ASSESSMENT — PAIN SCALES - GENERAL
PAINLEVEL_OUTOF10: 7
PAINLEVEL_OUTOF10: 0
PAINLEVEL_OUTOF10: 7
PAINLEVEL_OUTOF10: 3
PAINLEVEL_OUTOF10: 7
PAINLEVEL_OUTOF10: 6

## 2024-10-19 ASSESSMENT — PAIN - FUNCTIONAL ASSESSMENT
PAIN_FUNCTIONAL_ASSESSMENT: PREVENTS OR INTERFERES SOME ACTIVE ACTIVITIES AND ADLS
PAIN_FUNCTIONAL_ASSESSMENT: ACTIVITIES ARE NOT PREVENTED

## 2024-10-19 ASSESSMENT — PAIN DESCRIPTION - PAIN TYPE
TYPE: ACUTE PAIN
TYPE: ACUTE PAIN
TYPE: SURGICAL PAIN

## 2024-10-19 ASSESSMENT — PAIN DESCRIPTION - ORIENTATION
ORIENTATION: RIGHT
ORIENTATION: LOWER

## 2024-10-19 ASSESSMENT — PAIN DESCRIPTION - FREQUENCY
FREQUENCY: INTERMITTENT
FREQUENCY: INTERMITTENT

## 2024-10-19 ASSESSMENT — PAIN DESCRIPTION - ONSET
ONSET: ON-GOING
ONSET: ON-GOING

## 2024-10-19 ASSESSMENT — PAIN DESCRIPTION - DESCRIPTORS
DESCRIPTORS: ACHING;DISCOMFORT;TENDER
DESCRIPTORS: ACHING
DESCRIPTORS: ACHING;DISCOMFORT

## 2024-10-19 NOTE — PLAN OF CARE
Problem: Discharge Planning  Goal: Discharge to home or other facility with appropriate resources  10/19/2024 1219 by Merry Dash RN  Outcome: Adequate for Discharge  Flowsheets (Taken 10/19/2024 0800)  Discharge to home or other facility with appropriate resources: Identify barriers to discharge with patient and caregiver  10/18/2024 2254 by Kelsi Moraes RN  Outcome: Progressing     Problem: Pain  Goal: Verbalizes/displays adequate comfort level or baseline comfort level  10/19/2024 1219 by Merry Dash RN  Outcome: Adequate for Discharge  Flowsheets (Taken 10/19/2024 0830)  Verbalizes/displays adequate comfort level or baseline comfort level:   Encourage patient to monitor pain and request assistance   Assess pain using appropriate pain scale   Administer analgesics based on type and severity of pain and evaluate response   Implement non-pharmacological measures as appropriate and evaluate response  10/18/2024 2254 by Kelsi Moraes RN  Outcome: Progressing     Problem: Safety - Adult  Goal: Free from fall injury  10/19/2024 1219 by Merry Dash RN  Outcome: Adequate for Discharge  10/18/2024 2254 by Kelsi Moraes RN  Outcome: Progressing     Problem: Hematologic - Adult  Goal: Maintains hematologic stability  10/19/2024 1219 by Merry Dash RN  Outcome: Adequate for Discharge  Flowsheets (Taken 10/19/2024 0800)  Maintains hematologic stability:   Assess for signs and symptoms of bleeding or hemorrhage   Monitor labs for bleeding or clotting disorders  10/18/2024 2254 by Kelsi Moraes RN  Outcome: Progressing     Problem: Respiratory - Adult  Goal: Achieves optimal ventilation and oxygenation  10/19/2024 1219 by Merry Dash RN  Outcome: Adequate for Discharge  Flowsheets (Taken 10/19/2024 0800)  Achieves optimal ventilation and oxygenation:   Assess for changes in respiratory status   Assess for changes in mentation and behavior   Position to facilitate oxygenation and

## 2024-10-19 NOTE — PLAN OF CARE
Problem: Discharge Planning  Goal: Discharge to home or other facility with appropriate resources  10/19/2024 1403 by Merry Dash RN  Outcome: Progressing  10/19/2024 1219 by Merry Dash RN  Outcome: Adequate for Discharge  Flowsheets (Taken 10/19/2024 0800)  Discharge to home or other facility with appropriate resources: Identify barriers to discharge with patient and caregiver     Problem: Pain  Goal: Verbalizes/displays adequate comfort level or baseline comfort level  10/19/2024 1403 by Merry Dash RN  Outcome: Progressing  Flowsheets (Taken 10/19/2024 1319)  Verbalizes/displays adequate comfort level or baseline comfort level:   Encourage patient to monitor pain and request assistance   Assess pain using appropriate pain scale   Administer analgesics based on type and severity of pain and evaluate response   Implement non-pharmacological measures as appropriate and evaluate response  10/19/2024 1219 by Merry Dash RN  Outcome: Adequate for Discharge  Flowsheets (Taken 10/19/2024 0830)  Verbalizes/displays adequate comfort level or baseline comfort level:   Encourage patient to monitor pain and request assistance   Assess pain using appropriate pain scale   Administer analgesics based on type and severity of pain and evaluate response   Implement non-pharmacological measures as appropriate and evaluate response     Problem: Safety - Adult  Goal: Free from fall injury  10/19/2024 1403 by Merry Dash RN  Outcome: Progressing  10/19/2024 1219 by Merry Dash RN  Outcome: Adequate for Discharge     Problem: Hematologic - Adult  Goal: Maintains hematologic stability  10/19/2024 1403 by Merry Dash RN  Outcome: Progressing  10/19/2024 1219 by Merry Dash RN  Outcome: Adequate for Discharge  Flowsheets (Taken 10/19/2024 0800)  Maintains hematologic stability:   Assess for signs and symptoms of bleeding or hemorrhage   Monitor labs for bleeding or clotting disorders

## 2024-10-20 VITALS
DIASTOLIC BLOOD PRESSURE: 73 MMHG | HEART RATE: 71 BPM | SYSTOLIC BLOOD PRESSURE: 126 MMHG | TEMPERATURE: 97.9 F | HEIGHT: 63 IN | OXYGEN SATURATION: 94 % | RESPIRATION RATE: 18 BRPM | BODY MASS INDEX: 33.2 KG/M2 | WEIGHT: 187.39 LBS

## 2024-10-20 LAB
BASOPHILS # BLD: 0.1 K/UL (ref 0–0.2)
BASOPHILS NFR BLD: 0.9 %
DEPRECATED RDW RBC AUTO: 19.3 % (ref 12.4–15.4)
EOSINOPHIL # BLD: 0.3 K/UL (ref 0–0.6)
EOSINOPHIL NFR BLD: 4.6 %
HCT VFR BLD AUTO: 29.1 % (ref 36–48)
HGB BLD-MCNC: 9.8 G/DL (ref 12–16)
LYMPHOCYTES # BLD: 0.7 K/UL (ref 1–5.1)
LYMPHOCYTES NFR BLD: 11.6 %
MCH RBC QN AUTO: 31.1 PG (ref 26–34)
MCHC RBC AUTO-ENTMCNC: 33.6 G/DL (ref 31–36)
MCV RBC AUTO: 92.6 FL (ref 80–100)
MONOCYTES # BLD: 0.6 K/UL (ref 0–1.3)
MONOCYTES NFR BLD: 8.8 %
NEUTROPHILS # BLD: 4.7 K/UL (ref 1.7–7.7)
NEUTROPHILS NFR BLD: 74.1 %
PLATELET # BLD AUTO: 273 K/UL (ref 135–450)
PMV BLD AUTO: 7.1 FL (ref 5–10.5)
RBC # BLD AUTO: 3.14 M/UL (ref 4–5.2)
WBC # BLD AUTO: 6.4 K/UL (ref 4–11)

## 2024-10-20 PROCEDURE — 2580000003 HC RX 258: Performed by: SURGERY

## 2024-10-20 PROCEDURE — 6360000002 HC RX W HCPCS: Performed by: SURGERY

## 2024-10-20 PROCEDURE — 6370000000 HC RX 637 (ALT 250 FOR IP): Performed by: NURSE PRACTITIONER

## 2024-10-20 PROCEDURE — 6370000000 HC RX 637 (ALT 250 FOR IP): Performed by: SURGERY

## 2024-10-20 PROCEDURE — 2580000003 HC RX 258: Performed by: INTERNAL MEDICINE

## 2024-10-20 PROCEDURE — 6370000000 HC RX 637 (ALT 250 FOR IP)

## 2024-10-20 PROCEDURE — 94640 AIRWAY INHALATION TREATMENT: CPT

## 2024-10-20 PROCEDURE — 99024 POSTOP FOLLOW-UP VISIT: CPT | Performed by: SURGERY

## 2024-10-20 PROCEDURE — 85025 COMPLETE CBC W/AUTO DIFF WBC: CPT

## 2024-10-20 PROCEDURE — 94761 N-INVAS EAR/PLS OXIMETRY MLT: CPT

## 2024-10-20 RX ADMIN — POLYETHYLENE GLYCOL 3350 17 G: 17 POWDER, FOR SOLUTION ORAL at 09:35

## 2024-10-20 RX ADMIN — CETIRIZINE HYDROCHLORIDE 10 MG: 10 TABLET, FILM COATED ORAL at 09:35

## 2024-10-20 RX ADMIN — OXYCODONE 10 MG: 5 TABLET ORAL at 05:02

## 2024-10-20 RX ADMIN — SODIUM CHLORIDE, PRESERVATIVE FREE 10 ML: 5 INJECTION INTRAVENOUS at 09:36

## 2024-10-20 RX ADMIN — OXYCODONE 10 MG: 5 TABLET ORAL at 09:39

## 2024-10-20 RX ADMIN — GABAPENTIN 600 MG: 300 CAPSULE ORAL at 09:35

## 2024-10-20 RX ADMIN — THYROID 90 MG: 30 TABLET ORAL at 09:40

## 2024-10-20 RX ADMIN — SODIUM CHLORIDE, PRESERVATIVE FREE 10 ML: 5 INJECTION INTRAVENOUS at 05:52

## 2024-10-20 RX ADMIN — DOCUSATE SODIUM 100 MG: 100 CAPSULE, LIQUID FILLED ORAL at 09:35

## 2024-10-20 RX ADMIN — SODIUM CHLORIDE, PRESERVATIVE FREE 10 ML: 5 INJECTION INTRAVENOUS at 07:00

## 2024-10-20 RX ADMIN — ACETAMINOPHEN 650 MG: 325 TABLET ORAL at 09:35

## 2024-10-20 RX ADMIN — METOPROLOL SUCCINATE 25 MG: 25 TABLET, FILM COATED, EXTENDED RELEASE ORAL at 09:35

## 2024-10-20 RX ADMIN — PIPERACILLIN AND TAZOBACTAM 3375 MG: 3; .375 INJECTION, POWDER, LYOPHILIZED, FOR SOLUTION INTRAVENOUS at 07:03

## 2024-10-20 RX ADMIN — ESCITALOPRAM OXALATE 10 MG: 10 TABLET ORAL at 09:35

## 2024-10-20 RX ADMIN — PANTOPRAZOLE SODIUM 40 MG: 40 TABLET, DELAYED RELEASE ORAL at 07:25

## 2024-10-20 RX ADMIN — CALCIUM CARBONATE-VITAMIN D TAB 500 MG-200 UNIT 1 TABLET: 500-200 TAB at 09:39

## 2024-10-20 RX ADMIN — TIOTROPIUM BROMIDE AND OLODATEROL 2 PUFF: 3.124; 2.736 SPRAY, METERED RESPIRATORY (INHALATION) at 09:52

## 2024-10-20 RX ADMIN — Medication 1000 MCG: at 09:35

## 2024-10-20 RX ADMIN — SENNOSIDES 8.6 MG: 8.6 TABLET, FILM COATED ORAL at 09:35

## 2024-10-20 RX ADMIN — FLUCONAZOLE IN SODIUM CHLORIDE 200 MG: 2 INJECTION, SOLUTION INTRAVENOUS at 05:55

## 2024-10-20 ASSESSMENT — PAIN DESCRIPTION - LOCATION
LOCATION: ABDOMEN
LOCATION: ABDOMEN

## 2024-10-20 ASSESSMENT — PAIN SCALES - WONG BAKER
WONGBAKER_NUMERICALRESPONSE: NO HURT

## 2024-10-20 ASSESSMENT — PAIN DESCRIPTION - DESCRIPTORS
DESCRIPTORS: ACHING
DESCRIPTORS: ACHING;DISCOMFORT

## 2024-10-20 ASSESSMENT — PAIN SCALES - GENERAL
PAINLEVEL_OUTOF10: 7
PAINLEVEL_OUTOF10: 7
PAINLEVEL_OUTOF10: 0

## 2024-10-20 NOTE — DISCHARGE SUMMARY
Hospital Medicine Discharge Summary    Patient: Kasandra Cantor     Gender: female  : 1955   Age: 68 y.o.  MRN: [id number]    Admitting Physician: Hernando Rogers MD  Discharge Physician: Rosemary Modi PA-C     Code Status: Full Code     Admit Date: 10/11/2024   Discharge Date:   10/20/24    Disposition:  Home  Time spent arranging discharge: 34 minutes    Discharge Diagnoses:    Active Hospital Problems    Diagnosis Date Noted    Hypotension [I95.9] 2023     Priority: Medium    Chronic heart failure with preserved ejection fraction (HCC) [I50.32] 10/17/2024    Acute appendicitis with generalized peritonitis and gangrene [K35.209, K35.891] 10/13/2024    Appendicitis [K37] 10/13/2024    Hemoperitoneum [K66.1] 10/13/2024    Symptomatic anemia [D64.9] 10/13/2024    Hemorrhagic shock (HCC) [R57.8] 10/13/2024    Acute gangrenous appendicitis [K35.891] 10/11/2024    BRIDGETT (acute kidney injury) (HCC) [N17.9] 10/11/2024    Sepsis (HCC) [A41.9] 10/11/2024    Chronic obstructive pulmonary disease (HCC) [J44.9] 2024    Atrial fibrillation and flutter (HCC) [I48.91, I48.92]     Benign essential HTN [I10]        Recommendations: Follow-up with Dr. Iraheta in 1 week    Condition at Discharge:  Stable    Hospital Course:   Patient is a 68-year-old female with A-fib on Xarelto, who presented to the hospital with a 5-day history of worsening abdominal pain and anorexia for 2 days.  She stopped having flatus or bowel movements 2 days prior to admission.  In the emergency room she was hypotensive with a blood pressure of 84/45.  White blood cell count was 12,000.  CT abdomen pelvis revealed acute gangrenous appendicitis.  Patient was admitted, hydrated, and started on IV antibiotics.  She was taken to the operating room for laparoscopic appendectomy by Dr. Iraheta on .  On postoperative day 1 she had some nausea vomiting.  Hemoglobin was 9.2.  It had been 11.6 the day before.  Later that evening she

## 2024-10-20 NOTE — PLAN OF CARE
Problem: Discharge Planning  Goal: Discharge to home or other facility with appropriate resources  10/19/2024 2351 by Liza Saunders RN  Outcome: Progressing     Problem: Pain  Goal: Verbalizes/displays adequate comfort level or baseline comfort level  10/19/2024 2351 by Liza Saunders RN  Outcome: Progressing     Problem: Safety - Adult  Goal: Free from fall injury  10/19/2024 2351 by Liza Saunders RN  Outcome: Progressing     Problem: Hematologic - Adult  Goal: Maintains hematologic stability  10/19/2024 2351 by Liza Saunders RN  Outcome: Progressing     Problem: Respiratory - Adult  Goal: Achieves optimal ventilation and oxygenation  10/19/2024 2351 by Liza Saunders RN  Outcome: Progressing     Problem: Gastrointestinal - Adult  Goal: Maintains or returns to baseline bowel function  10/19/2024 2351 by Liza Saunders RN  Outcome: Progressing     Problem: Gastrointestinal - Adult  Goal: Maintains adequate nutritional intake  10/19/2024 2351 by Liza Saunders RN  Outcome: Progressing     Problem: Cardiovascular - Adult  Goal: Maintains optimal cardiac output and hemodynamic stability  10/19/2024 2351 by Liza Saunders RN  Outcome: Progressing     Problem: Cardiovascular - Adult  Goal: Absence of cardiac dysrhythmias or at baseline  10/19/2024 2351 by Liza Saunders RN  Outcome: Progressing

## 2024-10-20 NOTE — CARE COORDINATION
10/20/24 0936   IMM Letter   IMM Letter given to Patient/Family/Significant other/Guardian/POA/by: IMM Letter given to Patient by HERNANDEZ. Patient signed IMM and is in agreement with D/C home today.   IMM Letter date given: 10/20/24   IMM Letter time given: 0930         Electronically signed by PIA Garrison on 10/20/2024 at 9:37 AM

## 2024-10-20 NOTE — PROGRESS NOTES
[facility]        Post-op Note    Pt Name: Kasandra Cantor  MRN: [id number]  YOB: 1955  Date of Evaluation: 10/18/2024    Postoperative Day #7, 5    Subjective:    No acute events overnight  Pain controlled  No nausea or vomiting, tolerating full liquids  Passing flatus and several stools overnight  Resting in bed at this time      Vital Signs:  Patient Vitals for the past 24 hrs:   BP Temp Temp src Pulse Resp SpO2   10/18/24 0935 134/77 97.7 °F (36.5 °C) Oral 78 -- 95 %   10/18/24 0404 127/81 97.7 °F (36.5 °C) Oral 71 18 98 %   10/18/24 0040 -- -- -- -- 18 --   10/18/24 0010 115/78 98 °F (36.7 °C) Oral 80 18 96 %   10/17/24 2019 -- -- -- -- 18 --   10/17/24 1936 (!) 147/81 97.8 °F (36.6 °C) Oral 77 18 95 %   10/17/24 1623 (!) 144/87 98.1 °F (36.7 °C) Oral 85 -- 95 %   10/17/24 1300 135/80 97.4 °F (36.3 °C) Oral 97 16 98 %   10/17/24 1144 (!) 146/69 97.7 °F (36.5 °C) Oral 89 -- 98 %      TEMPERATURE HISTORY 24H: Temp (24hrs), Av.8 °F (36.6 °C), Min:97.4 °F (36.3 °C), Max:98.1 °F (36.7 °C)    BLOOD PRESSURE HISTORY: Systolic (36hrs), Av , Min:115 , Max:149    Diastolic (36hrs), Av, Min:69, Max:87      Intake/Output:    I/O last 3 completed shifts:  In: -   Out: 3195 [Urine:3050; Drains:145]  No intake/output data recorded.  Drain/tube Output:    [REMOVED] Closed/Suction Drain Anterior RLQ Bulb-Output (ml): 225 ml  Closed/Suction Drain Right RLQ Bulb-Output (ml): 25 ml  Closed/Suction Drain Left LUQ Bulb-Output (ml): 50 ml      Physical Exam:  General: awake, alert, no acute distress  Abdomen: soft, non-distended, appropriate incisional tenderness, bowel sounds present  Drain: serosanguinous x2--tubing stripped  Skin/Wound: healing well, no drainage, no erythema, ecchymosis noted, well approximated with staples    Labs:  CBC:    Recent Labs     10/16/24  0520 10/16/24  1145 10/17/24  0519 10/18/24  0541   WBC 9.9  --  7.8 7.0   HGB 8.9* 10.0* 9.7* 
          [facility]        Post-op Note    Pt Name: Kasandra Cantor  MRN: [id number]  YOB: 1955  Date of Evaluation: 10/16/2024    Postoperative Day #5, 3    Subjective:    No acute events overnight  Pain controlled  No nausea or vomiting, tolerating clear liquids  No flatus or stool  Decreased leaking from right-sided ESTRELLA drain at insertion site  Up to chair at this time      Vital Signs:  Patient Vitals for the past 24 hrs:   BP Temp Temp src Pulse Resp SpO2 Weight   10/16/24 1136 136/72 98 °F (36.7 °C) Oral 82 16 96 % --   10/16/24 0913 -- -- -- -- 16 -- --   10/16/24 0822 -- -- -- 80 16 96 % --   10/16/24 0817 -- -- -- -- -- -- 95.3 kg (210 lb)   10/16/24 0800 (!) 146/67 97.4 °F (36.3 °C) Oral 80 18 93 % --   10/16/24 0335 (!) 99/56 97.4 °F (36.3 °C) Oral 66 18 94 % --   10/15/24 2345 (!) 85/53 97.7 °F (36.5 °C) Oral 67 17 92 % --   10/15/24 2045 -- -- -- -- 17 -- --   10/15/24 2018 -- -- -- 79 18 92 % --   10/15/24 1930 107/67 98.3 °F (36.8 °C) Temporal 78 19 92 % --   10/15/24 1650 120/73 97.9 °F (36.6 °C) Oral 86 21 92 % --   10/15/24 1613 -- -- -- -- 23 -- --   10/15/24 1603 -- -- -- 79 -- -- --   10/15/24 1600 121/64 -- -- 81 14 95 % --   10/15/24 1530 117/66 -- -- 79 13 96 % --   10/15/24 1500 (!) 117/59 -- -- 83 24 93 % --      TEMPERATURE HISTORY 24H: Temp (24hrs), Av.8 °F (36.6 °C), Min:97.4 °F (36.3 °C), Max:98.3 °F (36.8 °C)    BLOOD PRESSURE HISTORY: Systolic (36hrs), Av , Min:85 , Max:146    Diastolic (36hrs), Av, Min:44, Max:81      Intake/Output:    I/O last 3 completed shifts:  In: 3113.3 [P.O.:1440; I.V.:1444.6; IV Piggyback:228.7]  Out: 3575 [Urine:3300; Drains:275]  No intake/output data recorded.  Drain/tube Output:    [REMOVED] Closed/Suction Drain Anterior RLQ Bulb-Output (ml): 225 ml  Closed/Suction Drain Right RLQ Bulb-Output (ml): 20 ml  Closed/Suction Drain Left LUQ Bulb-Output (ml): 35 ml      Physical Exam:  General: awake, 
          [facility]        Post-op Note    Pt Name: Kasandra Cantor  MRN: [id number]  YOB: 1955  Date of evaluation: 10/12/2024    Post-op Day #1    Subjective:    No acute events overnight  Pain fairly controlled  Nausea and emesis this morning    Vital Signs:  Patient Vitals for the past 24 hrs:   BP Temp Temp src Pulse Resp SpO2 Weight   10/12/24 1311 -- -- -- -- 18 -- --   10/12/24 1230 113/65 -- -- 91 -- -- --   10/12/24 1114 (!) 92/57 98.5 °F (36.9 °C) Oral 84 18 98 % --   10/12/24 0800 100/64 97.5 °F (36.4 °C) Oral 90 18 98 % --   10/12/24 0758 -- -- -- -- -- -- 77.1 kg (170 lb)   10/12/24 0422 108/66 99 °F (37.2 °C) Oral 85 18 96 % --   10/12/24 0028 124/68 99 °F (37.2 °C) Oral 88 18 96 % --   10/11/24 2117 -- -- -- 80 -- -- --   10/11/24 2000 115/67 -- -- 88 18 -- --   10/11/24 1845 109/65 99.1 °F (37.3 °C) Oral 88 18 93 % --   10/11/24 1822 -- -- -- 97 -- -- --   10/11/24 1817 (!) 119/55 -- -- 96 20 92 % --   10/11/24 181 (!) 119/55 -- -- 93 17 98 % --   10/11/24 1800 123/61 -- -- 93 24 100 % --   10/11/24 1750 124/67 99.2 °F (37.3 °C) Oral 93 17 100 % --   10/11/24 1745 126/78 -- -- 95 19 96 % --   10/11/24 1740 123/63 -- -- 98 20 96 % --   10/11/24 1738 135/62 98.8 °F (37.1 °C) Temporal 95 21 96 % --   10/11/24 1556 130/67 98.7 °F (37.1 °C) Oral 88 16 96 % --      TEMPERATURE HISTORY 24H: Temp (24hrs), Av.7 °F (37.1 °C), Min:97.5 °F (36.4 °C), Max:99.2 °F (37.3 °C)    BLOOD PRESSURE HISTORY: Systolic (36hrs), Av , Min:79 , Max:135    Diastolic (36hrs), Av, Min:43, Max:78      Intake/Output:    I/O last 3 completed shifts:  In: 1116 [P.O.:240; I.V.:856.9; IV Piggyback:19.1]  Out: 1150 [Urine:1150]  No intake/output data recorded.  Drain/tube Output:           Physical Exam:  General: awake, alert, no acute distress  Abdomen: soft, non-distended, appropriate incisional tenderness, incisions clean dry and intact    Labs:  CBC:    Recent Labs     
          [facility]        Post-op Note    Pt Name: Kasandra Cantor  MRN: [id number]  YOB: 1955  Date of Evaluation: 10/19/2024    Postoperative Day # 8, 6    Subjective:    No acute events overnight  Pain controlled  No nausea or vomiting, sunny diet  Passing flatus and stools      Vital Signs:  Patient Vitals for the past 24 hrs:   BP Temp Temp src Pulse Resp SpO2 Weight   10/19/24 1025 -- -- -- 59 16 93 % --   10/19/24 0830 (!) 142/74 97.4 °F (36.3 °C) Oral 70 18 97 % --   10/19/24 0500 -- -- -- -- -- -- 84.8 kg (186 lb 15.2 oz)   10/19/24 0418 (!) 147/70 97.9 °F (36.6 °C) Oral 77 16 96 % --   10/19/24 0117 -- -- -- -- 16 -- --   10/19/24 0047 130/81 98.1 °F (36.7 °C) Oral 82 16 93 % --   10/18/24 2050 -- -- -- 77 -- -- --   10/18/24 2040 139/81 97.8 °F (36.6 °C) Oral 72 16 95 % --   10/18/24 1842 -- -- -- -- 17 -- --   10/18/24 1525 116/69 97.7 °F (36.5 °C) Oral 70 16 95 % --   10/18/24 1144 -- -- -- 78 15 98 % --      TEMPERATURE HISTORY 24H: Temp (24hrs), Av.8 °F (36.6 °C), Min:97.4 °F (36.3 °C), Max:98.1 °F (36.7 °C)    BLOOD PRESSURE HISTORY: Systolic (36hrs), Av , Min:115 , Max:148    Diastolic (36hrs), Av, Min:69, Max:81      Intake/Output:    I/O last 3 completed shifts:  In: 240 [P.O.:240]  Out: 500 [Urine:500]  No intake/output data recorded.  Drain/tube Output:    [REMOVED] Closed/Suction Drain Anterior RLQ Bulb-Output (ml): 225 ml  Closed/Suction Drain Right RLQ Bulb-Output (ml): 25 ml  Closed/Suction Drain Left LUQ Bulb-Output (ml): 50 ml      Physical Exam:  General: awake, alert, no acute distress  Abdomen: soft, non-distended, appropriate incisional tenderness, bowel sounds present  Drain: serosanguinous X 1--tubing stripped  Skin/Wound: healing well, no drainage, no erythema, ecchymosis noted, well approximated with staples    Labs:  CBC:    Recent Labs     10/17/24  0519 10/18/24  0541 10/19/24  0553   WBC 7.8 7.0 6.4   HGB 9.7* 9.3* 
          [facility]        Post-op Note    Pt Name: Kasandra Cantor  MRN: [id number]  YOB: 1955  Date of Evaluation: 10/20/2024    Postoperative Day # 9,7     Subjective:    No acute events overnight  Pain controlled  No nausea or vomiting, sunny diet  Passing flatus and stools      Vital Signs:  Patient Vitals for the past 24 hrs:   BP Temp Temp src Pulse Resp SpO2 Weight   10/20/24 1115 126/73 -- -- 71 18 94 % --   10/20/24 0953 -- -- -- 67 18 96 % --   10/20/24 0930 (!) 148/80 97.9 °F (36.6 °C) Temporal 67 18 96 % --   10/20/24 0837 -- -- -- -- -- -- 85 kg (187 lb 6.3 oz)   10/20/24 0532 -- -- -- -- 18 -- --   10/20/24 0502 -- -- -- -- 18 -- --   10/20/24 0331 139/77 98 °F (36.7 °C) Temporal 72 18 94 % --   10/19/24 2321 -- -- -- -- 18 -- --   10/19/24 2233 135/75 97.5 °F (36.4 °C) Oral 74 20 95 % --   10/19/24 1924 125/72 97.5 °F (36.4 °C) Oral 64 16 95 % --   10/19/24 1611 129/76 97.8 °F (36.6 °C) Oral 71 16 -- --   10/19/24 1349 -- -- -- -- 18 -- --   10/19/24 1217 137/80 98 °F (36.7 °C) Oral 72 16 97 % --      TEMPERATURE HISTORY 24H: Temp (24hrs), Av.8 °F (36.6 °C), Min:97.5 °F (36.4 °C), Max:98 °F (36.7 °C)    BLOOD PRESSURE HISTORY: Systolic (36hrs), Av , Min:125 , Max:148    Diastolic (36hrs), Av, Min:70, Max:81      Intake/Output:    I/O last 3 completed shifts:  In: 50 [I.V.:50]  Out: 500 [Urine:500]  No intake/output data recorded.  Drain/tube Output:    [REMOVED] Closed/Suction Drain Anterior RLQ Bulb-Output (ml): 225 ml  [REMOVED] Closed/Suction Drain Right RLQ Bulb-Output (ml): 25 ml  Closed/Suction Drain Left LUQ Bulb-Output (ml): 50 ml      Physical Exam:  General: awake, alert, no acute distress  Abdomen: soft, non-distended, appropriate incisional tenderness, bowel sounds present  Drain: both removed  Skin/Wound: healing well, no drainage, no erythema, ecchymosis noted, well approximated with staples    Labs:  CBC:    Recent Labs     
          [facility]        Post-op Note    Pt Name: Kasandra Cantor  MRN: [id number]  YOB: 1955  Date of Evaluation: 10/14/2024    Postoperative Day #3, 1    Subjective:    No acute events overnight  Pain controlled, occasional cramping  No nausea or vomiting  No flatus or stool  Resting in bed at this time      Vital Signs:  Patient Vitals for the past 24 hrs:   BP Temp Temp src Pulse Resp SpO2 Weight   10/14/24 1340 -- -- -- 96 20 96 % --   10/14/24 1300 (!) 109/57 98.9 °F (37.2 °C) Temporal -- -- -- --   10/14/24 1248 -- -- -- -- 25 -- --   10/14/24 1200 -- -- -- 93 30 -- --   10/14/24 1000 (!) 86/53 -- -- (!) 104 22 -- --   10/14/24 0930 -- -- -- -- 23 -- --   10/14/24 0800 117/63 97.6 °F (36.4 °C) Temporal 85 13 93 % --   10/14/24 0648 -- -- -- -- 26 -- --   10/14/24 0618 -- -- -- -- -- -- 89.8 kg (197 lb 15.6 oz)   10/14/24 0400 91/67 97.8 °F (36.6 °C) Temporal 87 20 94 % --   10/14/24 0224 -- -- -- -- 18 -- --   10/14/24 0000 (!) 93/50 97.3 °F (36.3 °C) Temporal 96 18 93 % --   10/13/24 2344 -- -- -- -- 19 -- --   10/13/24 2219 -- -- -- -- 21 -- --   10/13/24 2117 -- -- -- -- 23 -- --   10/13/24 2100 122/68 97.8 °F (36.6 °C) Temporal (!) 110 20 94 % --   10/13/24 2000 131/64 -- -- 100 20 95 % --   10/13/24 1952 (!) 147/77 -- -- (!) 116 30 93 % --   10/13/24 1950 (!) 147/77 -- -- (!) 121 25 93 % --   10/13/24 1945 (!) 130/95 97.6 °F (36.4 °C) Oral (!) 120 13 (!) 87 % --   10/13/24 1940 (!) 144/35 -- -- (!) 116 29 90 % --   10/13/24 1933 132/68 98.6 °F (37 °C) Temporal (!) 116 28 (!) 74 % --   10/13/24 1800 132/71 -- -- (!) 118 25 92 % --   10/13/24 1750 133/72 -- -- (!) 114 22 93 % --   10/13/24 1745 121/76 -- -- (!) 115 21 95 % --   10/13/24 1740 99/77 -- -- (!) 121 22 (!) 87 % --   10/13/24 173 133/62 97 °F (36.1 °C) Oral (!) 120 21 (!) 83 % --   10/13/24 1735 133/62 -- -- -- -- -- --      TEMPERATURE HISTORY 24H: Temp (24hrs), Av.8 °F (36.6 °C), Min:97 °F 
          [facility]        Post-op Note    Pt Name: Kasandra Cantor  MRN: [id number]  YOB: 1955  Date of Evaluation: 10/17/2024    Postoperative Day #6, 4    Subjective:    No acute events overnight  Pain controlled  No nausea or vomiting, tolerating clear liquids and would like to try more to eat  No flatus or stool  Resting in bed at this time      Vital Signs:  Patient Vitals for the past 24 hrs:   BP Temp Temp src Pulse Resp SpO2 Height Weight   10/17/24 1300 135/80 97.4 °F (36.3 °C) Oral 97 16 98 % -- --   10/17/24 1144 (!) 146/69 97.7 °F (36.5 °C) Oral 89 -- 98 % -- --   10/17/24 0954 -- -- -- 82 15 97 % -- --   10/17/24 0940 -- -- -- -- 16 -- -- --   10/17/24 0934 -- -- -- -- -- -- 1.6 m (5' 3\") --   10/17/24 0857 -- -- -- -- -- -- -- 90 kg (198 lb 6.6 oz)   10/17/24 0800 (!) 149/87 97.5 °F (36.4 °C) Oral 75 18 96 % -- --   10/17/24 0450 -- -- -- -- 18 -- -- --   10/17/24 0414 127/73 97.5 °F (36.4 °C) Oral 75 18 97 % -- --   10/17/24 0141 131/73 -- -- 77 18 97 % -- --   10/17/24 0105 -- -- -- 81 -- -- -- --   10/16/24 2351 125/80 97.6 °F (36.4 °C) Oral 82 18 95 % -- --   10/16/24 2158 -- -- -- -- 18 -- -- --   10/16/24 2033 -- -- -- -- -- 98 % -- --   10/16/24 1932 115/70 97.6 °F (36.4 °C) Oral 79 18 96 % -- --   10/16/24 1722 -- -- -- -- 17 -- -- --   10/16/24 1643 127/83 98 °F (36.7 °C) Oral 82 17 90 % -- --      TEMPERATURE HISTORY 24H: Temp (24hrs), Av.6 °F (36.4 °C), Min:97.4 °F (36.3 °C), Max:98 °F (36.7 °C)    BLOOD PRESSURE HISTORY: Systolic (36hrs), Av , Min:99 , Max:149    Diastolic (36hrs), Av, Min:56, Max:87      Intake/Output:    I/O last 3 completed shifts:  In: 480 [P.O.:480]  Out:  [Urine:1750; Drains:240]  I/O this shift:  In: -   Out: 2575 [Urine:2500; Drains:75]  Drain/tube Output:    [REMOVED] Closed/Suction Drain Anterior RLQ Bulb-Output (ml): 225 ml  Closed/Suction Drain Right RLQ Bulb-Output (ml): 25 ml  Closed/Suction 
          [facility]        Post-op Note    Pt Name: Kasandra Cantro  MRN: [id number]  YOB: 1955  Date of evaluation: 10/13/2024    Post-op Day #2    Subjective:    Acute bleeding from left incision and into abdomen overnight, CTA negative for active bleeding  Initially on pressors but responded with fluid and PRBC resuscitation  Since encounter this morning her hemoglobin has again dropped, become hypoxic and with increased pain. This is worse than this morning when her pain was controlled and she had appeared to be stable    Vital Signs:  Patient Vitals for the past 24 hrs:   BP Temp Temp src Pulse Resp SpO2 Weight   10/13/24 1300 127/60 -- -- 93 22 99 % --   10/13/24 1215 (!) 101/51 96.9 °F (36.1 °C) -- 98 20 94 % --   10/13/24 1200 105/61 -- -- 99 19 95 % --   10/13/24 1156 114/71 97 °F (36.1 °C) -- 87 18 94 % --   10/13/24 1114 94/64 -- -- 98 15 92 % --   10/13/24 1044 -- -- -- -- 12 -- --   10/13/24 1000 (!) 103/52 -- -- (!) 103 23 94 % --   10/13/24 0900 (!) 124/59 -- -- (!) 102 18 95 % --   10/13/24 0856 (!) 124/59 97.3 °F (36.3 °C) Temporal (!) 102 20 92 % --   10/13/24 0846 -- -- -- -- 15 -- --   10/13/24 0816 -- -- -- -- 16 -- --   10/13/24 0700 (!) 115/50 -- -- 92 19 91 % --   10/13/24 0645 (!) 112/52 -- -- 88 22 91 % --   10/13/24 0630 (!) 110/50 -- -- 93 18 -- --   10/13/24 0615 (!) 113/51 -- -- 93 19 -- --   10/13/24 0600 (!) 105/50 -- -- 94 18 92 % --   10/13/24 0545 (!) 101/55 -- -- 94 18 92 % --   10/13/24 0530 (!) 111/52 -- -- 98 21 92 % --   10/13/24 0515 (!) 123/57 -- -- 92 26 94 % --   10/13/24 0500 (!) 77/66 -- -- 96 19 92 % --   10/13/24 0445 (!) 121/53 -- -- 91 18 94 % --   10/13/24 0430 (!) 131/58 -- -- 86 25 96 % --   10/13/24 0415 (!) 127/50 -- -- 88 25 94 % --   10/13/24 0413 (!) 127/50 -- -- 95 19 95 % --   10/13/24 0400 (!) 122/51 97.9 °F (36.6 °C) Temporal 92 20 93 % 87.2 kg (192 lb 3.9 oz)   10/13/24 0345 (!) 119/54 -- -- 88 21 93 % -- 
      Hospitalist Progress Note      PCP: Tio Gonzalez MD    Date of Admission: 10/11/2024    Chief Complaint: Abdominal pain    Hospital Course: 68 y.o. female with history of Afib on Xarelto, COPD, HTN came to ER with complaints of abdominal pain.  States she has had worsening abdominal pain for past 5 days.  Unable to eat for 2 days.  Stopped having flatus and BM in past 2 days.  She was concerned she might have a recurrent peptic ulcer.  Admitted as inpatient for acute gangrenous appendicitis, sepsis, BRIDGETT and hypotension.  Started on IVF and IV Abx.  Followed by Surgery.    Underwent laparoscopic appendectomy on 10/11/24.    Had acute post operative acute blood loss anemia.  Transferred to ICU.  Transfused 3 U PRBC on 10/13.  Back to OR for ex lap on 10/13.    Subjective:  Patient seen in ICU.  Off Levophed.  Still with lower abdominal pain.  No CP, SOB, HA or fevers.       Medications:  Reviewed    Infusion Medications    sodium chloride      sodium chloride      sodium chloride 150 mL/hr at 10/13/24 1528    sodium chloride      norepinephrine Stopped (10/13/24 0914)     Scheduled Medications    [START ON 10/14/2024] fluconazole  200 mg IntraVENous Q24H    senna  1 tablet Oral BID    sodium chloride flush  5-40 mL IntraVENous 2 times per day    sodium chloride flush  5-40 mL IntraVENous 2 times per day    piperacillin-tazobactam  3,375 mg IntraVENous Q8H    docusate sodium  100 mg Oral BID    polyethylene glycol  17 g Oral Daily    acetaminophen  650 mg Oral Q6H    traZODone  50 mg Oral Nightly    vitamin B-12  1,000 mcg Oral Daily    thyroid  90 mg Oral Daily    oyster shell calcium w/D  1 tablet Oral Daily    cetirizine  10 mg Oral Daily    escitalopram  10 mg Oral Daily    gabapentin  600 mg Oral TID    tiotropium-olodaterol  2 puff Inhalation Daily RT    albuterol sulfate HFA  2 puff Inhalation BID RT    sodium chloride flush  5-40 mL IntraVENous 2 times per day    [Held by provider] enoxaparin  
      Hospitalist Progress Note      PCP: Tio Gonzalez MD    Date of Admission: 10/11/2024    Chief Complaint: Abdominal pain    Hospital Course: 68 y.o. female with history of Afib on Xarelto, COPD, HTN came to ER with complaints of abdominal pain.  States she has had worsening abdominal pain for past 5 days.  Unable to eat for 2 days.  Stopped having flatus and BM in past 2 days.  She was concerned she might have a recurrent peptic ulcer.  Admitted as inpatient for acute gangrenous appendicitis, sepsis, BRIDGETT and hypotension.  Started on IVF and IV Abx.  Followed by Surgery.    Underwent laparoscopic appendectomy on 10/11/24.    Had acute post operative acute blood loss anemia.  Transferred to ICU.  Transfused 5 U PRBC on 10/13.  Taken back to OR for ex lap on 10/13.  Suspect bleeding from trochar laceration of spleen from retraction.      Resumed Xarelto on 10/18.    Subjective:  Patient had flatus and BM.  +48 mL since admission.  Tolerable abdominal pain.  Wants to eat more. No vomiting.  No CP, SOB, HA or fevers.       Medications:  Reviewed    Infusion Medications    sodium chloride 25 mL (10/17/24 1222)    sodium chloride       Scheduled Medications    potassium chloride  20 mEq Oral Once    rivaroxaban  20 mg Oral Daily    metoprolol succinate  25 mg Oral Daily    polyethylene glycol  17 g Oral BID    pantoprazole  40 mg Oral QAM AC    sodium chloride flush  5-40 mL IntraVENous 2 times per day    lidocaine 1 % injection  50 mg IntraDERmal Once    fluconazole  200 mg IntraVENous Q24H    senna  1 tablet Oral BID    sodium chloride flush  5-40 mL IntraVENous 2 times per day    acetaminophen  650 mg Oral Q6H    sodium chloride flush  5-40 mL IntraVENous 2 times per day    sodium chloride flush  5-40 mL IntraVENous 2 times per day    piperacillin-tazobactam  3,375 mg IntraVENous Q8H    docusate sodium  100 mg Oral BID    traZODone  50 mg Oral Nightly    vitamin B-12  1,000 mcg Oral Daily    thyroid  90 
      Hospitalist Progress Note      PCP: Tio Gonzalez MD    Date of Admission: 10/11/2024    Chief Complaint: Abdominal pain    Hospital Course: 68 y.o. female with history of Afib on Xarelto, COPD, HTN came to ER with complaints of abdominal pain.  States she has had worsening abdominal pain for past 5 days.  Unable to eat for 2 days.  Stopped having flatus and BM in past 2 days.  She was concerned she might have a recurrent peptic ulcer.  Admitted as inpatient for acute gangrenous appendicitis, sepsis, BRIDGETT and hypotension.  Started on IVF and IV Abx.  Followed by Surgery.    Underwent laparoscopic appendectomy on 10/11/24.    Had acute post operative acute blood loss anemia.  Transferred to ICU.  Transfused 5 U PRBC on 10/13.  Taken back to OR for ex lap on 10/13.  Suspect bleeding from trochar laceration of spleen from retraction.      Resumed Xarelto on 10/18.    Subjective:  feeling better some nausea no vomtiting or chest pain      Medications:  Reviewed    Infusion Medications    sodium chloride 25 mL (10/17/24 1222)    sodium chloride       Scheduled Medications    potassium chloride  20 mEq Oral Once    rivaroxaban  20 mg Oral Daily    metoprolol succinate  25 mg Oral Daily    polyethylene glycol  17 g Oral BID    pantoprazole  40 mg Oral QAM AC    sodium chloride flush  5-40 mL IntraVENous 2 times per day    lidocaine 1 % injection  50 mg IntraDERmal Once    fluconazole  200 mg IntraVENous Q24H    senna  1 tablet Oral BID    sodium chloride flush  5-40 mL IntraVENous 2 times per day    acetaminophen  650 mg Oral Q6H    sodium chloride flush  5-40 mL IntraVENous 2 times per day    sodium chloride flush  5-40 mL IntraVENous 2 times per day    piperacillin-tazobactam  3,375 mg IntraVENous Q8H    docusate sodium  100 mg Oral BID    traZODone  50 mg Oral Nightly    vitamin B-12  1,000 mcg Oral Daily    thyroid  90 mg Oral Daily    oyster shell calcium w/D  1 tablet Oral Daily    cetirizine  10 mg 
      Hospitalist Progress Note      PCP: Tio Gonzalez MD    Date of Admission: 10/11/2024    Chief Complaint: Abdominal pain    Hospital Course: 68 y.o. female with history of Afib on Xarelto, COPD, HTN came to ER with complaints of abdominal pain.  States she has had worsening abdominal pain for past 5 days.  Unable to eat for 2 days.  Stopped having flatus and BM in past 2 days.  She was concerned she might have a recurrent peptic ulcer.  Admitted as inpatient for acute gangrenous appendicitis, sepsis, BRIDGETT and hypotension.  Started on IVF and IV Abx.  Followed by Surgery.    Underwent laparoscopic appendectomy on 10/11/24.    Had acute post operative acute blood loss anemia.  Transferred to ICU.  Transfused 5 U PRBC on 10/13.  Taken back to OR for ex lap on 10/13.  Suspect bleeding from trochar laceration of spleen from retraction.      Subjective:  Patient denies flatus or BM.  +2.3 L since admission.  Tolerable abdominal pain.  No vomiting.  No CP, SOB, HA or fevers.       Medications:  Reviewed    Infusion Medications    sodium chloride 15 mL/hr at 10/17/24 0540    sodium chloride       Scheduled Medications    potassium chloride  40 mEq Oral Q1H    enoxaparin  40 mg SubCUTAneous Daily    metoclopramide  10 mg IntraVENous Q6H    pantoprazole  40 mg Oral QAM AC    sodium chloride flush  5-40 mL IntraVENous 2 times per day    lidocaine 1 % injection  50 mg IntraDERmal Once    fluconazole  200 mg IntraVENous Q24H    senna  1 tablet Oral BID    sodium chloride flush  5-40 mL IntraVENous 2 times per day    acetaminophen  650 mg Oral Q6H    sodium chloride flush  5-40 mL IntraVENous 2 times per day    sodium chloride flush  5-40 mL IntraVENous 2 times per day    piperacillin-tazobactam  3,375 mg IntraVENous Q8H    docusate sodium  100 mg Oral BID    polyethylene glycol  17 g Oral Daily    traZODone  50 mg Oral Nightly    vitamin B-12  1,000 mcg Oral Daily    thyroid  90 mg Oral Daily    oyster shell 
      Hospitalist Progress Note      PCP: Tio Gonzalez MD    Date of Admission: 10/11/2024    Chief Complaint: Abdominal pain    Hospital Course: 68 y.o. female with history of Afib on Xarelto, COPD, HTN came to ER with complaints of abdominal pain.  States she has had worsening abdominal pain for past 5 days.  Unable to eat for 2 days.  Stopped having flatus and BM in past 2 days.  She was concerned she might have a recurrent peptic ulcer.  Admitted as inpatient for acute gangrenous appendicitis, sepsis, BRIDGETT and hypotension.  Started on IVF and IV Abx.  Followed by Surgery.    Underwent laparoscopic appendectomy on 10/11/24.    Subjective:  Patient with no flatus or BM.  Has abdominal pain.  No CP, SOB, HA or fevers.       Medications:  Reviewed    Infusion Medications    sodium chloride      sodium chloride      sodium chloride Stopped (10/12/24 0425)     Scheduled Medications    sodium chloride flush  5-40 mL IntraVENous 2 times per day    sodium chloride flush  5-40 mL IntraVENous 2 times per day    piperacillin-tazobactam  3,375 mg IntraVENous Q8H    docusate sodium  100 mg Oral BID    polyethylene glycol  17 g Oral Daily    acetaminophen  650 mg Oral Q6H    fluconazole  400 mg IntraVENous Q24H    sodium chloride flush  5-40 mL IntraVENous 2 times per day    enoxaparin  40 mg SubCUTAneous Daily     PRN Meds: albuterol sulfate HFA, sodium chloride flush, sodium chloride, potassium chloride **OR** potassium alternative oral replacement **OR** potassium chloride, magnesium sulfate, ondansetron **OR** ondansetron, polyethylene glycol, acetaminophen **OR** acetaminophen, sodium chloride flush, sodium chloride, HYDROmorphone, oxyCODONE **OR** oxyCODONE, morphine **OR** morphine, sodium chloride flush      Intake/Output Summary (Last 24 hours) at 10/12/2024 1116  Last data filed at 10/12/2024 0529  Gross per 24 hour   Intake 1115.99 ml   Output 1150 ml   Net -34.01 ml       Physical Exam Performed:    BP (!) 
   10/17/24 0426   RT Protocol   History Pulmonary Disease 0   Respiratory pattern 0   Breath sounds 2   Cough 1   Indications for Bronchodilator Therapy On home bronchodilators   Bronchodilator Assessment Score 3       
  [facility] - Inpatient Rehabilitation Department   Phone: [phone]    Occupational Therapy    [] Initial Evaluation            [x] Daily Treatment Note         [] Discharge Summary      Patient: Kasandra Cantor   : 1955   MRN: [id number]   Date of Service:  10/18/2024    Admitting Diagnosis:  Acute gangrenous appendicitis  Current Admission Summary: Chief complaint:         Chief Complaint   Patient presents with    Abdominal Pain       Abdominal pain x 5 days and is getting worse in the last 2 days.         Date of Procedure: 10/11/2024   Pre-operative Diagnosis: Acute appendicitis   Post-operative Diagnosis:  acute gangrenous appendicitis     Procedure: Laparoscopic appendectomy   Surgeon(s):  Chacho Iraheta MD   ___________________________________________________  Underwent laparoscopic appendectomy on 10/11/24.    Had acute post operative acute blood loss anemia.  Transferred to ICU.  Transfused 3 U PRBC on 10/13.  Back to OR for ex lap on 10/13.     History of Present Illness:  Kasandra Cantor is a 68 y.o. female with history of Afib on Xarelto, COPD, HTN came to ER with complaints of abdominal pain.  States she has had worsening abdominal pain for past 5 days.  Unable to eat for 2 days.  Stopped having flatus and BM in past 2 days.  She was concerned she might have a recurrent peptic ulcer.  Denies CP, SOB, HA, fevers, chills, NS, diarrhea, melena or hematochezia.  Otherwise complete ROS is negative unless listed above.  Past Medical History:  has a past medical history of A-fib (HCC), Age-related osteoporosis without current pathological fracture, Anxiety, Arthritis, COPD (chronic obstructive pulmonary disease) (HCC), Depression, History of blood transfusion, History of claustrophobia, Hypertension, Hypothyroidism, Pelvic fracture (HCC), and Sleep apnea.  Past Surgical History:  has a past surgical history that includes Hysterectomy; bladder repair; Abdominal exploration 
  [facility] - Inpatient Rehabilitation Department   Phone: [phone]    Occupational Therapy    [x] Initial Evaluation            [] Daily Treatment Note         [] Discharge Summary      Patient: Kasandra Cantor   : 1955   MRN: [id number]   Date of Service:  10/14/2024    Admitting Diagnosis:  Acute gangrenous appendicitis  Current Admission Summary: Chief complaint:         Chief Complaint   Patient presents with    Abdominal Pain       Abdominal pain x 5 days and is getting worse in the last 2 days.         Date of Procedure: 10/11/2024   Pre-operative Diagnosis: Acute appendicitis   Post-operative Diagnosis:  acute gangrenous appendicitis     Procedure: Laparoscopic appendectomy   Surgeon(s):  Chacho Iraheta MD   ___________________________________________________  Underwent laparoscopic appendectomy on 10/11/24.    Had acute post operative acute blood loss anemia.  Transferred to ICU.  Transfused 3 U PRBC on 10/13.  Back to OR for ex lap on 10/13.     History of Present Illness:  Kasandra Cantor is a 68 y.o. female with history of Afib on Xarelto, COPD, HTN came to ER with complaints of abdominal pain.  States she has had worsening abdominal pain for past 5 days.  Unable to eat for 2 days.  Stopped having flatus and BM in past 2 days.  She was concerned she might have a recurrent peptic ulcer.  Denies CP, SOB, HA, fevers, chills, NS, diarrhea, melena or hematochezia.  Otherwise complete ROS is negative unless listed above.  Past Medical History:  has a past medical history of A-fib (HCC), Age-related osteoporosis without current pathological fracture, Anxiety, Arthritis, COPD (chronic obstructive pulmonary disease) (HCC), Depression, History of blood transfusion, History of claustrophobia, Hypertension, Hypothyroidism, Pelvic fracture (HCC), and Sleep apnea.  Past Surgical History:  has a past surgical history that includes Hysterectomy; bladder repair; Abdominal exploration 
  [facility] - Inpatient Rehabilitation Department   Phone: [phone]    Occupational Therapy    [] Initial Evaluation            [x] Daily Treatment Note         [] Discharge Summary      Patient: Kasandra Cantor   : 1955   MRN: [id number]   Date of Service:  10/15/2024    Admitting Diagnosis:  Acute gangrenous appendicitis  Current Admission Summary: Chief complaint:         Chief Complaint   Patient presents with    Abdominal Pain       Abdominal pain x 5 days and is getting worse in the last 2 days.         Date of Procedure: 10/11/2024   Pre-operative Diagnosis: Acute appendicitis   Post-operative Diagnosis:  acute gangrenous appendicitis     Procedure: Laparoscopic appendectomy   Surgeon(s):  Chacho Iraheta MD   ___________________________________________________  Underwent laparoscopic appendectomy on 10/11/24.    Had acute post operative acute blood loss anemia.  Transferred to ICU.  Transfused 3 U PRBC on 10/13.  Back to OR for ex lap on 10/13.     History of Present Illness:  Kasandra Cantor is a 68 y.o. female with history of Afib on Xarelto, COPD, HTN came to ER with complaints of abdominal pain.  States she has had worsening abdominal pain for past 5 days.  Unable to eat for 2 days.  Stopped having flatus and BM in past 2 days.  She was concerned she might have a recurrent peptic ulcer.  Denies CP, SOB, HA, fevers, chills, NS, diarrhea, melena or hematochezia.  Otherwise complete ROS is negative unless listed above.  Past Medical History:  has a past medical history of A-fib (HCC), Age-related osteoporosis without current pathological fracture, Anxiety, Arthritis, COPD (chronic obstructive pulmonary disease) (HCC), Depression, History of blood transfusion, History of claustrophobia, Hypertension, Hypothyroidism, Pelvic fracture (HCC), and Sleep apnea.  Past Surgical History:  has a past surgical history that includes Hysterectomy; bladder repair; Abdominal exploration 
  [facility] - Inpatient Rehabilitation Department   Phone: [phone]    Occupational Therapy    [] Initial Evaluation            [x] Daily Treatment Note         [] Discharge Summary      Patient: Kasandra Cantor   : 1955   MRN: [id number]   Date of Service:  10/17/2024    Admitting Diagnosis:  Acute gangrenous appendicitis  Current Admission Summary: Chief complaint:         Chief Complaint   Patient presents with    Abdominal Pain       Abdominal pain x 5 days and is getting worse in the last 2 days.         Date of Procedure: 10/11/2024   Pre-operative Diagnosis: Acute appendicitis   Post-operative Diagnosis:  acute gangrenous appendicitis     Procedure: Laparoscopic appendectomy   Surgeon(s):  Chacho Iraheta MD   ___________________________________________________  Underwent laparoscopic appendectomy on 10/11/24.    Had acute post operative acute blood loss anemia.  Transferred to ICU.  Transfused 3 U PRBC on 10/13.  Back to OR for ex lap on 10/13.     History of Present Illness:  Kasandra Cantor is a 68 y.o. female with history of Afib on Xarelto, COPD, HTN came to ER with complaints of abdominal pain.  States she has had worsening abdominal pain for past 5 days.  Unable to eat for 2 days.  Stopped having flatus and BM in past 2 days.  She was concerned she might have a recurrent peptic ulcer.  Denies CP, SOB, HA, fevers, chills, NS, diarrhea, melena or hematochezia.  Otherwise complete ROS is negative unless listed above.  Past Medical History:  has a past medical history of A-fib (HCC), Age-related osteoporosis without current pathological fracture, Anxiety, Arthritis, COPD (chronic obstructive pulmonary disease) (HCC), Depression, History of blood transfusion, History of claustrophobia, Hypertension, Hypothyroidism, Pelvic fracture (HCC), and Sleep apnea.  Past Surgical History:  has a past surgical history that includes Hysterectomy; bladder repair; Abdominal exploration 
  Physician Progress Note      PATIENT:               MARIA C CARRILLO  CSN #:                  [id number]  :                       1955  ADMIT DATE:       10/11/2024 12:14 PM  DISCH DATE:  RESPONDING  PROVIDER #:        Hernando MARTINEZ MD          QUERY TEXT:    Dear Dr. Martinez,  Patient admitted with acute gangrenous appendicitis and developed   Hemoperitoneum on Xarelto. If possible, please document in the progress notes   and discharge summary if there is a link between the Xarelto and the   Hemoperitoneum.  The medical record reflects the following:  Risk Factors: chronic anticoagulant, symptomatic anemia Afib  Clinical Indicators: H/H 10/11/24 12:27=11.6/34.0, 10/12=9.2/27.2,   10/12=5.3/15.4, CT abdomen showing hemoperitoneum with no active signs of   bleeding.  Treatment: 3 units packed cells, NPO, Xarelto on hold    Thank you,  Virginia Hodge RN BSN  Clinical   [email or web address]  Options provided:  -- Hemoperitoneum associated with Xarelto  -- Hemoperitoneum unrelated to anticoagulation  -- Other - I will add my own diagnosis  -- Disagree - Not applicable / Not valid  -- Disagree - Clinically unable to determine / Unknown  -- Refer to Clinical Documentation Reviewer    PROVIDER RESPONSE TEXT:    This patient has Hemoperitoneum is unrelated to anticoagulation.    Query created by: Virginia Hodge on 10/14/2024 2:18 PM      Electronically signed by:  Hernando MARTINEZ MD 10/14/2024 6:24 PM          
  Pulmonary and Critical Care Medicine    CC: f/u respiratory failure    Date: 10/14/2024  Admit Date:  10/11/2024  Consult Requesting Physician: Hernando Rogers MD     HPI     This is a 67 y/o F w/ a hx of Afib, HTN, COPD presented with abd pain. Acute gangrenous appendicitis ex lap on 10/11, hospital course complicated by acute blood loss anemia from intra abdominal bleeding.     Overnight:  This AM she is on 6L NC  She is on 125 NS  She is on PO clears  ESTRELLA serosang output   Some abd discomfort    ROS: negative except stated above    OBJECTIVE DATA       PHYSICAL EXAM:   Temp  Av.6 °F (36.4 °C)  Min: 97 °F (36.1 °C)  Max: 98.6 °F (37 °C)  Pulse  Av.8  Min: 85  Max: 121  BP  Min: 91/67  Max: 147/77  SpO2  Av.1 %  Min: 74 %  Max: 100 %    General: NAD  Neuro: A0x3  Lung: Clear, no wheezing, equal non labored  Heart: regular rate  Abd: tender mild diffuse   LE: warm perfused    MEDICATIONS: Reviewed  Scheduled Meds:   fluconazole  200 mg IntraVENous Q24H    senna  1 tablet Oral BID    sodium chloride flush  5-40 mL IntraVENous 2 times per day    acetaminophen  650 mg Oral Q6H    sodium chloride flush  5-40 mL IntraVENous 2 times per day    sodium chloride flush  5-40 mL IntraVENous 2 times per day    piperacillin-tazobactam  3,375 mg IntraVENous Q8H    docusate sodium  100 mg Oral BID    polyethylene glycol  17 g Oral Daily    traZODone  50 mg Oral Nightly    vitamin B-12  1,000 mcg Oral Daily    thyroid  90 mg Oral Daily    oyster shell calcium w/D  1 tablet Oral Daily    cetirizine  10 mg Oral Daily    escitalopram  10 mg Oral Daily    gabapentin  600 mg Oral TID    tiotropium-olodaterol  2 puff Inhalation Daily RT    albuterol sulfate HFA  2 puff Inhalation BID RT    sodium chloride flush  5-40 mL IntraVENous 2 times per day     Continuous Infusions:   dextrose 5% and 0.45% NaCl with KCl 20 mEq 75 mL/hr at 10/14/24 1252    sodium chloride      sodium chloride      norepinephrine Stopped (10/13/24 
  [facility] - Inpatient Rehabilitation Department   Phone: [phone]    Physical Therapy    [x] Initial Evaluation            [] Daily Treatment Note         [] Discharge Summary      Patient: Kasandra Cantor   : 1955   MRN: [id number]   Date of Service:  10/14/2024  Admitting Diagnosis: Acute gangrenous appendicitis  Current Admission Summary: Ms. Cantor is a 68 y.o. female who presents with 2-3 day history of diffuse sharp abdominal pain, anorexia and constipation. Some dyspnea, but at baseline. Denies fevers, chills, nausea, chest pain, dysuria, jaundice or other complaints. Similar symptoms with prior perforated duodenal ulcer with Dr. Richardson 2018. Workup in ED consistent with gangrenous appendicitis and surgery consulted. Medical conditions include atrial fibrillation on Xarelto last dose yesterday morning, COPD.    To OR 10/11 for Laparoscopic appendectomy for acute gangrenous appendicitis.   Return to OR 10/13 for diagnostic laproscopy, abdominal washout, placement of intra-abdominal drain   Past Medical History:  has a past medical history of A-fib ([facility]), Age-related osteoporosis without current pathological fracture, Anxiety, Arthritis, COPD (chronic obstructive pulmonary disease) ([facility]), Depression, History of blood transfusion, History of claustrophobia, Hypertension, Hypothyroidism, Pelvic fracture ([facility]), and Sleep apnea.  Past Surgical History:  has a past surgical history that includes Hysterectomy; bladder repair; Abdominal exploration surgery (2018); Partial hysterectomy; Breast enhancement surgery; shoulder surgery; joint replacement (Left, 2023); Elbow fracture surgery (Left); Examination under anesthesia (N/A, 11/15/2023); Bony pelvis surgery (2023); shoulder surgery (Left, ); Stimulator Surgery (Right, 2024); laparoscopic appendectomy (N/A, 10/11/2024); laparoscopy (N/A, 10/13/2024); and laparotomy (N/A, 10/13/2024).  Discharge 
  [facility] - Inpatient Rehabilitation Department   Phone: [phone]    Physical Therapy    [] Initial Evaluation            [x] Daily Treatment Note         [] Discharge Summary      Patient: Kasandra Cantor   : 1955   MRN: [id number]   Date of Service:  10/15/2024  Admitting Diagnosis: Acute gangrenous appendicitis  Current Admission Summary: Ms. Cantor is a 68 y.o. female who presents with 2-3 day history of diffuse sharp abdominal pain, anorexia and constipation. Some dyspnea, but at baseline. Denies fevers, chills, nausea, chest pain, dysuria, jaundice or other complaints. Similar symptoms with prior perforated duodenal ulcer with Dr. Richardson 2018. Workup in ED consistent with gangrenous appendicitis and surgery consulted. Medical conditions include atrial fibrillation on Xarelto last dose yesterday morning, COPD.    To OR 10/11 for Laparoscopic appendectomy for acute gangrenous appendicitis.   Return to OR 10/13 for diagnostic laproscopy, abdominal washout, placement of intra-abdominal drain   Past Medical History:  has a past medical history of A-fib ([facility]), Age-related osteoporosis without current pathological fracture, Anxiety, Arthritis, COPD (chronic obstructive pulmonary disease) ([facility]), Depression, History of blood transfusion, History of claustrophobia, Hypertension, Hypothyroidism, Pelvic fracture ([facility]), and Sleep apnea.  Past Surgical History:  has a past surgical history that includes Hysterectomy; bladder repair; Abdominal exploration surgery (2018); Partial hysterectomy; Breast enhancement surgery; shoulder surgery; joint replacement (Left, 2023); Elbow fracture surgery (Left); Examination under anesthesia (N/A, 11/15/2023); Bony pelvis surgery (2023); shoulder surgery (Left, ); Stimulator Surgery (Right, 2024); laparoscopic appendectomy (N/A, 10/11/2024); laparoscopy (N/A, 10/13/2024); and laparotomy (N/A, 10/13/2024).  Discharge 
2100: Notified Poppy MERIDA pt's left lower lateral lap site is leaking a moderate amount of serosanguinous fluid. Requested repeat H&H. Notified pt's BP 84/51. Pt's abdomen distended and bruised with hypoactive bowel sounds on assessment.     Repeat H&H ordered.       2148: manual BP 76/38. Dr. Iraheta notified by Peter Fletcher RN of pt's current condition.    2150: Dr. Krishnan and Poppy NP to bedside to assess patient. CT abdomen & pelvis ordered with contrast.     Transfer order placed to ICU. 2 units PRBC and levophed ordered.    2200: blood consent signed by patient. Patient transferred to ICU.   
Blood ordered and brought to bedside, consents obtained with , Dr Iraheta at bedside to speak with pt and , pt returned to OR  
CC MD notified of drop in hgb as well as desaturation pt now requiring 3 liters of O2 rom RA. Surgery paged as well, to notify.   
Call made to on call Dr. Iraheta regarding critical results. Dr. Krishnan at bedside to discuss results with patient.     @0049-received call back from Dr. Iraheta. Updated on pt current status and CT results. No new orders at this time.     Instructed to recheck H&H one hour after second PRBC infusion per Dr. Krishnan and if pt H&H still dropping re-page Dr. Iraheta.   
Call placed to Chacho Iraheta MD with Surgery to notify of small/moderate amount of serosanguinous drainage from Left laparoscopic site and ongoing hypotension with critical hemoglobin just received of 5.3 and hematocrit of 15.4. Orders received and placed for 2x unit PRBC transfusion, hard pressure dressing to lap site, critical care consult and transfer to ICU orders.   
Cont to be tachycardic, gave 2.5mg IV lopressor, IVF infusing slowly, pt awakening more and c/o pain- medicated with dilaudid per orders, labs drawn per Dr Iraheta. Pt remains on simple mask and asking for her family.   
D:    Latest Reference Range & Units 10/13/24 11:15   Hemoglobin Quant 12.0 - 16.0 g/dL 6.6 (LL)   Hematocrit 36.0 - 48.0 % 19.9 (LL)   (LL): Data is critically low  A: see new orders.   R: VSS  
D:    Latest Reference Range & Units 10/13/24 14:20   Hemoglobin Quant 12.0 - 16.0 g/dL 7.6 (L)   Hematocrit 36.0 - 48.0 % 22.6 (L)   (L): Data is abnormally low  A: reported to MD  R: VSS pt to go back to OR today.   
Data- discharge order received, pt verbalized agreement to discharge, disposition to previous residence, no needs for HHC/DME.     Action- discharge instructions prepared and given to ptient, pt verbalized understanding. Medication information packet given r/t NEW and/or CHANGED prescriptions emphasizing name/purpose/side effects, pt verbalized understanding. Discharge instruction summary: Diet- general, Activity- as tolerated, Primary Care Physician as follows: Tio Gonzalez [phone] f/u appointment within 1 week, prescription medications filled personal pharmacy.       1. WEIGHT: Admit Weight - Scale: 80.3 kg (177 lb) (10/11/24 1216)        Today  Weight - Scale: 85 kg (187 lb 6.3 oz) (10/20/24 0837)       2. O2 SAT.: SpO2: 94 % (10/20/24 1115)    Response- Pt belongings gathered, IV removed. Disposition is home (no HHC/DME needs), transported with personal belongings, taken to lobby via w/c, no complications.    
Incentive Spirometry education and demonstration completed by Respiratory Therapy Yes      Response to education: Very Good     Teaching Time: 5 minutes    Minimum Predicted Vital Capacity - 524 mL.  Patient's Actual Vital Capacity - 750 mL. Turning over to Nursing for routine follow-up Yes.    Comments: .    Electronically signed by Rain Estrada RCP on 10/14/2024 at 2:36 PM    
Nutrition Note    RECOMMENDATIONS  PO Diet: Advance diet as tolerated to regular  ONS: Change ONS to Ensure Plus HP BID when diet is advanced     ASSESSMENT   Pt triggered for NPO/clear liquid diet throughout admission now x 6 days. S/p lap appy for acute gangrenous appendicitis 10/11, ex lap with abdominal washout for abdominal hemorrhage and returned to OR for placement of intraabdominal drain for intraabdominal bleeding 10/13. On clear liquid diet since 10/13 with Ensure Clear ordered TID. Upon visiting, pt reported sick of clear liquid diet but tolerating despite some reflux, consuming 1/4 to 1/2 of Ensure but no issues with po intake or unintentional wt loss pta. RD will monitor for diet to be advanced with adequate po intake.     Malnutrition Status: No malnutrition  Acute Illness    NUTRITION DIAGNOSIS   Inadequate protein-energy intake related to altered GI function as evidenced by NPO or clear liquid status due to medical condition    Goals: PO intake 50% or greater, by next RD assessment (diet advancement)     NUTRITION RELATED FINDINGS  Objective: K+ 2.8. LBM 10/11. Non-pitting BLE edema.  Wounds: Multiple, Surgical Incision    CURRENT NUTRITION THERAPIES  ADULT DIET; Clear Liquid  ADULT ORAL NUTRITION SUPPLEMENT; Breakfast, Lunch, Dinner; Clear Liquid Oral Supplement     PO Intake: Unable to assess   PO Supplement Intake:1-25%, 26-50% (per pt report)    ANTHROPOMETRICS  Current Height: 160 cm (5' 3\")  Current Weight - Scale: 90 kg (198 lb 6.6 oz)    Admission weight: 77.1 kg (169 lb 15.6 oz)  Ideal Body Weight (IBW): 115 lbs  (52 kg)        BMI: 35.2    COMPARATIVE STANDARDS  Total Energy Requirements (kcals/day): 4283-6107     Protein (g):  62-78       Fluid (mL/day):  1765    EDUCATION  Education not indicated     The patient will be monitored per nutrition standards of care. Consult dietitian if additional nutrition interventions are needed prior to RD reassessment.     Jennifer Rios, MS, RD, LD  
Patient complaining of pain rated 7/10 throbbing to the right and left abdomen lower and just below right ESTRELLA drain. Noted pink drainage to right side of gown, and right dressing around ESTRELLA drain. Gauze packing pink and damp. No active drainage around site noted. 20 mL of bloody drainage from ESTRELLA drain emptied at this time.Patient did state that pain has Improved from last evening. Will continue to monitor. /65, P103, R22 T 97.6 sats 95% 6L HF. Morphine 4 mg IVP and Zofran 4 mg at this time.  
Patient returned to unit from OR around 2100. Patient alert and oriented, on simple mask. Bedside handoff from PACU. Pt switched to HFNC at 6, maintaining sats in low 90's. Pt presents with bilateral ESTRELLA drians from abd, both draining dark red blood with flecks left 50ml, right 10ml. Shadowing noted at both ESTRELLA insertion spots but not saturating dressing. Dressing on midline incision CDI. Pt is tachy, BP sable in 120's. C/O 10/10 pain across middle of abd with burning accompanying pain. Abd is distended, soft and tender. Pt was reported to have received 2 units of PRBC in OR. Dilaudid given per orders, effective in reducing pain level to 6. Ice packs applied to abd with good results. Oxycodone 10mg also given per orders, patient reported pain controlled with oxy. Oxy 10mg given 2 more times during shift and with scheduled tylenol, pt was able to sleep, additional IV pain medication not needed overnight. Patient tolerating clear liquids well. Pain level was 5 this am, output from ESTRELLA's minimal. Abdominal distension down from last night.  
Physical Therapy  Pt declined therapy this afternoon.  Will follow-up another day.   Thanks, Breanna Hammond PT, DPT [id number]      
Physical Therapy  Pt just recently finished working with OT and wants to rest.  Will attempt another day.   Thanks, Breanna Hammond PT, DPT [id number]      
Physical Therapy  Pt kindly declined PT today. Just returned to bed from being up most of the day.  Will follow-up another day as schedule permits.   Thanks, Breanna Hammond PT, DPT [id number]      
Poppy BAÑUELOS notified that 2 units PRBC ordered by surgery. Verbal orders received to cancel 1 unit PRBC that she ordered and just transfuse 2 units that surgery ordered.   
Pt VSS. Pt pain is at a tolerable level at this time. Surgical site to abdomen x3 closed with glue, CDI, soft to palpation, color and temp appropriate, ice applied. CVC  line to right groin, triple lumen, dressing CDI, right pedal pulse 3+, color and temp appropriate, unable to assess cap refill due to nail polish, pt reports full sensation. Pt seen by anesthesia. Phase I criteria met, transferring to 4T.   
Pt admitted to PACU, awakening and following commands, , BP stable, abd incisions CD&I, ESTRELLA in place- drainage around ESTRELLA molly rios, lg amount of bloody output from ESTRELLA 225ml, Dr Iraheta aware, anesthesia at bedside, family brought to bedside, H/h ordered and drawn. Plans for pt to return to surgery.   
Pt admitted to PACU, awakening and following commands, tachypneic 34, exp wheezes noted, anesthesia at bedside @ duoneb ordered followed by lasix 10mg IV, Tacycardic 115, abd incision CD&I, ESTRELLA ( R) & (L) CD&I, minimal bloody output. Crane in place, will monitor  
Pt arrived from OR to PACU with VSS, O2 96% on 3L nasal cannula. Pt denies pain at this time. Surgical incision to abdomen x 3 closed with glue, CDI, soft to palpation, color and temp appropriate, ice applied. Will cont to monitor.   
Pt high fall risk. Education given on high fall risk score and precautions taken to reduce falls. Pt refused high fall risk precautions including bed alarm. Pt stated she understands that she can use call light to call for any assistance needed. Refusal form signed.  
Pt to 3911. Transferred to ICU bed and vital signs obtained. Levophed gtt started, see MAR. Pt has 3 lap sites- L side lap site draining bloody drainage- cleansed and dressed with gauz, ABD, and tape. Abdominal binder applied to promote pressure per Dr. Iraheta. Pt assessment complete, see flowsheets. Pt has L forearm peripheral and R femoral CVC access, see flowsheets. Pt alert and oriented x4 but lethargic. Pt NPO. Room air. Call light within reach, bed in lowest position, bed alarm on, and wheels locked.     @4894- pt updated her partner-Nabor and son-Cabrera about current status and room change.   
Pt to CT @3590. Pt returned from CT @2646. Pt tolerated. Placed back on monitor.   
Pt transported to OR VSS   
Robaxin 500mg IV given. Pain 8/10. Abd incisions w/ some shadowing. ESTRELLA x2 CD&I- compressed and sm amt of drainage, powell with moderate out put, family updated. Vss, remains on 6L simple mask. Breathing slower and more relaxed. Per anesthesia - ok to transfer to ICU  
Shift assessment completed. Routine vitals stable. Scheduled medications given. Patient is awake, alert and oriented. Respirations are easy and unlabored. Patient does not appear to be in distress, resting comfortably at this time. Call light within reach.    
Teaching / education initiated regarding perioperative experience, expectations, and pain management during stay. Patient verbalized understanding.  
Teaching / education initiated regarding perioperative experience, expectations, and pain management during stay. Patient verbalized understanding.   
Telemetry reviewed, no episodes of atrial fibrillation noted. Sinus rhythm with PVCs, sinus arrhythmia noted. EP recommends resuming anticoagulation as soon as ok from surgery. Continue Toprol XL 25 mg daily. Discussed with patient and , all questions were addressed.     No further recommendations from EP.     Zena Pereira DNP  [facility]  Office: [phone]    
Verbally spoke with surgeon Dr. Iraheta on the phone while this RN was at patient's bedside. This RN was inquiring about patient's diet and if she was allowed clears. Dr. Iraheta wanted to know if the patient's pain was controlled. Patient stated she had a rough night last night and was unable to sleep. But with both the IV pain medication and oral it was finally becoming managed. Dr. Iraheta then asked the patient if she was wanting to go home, and if she felt well enough to go home. Patient stated she did not, and the hospitalist had informed her that her labs were not good enough for her to go home. Dr Iraheta asked this RN, \"what's wrong with her labs?\" This RN pulled up patient's chart while still at her bedside and stated that her hemoglobin dropped from 11.6 to 9.2, and that her WBC was still elevated among other abnormalities flagged by [facility]. Dr. Iraheta stated that he would be around to see her later and make his decision then. No orders given at this time.  
Oral Daily    gabapentin  600 mg Oral TID    tiotropium-olodaterol  2 puff Inhalation Daily RT    albuterol sulfate HFA  2 puff Inhalation BID RT    sodium chloride flush  5-40 mL IntraVENous 2 times per day     PRN Meds: sodium chloride flush, sodium chloride, sodium chloride flush, sodium chloride, ondansetron **OR** ondansetron, morphine **OR** morphine, oxyCODONE **OR** oxyCODONE, sodium chloride, albuterol sulfate HFA, sodium chloride flush, potassium chloride **OR** potassium alternative oral replacement **OR** potassium chloride, magnesium sulfate, polyethylene glycol, acetaminophen **OR** acetaminophen, sodium chloride flush, ALPRAZolam, tiZANidine, sodium chloride flush      Intake/Output Summary (Last 24 hours) at 10/15/2024 0822  Last data filed at 10/15/2024 0600  Gross per 24 hour   Intake 3558.56 ml   Output 1255 ml   Net 2303.56 ml       Physical Exam Performed:    /71   Pulse 74   Temp 97.8 °F (36.6 °C) (Temporal)   Resp 17   Ht 1.6 m (5' 3\")   Wt 90.7 kg (199 lb 15.3 oz)   SpO2 95%   BMI 35.42 kg/m²     General appearance: No apparent distress, appears stated age and cooperative.  HEENT: Pupils equal, round, and reactive to light. Conjunctivae/corneas clear.  Neck: Supple, with full range of motion. No jugular venous distention. Trachea midline.  Respiratory:  Normal respiratory effort. Clear to auscultation, bilaterally without Rales/Wheezes/Rhonchi.  Cardiovascular: Regular rate and rhythm with normal S1/S2 without murmurs, rubs or gallops.  2+ BL LE edema  Abdomen: Soft, expected abdominal tenderness, mildly distended with normal bowel sounds.  Musculoskeletal: No clubbing or cyanosis. bilaterally.  Full range of motion without deformity.  Skin: Skin color, texture, turgor normal.  No rashes or lesions.  Neurologic:  Neurovascularly intact without any focal sensory/motor deficits. Cranial nerves: II-XII intact, grossly non-focal.  Psychiatric: Alert and oriented, thought content 
escitalopram  10 mg Oral Daily    gabapentin  600 mg Oral TID    tiotropium-olodaterol  2 puff Inhalation Daily RT    albuterol sulfate HFA  2 puff Inhalation BID RT    sodium chloride flush  5-40 mL IntraVENous 2 times per day     PRN Meds: calcium carbonate, sodium chloride flush, sodium chloride, sodium chloride flush, sodium chloride, ondansetron **OR** ondansetron, morphine **OR** morphine, oxyCODONE **OR** oxyCODONE, albuterol sulfate HFA, sodium chloride flush, potassium chloride **OR** potassium alternative oral replacement **OR** potassium chloride, magnesium sulfate, polyethylene glycol, acetaminophen **OR** acetaminophen, sodium chloride flush, ALPRAZolam, tiZANidine, sodium chloride flush      Intake/Output Summary (Last 24 hours) at 10/16/2024 1245  Last data filed at 10/16/2024 0626  Gross per 24 hour   Intake 1668.64 ml   Output 2275 ml   Net -606.36 ml       Physical Exam Performed:    /72   Pulse 82   Temp 98 °F (36.7 °C) (Oral)   Resp 16   Ht 1.6 m (5' 3\")   Wt 95.3 kg (210 lb)   SpO2 96%   BMI 37.20 kg/m²     General appearance: No apparent distress, appears stated age and cooperative.  HEENT: Pupils equal, round, and reactive to light. Conjunctivae/corneas clear.  Neck: Supple, with full range of motion. No jugular venous distention. Trachea midline.  Respiratory:  Normal respiratory effort. Clear to auscultation, bilaterally without Rales/Wheezes/Rhonchi.  Cardiovascular: Regular rate and rhythm with normal S1/S2 without murmurs, rubs or gallops.  2+ BL LE edema  Abdomen: Soft, expected abdominal tenderness, mildly distended with normal bowel sounds.  Musculoskeletal: No clubbing or cyanosis. bilaterally.  Full range of motion without deformity.  Skin: Skin color, texture, turgor normal.  No rashes or lesions.  Neurologic:  Neurovascularly intact without any focal sensory/motor deficits. Cranial nerves: II-XII intact, grossly non-focal.  Psychiatric: Alert and oriented, thought 
gangrene [K35.209, K35.891]     Appendicitis [K37]     Hemoperitoneum [K66.1]     Symptomatic anemia [D64.9]     Hemorrhagic shock (HCC) [R57.8]     Acute gangrenous appendicitis [K35.891]     BRIDGETT (acute kidney injury) (HCC) [N17.9]     Sepsis (HCC) [A41.9]     Chronic obstructive pulmonary disease (HCC) [J44.9]     Atrial fibrillation and flutter (HCC) [I48.91, I48.92]     Hypertension [I10]      Acute gangrenous appendicitis  CLD per Surgery  S/P emergent ex lap 10/13  NO Lovenox  S/P lap appy on 10/11  Continue IVF today  Continue IV Zosyn  Continue IV Diflucan given leukocytosis  Continue IV Dilaudid PRN  Surgery consult appreciated  Acute post operative blood loss anemia  Transfused 5 U PRBC  Keep Hg > 7  Hypovolemic shock  Continue IVF  Off Levophed gtt  Transfused PRBC to keep Hg > 7  Sepsis  Secondary to acute gangrenous appendicitis  Continue IVF  Continue IV Zosyn  Continue IV Diflucan  Sepsis orders  BRIDGETT  Continue IVF  Crane to be kept in place  Serial BMP  Afib with RVR  Hold Xarelto until ABLA stable and ok with Surgery       DVT Prophylaxis: SCD  Diet: ADULT DIET; Clear Liquid  ADULT ORAL NUTRITION SUPPLEMENT; Breakfast, Lunch, Dinner; Clear Liquid Oral Supplement  Code Status: Full Code  PT/OT Eval Status: Requested    Dispo - Unclear    Discussed with patient, nursing and CM.    Anticipate SLOW bowel recovery here.  Watch in ICU today for bleeding.    Hernando Rogers MD    
restart tomorrow if no signs of bleeding, will restart PO anticoagulation prior to discharge to monitor for recurrent bleeding  Defer management of remainder of medical comorbidities to primary and consulting teams    Chacho Iraheta MD, FACS  10/15/2024  4:27 PM

## 2024-10-20 NOTE — PLAN OF CARE
Problem: Discharge Planning  Goal: Discharge to home or other facility with appropriate resources  10/20/2024 1236 by Romina Crooks RN  Outcome: Progressing     Problem: Pain  Goal: Verbalizes/displays adequate comfort level or baseline comfort level  10/20/2024 1236 by Romina Crooks RN  Outcome: Progressing     Problem: Safety - Adult  Goal: Free from fall injury  10/20/2024 1236 by Romina Crooks RN  Outcome: Progressing     Problem: Hematologic - Adult  Goal: Maintains hematologic stability  10/20/2024 1236 by Romina Crooks RN  Outcome: Progressing     Problem: Respiratory - Adult  Goal: Achieves optimal ventilation and oxygenation  10/20/2024 1236 by Romina Crooks RN  Outcome: Progressing     Problem: Gastrointestinal - Adult  Goal: Maintains or returns to baseline bowel function  10/20/2024 1236 by Romina Crooks RN  Outcome: Progressing     Problem: Gastrointestinal - Adult  Goal: Maintains adequate nutritional intake  10/20/2024 1236 by Romina Crooks RN  Outcome: Progressing     Problem: Cardiovascular - Adult  Goal: Maintains optimal cardiac output and hemodynamic stability  10/20/2024 1236 by Romina Crooks RN  Outcome: Progressing     Problem: Cardiovascular - Adult  Goal: Absence of cardiac dysrhythmias or at baseline  10/20/2024 1236 by Romina Crooks RN  Outcome: Progressing     Problem: Skin/Tissue Integrity - Adult  Goal: Incisions, wounds, or drain sites healing without S/S of infection  10/20/2024 1236 by Romina Crooks RN  Outcome: Progressing     Problem: Musculoskeletal - Adult  Goal: Return mobility to safest level of function  10/20/2024 1236 by Romina Crooks RN  Outcome: Progressing     Problem: Musculoskeletal - Adult  Goal: Return ADL status to a safe level of function  10/20/2024 1236 by Romina Crooks RN  Outcome: Progressing     Problem: Infection - Adult  Goal: Absence of infection during hospitalization  10/20/2024 1236 by Romina Crooks

## 2024-10-20 NOTE — RT PROTOCOL NOTE
RT Inhaler-Nebulizer Bronchodilator Protocol Note    There is a bronchodilator order in the chart from a provider indicating to follow the RT Bronchodilator Protocol and there is an “Initiate RT Inhaler-Nebulizer Bronchodilator Protocol” order as well (see protocol at bottom of note).    CXR Findings:  No results found.    The findings from the last RT Protocol Assessment were as follows:   History Pulmonary Disease: Chronic pulmonary disease  Respiratory Pattern: Regular pattern and RR 12-20 bpm  Breath Sounds: Slightly diminished and/or crackles  Cough: Strong, spontaneous, non-productive  Indication for Bronchodilator Therapy:    Bronchodilator Assessment Score: 4    Aerosolized bronchodilator medication orders have been revised according to the RT Inhaler-Nebulizer Bronchodilator Protocol below.    Respiratory Therapist to perform RT Therapy Protocol Assessment initially then follow the protocol.  Repeat RT Therapy Protocol Assessment PRN for score 0-3 or on second treatment, BID, and PRN for scores above 3.    No Indications - adjust the frequency to every 6 hours PRN wheezing or bronchospasm, if no treatments needed after 48 hours then discontinue using Per Protocol order mode.     If indication present, adjust the RT bronchodilator orders based on the Bronchodilator Assessment Score as indicated below.  Use Inhaler orders unless patient has one or more of the following: on home nebulizer, not able to hold breath for 10 seconds, is not alert and oriented, cannot activate and use MDI correctly, or respiratory rate 25 breaths per minute or more, then use the equivalent nebulizer order(s) with same Frequency and PRN reasons based on the score.  If a patient is on this medication at home then do not decrease Frequency below that used at home.    0-3 - enter or revise RT bronchodilator order(s) to equivalent RT Bronchodilator order with Frequency of every 4 hours PRN for wheezing or increased work of breathing 
RT Inhaler-Nebulizer Bronchodilator Protocol Note    There is a bronchodilator order in the chart from a provider indicating to follow the RT Bronchodilator Protocol and there is an “Initiate RT Inhaler-Nebulizer Bronchodilator Protocol” order as well (see protocol at bottom of note).    CXR Findings:  No results found.    The findings from the last RT Protocol Assessment were as follows:   History Pulmonary Disease: None or smoker <15 pack years  Respiratory Pattern: Regular pattern and RR 12-20 bpm  Breath Sounds: Slightly diminished and/or crackles  Cough: Strong, spontaneous, non-productive  Indication for Bronchodilator Therapy: On home bronchodilators  Bronchodilator Assessment Score: 2    Aerosolized bronchodilator medication orders have been revised according to the RT Inhaler-Nebulizer Bronchodilator Protocol below.    Respiratory Therapist to perform RT Therapy Protocol Assessment initially then follow the protocol.  Repeat RT Therapy Protocol Assessment PRN for score 0-3 or on second treatment, BID, and PRN for scores above 3.    No Indications - adjust the frequency to every 6 hours PRN wheezing or bronchospasm, if no treatments needed after 48 hours then discontinue using Per Protocol order mode.     If indication present, adjust the RT bronchodilator orders based on the Bronchodilator Assessment Score as indicated below.  Use Inhaler orders unless patient has one or more of the following: on home nebulizer, not able to hold breath for 10 seconds, is not alert and oriented, cannot activate and use MDI correctly, or respiratory rate 25 breaths per minute or more, then use the equivalent nebulizer order(s) with same Frequency and PRN reasons based on the score.  If a patient is on this medication at home then do not decrease Frequency below that used at home.    0-3 - enter or revise RT bronchodilator order(s) to equivalent RT Bronchodilator order with Frequency of every 4 hours PRN for wheezing or 
RT Inhaler-Nebulizer Bronchodilator Protocol Note    There is a bronchodilator order in the chart from a provider indicating to follow the RT Bronchodilator Protocol and there is an “Initiate RT Inhaler-Nebulizer Bronchodilator Protocol” order as well (see protocol at bottom of note).    CXR Findings:  XR CHEST PORTABLE    Result Date: 10/15/2024  Mild left basilar airspace disease which could represent atelectasis, less likely pneumonia       The findings from the last RT Protocol Assessment were as follows:   History Pulmonary Disease: None or smoker <15 pack years  Respiratory Pattern: Regular pattern and RR 12-20 bpm  Breath Sounds: Slightly diminished and/or crackles  Cough: Strong, productive  Indication for Bronchodilator Therapy: On home bronchodilators  Bronchodilator Assessment Score: 3    Aerosolized bronchodilator medication orders have been revised according to the RT Inhaler-Nebulizer Bronchodilator Protocol below.    Respiratory Therapist to perform RT Therapy Protocol Assessment initially then follow the protocol.  Repeat RT Therapy Protocol Assessment PRN for score 0-3 or on second treatment, BID, and PRN for scores above 3.    No Indications - adjust the frequency to every 6 hours PRN wheezing or bronchospasm, if no treatments needed after 48 hours then discontinue using Per Protocol order mode.     If indication present, adjust the RT bronchodilator orders based on the Bronchodilator Assessment Score as indicated below.  Use Inhaler orders unless patient has one or more of the following: on home nebulizer, not able to hold breath for 10 seconds, is not alert and oriented, cannot activate and use MDI correctly, or respiratory rate 25 breaths per minute or more, then use the equivalent nebulizer order(s) with same Frequency and PRN reasons based on the score.  If a patient is on this medication at home then do not decrease Frequency below that used at home.    0-3 - enter or revise RT bronchodilator 
increased work of breathing using Per Protocol order mode.        4-6 - enter or revise RT Bronchodilator order(s) to two equivalent RT bronchodilator orders with one order with BID Frequency and one order with Frequency of every 4 hours PRN wheezing or increased work of breathing using Per Protocol order mode.        7-10 - enter or revise RT Bronchodilator order(s) to two equivalent RT bronchodilator orders with one order with TID Frequency and one order with Frequency of every 4 hours PRN wheezing or increased work of breathing using Per Protocol order mode.       11-13 - enter or revise RT Bronchodilator order(s) to one equivalent RT bronchodilator order with QID Frequency and an Albuterol order with Frequency of every 4 hours PRN wheezing or increased work of breathing using Per Protocol order mode.      Greater than 13 - enter or revise RT Bronchodilator order(s) to one equivalent RT bronchodilator order with every 4 hours Frequency and an Albuterol order with Frequency of every 2 hours PRN wheezing or increased work of breathing using Per Protocol order mode.     RT to enter RT Home Evaluation for COPD & MDI Assessment order using Per Protocol order mode.    Electronically signed by Soumya Bergman RCP on 10/20/2024 at 4:34 AM

## 2024-10-20 NOTE — CARE COORDINATION
Case Management -  Discharge Note      Patient Name: Kasandra Cantor                   YOB: 1955            Readmission Risk (Low < 19, Mod (19-27), High > 27): Readmission Risk Score: 16.2    Current PCP: Tio Gonzalez MD      (IMM) Important Message from Medicare:    Has pt received appropriate IMM before discharge if required: yes  Date: 10/20/2024    PT AM-PAC: 18 /24  OT AM-PAC: 21 /24    Patient/patient representative has been educated on the benefits of HHC as well as the possible risks of declining recommended services. Patient/patient representative has acknowledged the information provided and decided on the following discharge plan. Patient/ patient representative has been provided freedom of choice regarding service provider, supported by basic dialogue that supports the patient's individualized plan of care/goals.    Patient declined HHC at this time.     Financial    Payor: [employer] MEDICARE / Plan: [employer] GOLD PLUS HMO / Product Type: *No Product type* /     Pharmacy:  Potential assistance Purchasing Medications: No  Meds-to-Beds request: Yes      [id number] [place], [place] - [address] [place] [address] -  [phone] - F [phone]  [address] [place] [id number]  Phone: [phone] Fax: [phone]      Notes:    Additional Case Management Notes:  to transport home. All CM needs met.         Electronically signed by PIA Garrison on 10/20/2024 at 9:39 AM

## 2024-10-21 ENCOUNTER — CARE COORDINATION (OUTPATIENT)
Dept: CASE MANAGEMENT | Age: 69
End: 2024-10-21

## 2024-10-21 NOTE — CARE COORDINATION
Care Transitions Note    Initial Call - Call within 2 business days of discharge: Yes    Patient Current Location:  Home: [facility] [address]  [facility] [place] [address]    Care Transition Nurse contacted the patient by telephone to perform post hospital discharge assessment, verified name and  as identifiers. Provided introduction to self, and explanation of the Care Transition Nurse role.     Patient: Kasandra Cantor    Patient : 1955   MRN: [id number]    Reason for Admission:   Discharge Date: 10/20/24  RURS: Readmission Risk Score: 16.2      Last Discharge Facility       Date Complaint Diagnosis Description Type Department Provider    10/11/24 Abdominal Pain Acute appendicitis with generalized peritonitis and gangrene, unspecified whether abscess present, unspecified whether perforation present ... ED to Hosp-Admission (Discharged) (ADMITTED) PATTI 3T Stephanie Rider MD; BECKY Chang.            Was this an external facility discharge? No    Additional needs identified to be addressed with provider   No needs identified             Method of communication with provider: none.    Patients top risk factors for readmission: medical condition-lap appe, post op bleed    Interventions to address risk factors:   Review of patient management of conditions/medications: as above    Care Summary Note: Pt states doing well, no issues or concerns. Incisions CDI,taken pain meds as directed. Will make necessary f/u appts. No  further f/u CTC calls.    Care Transition Nurse reviewed discharge instructions with patient. The patient was given an opportunity to ask questions; all questions answered at this time.. The patient verbalized understanding.   Were discharge instructions available to patient? Yes.   Reviewed appropriate site of care based on symptoms and resources available to patient including: PCP  Specialist  When to call 911. The patient agrees to contact the primary care provider and/or specialist office

## 2024-10-29 ENCOUNTER — OFFICE VISIT (OUTPATIENT)
Dept: SURGERY | Age: 69
End: 2024-10-29

## 2024-10-29 VITALS — SYSTOLIC BLOOD PRESSURE: 124 MMHG | DIASTOLIC BLOOD PRESSURE: 68 MMHG | BODY MASS INDEX: 30.11 KG/M2 | WEIGHT: 170 LBS

## 2024-10-29 DIAGNOSIS — Z09 SURGERY FOLLOW-UP: Primary | ICD-10-CM

## 2024-10-29 PROCEDURE — 99024 POSTOP FOLLOW-UP VISIT: CPT | Performed by: SURGERY

## 2024-10-29 RX ORDER — OXYCODONE HYDROCHLORIDE 5 MG/1
5 TABLET ORAL EVERY 4 HOURS PRN
Qty: 20 TABLET | Refills: 0 | Status: SHIPPED | OUTPATIENT
Start: 2024-10-29 | End: 2024-11-05

## 2024-10-29 NOTE — PATIENT INSTRUCTIONS
Will give refill of oxycodone. Counseled that prolonged use is associated with decreased efficacy and risk of dependence  No heavy lifting over 20lbs for 6 weeks total postop  Diet and activity as tolerated otherwise  OK to return to work for light duty but not available at her work  Follow up with general surgery office as needed

## 2024-10-29 NOTE — PROGRESS NOTES
Negative for aneurysm.  No high-density material within the aorta. Collection around the spleen.  Large amount of high-density material within the pelvis.  This is new from the previous study. Abdominal aorta/Branches:Abdominal aorta is patent and normal size.  Negative for dissection.  The celiac axis, SMA, and LINDA are patent.  Renal arteries are patent and unremarkable. Organs: Liver, spleen, adrenals, pancreas, and kidneys are unremarkable.  No mass no hydronephrosis. GI/Bowel: The stomach is unremarkable in appearance.  No small bowel dilation.  No colonic dilation on wall thickening.Moderate-sized fixed hiatus hernia. Peritoneum/Retroperitoneum: Gas in the subcutaneous tissues in the anterior abdominal wall.  Stranding in fluid in the anterior abdominal wall.  No definite abscess.  Central venous catheter from the right inguinal region. The tip Bones/Soft Tissues: SpondylosisNo subcutaneous mass. CTA PELVIS: Aorta/Iliacs: Iliac arteries are patent.  No dissection.  Atherosclerotic changes. Other: Again, hemorrhage in the pelvis.  Crane catheter in the bladder. Large amount of streak artifact from the pelvic surgery. Bones/Soft Tissues: Left hip arthroplasty and internal fixation of the left hemipelvis.  Advanced facet arthropathy.     1. Large amount of hemorrhage in the abdomen and pelvis. 2. No evidence for active bleeding. 3. Bilateral pleural effusions. Critical results were called by Dr. Melida Rojas to Dr. Krishnan on 10/13/2024 at 00:40.     CT ABDOMEN PELVIS WO CONTRAST Additional Contrast? None    Result Date: 10/11/2024  EXAMINATION: CT OF THE ABDOMEN AND PELVIS WITHOUT CONTRAST 10/11/2024 12:54 pm TECHNIQUE: CT of the abdomen and pelvis was performed without the administration of intravenous contrast. Multiplanar reformatted images are provided for review. Automated exposure control, iterative reconstruction, and/or weight based adjustment of the mA/kV was utilized to reduce the radiation dose to as

## 2024-11-20 ENCOUNTER — OFFICE VISIT (OUTPATIENT)
Dept: PULMONOLOGY | Age: 69
End: 2024-11-20

## 2024-11-20 VITALS
SYSTOLIC BLOOD PRESSURE: 132 MMHG | HEIGHT: 63 IN | HEART RATE: 108 BPM | BODY MASS INDEX: 30.41 KG/M2 | OXYGEN SATURATION: 97 % | DIASTOLIC BLOOD PRESSURE: 70 MMHG | WEIGHT: 171.6 LBS

## 2024-11-20 DIAGNOSIS — E66.811 CLASS 1 OBESITY DUE TO EXCESS CALORIES WITH SERIOUS COMORBIDITY AND BODY MASS INDEX (BMI) OF 31.0 TO 31.9 IN ADULT: ICD-10-CM

## 2024-11-20 DIAGNOSIS — G47.33 OSA (OBSTRUCTIVE SLEEP APNEA): Primary | ICD-10-CM

## 2024-11-20 DIAGNOSIS — Z96.82 S/P INSERTION OF HYPOGLOSSAL NERVE STIMULATOR: ICD-10-CM

## 2024-11-20 DIAGNOSIS — E66.09 CLASS 1 OBESITY DUE TO EXCESS CALORIES WITH SERIOUS COMORBIDITY AND BODY MASS INDEX (BMI) OF 31.0 TO 31.9 IN ADULT: ICD-10-CM

## 2024-11-20 ASSESSMENT — SLEEP AND FATIGUE QUESTIONNAIRES
HOW LIKELY ARE YOU TO NOD OFF OR FALL ASLEEP WHILE SITTING QUIETLY AFTER LUNCH WITHOUT ALCOHOL: WOULD NEVER DOZE
HOW LIKELY ARE YOU TO NOD OFF OR FALL ASLEEP WHILE LYING DOWN TO REST IN THE AFTERNOON WHEN CIRCUMSTANCES PERMIT: MODERATE CHANCE OF DOZING
HOW LIKELY ARE YOU TO NOD OFF OR FALL ASLEEP WHILE WATCHING TV: WOULD NEVER DOZE
HOW LIKELY ARE YOU TO NOD OFF OR FALL ASLEEP WHILE SITTING AND TALKING TO SOMEONE: WOULD NEVER DOZE
HOW LIKELY ARE YOU TO NOD OFF OR FALL ASLEEP WHEN YOU ARE A PASSENGER IN A CAR FOR AN HOUR WITHOUT A BREAK: WOULD NEVER DOZE
HOW LIKELY ARE YOU TO NOD OFF OR FALL ASLEEP IN A CAR, WHILE STOPPED FOR A FEW MINUTES IN TRAFFIC: WOULD NEVER DOZE
HOW LIKELY ARE YOU TO NOD OFF OR FALL ASLEEP WHILE SITTING INACTIVE IN A PUBLIC PLACE: WOULD NEVER DOZE
ESS TOTAL SCORE: 3
HOW LIKELY ARE YOU TO NOD OFF OR FALL ASLEEP WHILE SITTING AND READING: SLIGHT CHANCE OF DOZING

## 2024-11-20 NOTE — PROGRESS NOTES
beneficial in improving the overall health.          Return in about 3 months (around 2/20/2025) for miranda.         Ricky Price MD  Pulmonary Critical Care and Sleep Medicine  11/20/2024, 2:26 PM    This note was completed using dragon medical speech recognition software. Grammatical errors, random word insertions, pronoun errors and incomplete sentences are occasional consequences of this technology due to software limitations. If there are questions or concerns about the content of this note of information contained within the body of this dictation they should be addressed with the provider for clarification.

## 2024-12-03 NOTE — PROGRESS NOTES
(TOPROL XL) 25 MG extended release tablet TAKE 1 TABLET BY MOUTH DAILY 4/12/24  Yes Hernandez Esquivel APRN - CNP   traZODone (DESYREL) 50 MG tablet Take 1 tablet by mouth nightly   Yes Mio Roy MD   gabapentin (NEURONTIN) 300 MG capsule Take 2 capsules by mouth 3 times daily.   Yes Mio Roy MD   fluocinolone (DERMOTIC) 0.01 % OIL oil Place 3 drops in ear(s) 2 times daily as needed (ear itching) 10/23/23  Yes Charan Gilmore DO   albuterol sulfate HFA (VENTOLIN HFA) 108 (90 Base) MCG/ACT inhaler Inhale 2 puffs into the lungs 4 times daily as needed for Wheezing 8/8/23  Yes Ino Mccrary MD   acetaminophen (TYLENOL) 325 MG tablet Take 2 tablets by mouth every 6 hours as needed for Pain   Yes ProviderMio MD   hydroxychloroquine (PLAQUENIL) 200 MG tablet Take 1 tablet by mouth 2 times daily 2/15/23 12/11/24 Yes Shanta Fox MD   ARMOUR THYROID 90 MG tablet TAKE 1 TABLET BY MOUTH ONCE A DAY 12/22/22  Yes ProviderMio MD   Calcium Carb-Cholecalciferol (CALCIUM 1000 + D) 1000-800 MG-UNIT TABS Take 1 tablet by mouth daily   Yes ProviderMio MD   Multiple Vitamins-Minerals (THERAPEUTIC MULTIVITAMIN-MINERALS) tablet Take 1 tablet by mouth daily   Yes ProviderMio MD   vitamin B-12 (CYANOCOBALAMIN) 1000 MCG tablet Take 1 tablet by mouth daily   Yes ProvideriMo MD   rivaroxaban (XARELTO) 20 MG TABS tablet Take 1 tablet by mouth daily 12/18/20  Yes Tio Gonzalez MD   pantoprazole (PROTONIX) 40 MG tablet Take 1 tablet by mouth daily 2/18/18  Yes Joss Huang MD   tiZANidine (ZANAFLEX) 4 MG tablet Take 1 tablet by mouth 2 times daily as needed Usually takes at night if needed   Yes Mio Roy MD   ALPRAZolam (XANAX) 0.25 MG tablet Take 1 tablet by mouth nightly as needed for Sleep or Anxiety.   Yes Mio oRy MD   lisinopril (PRINIVIL;ZESTRIL) 10 MG tablet Take 1 tablet by mouth daily  Patient not

## 2024-12-11 ENCOUNTER — OFFICE VISIT (OUTPATIENT)
Dept: CARDIOLOGY CLINIC | Age: 69
End: 2024-12-11
Payer: MEDICARE

## 2024-12-11 VITALS
HEART RATE: 64 BPM | SYSTOLIC BLOOD PRESSURE: 122 MMHG | DIASTOLIC BLOOD PRESSURE: 64 MMHG | WEIGHT: 176.2 LBS | BODY MASS INDEX: 31.22 KG/M2 | HEIGHT: 63 IN

## 2024-12-11 DIAGNOSIS — I10 BENIGN ESSENTIAL HTN: ICD-10-CM

## 2024-12-11 DIAGNOSIS — G47.33 OBSTRUCTIVE SLEEP APNEA: ICD-10-CM

## 2024-12-11 DIAGNOSIS — I48.3 TYPICAL ATRIAL FLUTTER (HCC): ICD-10-CM

## 2024-12-11 DIAGNOSIS — R00.1 BRADYCARDIA: ICD-10-CM

## 2024-12-11 DIAGNOSIS — I50.32 CHRONIC HEART FAILURE WITH PRESERVED EJECTION FRACTION (HCC): ICD-10-CM

## 2024-12-11 DIAGNOSIS — I48.0 PAF (PAROXYSMAL ATRIAL FIBRILLATION) (HCC): Primary | ICD-10-CM

## 2024-12-11 PROCEDURE — 1159F MED LIST DOCD IN RCRD: CPT | Performed by: INTERNAL MEDICINE

## 2024-12-11 PROCEDURE — 3017F COLORECTAL CA SCREEN DOC REV: CPT | Performed by: INTERNAL MEDICINE

## 2024-12-11 PROCEDURE — G8484 FLU IMMUNIZE NO ADMIN: HCPCS | Performed by: INTERNAL MEDICINE

## 2024-12-11 PROCEDURE — 1036F TOBACCO NON-USER: CPT | Performed by: INTERNAL MEDICINE

## 2024-12-11 PROCEDURE — 1123F ACP DISCUSS/DSCN MKR DOCD: CPT | Performed by: INTERNAL MEDICINE

## 2024-12-11 PROCEDURE — 93000 ELECTROCARDIOGRAM COMPLETE: CPT | Performed by: INTERNAL MEDICINE

## 2024-12-11 PROCEDURE — 99214 OFFICE O/P EST MOD 30 MIN: CPT | Performed by: INTERNAL MEDICINE

## 2024-12-11 PROCEDURE — 3078F DIAST BP <80 MM HG: CPT | Performed by: INTERNAL MEDICINE

## 2024-12-11 PROCEDURE — 3074F SYST BP LT 130 MM HG: CPT | Performed by: INTERNAL MEDICINE

## 2024-12-11 PROCEDURE — G8399 PT W/DXA RESULTS DOCUMENT: HCPCS | Performed by: INTERNAL MEDICINE

## 2024-12-11 PROCEDURE — G8417 CALC BMI ABV UP PARAM F/U: HCPCS | Performed by: INTERNAL MEDICINE

## 2024-12-11 PROCEDURE — 1090F PRES/ABSN URINE INCON ASSESS: CPT | Performed by: INTERNAL MEDICINE

## 2024-12-11 PROCEDURE — G8427 DOCREV CUR MEDS BY ELIG CLIN: HCPCS | Performed by: INTERNAL MEDICINE

## 2025-01-28 ENCOUNTER — OFFICE VISIT (OUTPATIENT)
Dept: ENT CLINIC | Age: 70
End: 2025-01-28

## 2025-01-28 VITALS
TEMPERATURE: 97.3 F | OXYGEN SATURATION: 96 % | HEART RATE: 74 BPM | WEIGHT: 179 LBS | HEIGHT: 63 IN | SYSTOLIC BLOOD PRESSURE: 122 MMHG | DIASTOLIC BLOOD PRESSURE: 76 MMHG | BODY MASS INDEX: 31.71 KG/M2

## 2025-01-28 DIAGNOSIS — H90.A32 MIXED CONDUCTIVE AND SENSORINEURAL HEARING LOSS OF LEFT EAR WITH RESTRICTED HEARING OF RIGHT EAR: Primary | ICD-10-CM

## 2025-01-28 DIAGNOSIS — H61.21 IMPACTED CERUMEN OF RIGHT EAR: ICD-10-CM

## 2025-01-28 DIAGNOSIS — G47.33 OSA (OBSTRUCTIVE SLEEP APNEA): ICD-10-CM

## 2025-01-28 NOTE — PROGRESS NOTES
[facility]  DIVISION OF OTOLARYNGOLOGY- HEAD & NECK SURGERY  CLINIC FOLLOW-UP VISIT      Patient Name: Kasandra Cantor  Medical Record Number:  [id number]  Primary Care Physician:  Tio Gonzalez MD    ChiefComplaint     Chief Complaint   Patient presents with    Ear Problem     Ear cleaning        History of Present Illness     Kasandra Cantor is an 69 y.o. female previously seen for EILEEN, status post inspire implantation on 2/23/2024.    Interval History:   Notes some hearing loss in her right ear, feels like it may be filled with wax.    Past Medical History     Past Medical History:   Diagnosis Date    A-fib (HCC)     Age-related osteoporosis without current pathological fracture 05/18/2022    Anxiety     Arthritis     COPD (chronic obstructive pulmonary disease) (HCC)     mild to moderate    Depression     History of blood transfusion     History of claustrophobia     Hypertension     Hypothyroidism     Pelvic fracture (HCC)     Sleep apnea     did not tolerate the CPAP       Past Surgical History     Past Surgical History:   Procedure Laterality Date    ABDOMINAL EXPLORATION SURGERY  02/12/2018    LAPAROTOMY EXPLORATORY, REPAIR PERFORATED ULCER    BLADDER REPAIR      BONY PELVIS SURGERY  04/23/2023    @     BREAST ENHANCEMENT SURGERY      1983 and 2011    ELBOW FRACTURE SURGERY Left     ORIF left elbow    EXAMINATION UNDER ANESTHESIA N/A 11/15/2023    DRUG INDUCED SLEEP ENDOSCOPY performed by Charan Gilmore DO at [facility] OR    HYSTERECTOMY (CERVIX STATUS UNKNOWN)      partial  age 36    JOINT REPLACEMENT Left 04/2023    thr    LAPAROSCOPIC APPENDECTOMY N/A 10/11/2024    LAPAROSCOPIC  APPENDECTOMY performed by Chacho Iraheta MD at [facility] OR    LAPAROSCOPY N/A 10/13/2024    DIAGNOSTIC LAPAROSCOPY WITH ABDOMINAL WASHOUT AND HEMOSTATIC AGENT APPLICATION performed by Chacho Iraheta MD at [facility] OR    LAPAROTOMY N/A 10/13/2024    LAPAROTOMY EXPLORATORY RETURN TO OR FOR ABDOMINAL BLEEDING

## 2025-01-30 ENCOUNTER — TELEPHONE (OUTPATIENT)
Dept: PULMONOLOGY | Age: 70
End: 2025-01-30

## 2025-01-30 NOTE — TELEPHONE ENCOUNTER
Received letter from [facility] stating the Bevespi is not covered on pt's formulary.   The covered alternatives are Stiolto Respimat, Striverdi Respimat or Spiriva Respimat.    Dr. Darden, please advise

## 2025-02-18 ASSESSMENT — SLEEP AND FATIGUE QUESTIONNAIRES
HOW LIKELY ARE YOU TO NOD OFF OR FALL ASLEEP WHILE LYING DOWN TO REST IN THE AFTERNOON WHEN CIRCUMSTANCES PERMIT: MODERATE CHANCE OF DOZING
HOW LIKELY ARE YOU TO NOD OFF OR FALL ASLEEP WHILE SITTING QUIETLY AFTER LUNCH WITHOUT ALCOHOL: WOULD NEVER DOZE
HOW LIKELY ARE YOU TO NOD OFF OR FALL ASLEEP WHILE SITTING INACTIVE IN A PUBLIC PLACE: WOULD NEVER DOZE
HOW LIKELY ARE YOU TO NOD OFF OR FALL ASLEEP WHILE SITTING AND READING: SLIGHT CHANCE OF DOZING
HOW LIKELY ARE YOU TO NOD OFF OR FALL ASLEEP WHEN YOU ARE A PASSENGER IN A CAR FOR AN HOUR WITHOUT A BREAK: WOULD NEVER DOZE
HOW LIKELY ARE YOU TO NOD OFF OR FALL ASLEEP WHILE SITTING AND TALKING TO SOMEONE: WOULD NEVER DOZE
HOW LIKELY ARE YOU TO NOD OFF OR FALL ASLEEP WHILE SITTING AND TALKING TO SOMEONE: WOULD NEVER DOZE
HOW LIKELY ARE YOU TO NOD OFF OR FALL ASLEEP WHILE WATCHING TV: WOULD NEVER DOZE
HOW LIKELY ARE YOU TO NOD OFF OR FALL ASLEEP WHILE WATCHING TV: WOULD NEVER DOZE
HOW LIKELY ARE YOU TO NOD OFF OR FALL ASLEEP IN A CAR, WHILE STOPPED FOR A FEW MINUTES IN TRAFFIC: WOULD NEVER DOZE
HOW LIKELY ARE YOU TO NOD OFF OR FALL ASLEEP WHILE SITTING QUIETLY AFTER LUNCH WITHOUT ALCOHOL: WOULD NEVER DOZE
ESS TOTAL SCORE: 3
HOW LIKELY ARE YOU TO NOD OFF OR FALL ASLEEP WHILE SITTING AND READING: SLIGHT CHANCE OF DOZING
HOW LIKELY ARE YOU TO NOD OFF OR FALL ASLEEP WHEN YOU ARE A PASSENGER IN A CAR FOR AN HOUR WITHOUT A BREAK: WOULD NEVER DOZE
HOW LIKELY ARE YOU TO NOD OFF OR FALL ASLEEP WHILE LYING DOWN TO REST IN THE AFTERNOON WHEN CIRCUMSTANCES PERMIT: MODERATE CHANCE OF DOZING
HOW LIKELY ARE YOU TO NOD OFF OR FALL ASLEEP IN A CAR, WHILE STOPPED FOR A FEW MINUTES IN TRAFFIC: WOULD NEVER DOZE
HOW LIKELY ARE YOU TO NOD OFF OR FALL ASLEEP WHILE SITTING INACTIVE IN A PUBLIC PLACE: WOULD NEVER DOZE

## 2025-02-19 ENCOUNTER — OFFICE VISIT (OUTPATIENT)
Dept: PULMONOLOGY | Age: 70
End: 2025-02-19
Payer: MEDICARE

## 2025-02-19 VITALS
DIASTOLIC BLOOD PRESSURE: 76 MMHG | SYSTOLIC BLOOD PRESSURE: 122 MMHG | HEIGHT: 63 IN | WEIGHT: 180 LBS | BODY MASS INDEX: 31.89 KG/M2 | HEART RATE: 73 BPM | OXYGEN SATURATION: 95 %

## 2025-02-19 DIAGNOSIS — Z96.82 S/P INSERTION OF HYPOGLOSSAL NERVE STIMULATOR: ICD-10-CM

## 2025-02-19 DIAGNOSIS — G47.33 OSA (OBSTRUCTIVE SLEEP APNEA): Primary | ICD-10-CM

## 2025-02-19 DIAGNOSIS — E66.811 CLASS 1 OBESITY DUE TO EXCESS CALORIES WITH SERIOUS COMORBIDITY AND BODY MASS INDEX (BMI) OF 31.0 TO 31.9 IN ADULT: ICD-10-CM

## 2025-02-19 DIAGNOSIS — E66.09 CLASS 1 OBESITY DUE TO EXCESS CALORIES WITH SERIOUS COMORBIDITY AND BODY MASS INDEX (BMI) OF 31.0 TO 31.9 IN ADULT: ICD-10-CM

## 2025-02-19 PROCEDURE — 1159F MED LIST DOCD IN RCRD: CPT | Performed by: INTERNAL MEDICINE

## 2025-02-19 PROCEDURE — 1036F TOBACCO NON-USER: CPT | Performed by: INTERNAL MEDICINE

## 2025-02-19 PROCEDURE — 1090F PRES/ABSN URINE INCON ASSESS: CPT | Performed by: INTERNAL MEDICINE

## 2025-02-19 PROCEDURE — 1123F ACP DISCUSS/DSCN MKR DOCD: CPT | Performed by: INTERNAL MEDICINE

## 2025-02-19 PROCEDURE — G2211 COMPLEX E/M VISIT ADD ON: HCPCS | Performed by: INTERNAL MEDICINE

## 2025-02-19 PROCEDURE — 3074F SYST BP LT 130 MM HG: CPT | Performed by: INTERNAL MEDICINE

## 2025-02-19 PROCEDURE — G8427 DOCREV CUR MEDS BY ELIG CLIN: HCPCS | Performed by: INTERNAL MEDICINE

## 2025-02-19 PROCEDURE — 3078F DIAST BP <80 MM HG: CPT | Performed by: INTERNAL MEDICINE

## 2025-02-19 PROCEDURE — G8417 CALC BMI ABV UP PARAM F/U: HCPCS | Performed by: INTERNAL MEDICINE

## 2025-02-19 PROCEDURE — G8399 PT W/DXA RESULTS DOCUMENT: HCPCS | Performed by: INTERNAL MEDICINE

## 2025-02-19 PROCEDURE — 99215 OFFICE O/P EST HI 40 MIN: CPT | Performed by: INTERNAL MEDICINE

## 2025-02-19 PROCEDURE — 3017F COLORECTAL CA SCREEN DOC REV: CPT | Performed by: INTERNAL MEDICINE

## 2025-02-19 NOTE — PROGRESS NOTES
Pulmonology Follow Up Visit    CONSULTING PHYSICIAN:  Tio Gonzalez MD ,     REASON FOR VISIT:   Chief Complaint   Patient presents with    Sleep Apnea     Inspire f/u       DATE OF VISIT: 2/19/2025    HISTORY OF PRESENT ILLNESS: 69 y.o. year old female  who is being seen in the pulmonary/sleep for a follow-up.  Patient is liking the new changes made to the inspire [hypoglossal nerve stimulation] therapy.  Reports that her  has reported that she is snoring less and less restless while sleeping.  She is waking up more refreshed.  Denies any tongue soreness, neck soreness.  Has been able to go up on the stimulation strength by a few levels.  Weight remains stable.      Previously: Continue to use her inspire [hypoglossal nerve stimulation] therapy regularly.  Recently was admitted to the hospital due to acute gangrenous appendicitis.  Had postop complication of abdominal bleeding.  Eventually recovered and was discharged back home.  During the hospitalization could not use the inspire [hypoglossal nerve stimulation] therapy.  Now starting to use it again.  Does find the settings to be a little bit too intense.  Repeat sleep study on inspire [hypoglossal nerve stimulation] therapy showed inadequate control of sleep apnea.  Weight remains stable.  Patient has found a new job and will be starting it on December 9, 2024.   Patient had inspire [hypoglossal nerve stimulation] therapy activation on 4/10/2024.  She has consistently gone up on the setting every week.  Finds therapy tolerable.  Reports that she is sleeping better.  Snoring less.  Wakes up unrefreshed.  Has been having some postnasal drip.  Weight remains stable..  Patient was unable to tolerate the CPAP therapy because of claustrophobia.  She tried several masks including small nasal pillow mask but was unable to use it.  Eventually she returned the machine.  Continues to struggle with sleep symptoms at nighttime.  Also has been having on

## 2025-03-13 ENCOUNTER — HOSPITAL ENCOUNTER (OUTPATIENT)
Dept: SLEEP CENTER | Age: 70
Discharge: HOME | End: 2025-03-13
Payer: MEDICARE

## 2025-03-13 DIAGNOSIS — G47.33 OSA (OBSTRUCTIVE SLEEP APNEA): ICD-10-CM

## 2025-03-13 DIAGNOSIS — Z96.82 S/P INSERTION OF HYPOGLOSSAL NERVE STIMULATOR: ICD-10-CM

## 2025-03-13 PROCEDURE — 95806 SLEEP STUDY UNATT&RESP EFFT: CPT

## 2025-03-18 ENCOUNTER — TELEPHONE (OUTPATIENT)
Dept: CARDIOLOGY CLINIC | Age: 70
End: 2025-03-18

## 2025-03-18 ENCOUNTER — NURSE ONLY (OUTPATIENT)
Dept: CARDIOLOGY CLINIC | Age: 70
End: 2025-03-18
Payer: MEDICARE

## 2025-03-18 ENCOUNTER — HOSPITAL ENCOUNTER (OUTPATIENT)
Age: 70
Discharge: HOME | End: 2025-03-20
Attending: INTERNAL MEDICINE
Payer: MEDICARE

## 2025-03-18 VITALS
HEIGHT: 63 IN | WEIGHT: 176 LBS | HEART RATE: 95 BPM | SYSTOLIC BLOOD PRESSURE: 124 MMHG | RESPIRATION RATE: 16 BRPM | DIASTOLIC BLOOD PRESSURE: 78 MMHG | TEMPERATURE: 98.2 F | BODY MASS INDEX: 31.18 KG/M2 | OXYGEN SATURATION: 100 %

## 2025-03-18 DIAGNOSIS — I48.0 PAROXYSMAL ATRIAL FIBRILLATION (HCC): ICD-10-CM

## 2025-03-18 DIAGNOSIS — I48.91 ATRIAL FIBRILLATION WITH RVR (HCC): ICD-10-CM

## 2025-03-18 DIAGNOSIS — I48.92 ATRIAL FIBRILLATION AND FLUTTER: Primary | ICD-10-CM

## 2025-03-18 DIAGNOSIS — I48.19 PERSISTENT ATRIAL FIBRILLATION (HCC): Primary | ICD-10-CM

## 2025-03-18 DIAGNOSIS — I48.91 ATRIAL FIBRILLATION AND FLUTTER: Primary | ICD-10-CM

## 2025-03-18 LAB — ECHO BSA: 1.88 M2

## 2025-03-18 PROCEDURE — 92960 CARDIOVERSION ELECTRIC EXT: CPT

## 2025-03-18 PROCEDURE — 93000 ELECTROCARDIOGRAM COMPLETE: CPT

## 2025-03-18 PROCEDURE — 93005 ELECTROCARDIOGRAM TRACING: CPT

## 2025-03-18 PROCEDURE — 7100000011 HC PHASE II RECOVERY - ADDTL 15 MIN: Performed by: INTERNAL MEDICINE

## 2025-03-18 PROCEDURE — 7100000010 HC PHASE II RECOVERY - FIRST 15 MIN: Performed by: INTERNAL MEDICINE

## 2025-03-18 PROCEDURE — 2500000003 HC RX 250 WO HCPCS: Performed by: INTERNAL MEDICINE

## 2025-03-18 RX ADMIN — METHOHEXITAL SODIUM 50 MG: 500 INJECTION, POWDER, LYOPHILIZED, FOR SOLUTION INTRAMUSCULAR; INTRAVENOUS; RECTAL at 14:09

## 2025-03-18 NOTE — DISCHARGE INSTRUCTIONS
C[facility]ERSION DISCHARGE INSTRUCTIONS    No driving for 24 hours. We strongly recommend that a responsible adult stay with you for the next 24 hours.    Continue Xarelto.    Hydrocortisone 1% cream to reddened areas as needed.

## 2025-03-18 NOTE — TELEPHONE ENCOUNTER
Procedure: DCCV     Date Of Procedure:  03/18/25     Time Of Arrival: 1230PM     Procedure Time: 130PM      Called and spoke to patient and she is agreeable to the date and time. Reviewed the Pre-Procedure instructions and they verbalized understanding. Encouraged to call with any questions or concerns.       Published on [employer] / emailed to Cath lab / note in chart / scheduled in Epic/Cupid/Carto

## 2025-03-18 NOTE — PROCEDURES
[facility]     Electrophysiology Procedure Note       Date of Procedure: 3/18/2025  Patient's Name: Kasandra Cantor  YOB: 1955   Medical Record Number: [id number]  Procedure Performed by: Moody Paul MD    Procedures performed:  IV sedation.     External Electrical cardioversion   Mallampati3  ASA 3    Indication of the procedure:  atrial flutter      Details of procedure:   The patient was brought to the cath lab area in a fasting and non-sedated state. The risks, benefits and alternatives of the procedure were discussed with the patient. The patient opted to proceed with the procedure. Written informed consent was signed and placed in the chart.  A timeout protocol was completed to identify the patient and the procedure being performed.        I pushed 50 mg Brevital.An independent trained observer pushed medications and/or monitored patient  at my direction.   We monitored the patient's level of consciousness and vital signs/physiologic status throughout the procedure duration (see start and stop times below).  Sedation:     Sedation start: 1330  Sedation stop: 1345     Patient is on chronic anticoagulation therapy.   Then we used Brevital for sedation and electrical DC cardioversion was perfomred using 200J, synchronized shock. Patient was converted to sinus rhythm at 90 bpm. The patient tolerated the procedure well and there were no complications.     Conclusion:   Successful external DC cardioversion of atrial fibrillation     Plan:   The patient can be discharged if remains stable. Will continue with medical therapy.

## 2025-03-18 NOTE — TELEPHONE ENCOUNTER
Pt called stating they are in AFIB when they woke up this morning.    HR is 100-155 walking across the room and pt is very SOB. Pt would like to know what they should do?    Pt has taken all medications as prescribed     Pls advise pt   [phone]

## 2025-03-18 NOTE — H&P
[facility]   Electrophysiology   CC: PAF   HPI: Kasandra Cantor is a 68 y.o. female  with Hx of HTN,  hypothyroid recovering from COVID 19 Dx on 11/21, was feeling bad 12/15/2020 and noticed her HR was >200.  She was found to be   in atrial flutter and today she is in Atrial fibrillation. Did not convert with adenosine    She has Hx of palpitations but only a few seconds at a time. LHC 12/17/2020 showed normal Cors.  S/P DCCV 12/17/2020.     02/25/2021 s/p Successful DCCV     She was diagnosed with Covid 11/2021-  presetented to ER with atrial fibrillation. Cardizem increased      S/p RFCA Atrial fibrillation with PVI 03/02/2022 11/09/2022  Continued c/o SOB.  Ordered PFT showed moderate obstructive airway disease. She was referred to pulmonary.     Covid + 02/2023 04/2023 - pelvis fracture with recurrence of afib during and after surgery.  Cardioversion 05/01/2023 6/18/2024 hospital admission for afib with RVR. S/p CLAUDINE/DCCV    10/11/2024 hospital admission for gangrenous appendix complicated by small splenic laceration. No afib noted on telemetry, one episode of atrial tachycardia lasting 6.2 seconds    Interval History:     Kasandra presents today for follow up. Patient denies lightheadedness, dizziness, chest pain, palpitations, orthopnea, edema, presyncope or syncope.  She has been doing well.  She still has some abdominal discomfort.    Assessment and plan:  Paroxysmal atrial fibrillation    -EKG today shows Atrial fibrillation    -[place] on monitor 11/2022 - 0%    -S/p RFCA atrial fibrillation with PVI 03/02/2022    -S/p successful DCCV 12/2020, 05/01/2023 at     -S/p DCCV 6/19/2024    -Patient has a [id number] Score of 3 ( age, gender,HTN)   -Continue Xarelto 20 mg daily. Creatinine clearance of 92 ml/ min based on creatinine of  0.7 on 10/19/2024  -Continue Toprol 25 mg daily   -TSH     -She had A-fib in the context of severe extreme stress/illness. Continue to monitor for

## 2025-03-18 NOTE — TELEPHONE ENCOUNTER
Per MXA, patient agreed to undergo ablation of AF.     ATRIAL FIBRILLATION ABLATION INFORMATION    Our  will be contacting you with a date and time for your procedure.     Lab work is required, please ensure this is completed within the 30 days prior to your procedure.      PRE-PROCEDURE INSTRUCTIONS -   -Do not eat or drink anything after midnight the night before your ablation.  -The morning of your ablation you will need to check in at the registration desk in the main lobby.  -You will need to have a responsible adult to drive you home.  -Your nurse will provide you with discharge instructions.  -You will need to hold your Xarelto the night before/morning of(depending when you take it)  -Stop taking GLP-1 (Mounjaro, Ozempic, Trulicity, Zepbound, Wegovy, Vitoza) one week before your procedure.  -Stop SGLT2 (Jardiance, Farxiga, Invokana, Brenzavvy, Steglatro) the day before your procedure and the morning of your procedure.  -If you are taking a diuretic (water pill) please hold the morning of the procedure  -If you take long acting insulin, take 1/2 the dose the evening before or morning of depending on when you take your dosage.  -You may take all of your other medications with a sip of water.    You will be scheduled for a 6-8 week follow up with cardiology.  This will be done prior to your discharge instructions.    If you have any questions regarding the procedure itself or medications, please call [phone] and ask to speak to an EP nurse.

## 2025-03-18 NOTE — PROGRESS NOTES
Patient noted to be in AF with rate in 130s. She has complaints of shortness of breath, racing heart, and lightheadedness. She admits she ate at 10A. Discussed with MXA. OK to undergo DCCV today in 1-2 hours. Orders placed. Call placed to cath lab and made staff aware. Patient sent upstairs via wheelchair by family present in room.

## 2025-03-19 ENCOUNTER — TELEPHONE (OUTPATIENT)
Dept: PULMONOLOGY | Age: 70
End: 2025-03-19

## 2025-03-19 LAB
EKG ATRIAL RATE: 90 BPM
EKG DIAGNOSIS: NORMAL
EKG P AXIS: 68 DEGREES
EKG P-R INTERVAL: 148 MS
EKG Q-T INTERVAL: 414 MS
EKG QRS DURATION: 88 MS
EKG QTC CALCULATION (BAZETT): 506 MS
EKG R AXIS: 49 DEGREES
EKG T AXIS: 55 DEGREES
EKG VENTRICULAR RATE: 90 BPM

## 2025-03-19 NOTE — TELEPHONE ENCOUNTER
Spoke with patient.  HST results discussed.  Patient states she was using Inspire the night of the study. Patient scheduled for appt 04/16/25.

## 2025-03-25 PROBLEM — I48.0 PAROXYSMAL ATRIAL FIBRILLATION (HCC): Status: ACTIVE | Noted: 2025-03-18

## 2025-03-25 NOTE — TELEPHONE ENCOUNTER
Procedure: AFIB ABL    Date Of Procedure:  6/19/25    Time Of Arrival: 10AM    Procedure Time:  11AM      Called and spoke to patient and she is agreeable to the date and time. Reviewed the Pre-Procedure instructions and they verbalized understanding. Encouraged to call with any questions or concerns.       Published on [employer] / emailed to Cath lab / note in chart / scheduled in Epic/Cupid/Carto

## 2025-04-16 ENCOUNTER — OFFICE VISIT (OUTPATIENT)
Dept: PULMONOLOGY | Age: 70
End: 2025-04-16
Payer: MEDICARE

## 2025-04-16 VITALS
HEART RATE: 73 BPM | DIASTOLIC BLOOD PRESSURE: 70 MMHG | BODY MASS INDEX: 32.25 KG/M2 | WEIGHT: 182 LBS | SYSTOLIC BLOOD PRESSURE: 112 MMHG | HEIGHT: 63 IN | OXYGEN SATURATION: 95 %

## 2025-04-16 DIAGNOSIS — E66.811 CLASS 1 OBESITY DUE TO EXCESS CALORIES WITH SERIOUS COMORBIDITY AND BODY MASS INDEX (BMI) OF 30.0 TO 30.9 IN ADULT: ICD-10-CM

## 2025-04-16 DIAGNOSIS — E66.09 CLASS 1 OBESITY DUE TO EXCESS CALORIES WITH SERIOUS COMORBIDITY AND BODY MASS INDEX (BMI) OF 30.0 TO 30.9 IN ADULT: ICD-10-CM

## 2025-04-16 DIAGNOSIS — E66.811 CLASS 1 OBESITY DUE TO EXCESS CALORIES WITH SERIOUS COMORBIDITY AND BODY MASS INDEX (BMI) OF 31.0 TO 31.9 IN ADULT: ICD-10-CM

## 2025-04-16 DIAGNOSIS — E66.09 CLASS 1 OBESITY DUE TO EXCESS CALORIES WITH SERIOUS COMORBIDITY AND BODY MASS INDEX (BMI) OF 31.0 TO 31.9 IN ADULT: ICD-10-CM

## 2025-04-16 DIAGNOSIS — G47.33 OSA (OBSTRUCTIVE SLEEP APNEA): Primary | ICD-10-CM

## 2025-04-16 DIAGNOSIS — Z96.82 S/P INSERTION OF HYPOGLOSSAL NERVE STIMULATOR: ICD-10-CM

## 2025-04-16 PROCEDURE — 1036F TOBACCO NON-USER: CPT | Performed by: INTERNAL MEDICINE

## 2025-04-16 PROCEDURE — G8417 CALC BMI ABV UP PARAM F/U: HCPCS | Performed by: INTERNAL MEDICINE

## 2025-04-16 PROCEDURE — 3078F DIAST BP <80 MM HG: CPT | Performed by: INTERNAL MEDICINE

## 2025-04-16 PROCEDURE — 3017F COLORECTAL CA SCREEN DOC REV: CPT | Performed by: INTERNAL MEDICINE

## 2025-04-16 PROCEDURE — G2211 COMPLEX E/M VISIT ADD ON: HCPCS | Performed by: INTERNAL MEDICINE

## 2025-04-16 PROCEDURE — 1090F PRES/ABSN URINE INCON ASSESS: CPT | Performed by: INTERNAL MEDICINE

## 2025-04-16 PROCEDURE — 99215 OFFICE O/P EST HI 40 MIN: CPT | Performed by: INTERNAL MEDICINE

## 2025-04-16 PROCEDURE — 1159F MED LIST DOCD IN RCRD: CPT | Performed by: INTERNAL MEDICINE

## 2025-04-16 PROCEDURE — G8427 DOCREV CUR MEDS BY ELIG CLIN: HCPCS | Performed by: INTERNAL MEDICINE

## 2025-04-16 PROCEDURE — 1123F ACP DISCUSS/DSCN MKR DOCD: CPT | Performed by: INTERNAL MEDICINE

## 2025-04-16 PROCEDURE — G8399 PT W/DXA RESULTS DOCUMENT: HCPCS | Performed by: INTERNAL MEDICINE

## 2025-04-16 PROCEDURE — 3074F SYST BP LT 130 MM HG: CPT | Performed by: INTERNAL MEDICINE

## 2025-04-16 ASSESSMENT — SLEEP AND FATIGUE QUESTIONNAIRES
HOW LIKELY ARE YOU TO NOD OFF OR FALL ASLEEP WHILE SITTING AND TALKING TO SOMEONE: WOULD NEVER DOZE
HOW LIKELY ARE YOU TO NOD OFF OR FALL ASLEEP WHILE SITTING AND READING: MODERATE CHANCE OF DOZING
HOW LIKELY ARE YOU TO NOD OFF OR FALL ASLEEP WHEN YOU ARE A PASSENGER IN A CAR FOR AN HOUR WITHOUT A BREAK: WOULD NEVER DOZE
HOW LIKELY ARE YOU TO NOD OFF OR FALL ASLEEP WHILE SITTING QUIETLY AFTER LUNCH WITHOUT ALCOHOL: WOULD NEVER DOZE
HOW LIKELY ARE YOU TO NOD OFF OR FALL ASLEEP WHILE SITTING AND READING: MODERATE CHANCE OF DOZING
HOW LIKELY ARE YOU TO NOD OFF OR FALL ASLEEP WHILE SITTING INACTIVE IN A PUBLIC PLACE: WOULD NEVER DOZE
HOW LIKELY ARE YOU TO NOD OFF OR FALL ASLEEP WHILE LYING DOWN TO REST IN THE AFTERNOON WHEN CIRCUMSTANCES PERMIT: SLIGHT CHANCE OF DOZING
ESS TOTAL SCORE: 4
HOW LIKELY ARE YOU TO NOD OFF OR FALL ASLEEP WHEN YOU ARE A PASSENGER IN A CAR FOR AN HOUR WITHOUT A BREAK: WOULD NEVER DOZE
HOW LIKELY ARE YOU TO NOD OFF OR FALL ASLEEP IN A CAR, WHILE STOPPED FOR A FEW MINUTES IN TRAFFIC: WOULD NEVER DOZE
HOW LIKELY ARE YOU TO NOD OFF OR FALL ASLEEP IN A CAR, WHILE STOPPED FOR A FEW MINUTES IN TRAFFIC: WOULD NEVER DOZE
HOW LIKELY ARE YOU TO NOD OFF OR FALL ASLEEP WHILE SITTING AND TALKING TO SOMEONE: WOULD NEVER DOZE
HOW LIKELY ARE YOU TO NOD OFF OR FALL ASLEEP WHILE SITTING QUIETLY AFTER LUNCH WITHOUT ALCOHOL: WOULD NEVER DOZE
HOW LIKELY ARE YOU TO NOD OFF OR FALL ASLEEP WHILE WATCHING TV: SLIGHT CHANCE OF DOZING
HOW LIKELY ARE YOU TO NOD OFF OR FALL ASLEEP WHILE LYING DOWN TO REST IN THE AFTERNOON WHEN CIRCUMSTANCES PERMIT: SLIGHT CHANCE OF DOZING
HOW LIKELY ARE YOU TO NOD OFF OR FALL ASLEEP WHILE WATCHING TV: SLIGHT CHANCE OF DOZING
HOW LIKELY ARE YOU TO NOD OFF OR FALL ASLEEP WHILE SITTING INACTIVE IN A PUBLIC PLACE: WOULD NEVER DOZE

## 2025-05-23 RX ORDER — SODIUM CHLORIDE 0.9 % (FLUSH) 0.9 %
5-40 SYRINGE (ML) INJECTION EVERY 12 HOURS SCHEDULED
Status: CANCELLED | OUTPATIENT
Start: 2025-05-23

## 2025-05-23 RX ORDER — SODIUM CHLORIDE 9 MG/ML
INJECTION, SOLUTION INTRAVENOUS PRN
Status: CANCELLED | OUTPATIENT
Start: 2025-05-23

## 2025-05-23 RX ORDER — SODIUM CHLORIDE 0.9 % (FLUSH) 0.9 %
5-40 SYRINGE (ML) INJECTION PRN
Status: CANCELLED | OUTPATIENT
Start: 2025-05-23

## 2025-06-13 ENCOUNTER — TRANSCRIBE ORDERS (OUTPATIENT)
Dept: ADMINISTRATIVE | Age: 70
End: 2025-06-13

## 2025-06-13 DIAGNOSIS — M81.0 AGE-RELATED OSTEOPOROSIS WITHOUT CURRENT PATHOLOGICAL FRACTURE: Primary | ICD-10-CM

## 2025-06-18 NOTE — PROGRESS NOTES
[facility]     Cardiology Follow Up  [phone]  7/17/25  Referring: Tio Negron MD (PCP)    REASON FOR CONSULT/CHIEF COMPLAINT/HPI     Reason for visit/ Chief complaint  Atrial fibrillation  Hospital f/u     HPI Kasandra Cantor is a 69 y.o. female patient here for follow up.     PMH of Atrial Fibrillation, COPD-s/p Inspire implant,  hypertension, hypothyroidism and sleep apnea, Covid 2021 & 2023.      [facility] 12/17/2020 showed normal Cors.     History of multiple DCCV and ablations with most recent being 6/19/25 by Dr Paul.          10/11/2024 hospital admission for gangrenous appendix complicated by small splenic laceration. No afib noted on telemetry, one episode of atrial tachycardia lasting 6.2 seconds  She had a surface echo that overestimated how much MR/TR she had (refuted by CLAUDINE that I just did).    Today she is doing well since her ablation last month. She states that he Afib started when she had COVID the first time. No complaints today of CP, SOB, palpitations or dizziness at this time.     Patient is adherent with medications and is tolerating them well without side effects     HISTORY/ALLERGIES/ROS     MedHx:  has a past medical history of A-fib (HCC), Age-related osteoporosis without current pathological fracture, Anxiety, Arthritis, COPD (chronic obstructive pulmonary disease) (HCC), Depression, History of blood transfusion, History of claustrophobia, Hypertension, Hypothyroidism, Pelvic fracture (HCC), and Sleep apnea.  SurgHx:  has a past surgical history that includes Hysterectomy; bladder repair; Abdominal exploration surgery (02/12/2018); Partial hysterectomy; Breast enhancement surgery; shoulder surgery; joint replacement (Left, 04/2023); Elbow fracture surgery (Left); Examination under anesthesia (N/A, 11/15/2023); Bony pelvis surgery (04/23/2023); shoulder surgery (Left, 1989); Stimulator Surgery (Right, 02/23/2024); laparoscopic appendectomy (N/A, 10/11/2024);

## 2025-06-19 ENCOUNTER — ANESTHESIA EVENT (OUTPATIENT)
Age: 70
End: 2025-06-19
Payer: MEDICARE

## 2025-06-19 ENCOUNTER — ANESTHESIA (OUTPATIENT)
Age: 70
End: 2025-06-19
Payer: MEDICARE

## 2025-06-19 ENCOUNTER — HOSPITAL ENCOUNTER (OUTPATIENT)
Age: 70
LOS: 1 days | Discharge: HOME | End: 2025-06-20
Attending: INTERNAL MEDICINE | Admitting: INTERNAL MEDICINE
Payer: MEDICARE

## 2025-06-19 ENCOUNTER — HOSPITAL ENCOUNTER (OUTPATIENT)
Age: 70
Setting detail: OUTPATIENT SURGERY
Discharge: HOME | End: 2025-06-21
Attending: INTERNAL MEDICINE
Payer: MEDICARE

## 2025-06-19 DIAGNOSIS — I48.0 PAROXYSMAL ATRIAL FIBRILLATION (HCC): ICD-10-CM

## 2025-06-19 LAB
ANION GAP SERPL CALCULATED.3IONS-SCNC: 13 MMOL/L (ref 3–16)
BUN SERPL-MCNC: 16 MG/DL (ref 7–20)
CALCIUM SERPL-MCNC: 9 MG/DL (ref 8.3–10.6)
CHLORIDE SERPL-SCNC: 101 MMOL/L (ref 99–110)
CO2 SERPL-SCNC: 24 MMOL/L (ref 21–32)
CREAT SERPL-MCNC: 0.9 MG/DL (ref 0.6–1.2)
DEPRECATED RDW RBC AUTO: 13.5 % (ref 12.4–15.4)
ECHO BSA: 1.92 M2
ECHO BSA: 1.92 M2
GFR SERPLBLD CREATININE-BSD FMLA CKD-EPI: 68 ML/MIN/{1.73_M2}
GLUCOSE BLD-MCNC: 212 MG/DL (ref 70–99)
GLUCOSE SERPL-MCNC: 106 MG/DL (ref 70–99)
HCT VFR BLD AUTO: 39.5 % (ref 36–48)
HGB BLD-MCNC: 13.7 G/DL (ref 12–16)
MCH RBC QN AUTO: 34.3 PG (ref 26–34)
MCHC RBC AUTO-ENTMCNC: 34.8 G/DL (ref 31–36)
MCV RBC AUTO: 98.6 FL (ref 80–100)
PERFORMED ON: ABNORMAL
PLATELET # BLD AUTO: 244 K/UL (ref 135–450)
PMV BLD AUTO: 8.8 FL (ref 5–10.5)
POC ACT LR: 357 SEC
POC ACT LR: 388 SEC
POC ACT LR: >400 SEC
POC ACT LR: >400 SEC
POTASSIUM SERPL-SCNC: 4.7 MMOL/L (ref 3.5–5.1)
RBC # BLD AUTO: 4 M/UL (ref 4–5.2)
SODIUM SERPL-SCNC: 138 MMOL/L (ref 136–145)
WBC # BLD AUTO: 5.7 K/UL (ref 4–11)

## 2025-06-19 PROCEDURE — C1894 INTRO/SHEATH, NON-LASER: HCPCS | Performed by: INTERNAL MEDICINE

## 2025-06-19 PROCEDURE — C1760 CLOSURE DEV, VASC: HCPCS | Performed by: INTERNAL MEDICINE

## 2025-06-19 PROCEDURE — 2580000003 HC RX 258: Performed by: NURSE ANESTHETIST, CERTIFIED REGISTERED

## 2025-06-19 PROCEDURE — 93312 ECHO TRANSESOPHAGEAL: CPT

## 2025-06-19 PROCEDURE — 93462 L HRT CATH TRNSPTL PUNCTURE: CPT | Performed by: INTERNAL MEDICINE

## 2025-06-19 PROCEDURE — 93623 PRGRMD STIMJ&PACG IV RX NFS: CPT | Performed by: INTERNAL MEDICINE

## 2025-06-19 PROCEDURE — 93622 COMP EP EVAL L VENTR PAC&REC: CPT | Performed by: INTERNAL MEDICINE

## 2025-06-19 PROCEDURE — 3700000000 HC ANESTHESIA ATTENDED CARE: Performed by: INTERNAL MEDICINE

## 2025-06-19 PROCEDURE — 7100000001 HC PACU RECOVERY - ADDTL 15 MIN: Performed by: INTERNAL MEDICINE

## 2025-06-19 PROCEDURE — C1759 CATH, INTRA ECHOCARDIOGRAPHY: HCPCS | Performed by: INTERNAL MEDICINE

## 2025-06-19 PROCEDURE — C1769 GUIDE WIRE: HCPCS | Performed by: INTERNAL MEDICINE

## 2025-06-19 PROCEDURE — 80048 BASIC METABOLIC PNL TOTAL CA: CPT

## 2025-06-19 PROCEDURE — C1732 CATH, EP, DIAG/ABL, 3D/VECT: HCPCS | Performed by: INTERNAL MEDICINE

## 2025-06-19 PROCEDURE — 36415 COLL VENOUS BLD VENIPUNCTURE: CPT

## 2025-06-19 PROCEDURE — 2709999900 HC NON-CHARGEABLE SUPPLY: Performed by: INTERNAL MEDICINE

## 2025-06-19 PROCEDURE — 93662 INTRACARDIAC ECG (ICE): CPT | Performed by: INTERNAL MEDICINE

## 2025-06-19 PROCEDURE — 93655 ICAR CATH ABLTJ DSCRT ARRHYT: CPT | Performed by: INTERNAL MEDICINE

## 2025-06-19 PROCEDURE — 93005 ELECTROCARDIOGRAM TRACING: CPT | Performed by: INTERNAL MEDICINE

## 2025-06-19 PROCEDURE — 2500000003 HC RX 250 WO HCPCS: Performed by: INTERNAL MEDICINE

## 2025-06-19 PROCEDURE — C1733 CATH, EP, OTHR THAN COOL-TIP: HCPCS | Performed by: INTERNAL MEDICINE

## 2025-06-19 PROCEDURE — 6360000002 HC RX W HCPCS: Performed by: NURSE ANESTHETIST, CERTIFIED REGISTERED

## 2025-06-19 PROCEDURE — 3700000001 HC ADD 15 MINUTES (ANESTHESIA): Performed by: INTERNAL MEDICINE

## 2025-06-19 PROCEDURE — 93653 COMPRE EP EVAL TX SVT: CPT | Performed by: INTERNAL MEDICINE

## 2025-06-19 PROCEDURE — C1766 INTRO/SHEATH,STRBLE,NON-PEEL: HCPCS | Performed by: INTERNAL MEDICINE

## 2025-06-19 PROCEDURE — 6370000000 HC RX 637 (ALT 250 FOR IP): Performed by: INTERNAL MEDICINE

## 2025-06-19 PROCEDURE — C1730 CATH, EP, 19 OR FEW ELECT: HCPCS | Performed by: INTERNAL MEDICINE

## 2025-06-19 PROCEDURE — 7100000000 HC PACU RECOVERY - FIRST 15 MIN: Performed by: INTERNAL MEDICINE

## 2025-06-19 PROCEDURE — 6360000002 HC RX W HCPCS: Performed by: INTERNAL MEDICINE

## 2025-06-19 PROCEDURE — 2720000010 HC SURG SUPPLY STERILE: Performed by: INTERNAL MEDICINE

## 2025-06-19 PROCEDURE — 2500000003 HC RX 250 WO HCPCS: Performed by: NURSE ANESTHETIST, CERTIFIED REGISTERED

## 2025-06-19 PROCEDURE — 85347 COAGULATION TIME ACTIVATED: CPT

## 2025-06-19 PROCEDURE — 85027 COMPLETE CBC AUTOMATED: CPT

## 2025-06-19 RX ORDER — ALBUTEROL SULFATE 90 UG/1
2 INHALANT RESPIRATORY (INHALATION) 4 TIMES DAILY PRN
Status: DISCONTINUED | OUTPATIENT
Start: 2025-06-19 | End: 2025-06-20 | Stop reason: HOSPADM

## 2025-06-19 RX ORDER — HEPARIN SODIUM 1000 [USP'U]/ML
INJECTION, SOLUTION INTRAVENOUS; SUBCUTANEOUS
Status: DISCONTINUED | OUTPATIENT
Start: 2025-06-19 | End: 2025-06-19 | Stop reason: SDUPTHER

## 2025-06-19 RX ORDER — SODIUM CHLORIDE 0.9 % (FLUSH) 0.9 %
5-40 SYRINGE (ML) INJECTION EVERY 12 HOURS SCHEDULED
Status: DISCONTINUED | OUTPATIENT
Start: 2025-06-19 | End: 2025-06-20 | Stop reason: HOSPADM

## 2025-06-19 RX ORDER — SODIUM CHLORIDE 9 MG/ML
INJECTION, SOLUTION INTRAVENOUS CONTINUOUS
Status: DISCONTINUED | OUTPATIENT
Start: 2025-06-19 | End: 2025-06-19 | Stop reason: HOSPADM

## 2025-06-19 RX ORDER — OXYCODONE HYDROCHLORIDE 5 MG/1
5 TABLET ORAL
Status: DISCONTINUED | OUTPATIENT
Start: 2025-06-19 | End: 2025-06-19 | Stop reason: HOSPADM

## 2025-06-19 RX ORDER — SODIUM CHLORIDE 9 MG/ML
INJECTION, SOLUTION INTRAVENOUS PRN
Status: DISCONTINUED | OUTPATIENT
Start: 2025-06-19 | End: 2025-06-20 | Stop reason: HOSPADM

## 2025-06-19 RX ORDER — NALOXONE HYDROCHLORIDE 0.4 MG/ML
INJECTION, SOLUTION INTRAMUSCULAR; INTRAVENOUS; SUBCUTANEOUS PRN
Status: DISCONTINUED | OUTPATIENT
Start: 2025-06-19 | End: 2025-06-19 | Stop reason: HOSPADM

## 2025-06-19 RX ORDER — ONDANSETRON 2 MG/ML
4 INJECTION INTRAMUSCULAR; INTRAVENOUS
Status: DISCONTINUED | OUTPATIENT
Start: 2025-06-19 | End: 2025-06-19 | Stop reason: HOSPADM

## 2025-06-19 RX ORDER — SODIUM CHLORIDE 0.9 % (FLUSH) 0.9 %
5-40 SYRINGE (ML) INJECTION PRN
Status: CANCELLED | OUTPATIENT
Start: 2025-06-19

## 2025-06-19 RX ORDER — DEXTROSE MONOHYDRATE 100 MG/ML
INJECTION, SOLUTION INTRAVENOUS CONTINUOUS PRN
Status: DISCONTINUED | OUTPATIENT
Start: 2025-06-19 | End: 2025-06-19 | Stop reason: HOSPADM

## 2025-06-19 RX ORDER — SPIRONOLACTONE 25 MG/1
25 TABLET ORAL DAILY
Status: DISCONTINUED | OUTPATIENT
Start: 2025-06-19 | End: 2025-06-20 | Stop reason: HOSPADM

## 2025-06-19 RX ORDER — M-VIT,TX,IRON,MINS/CALC/FOLIC 27MG-0.4MG
1 TABLET ORAL DAILY
Status: DISCONTINUED | OUTPATIENT
Start: 2025-06-19 | End: 2025-06-20 | Stop reason: HOSPADM

## 2025-06-19 RX ORDER — FENTANYL CITRATE 50 UG/ML
25 INJECTION, SOLUTION INTRAMUSCULAR; INTRAVENOUS EVERY 5 MIN PRN
Status: DISCONTINUED | OUTPATIENT
Start: 2025-06-19 | End: 2025-06-19 | Stop reason: HOSPADM

## 2025-06-19 RX ORDER — METOPROLOL SUCCINATE 25 MG/1
25 TABLET, EXTENDED RELEASE ORAL DAILY
Status: DISCONTINUED | OUTPATIENT
Start: 2025-06-19 | End: 2025-06-20 | Stop reason: HOSPADM

## 2025-06-19 RX ORDER — HYDROMORPHONE HYDROCHLORIDE 2 MG/ML
0.5 INJECTION, SOLUTION INTRAMUSCULAR; INTRAVENOUS; SUBCUTANEOUS EVERY 5 MIN PRN
Status: DISCONTINUED | OUTPATIENT
Start: 2025-06-19 | End: 2025-06-19 | Stop reason: HOSPADM

## 2025-06-19 RX ORDER — GLYCOPYRROLATE 0.2 MG/ML
INJECTION INTRAMUSCULAR; INTRAVENOUS
Status: DISCONTINUED | OUTPATIENT
Start: 2025-06-19 | End: 2025-06-19 | Stop reason: SDUPTHER

## 2025-06-19 RX ORDER — ONDANSETRON 2 MG/ML
INJECTION INTRAMUSCULAR; INTRAVENOUS
Status: DISCONTINUED | OUTPATIENT
Start: 2025-06-19 | End: 2025-06-19 | Stop reason: SDUPTHER

## 2025-06-19 RX ORDER — GLUCAGON 1 MG/ML
1 KIT INJECTION PRN
Status: DISCONTINUED | OUTPATIENT
Start: 2025-06-19 | End: 2025-06-19 | Stop reason: HOSPADM

## 2025-06-19 RX ORDER — OXYCODONE AND ACETAMINOPHEN 7.5; 325 MG/1; MG/1
1 TABLET ORAL EVERY 4 HOURS PRN
Status: DISCONTINUED | OUTPATIENT
Start: 2025-06-19 | End: 2025-06-20 | Stop reason: HOSPADM

## 2025-06-19 RX ORDER — GABAPENTIN 300 MG/1
600 CAPSULE ORAL 3 TIMES DAILY
Status: DISCONTINUED | OUTPATIENT
Start: 2025-06-19 | End: 2025-06-20 | Stop reason: HOSPADM

## 2025-06-19 RX ORDER — SODIUM CHLORIDE 0.9 % (FLUSH) 0.9 %
5-40 SYRINGE (ML) INJECTION EVERY 12 HOURS SCHEDULED
Status: DISCONTINUED | OUTPATIENT
Start: 2025-06-19 | End: 2025-06-19 | Stop reason: HOSPADM

## 2025-06-19 RX ORDER — DEXAMETHASONE SODIUM PHOSPHATE 4 MG/ML
INJECTION, SOLUTION INTRA-ARTICULAR; INTRALESIONAL; INTRAMUSCULAR; INTRAVENOUS; SOFT TISSUE
Status: DISCONTINUED | OUTPATIENT
Start: 2025-06-19 | End: 2025-06-19 | Stop reason: SDUPTHER

## 2025-06-19 RX ORDER — MEPERIDINE HYDROCHLORIDE 25 MG/ML
12.5 INJECTION INTRAMUSCULAR; INTRAVENOUS; SUBCUTANEOUS EVERY 5 MIN PRN
Status: DISCONTINUED | OUTPATIENT
Start: 2025-06-19 | End: 2025-06-19 | Stop reason: HOSPADM

## 2025-06-19 RX ORDER — SODIUM CHLORIDE 0.9 % (FLUSH) 0.9 %
5-40 SYRINGE (ML) INJECTION PRN
Status: DISCONTINUED | OUTPATIENT
Start: 2025-06-19 | End: 2025-06-20 | Stop reason: HOSPADM

## 2025-06-19 RX ORDER — SODIUM CHLORIDE 0.9 % (FLUSH) 0.9 %
5-40 SYRINGE (ML) INJECTION PRN
Status: DISCONTINUED | OUTPATIENT
Start: 2025-06-19 | End: 2025-06-19 | Stop reason: HOSPADM

## 2025-06-19 RX ORDER — ALPRAZOLAM 0.25 MG
0.25 TABLET ORAL NIGHTLY PRN
Status: DISCONTINUED | OUTPATIENT
Start: 2025-06-19 | End: 2025-06-20 | Stop reason: HOSPADM

## 2025-06-19 RX ORDER — DIPHENHYDRAMINE HYDROCHLORIDE 50 MG/ML
12.5 INJECTION, SOLUTION INTRAMUSCULAR; INTRAVENOUS
Status: DISCONTINUED | OUTPATIENT
Start: 2025-06-19 | End: 2025-06-19 | Stop reason: HOSPADM

## 2025-06-19 RX ORDER — ESCITALOPRAM OXALATE 10 MG/1
10 TABLET ORAL DAILY
Status: DISCONTINUED | OUTPATIENT
Start: 2025-06-19 | End: 2025-06-20 | Stop reason: HOSPADM

## 2025-06-19 RX ORDER — ROCURONIUM BROMIDE 10 MG/ML
INJECTION, SOLUTION INTRAVENOUS
Status: DISCONTINUED | OUTPATIENT
Start: 2025-06-19 | End: 2025-06-19 | Stop reason: SDUPTHER

## 2025-06-19 RX ORDER — HYDROXYCHLOROQUINE SULFATE 200 MG/1
200 TABLET, FILM COATED ORAL 2 TIMES DAILY
Status: DISCONTINUED | OUTPATIENT
Start: 2025-06-19 | End: 2025-06-20 | Stop reason: HOSPADM

## 2025-06-19 RX ORDER — PANTOPRAZOLE SODIUM 40 MG/1
40 TABLET, DELAYED RELEASE ORAL DAILY
Status: DISCONTINUED | OUTPATIENT
Start: 2025-06-19 | End: 2025-06-20 | Stop reason: HOSPADM

## 2025-06-19 RX ORDER — SODIUM CHLORIDE 9 MG/ML
INJECTION, SOLUTION INTRAVENOUS PRN
Status: DISCONTINUED | OUTPATIENT
Start: 2025-06-19 | End: 2025-06-19 | Stop reason: HOSPADM

## 2025-06-19 RX ORDER — FUROSEMIDE 20 MG/1
20 TABLET ORAL DAILY
Status: DISCONTINUED | OUTPATIENT
Start: 2025-06-19 | End: 2025-06-20 | Stop reason: HOSPADM

## 2025-06-19 RX ORDER — PROPOFOL 10 MG/ML
INJECTION, EMULSION INTRAVENOUS
Status: DISCONTINUED | OUTPATIENT
Start: 2025-06-19 | End: 2025-06-19 | Stop reason: SDUPTHER

## 2025-06-19 RX ORDER — TRAZODONE HYDROCHLORIDE 50 MG/1
50 TABLET ORAL NIGHTLY
Status: DISCONTINUED | OUTPATIENT
Start: 2025-06-19 | End: 2025-06-20 | Stop reason: HOSPADM

## 2025-06-19 RX ORDER — THYROID 30 MG/1
90 TABLET ORAL DAILY
Status: DISCONTINUED | OUTPATIENT
Start: 2025-06-19 | End: 2025-06-20 | Stop reason: HOSPADM

## 2025-06-19 RX ORDER — ACETAMINOPHEN 325 MG/1
650 TABLET ORAL EVERY 6 HOURS PRN
Status: DISCONTINUED | OUTPATIENT
Start: 2025-06-19 | End: 2025-06-20 | Stop reason: HOSPADM

## 2025-06-19 RX ORDER — FENTANYL CITRATE 50 UG/ML
INJECTION, SOLUTION INTRAMUSCULAR; INTRAVENOUS
Status: DISCONTINUED | OUTPATIENT
Start: 2025-06-19 | End: 2025-06-19 | Stop reason: SDUPTHER

## 2025-06-19 RX ORDER — LANOLIN ALCOHOL/MO/W.PET/CERES
1000 CREAM (GRAM) TOPICAL DAILY
Status: DISCONTINUED | OUTPATIENT
Start: 2025-06-19 | End: 2025-06-20 | Stop reason: HOSPADM

## 2025-06-19 RX ORDER — DROPERIDOL 2.5 MG/ML
0.62 INJECTION, SOLUTION INTRAMUSCULAR; INTRAVENOUS
Status: DISCONTINUED | OUTPATIENT
Start: 2025-06-19 | End: 2025-06-19 | Stop reason: HOSPADM

## 2025-06-19 RX ORDER — SODIUM CHLORIDE 0.9 % (FLUSH) 0.9 %
5-40 SYRINGE (ML) INJECTION EVERY 12 HOURS SCHEDULED
Status: CANCELLED | OUTPATIENT
Start: 2025-06-19

## 2025-06-19 RX ORDER — SODIUM CHLORIDE 9 MG/ML
INJECTION, SOLUTION INTRAVENOUS PRN
Status: CANCELLED | OUTPATIENT
Start: 2025-06-19

## 2025-06-19 RX ORDER — LIDOCAINE HYDROCHLORIDE 20 MG/ML
INJECTION, SOLUTION EPIDURAL; INFILTRATION; INTRACAUDAL; PERINEURAL
Status: DISCONTINUED | OUTPATIENT
Start: 2025-06-19 | End: 2025-06-19 | Stop reason: SDUPTHER

## 2025-06-19 RX ADMIN — METOPROLOL SUCCINATE 25 MG: 25 TABLET, FILM COATED, EXTENDED RELEASE ORAL at 17:56

## 2025-06-19 RX ADMIN — FENTANYL CITRATE 50 MCG: 50 INJECTION, SOLUTION INTRAMUSCULAR; INTRAVENOUS at 12:31

## 2025-06-19 RX ADMIN — TRAZODONE HYDROCHLORIDE 50 MG: 50 TABLET ORAL at 23:52

## 2025-06-19 RX ADMIN — PHENYLEPHRINE HYDROCHLORIDE 100 MCG: 10 INJECTION INTRAVENOUS at 11:09

## 2025-06-19 RX ADMIN — HEPARIN SODIUM 14000 UNITS: 1000 INJECTION INTRAVENOUS; SUBCUTANEOUS at 11:13

## 2025-06-19 RX ADMIN — PHENYLEPHRINE HYDROCHLORIDE 20 MCG/MIN: 10 INJECTION INTRAVENOUS at 11:09

## 2025-06-19 RX ADMIN — PROPOFOL 120 MG: 10 INJECTION, EMULSION INTRAVENOUS at 10:47

## 2025-06-19 RX ADMIN — GLYCOPYRROLATE 0.4 MG: 0.2 INJECTION, SOLUTION INTRAMUSCULAR; INTRAVENOUS at 11:54

## 2025-06-19 RX ADMIN — SUGAMMADEX 200 MG: 100 INJECTION, SOLUTION INTRAVENOUS at 12:41

## 2025-06-19 RX ADMIN — OXYCODONE HYDROCHLORIDE AND ACETAMINOPHEN 1 TABLET: 7.5; 325 TABLET ORAL at 21:54

## 2025-06-19 RX ADMIN — HEPARIN SODIUM 2000 UNITS: 1000 INJECTION INTRAVENOUS; SUBCUTANEOUS at 11:52

## 2025-06-19 RX ADMIN — DEXAMETHASONE SODIUM PHOSPHATE 4 MG: 4 INJECTION, SOLUTION INTRAMUSCULAR; INTRAVENOUS at 10:53

## 2025-06-19 RX ADMIN — SODIUM CHLORIDE, PRESERVATIVE FREE 10 ML: 5 INJECTION INTRAVENOUS at 21:51

## 2025-06-19 RX ADMIN — GABAPENTIN 600 MG: 300 CAPSULE ORAL at 19:56

## 2025-06-19 RX ADMIN — ISOPROTERENOL HYDROCHLORIDE 2 MCG/MIN: 0.2 INJECTION, SOLUTION INTRAMUSCULAR; INTRAVENOUS at 11:42

## 2025-06-19 RX ADMIN — FENTANYL CITRATE 50 MCG: 50 INJECTION, SOLUTION INTRAMUSCULAR; INTRAVENOUS at 11:25

## 2025-06-19 RX ADMIN — GABAPENTIN 600 MG: 300 CAPSULE ORAL at 17:19

## 2025-06-19 RX ADMIN — HEPARIN SODIUM 4000 UNITS: 1000 INJECTION INTRAVENOUS; SUBCUTANEOUS at 11:33

## 2025-06-19 RX ADMIN — ROCURONIUM BROMIDE 20 MG: 10 INJECTION, SOLUTION INTRAVENOUS at 11:02

## 2025-06-19 RX ADMIN — TIZANIDINE 4 MG: 4 TABLET ORAL at 20:00

## 2025-06-19 RX ADMIN — HYDROXYCHLOROQUINE SULFATE 200 MG: 200 TABLET ORAL at 19:56

## 2025-06-19 RX ADMIN — LIDOCAINE HYDROCHLORIDE 100 MG: 20 INJECTION, SOLUTION EPIDURAL; INFILTRATION; INTRACAUDAL; PERINEURAL at 10:47

## 2025-06-19 RX ADMIN — Medication 10 ML: at 19:56

## 2025-06-19 RX ADMIN — OXYCODONE HYDROCHLORIDE AND ACETAMINOPHEN 1 TABLET: 7.5; 325 TABLET ORAL at 17:19

## 2025-06-19 RX ADMIN — RIVAROXABAN 20 MG: 20 TABLET, FILM COATED ORAL at 19:56

## 2025-06-19 RX ADMIN — ROCURONIUM BROMIDE 50 MG: 10 INJECTION, SOLUTION INTRAVENOUS at 10:47

## 2025-06-19 RX ADMIN — ROCURONIUM BROMIDE 30 MG: 10 INJECTION, SOLUTION INTRAVENOUS at 11:19

## 2025-06-19 RX ADMIN — ONDANSETRON 4 MG: 2 INJECTION INTRAMUSCULAR; INTRAVENOUS at 10:41

## 2025-06-19 ASSESSMENT — PAIN DESCRIPTION - ORIENTATION
ORIENTATION: LOWER
ORIENTATION: LOWER
ORIENTATION: MID

## 2025-06-19 ASSESSMENT — PAIN DESCRIPTION - LOCATION
LOCATION: NECK;BACK
LOCATION: NECK
LOCATION: NECK;BACK

## 2025-06-19 ASSESSMENT — PAIN DESCRIPTION - DESCRIPTORS
DESCRIPTORS: ACHING
DESCRIPTORS: THROBBING
DESCRIPTORS: THROBBING

## 2025-06-19 ASSESSMENT — ENCOUNTER SYMPTOMS: SHORTNESS OF BREATH: 1

## 2025-06-19 ASSESSMENT — PAIN SCALES - GENERAL
PAINLEVEL_OUTOF10: 6
PAINLEVEL_OUTOF10: 7
PAINLEVEL_OUTOF10: 6
PAINLEVEL_OUTOF10: 0
PAINLEVEL_OUTOF10: 0
PAINLEVEL_OUTOF10: 4

## 2025-06-19 ASSESSMENT — PAIN - FUNCTIONAL ASSESSMENT
PAIN_FUNCTIONAL_ASSESSMENT: ACTIVITIES ARE NOT PREVENTED

## 2025-06-19 ASSESSMENT — PAIN DESCRIPTION - PAIN TYPE
TYPE: ACUTE PAIN
TYPE: ACUTE PAIN

## 2025-06-19 NOTE — DISCHARGE INSTRUCTIONS
ABLATION DISCHARGE INSTRUCTIONS:    Care of your puncture site:  Remove bandage 24 hours after the procedure.  May shower in 24 hours but do not sit in a bathtub/pool of water for 5 days or until the wound is healed.  Inspect the site daily and gently clean using soap and water while standing in the shower.  Dry thoroughly and apply a Band-Aid that covers the entire site. Do not apply powder or lotion.    Normal Observations:  Soreness or tenderness which may last one week.  Mild oozing from the incision site.  Possible bruising that could last 2 weeks.    Activity:  You may resume driving 24 hours following the procedure.  You may resume normal activity in 3 days or after the wound heals.  Avoid lifting more than 10 pounds for 3 days or until the wound heals.  Avoid strenuous exercise or activity for 1 week.    Nutrition:  Regular diet  Drink at least 8 to 10 glasses of decaffeinated, non-alcoholic fluid for the next 24 hours to flush the x-ray dye used for your angiogram out of your body.    Call your doctor immediately if your condition worsens, for any other concerns, for a follow-up appointment or if you experience any of the following:  Significant bleeding that does not stop after 10 minutes of applying firm pressure on the puncture site.  Increased swelling on the groin or leg.  Unusual pain, numbness, or tingling of the groin or down the leg.  Any signs of infection such as: redness, yellow drainage at the site, swelling or pain.

## 2025-06-19 NOTE — PROGRESS NOTES
Phase 1 discharge criteria met.  HR and rhythm stable, NSR 70.  Right femoral puncture site with figure of 8 suture.  Site is soft and without hematoma or oozing.  Pt holding in pacu awaiting bed availability on 5C.

## 2025-06-19 NOTE — H&P
[facility]   Electrophysiology   CC: PAF   HPI: Kasandra Cantor is a 68 y.o. female  with Hx of HTN,  hypothyroid recovering from COVID 19 Dx on 11/21, was feeling bad 12/15/2020 and noticed her HR was >200.  She was found to be   in atrial flutter and today she is in Atrial fibrillation. Did not convert with adenosine    She has Hx of palpitations but only a few seconds at a time. LHC 12/17/2020 showed normal Cors.  S/P DCCV 12/17/2020.     02/25/2021 s/p Successful DCCV     She was diagnosed with Covid 11/2021-  presetented to ER with atrial fibrillation. Cardizem increased      S/p RFCA Atrial fibrillation with PVI 03/02/2022 11/09/2022  Continued c/o SOB.  Ordered PFT showed moderate obstructive airway disease. She was referred to pulmonary.     Covid + 02/2023 04/2023 - pelvis fracture with recurrence of afib during and after surgery.  Cardioversion 05/01/2023 6/18/2024 hospital admission for afib with RVR. S/p CLAUDINE/DCCV    10/11/2024 hospital admission for gangrenous appendix complicated by small splenic laceration. No afib noted on telemetry, one episode of atrial tachycardia lasting 6.2 seconds    Interval History:     Kasandra presents today for follow up. Patient denies lightheadedness, dizziness, chest pain, palpitations, orthopnea, edema, presyncope or syncope.  She has been doing well.  She still has some abdominal discomfort.    Assessment and plan:  Paroxysmal atrial fibrillation    -EKG today shows Atrial fibrillation    -[place] on monitor 11/2022 - 0%    -S/p RFCA atrial fibrillation with PVI 03/02/2022    -S/p successful DCCV 12/2020, 05/01/2023 at     -S/p DCCV 6/19/2024    -Patient has a [id number] Score of 3 ( age, gender,HTN)   -Continue Xarelto 20 mg daily. Creatinine clearance of 92 ml/ min based on creatinine of  0.7 on 10/19/2024  -Continue Toprol 25 mg daily   -TSH     -She had A-fib in the context of severe extreme stress/illness. Continue to monitor for    Component Value Date    TSHFT4 1.50 02/23/2022    TSH 1.03 06/19/2024         Physical Examination:  Vitals:    12/11/24 1444   BP: 122/64   Pulse: 64            Wt Readings from Last 3 Encounters:   12/11/24 79.9 kg (176 lb 3.2 oz)   11/20/24 77.8 kg (171 lb 9.6 oz)   10/29/24 77.1 kg (170 lb)       Constitutional: Oriented. No distress.   Head: Normocephalic and atraumatic.   Mouth/Throat: Oropharynx is clear and moist.   Eyes: Conjunctivae normal. EOM are normal.   Neck: Neck supple. No rigidity. No JVD present.    Cardiovascular: Normal rate, regular rhythm, S1&S2.    Pulmonary/Chest: Bilateral respiratory sounds. No wheezes, No rhonchi.    Abdominal: Soft. Bowel sounds present. No distension, No tenderness.   Musculoskeletal: No tenderness. No edema    Lymphadenopathy: Has no cervical adenopathy.   Neurological: Alert and oriented. Cranial nerve appears intact, No Gross deficit   Skin: Skin is warm and dry. No rash noted.   Psychiatric: Has a normal behavior       Review of System:  [x] Full ROS obtained and negative except as mentioned in HPI    Prior to Admission medications    Medication Sig Start Date End Date Taking? Authorizing Provider   glycopyrrolate-formoterol (BEVESPI AEROSPHERE) 9-4.8 MCG/ACT AERO Inhale 2 puffs into the lungs 2 times daily 9/13/24  Yes Ino Mccrary MD   spironolactone (ALDACTONE) 25 MG tablet Take 1 tablet by mouth daily 7/18/24  Yes Khang Lazaro MD   empagliflozin (JARDIANCE) 10 MG tablet Take 1 tablet by mouth daily 7/18/24  Yes Khang Lazaro MD   furosemide (LASIX) 20 MG tablet Take 1 tablet by mouth daily 7/18/24  Yes Khang Lazaro MD   cetirizine (ZYRTEC) 10 MG tablet Take 1 tablet by mouth daily 6/21/24  Yes Nati Holguin APRN - CNP   escitalopram (LEXAPRO) 20 MG tablet Take 0.5 tablets by mouth daily 6/20/24  Yes Nati Holguin APRN - CNP   metoprolol succinate (TOPROL XL) 25 MG extended release tablet TAKE 1 TABLET BY MOUTH DAILY

## 2025-06-19 NOTE — PROCEDURES
[facility]     Electrophysiology Procedure Note       Date of Procedure: 6/19/2025  Patient's Name: Kasandra Cantor  YOB: 1955   Medical Record Number: [id number]  Referring Physician: Tio Gonzalez MD  Procedure Performed by: Moody Paul MD    Procedure performed:     Electrophysiology study with left atrial recording and mapping   3-D electroanatomical mapping of the left atrium and  right atium.    Electrophysiological study(EPS) and induction after IV drug infusion  Transseptal puncture through an intact septum .   Intracardiac echocardiography.   Ablation of roof dependent left atrial flutter  Additional ablation/of left atrial flutter involving anterior scar as a separate mechanism  Additional ablation of focal atrial tachycardia from anterior left faith as a separate mechanism   LV pacing and sensing     Deployment of closure device on all   access sites.           Indications for procedure:   Symptomatic atrial fibrillation, failed antiarrhythmic therapy and cardioversion in the past   Kasandra Cantor is a 69 y.o. female   Due to failure of antiarrhythmic therapy in the past, patient has been scheduled to have ablation for symptomatic atrial fibrillation.     Details of Procedure:   The risks, benefits and alternatives of the ablation procedure were discussed with the patient. The risks including, but not limited to, the risks of bleeding, infection, radiation exposure, injury to vascular, cardiac and surrounding structures (including pneumothorax), stroke, cardiac perforation, tamponade, need for emergent open heart surgery, need for pacemaker implantation, esophageal injury and fistula, myocardial infarction and death were discussed in detail. The patient opted to proceed with the ablation. Written informed consent was signed and placed in the chart.     Patient was brought to the EP lab in a fasting non-sedate state. Patient underwent general anesthesia by

## 2025-06-19 NOTE — PROGRESS NOTES
Pt admit to pacu from cath lab, awake and alert.  HR and rhythm stable, NSR, 70.  Right femoral puncture site with figure of 8 suture.  Site is soft and without hematoma or oozing.  RLE neuro checks complete and WDL.  Will monitor

## 2025-06-19 NOTE — ANESTHESIA POSTPROCEDURE EVALUATION
Department of Anesthesiology  Postprocedure Note    Patient: Kasandra Cantor  MRN: [id number]  YOB: 1955  Date of evaluation: 6/19/2025    Procedure Summary       Date: 06/19/25 Room / Location: [facility] EP LAB 3 / [facility] CARDIAC CATH LAB    Anesthesia Start: 1041 Anesthesia Stop: 1303    Procedure: Ablation A-fib w complete ep study Diagnosis:       Paroxysmal atrial fibrillation (HCC)      (Paroxysmal atrial fibrillation (HCC) [I48.0])    Providers: Moody Paul MD Responsible Provider: Everette Maldonado MD    Anesthesia Type: general ASA Status: 4            Anesthesia Type: No value filed.    Erin Phase I: Erin Score: 10    Erin Phase II:      Anesthesia Post Evaluation    Patient location during evaluation: PACU  Patient participation: complete - patient participated  Level of consciousness: awake  Pain score: 0  Airway patency: patent  Nausea & Vomiting: no nausea and no vomiting  Cardiovascular status: hemodynamically stable  Respiratory status: nasal cannula  Hydration status: euvolemic  Pain management: adequate    There were no known notable events for this encounter.

## 2025-06-19 NOTE — PROGRESS NOTES
Pt unable to urinate. MD okay to bladder scan and cath per protocol. Bladder scan 911. Straight cath emptied 850cc. Figure 8 removed. Dstat and transparent dressing in place.

## 2025-06-19 NOTE — ANESTHESIA PRE PROCEDURE
INDUCED SLEEP ENDOSCOPY performed by Charan Gilmore DO at [facility] OR    HYSTERECTOMY (CERVIX STATUS UNKNOWN)      partial  age 36    JOINT REPLACEMENT Left 04/2023    thr    LAPAROSCOPIC APPENDECTOMY N/A 10/11/2024    LAPAROSCOPIC  APPENDECTOMY performed by Chacho Iraheta MD at [facility] OR    LAPAROSCOPY N/A 10/13/2024    DIAGNOSTIC LAPAROSCOPY WITH ABDOMINAL WASHOUT AND HEMOSTATIC AGENT APPLICATION performed by Chacho Iraheta MD at [facility]    LAPAROTOMY N/A 10/13/2024    LAPAROTOMY EXPLORATORY RETURN TO OR FOR ABDOMINAL BLEEDING performed by Chacho Iraheta MD at [facility] OR    PARTIAL HYSTERECTOMY (CERVIX NOT REMOVED)      SHOULDER SURGERY      ligament moved up left shoulder    SHOULDER SURGERY Left 1989    STIMULATOR SURGERY Right 2/23/2024    INSPIRE IMPLANT PLACEMENT - RIGHT NECK performed by Charan Gilmore DO at [facility] OR       Social History:    Social History     Tobacco Use    Smoking status: Never     Passive exposure: Past    Smokeless tobacco: Never   Substance Use Topics    Alcohol use: Yes     Alcohol/week: 2.0 standard drinks of alcohol     Types: 2 Cans of beer per week     Comment: socially/2 beers daily                                Counseling given: Not Answered      Vital Signs (Current):   Vitals:    06/19/25 0940   BP: 109/63   Pulse: 65   Resp: 18   Temp: 98 °F (36.7 °C)   SpO2: 98%   Weight: 82.6 kg (182 lb)   Height: 1.6 m (5' 3\")                                              BP Readings from Last 3 Encounters:   06/19/25 109/63   04/16/25 112/70   03/18/25 124/78       NPO Status: Time of last liquid consumption: 2345                        Time of last solid consumption: 1900                        Date of last liquid consumption: 06/18/25                        Date of last solid food consumption: 06/18/25    BMI:   Wt Readings from Last 3 Encounters:   06/19/25 82.6 kg (182 lb)   04/16/25 82.6 kg (182 lb)   03/18/25 79.8 kg (176 lb)     Body mass index is 32.24 kg/m².    CBC:

## 2025-06-20 VITALS
BODY MASS INDEX: 32.02 KG/M2 | WEIGHT: 180.7 LBS | TEMPERATURE: 97.8 F | HEART RATE: 58 BPM | HEIGHT: 63 IN | RESPIRATION RATE: 16 BRPM | OXYGEN SATURATION: 93 % | DIASTOLIC BLOOD PRESSURE: 60 MMHG | SYSTOLIC BLOOD PRESSURE: 95 MMHG

## 2025-06-20 LAB
EKG ATRIAL RATE: 63 BPM
EKG DIAGNOSIS: NORMAL
EKG P AXIS: 77 DEGREES
EKG P-R INTERVAL: 140 MS
EKG Q-T INTERVAL: 482 MS
EKG QRS DURATION: 88 MS
EKG QTC CALCULATION (BAZETT): 493 MS
EKG R AXIS: 56 DEGREES
EKG T AXIS: 65 DEGREES
EKG VENTRICULAR RATE: 63 BPM

## 2025-06-20 PROCEDURE — 94760 N-INVAS EAR/PLS OXIMETRY 1: CPT

## 2025-06-20 PROCEDURE — 99239 HOSP IP/OBS DSCHRG MGMT >30: CPT

## 2025-06-20 PROCEDURE — 6370000000 HC RX 637 (ALT 250 FOR IP): Performed by: INTERNAL MEDICINE

## 2025-06-20 PROCEDURE — 2500000003 HC RX 250 WO HCPCS: Performed by: INTERNAL MEDICINE

## 2025-06-20 PROCEDURE — 94640 AIRWAY INHALATION TREATMENT: CPT

## 2025-06-20 PROCEDURE — 93010 ELECTROCARDIOGRAM REPORT: CPT | Performed by: INTERNAL MEDICINE

## 2025-06-20 RX ADMIN — THYROID 90 MG: 30 TABLET ORAL at 09:20

## 2025-06-20 RX ADMIN — GABAPENTIN 600 MG: 300 CAPSULE ORAL at 09:24

## 2025-06-20 RX ADMIN — Medication 1000 MCG: at 09:23

## 2025-06-20 RX ADMIN — TIZANIDINE 4 MG: 4 TABLET ORAL at 09:24

## 2025-06-20 RX ADMIN — Medication 10 ML: at 09:25

## 2025-06-20 RX ADMIN — ESCITALOPRAM OXALATE 10 MG: 10 TABLET ORAL at 09:23

## 2025-06-20 RX ADMIN — HYDROXYCHLOROQUINE SULFATE 200 MG: 200 TABLET ORAL at 09:24

## 2025-06-20 RX ADMIN — Medication 1 TABLET: at 09:23

## 2025-06-20 RX ADMIN — FUROSEMIDE 20 MG: 20 TABLET ORAL at 09:24

## 2025-06-20 RX ADMIN — TIOTROPIUM BROMIDE AND OLODATEROL 2 PUFF: 3.124; 2.736 SPRAY, METERED RESPIRATORY (INHALATION) at 12:23

## 2025-06-20 RX ADMIN — SPIRONOLACTONE 25 MG: 25 TABLET ORAL at 09:24

## 2025-06-20 RX ADMIN — OXYCODONE HYDROCHLORIDE AND ACETAMINOPHEN 1 TABLET: 7.5; 325 TABLET ORAL at 04:52

## 2025-06-20 RX ADMIN — EMPAGLIFLOZIN 10 MG: 10 TABLET, FILM COATED ORAL at 09:23

## 2025-06-20 RX ADMIN — RIVAROXABAN 20 MG: 20 TABLET, FILM COATED ORAL at 09:23

## 2025-06-20 RX ADMIN — PANTOPRAZOLE SODIUM 40 MG: 40 TABLET, DELAYED RELEASE ORAL at 09:24

## 2025-06-20 ASSESSMENT — PAIN SCALES - GENERAL
PAINLEVEL_OUTOF10: 7
PAINLEVEL_OUTOF10: 0

## 2025-06-20 ASSESSMENT — PAIN - FUNCTIONAL ASSESSMENT: PAIN_FUNCTIONAL_ASSESSMENT: ACTIVITIES ARE NOT PREVENTED

## 2025-06-20 ASSESSMENT — PAIN DESCRIPTION - ORIENTATION: ORIENTATION: LOWER

## 2025-06-20 ASSESSMENT — PAIN DESCRIPTION - LOCATION: LOCATION: NECK;BACK

## 2025-06-20 ASSESSMENT — PAIN DESCRIPTION - PAIN TYPE: TYPE: ACUTE PAIN

## 2025-06-20 ASSESSMENT — PAIN DESCRIPTION - DESCRIPTORS: DESCRIPTORS: THROBBING

## 2025-06-20 NOTE — PLAN OF CARE
Problem: Discharge Planning  Goal: Discharge to home or other facility with appropriate resources  Outcome: Progressing     Problem: Safety - Adult  Goal: Free from fall injury  Outcome: Progressing     Problem: Pain  Goal: Verbalizes/displays adequate comfort level or baseline comfort level  Outcome: Progressing     Problem: Cardiovascular - Adult  Goal: Maintains optimal cardiac output and hemodynamic stability  Outcome: Progressing  Goal: Absence of cardiac dysrhythmias or at baseline  Outcome: Progressing

## 2025-06-20 NOTE — CARE COORDINATION
06/20/25 1016   IMM Letter   IMM Letter given to Patient/Family/Significant other/Guardian/POA/by: IMM given   IMM Letter date given: 06/20/25   IMM Letter time given: 1018

## 2025-06-20 NOTE — PROGRESS NOTES
Data- discharge order received, pt verbalized agreement to discharge, disposition to previous residence, no needs for HHC/DME.     Action- discharge instructions prepared and given to patient, pt verbalized understanding. Medication information packet given r/t NEW and/or CHANGED prescriptions emphasizing name/purpose/side effects, pt verbalized understanding. Discharge instruction summary: Diet- general, Activity- up as tolerated, Primary Care Physician as follows: Tio Gonzalez -[phone] f/u appointment in a week, immunizations reviewed, prescription medications filled pharmacy of choice. Inpatient surgical procedure precautions reviewed: ablation CHF Education reviewed. Pt/ Family has had a total of 60 minutes CHF education this admission encounter.     1. WEIGHT: Admit Weight - Scale: 82.6 kg (182 lb) (06/19/25 0940)        Today  Weight - Scale: 82 kg (180 lb 11.2 oz) (06/20/25 0445)       2. O2 SAT.: SpO2: 93 % (06/20/25 1224)    Response- Pt belongings gathered, IV removed. Disposition is home (no HHC/DME needs), transported with family, taken to lobby via w/c w/ staff, no complications.

## 2025-06-20 NOTE — DISCHARGE SUMMARY
Cardiology Discharge Summary  Electrophysiology  Patient :Kasandra Cantor  Room/Bed: A2U-4988/5918-01  Medical Record: [id number]  YOB: 1955    Admit date: 6/19/2025  Discharge date: 06/20/25     Admitting Physician: Moody Paul MD   Discharge NP: Zena Pereira, APRN - CNP , CNP    Admission Diagnoses: Paroxysmal atrial fibrillation (HCC) [I48.0]  PAF (paroxysmal atrial fibrillation) (HCC) [I48.0]  Discharge Diagnoses: PAF    Discharged Condition: good  Disposition: home  Consults: none    HPI: The patient is a 69 y.o. female with past medical history of Atrial Fibrillation, COPD, hypertension, hypothyroidism and sleep apnea.     S/p RFCA Atrial fibrillation with PVI 03/02/2022 11/09/2022  Continued c/o SOB.  Ordered PFT showed moderate obstructive airway disease. She was referred to pulmonary.      Covid + 02/2023 04/2023 - pelvis fracture with recurrence of afib during and after surgery.  Cardioversion 05/01/2023 6/18/2024 hospital admission for afib with RVR. S/p CLAUDINE/DCCV     10/11/2024 hospital admission for gangrenous appendix complicated by small splenic laceration. No afib noted on telemetry, one episode of atrial tachycardia lasting 6.2 seconds    6/19/2025- S/p Ablation of roof dependent left atrial flutter, Additional ablation/of left atrial flutter involving anterior scar as a separate mechanism and Additional ablation of focal atrial tachycardia from anterior left faith as a separate mechanism     Interval HX: Patient presented for ablation of atrial fibrillation/atrial flutter with Dr. Paul 6/19/2025. She is being seen today for follow up. She is doing well from a cardiac standpoint. Sinus rhythm on telemetry. Right groin site is stable. Area is soft. No oozing or concern for hematoma. She has ambulated in room without difficulty.     Denies having chest pain, palpitations, shortness of breath, edema or dizziness at the time of this visit.    Assessment and

## 2025-06-23 ENCOUNTER — TELEPHONE (OUTPATIENT)
Dept: CARDIOLOGY CLINIC | Age: 70
End: 2025-06-23

## 2025-06-23 LAB — ECHO BSA: 1.92 M2

## 2025-06-23 NOTE — TELEPHONE ENCOUNTER
Kasandra Cantor was called in regards to recent ablation.     Date of Procedure: 6/19/2025    Date of Call: 6/23/2025    Patient denies any complaints at this time.    Sore throat or difficulty swallowing? (This is normal for a couple of days post procedure - if it continues, we want to know).    Ongoing CP? (This is normal for a couple of days post procedure - if it continues, we want to know)    Ongoing SOB? Any s/s of heart failure? Swelling in feet ankles or abdomen? Unable to lie flat in bed? SOB with exertion or all the time?    How does the groin look? Any swelling, bleeding or drainage? (A small lump is ok.)    Remind no heavy lifting >10# for 7 days post procedure.     Discussed blanking period and that it is not abnormal to have intermittent episodes of atrial fibrillation. Advised patient to call if they develop prolonged episodes > 24 hours or new onset of symptoms.      Follow up appointment made with the atrial fibrillation clinic. 9/24/2025 @ 1130 am

## 2025-07-17 ENCOUNTER — OFFICE VISIT (OUTPATIENT)
Dept: CARDIOLOGY CLINIC | Age: 70
End: 2025-07-17
Payer: MEDICARE

## 2025-07-17 VITALS
DIASTOLIC BLOOD PRESSURE: 62 MMHG | WEIGHT: 178.6 LBS | BODY MASS INDEX: 31.64 KG/M2 | SYSTOLIC BLOOD PRESSURE: 128 MMHG | HEIGHT: 63 IN | OXYGEN SATURATION: 95 % | HEART RATE: 71 BPM

## 2025-07-17 DIAGNOSIS — G47.33 OBSTRUCTIVE SLEEP APNEA: ICD-10-CM

## 2025-07-17 DIAGNOSIS — I48.0 PAROXYSMAL ATRIAL FIBRILLATION (HCC): Primary | ICD-10-CM

## 2025-07-17 DIAGNOSIS — I37.1 NONRHEUMATIC PULMONARY VALVE INSUFFICIENCY: ICD-10-CM

## 2025-07-17 DIAGNOSIS — I10 HYPERTENSION, UNSPECIFIED TYPE: ICD-10-CM

## 2025-07-17 DIAGNOSIS — I50.32 CHRONIC HEART FAILURE WITH PRESERVED EJECTION FRACTION (HCC): ICD-10-CM

## 2025-07-17 PROCEDURE — G2211 COMPLEX E/M VISIT ADD ON: HCPCS | Performed by: INTERNAL MEDICINE

## 2025-07-17 PROCEDURE — 3074F SYST BP LT 130 MM HG: CPT | Performed by: INTERNAL MEDICINE

## 2025-07-17 PROCEDURE — 1123F ACP DISCUSS/DSCN MKR DOCD: CPT | Performed by: INTERNAL MEDICINE

## 2025-07-17 PROCEDURE — 1090F PRES/ABSN URINE INCON ASSESS: CPT | Performed by: INTERNAL MEDICINE

## 2025-07-17 PROCEDURE — 1111F DSCHRG MED/CURRENT MED MERGE: CPT | Performed by: INTERNAL MEDICINE

## 2025-07-17 PROCEDURE — 3078F DIAST BP <80 MM HG: CPT | Performed by: INTERNAL MEDICINE

## 2025-07-17 PROCEDURE — 3017F COLORECTAL CA SCREEN DOC REV: CPT | Performed by: INTERNAL MEDICINE

## 2025-07-17 PROCEDURE — G8399 PT W/DXA RESULTS DOCUMENT: HCPCS | Performed by: INTERNAL MEDICINE

## 2025-07-17 PROCEDURE — 1036F TOBACCO NON-USER: CPT | Performed by: INTERNAL MEDICINE

## 2025-07-17 PROCEDURE — 99214 OFFICE O/P EST MOD 30 MIN: CPT | Performed by: INTERNAL MEDICINE

## 2025-07-17 PROCEDURE — G8417 CALC BMI ABV UP PARAM F/U: HCPCS | Performed by: INTERNAL MEDICINE

## 2025-07-17 PROCEDURE — G8427 DOCREV CUR MEDS BY ELIG CLIN: HCPCS | Performed by: INTERNAL MEDICINE

## 2025-07-17 PROCEDURE — 1159F MED LIST DOCD IN RCRD: CPT | Performed by: INTERNAL MEDICINE

## 2025-07-23 RX ORDER — SPIRONOLACTONE 25 MG/1
25 TABLET ORAL DAILY
Qty: 90 TABLET | Refills: 3 | Status: SHIPPED | OUTPATIENT
Start: 2025-07-23

## 2025-07-23 RX ORDER — FUROSEMIDE 20 MG/1
20 TABLET ORAL DAILY
Qty: 90 TABLET | Refills: 3 | Status: SHIPPED | OUTPATIENT
Start: 2025-07-23

## 2025-07-23 NOTE — TELEPHONE ENCOUNTER
Requested Prescriptions     Pending Prescriptions Disp Refills    empagliflozin (JARDIANCE) 10 MG tablet 90 tablet 3     Sig: Take 1 tablet by mouth daily    spironolactone (ALDACTONE) 25 MG tablet 90 tablet 3     Sig: Take 1 tablet by mouth daily    furosemide (LASIX) 20 MG tablet 90 tablet 3     Sig: Take 1 tablet by mouth daily      Last OV: 07/17/2025 [facility]    Next OV: 09/24/2025 [facility]    Last Labs: 10/158/2024 BMP    Last Filled: 07/18/2024 [facility]

## (undated) DEVICE — WIRE GUID DIAG PTFE COAT FIX COR 3MM J TIP .035INX80CM

## (undated) DEVICE — PERCLOSE™ PROSTYLE™ SUTURE-MEDIATED CLOSURE AND REPAIR SYSTEM: Brand: PERCLOSE™ PROSTYLE™

## (undated) DEVICE — CUTTER ENDOSCP L340MM LIN ARTC SGL STROKE FIRING ENDOPATH

## (undated) DEVICE — CATHETER MAP F CRV 3-3-3-3-3 MM SPC GALAXY OCTARAY

## (undated) DEVICE — PULSED FIELD ABLATION CATHETER: Brand: FARAWAVE™

## (undated) DEVICE — TROCAR: Brand: KII FIOS FIRST ENTRY

## (undated) DEVICE — INTRODUCER SHTH DIA6FR CANN L23CM GRN VASC CATH W/O MINI

## (undated) DEVICE — STANDARD HYPODERMIC NEEDLE,POLYPROPYLENE HUB: Brand: MONOJECT

## (undated) DEVICE — TISSUE RETRIEVAL SYSTEM: Brand: INZII RETRIEVAL SYSTEM

## (undated) DEVICE — SUTURE PERMAHAND SZ 3-0 L18IN NONABSORBABLE BLK L26MM SH C013D

## (undated) DEVICE — APPLICATOR LAP 35 CM 2 RIGID VISTASEAL

## (undated) DEVICE — CATH LAB PACK: Brand: MEDLINE INDUSTRIES, INC.

## (undated) DEVICE — STAPLER SKIN H3.9MM WIRE DIA0.58MM CRWN 6.9MM 35 STPL ROT

## (undated) DEVICE — 3M™ TEGADERM™ TRANSPARENT FILM DRESSING FRAME STYLE, 1624W, 2-3/8 IN X 2-3/4 IN (6 CM X 7 CM), 100/CT 4CT/CASE: Brand: 3M™ TEGADERM™

## (undated) DEVICE — SOLUTION IRRIG 1000ML 0.9% SOD CHL USP POUR PLAS BTL

## (undated) DEVICE — WET SKIN PREP TRAY: Brand: MEDLINE INDUSTRIES, INC.

## (undated) DEVICE — PAD ABSRB W8XL10IN ABD HYDROPHOBIC NONWOVEN THCK LAYR CELOS

## (undated) DEVICE — 3M™ STERI-DRAPE™ INSTRUMENT POUCH 1018: Brand: STERI-DRAPE™

## (undated) DEVICE — SPONGE LAP W18XL18IN WHT COT 4 PLY FLD STRUNG RADPQ DISP ST 2 PER PACK

## (undated) DEVICE — PERCUTANEOUS ENTRY THINWALL NEEDLE  ONE-PART: Brand: COOK

## (undated) DEVICE — APPLIER CLP M/L SHFT DIA5MM 15 LIG LIGAMAX 5

## (undated) DEVICE — GENERAL LAPAROSCOPY: Brand: MEDLINE INDUSTRIES, INC.

## (undated) DEVICE — MAJOR SET UP PK

## (undated) DEVICE — 1LYRTR 16FR10ML 100%SILI SNAP: Brand: MEDLINE INDUSTRIES, INC.

## (undated) DEVICE — LIQUIBAND RAPID ADHESIVE 36/CS 0.8ML: Brand: MEDLINE

## (undated) DEVICE — DRAPE,SPLIT ,77X120: Brand: MEDLINE

## (undated) DEVICE — INTENDED TO BE USED TO OCCLUDE, RETRACT AND IDENTIFY ARTERIES, VEINS, TENDONS AND NERVES IN SURGICAL PROCEDURES: Brand: STERION®  VESSEL LOOP

## (undated) DEVICE — SUTURE PDS + SZ 1 L96IN ABSRB VLT L65MM TP-1 1/2 CIR PDP880G

## (undated) DEVICE — STEERABLE SHEATH CLEAR: Brand: FARADRIVE™

## (undated) DEVICE — APPLICATOR MEDICATED 26 CC SOLUTION HI LT ORNG CHLORAPREP

## (undated) DEVICE — 3M™ IOBAN™ 2 ANTIMICROBIAL INCISE DRAPE 6650EZ: Brand: IOBAN™ 2

## (undated) DEVICE — KIT,ANTI FOG,W/SPONGE & FLUID,SOFT PACK: Brand: MEDLINE

## (undated) DEVICE — SUTURE PERMA-HAND SZ 3-0 L18IN NONABSORBABLE BLK RB-1 L17MM C053D

## (undated) DEVICE — WOUND RETRACTOR AND PROTECTOR: Brand: ALEXIS WOUND PROTECTOR-RETRACTOR

## (undated) DEVICE — CORD,CAUTERY,BIPOLAR,STERILE: Brand: MEDLINE

## (undated) DEVICE — PROBE 8225401 5PK SD-SD BIPOL STIM ROHS

## (undated) DEVICE — MAJOR SET UP: Brand: MEDLINE INDUSTRIES, INC.

## (undated) DEVICE — CATHETER EP 5FR L110CM ELECTRD TIP L1MM SPC 2-5-2MM M CRV

## (undated) DEVICE — SYRINGE MED 10ML LUERLOCK TIP W/O SFTY DISP

## (undated) DEVICE — CATHETER CONNECTION CABLE: Brand: FARASTAR™

## (undated) DEVICE — PINNACLE INTRODUCER SHEATH: Brand: PINNACLE

## (undated) DEVICE — FIRST ENTRY KIOS THRD 5X100MM ST

## (undated) DEVICE — INTRODUCER SHTH DIA8FR CANN L23CM BLU VASC CATH W/O MINI

## (undated) DEVICE — 3M™ STERI-DRAPE™ FLUOROSCOPE DRAPE, 10 PER CARTON / 4 CARTONS PER CASE, 1012: Brand: STERI-DRAPE™

## (undated) DEVICE — CORD ES L10FT MPLR LAP

## (undated) DEVICE — LOOP,VESSEL,MAXI,BLUE,2/PK,STERILE: Brand: MEDLINE

## (undated) DEVICE — BLADE ES L6IN ELASTOMERIC COAT INSUL DURABLE BEND UPTO

## (undated) DEVICE — PAD, DEFIB, ADULT, RADIOTRANS, PHYSIO: Brand: MEDLINE

## (undated) DEVICE — PROVE COVER: Brand: UNBRANDED

## (undated) DEVICE — SOLUTION IRRIG 500ML 0.9% SOD CHLO USP POUR PLAS BTL

## (undated) DEVICE — CATHETER IV 20 GAX1 IN N WNG KINK RESIST INSYT AUTOGRD

## (undated) DEVICE — GLOVE ORANGE PI 8 1/2   MSG9085

## (undated) DEVICE — GOWN,AURORA,NONREINF,RAGLAN,XXL,STERILE: Brand: MEDLINE

## (undated) DEVICE — SUTURE PERMAHAND SZ 2-0 L30IN NONABSORBABLE BLK L17MM RB-1 K873H

## (undated) DEVICE — 3M™ RED DOT™ REPOSITIONABLE MONITORING ELECTRODE 2670-5, 5/BAG, 200/CASE, 54/PLT: Brand: RED DOT™

## (undated) DEVICE — SUTURE VCRL + SZ 4-0 L27IN ABSRB UD PS-2 3/8 CIR REV CUT VCP426H

## (undated) DEVICE — REMOTE SLEEP PATIENT CONTROL

## (undated) DEVICE — ELECTRODE 8227304 5PK PRASS PR 18MM ROHS

## (undated) DEVICE — SUTURE VICRYL + SZ 0 L18IN ABSRB CLR VCP112G

## (undated) DEVICE — DILATOR: Brand: COOK

## (undated) DEVICE — CATHETER US 8FR L90CM GRN TIP OVERLAY FOR GE-VIVID I VIVID

## (undated) DEVICE — GAUZE,SPONGE,4"X4",8PLY,STRL,LF,10/TRAY: Brand: MEDLINE

## (undated) DEVICE — PENCIL ES L3M BTTN SWCH S STL HEX LOK BLDE ELECTRD HOLSTER

## (undated) DEVICE — SINGLE USE FLEXIBLE RHINOLARYNGOSCOPE

## (undated) DEVICE — Device

## (undated) DEVICE — STERILE POLYISOPRENE POWDER-FREE SURGICAL GLOVES: Brand: PROTEXIS

## (undated) DEVICE — SYRINGE MED 10ML TRNSLUC BRL PLUNG BLK MRK POLYPR CTRL

## (undated) DEVICE — SKIN MARKER,REGULAR TIP WITH RULER AND LABELS: Brand: DEVON

## (undated) DEVICE — GUIDEWIRE VASC L180CM 0035IN 15MM J ROSEN TIP PTFE FIX COR

## (undated) DEVICE — PENCIL SMK EVAC TELSCP 3 M TBNG

## (undated) DEVICE — PROBE COVER KIT: Brand: MEDLINE INDUSTRIES, INC.

## (undated) DEVICE — TUBING PMP FOR CARTO SYS SMARTABLATE

## (undated) DEVICE — DRESSING,GAUZE,XEROFORM,CURAD,1"X8",ST: Brand: CURAD

## (undated) DEVICE — STERILE POLYISOPRENE POWDER-FREE SURGICAL GLOVES WITH EMOLLIENT COATING: Brand: PROTEXIS

## (undated) DEVICE — BLANKET WRM W29.9XL79.1IN UP BODY FORC AIR MISTRAL-AIR

## (undated) DEVICE — SUTURE PDS + SZ 0 L36IN ABSRB VLT CT 1 L36MM 1 2 CIR PDP346H

## (undated) DEVICE — INTENDED FOR TISSUE SEPARATION, AND OTHER PROCEDURES THAT REQUIRE A SHARP SURGICAL BLADE TO PUNCTURE OR CUT.: Brand: BARD-PARKER ® STAINLESS STEEL BLADES

## (undated) DEVICE — BLADE, TONGUE, 6", STERILE: Brand: MEDLINE

## (undated) DEVICE — SPONGE,PEANUT,XRAY,ST,SM,3/8",5/CARD: Brand: MEDLINE INDUSTRIES, INC.

## (undated) DEVICE — SNAPSECURE FOLEY DEVICE: Brand: MEDLINE

## (undated) DEVICE — GAUZE,SPONGE,4"X4",16PLY,XRAY,STRL,LF: Brand: MEDLINE

## (undated) DEVICE — DRAIN CHN 19FR L0.25IN DIA6.3MM SIL RND HUBLESS FULL FLUT

## (undated) DEVICE — SUTURE VICRYL + SZ 0 L27IN ABSRB VLT L26MM UR-6 5/8 CIR VCP603H

## (undated) DEVICE — STAPLER INT L28CM 60MM 12 FIRING B FRM PWD + ECHELON FLX

## (undated) DEVICE — SUTURE VICRYL + SZ 3-0 L18IN ABSRB UD SH 1/2 CIR TAPERCUT NDL VCP864D

## (undated) DEVICE — PATCH REF EXT FOR CARTO 3 SYS (EA = 6 PACKS)

## (undated) DEVICE — RELOAD STPL H1-2.5X45MM VASC THN TISS WHT 6 ROW B FRM SGL

## (undated) DEVICE — DRAPE: MAGNETIC 12X16 30/CS: Brand: MEDICAL ACTION INDUSTRIES

## (undated) DEVICE — ELECTRODE PT RET AD L9FT HI MOIST COND ADH HYDRGEL CORDED

## (undated) DEVICE — 1 X VERSACROSS CONNECT TRANSSEPTAL DILATOR;  1 X VERSACROSS RF WIRE (INCLUDING 1 X CONNECTOR CABLE (SINGLE USE)): Brand: VERSACROSS CONNECT ACCESS SOLUTION FOR FARADRIVE

## (undated) DEVICE — SUTURE ABSORBABLE L 18 IN SZ 4-0 NDL L 19 MM POLYGLACTIN 910 36/BX

## (undated) DEVICE — BLADE ES ELASTOMERIC COAT INSUL DURABLE BEND UPTO 90DEG

## (undated) DEVICE — COVER LT HNDL BLU PLAS

## (undated) DEVICE — SEALER LAP L37CM MARYLAND JAW OPN NANO COAT MULTIFUNCTIONAL

## (undated) DEVICE — SYRINGE,10ML,TR/FR,MLL,W/RES: Brand: NAMIC

## (undated) DEVICE — SURGICEL ENDOSCP APPL

## (undated) DEVICE — [HIGH FLOW INSUFFLATOR,  DO NOT USE IF PACKAGE IS DAMAGED,  KEEP DRY,  KEEP AWAY FROM SUNLIGHT,  PROTECT FROM HEAT AND RADIOACTIVE SOURCES.]: Brand: PNEUMOSURE

## (undated) DEVICE — PMI DISPOSABLE PUNCTURE CLOSURE DEVICE / SUTURE GRASPER: Brand: PMI

## (undated) DEVICE — TROCARS: Brand: KII® BALLOON BLUNT TIP SYSTEM

## (undated) DEVICE — DRAIN SURG 19FR SIL RND HUBLESS RADPQ W/O TRCR BLAK

## (undated) DEVICE — TOWEL,OR,DSP,ST,BLUE,DLX,10/PK,8PK/CS: Brand: MEDLINE

## (undated) DEVICE — SUTURE MCRYL + SZ 4-0 L27IN ABSRB UD L19MM PS-2 3/8 CIR MCP426H

## (undated) DEVICE — TROCAR: Brand: KII® SLEEVE

## (undated) DEVICE — SYSTEM CLOSURE 6-12 FR VEN VASC VASCADE MVP

## (undated) DEVICE — SUTURE VCRL + SZ 3-0 L18IN ABSRB UD SH 1/2 CIR TAPERCUT NDL VCP864D

## (undated) DEVICE — TOWEL,OR,DSP,ST,BLUE,STD,4/PK,20PK/CS: Brand: MEDLINE